# Patient Record
Sex: MALE | Race: WHITE | NOT HISPANIC OR LATINO
[De-identification: names, ages, dates, MRNs, and addresses within clinical notes are randomized per-mention and may not be internally consistent; named-entity substitution may affect disease eponyms.]

---

## 2017-02-16 ENCOUNTER — RX RENEWAL (OUTPATIENT)
Age: 82
End: 2017-02-16

## 2017-03-11 ENCOUNTER — RX RENEWAL (OUTPATIENT)
Age: 82
End: 2017-03-11

## 2017-04-13 ENCOUNTER — RX RENEWAL (OUTPATIENT)
Age: 82
End: 2017-04-13

## 2017-04-14 DIAGNOSIS — J06.9 ACUTE UPPER RESPIRATORY INFECTION, UNSPECIFIED: ICD-10-CM

## 2017-04-25 ENCOUNTER — APPOINTMENT (OUTPATIENT)
Dept: CARDIOLOGY | Facility: CLINIC | Age: 82
End: 2017-04-25

## 2017-04-25 ENCOUNTER — NON-APPOINTMENT (OUTPATIENT)
Age: 82
End: 2017-04-25

## 2017-04-25 VITALS
BODY MASS INDEX: 28.88 KG/M2 | SYSTOLIC BLOOD PRESSURE: 116 MMHG | HEIGHT: 69 IN | DIASTOLIC BLOOD PRESSURE: 61 MMHG | HEART RATE: 54 BPM | RESPIRATION RATE: 16 BRPM | OXYGEN SATURATION: 100 % | WEIGHT: 195 LBS

## 2017-04-25 RX ORDER — AZITHROMYCIN DIHYDRATE 250 MG/1
250 TABLET, FILM COATED ORAL
Qty: 1 | Refills: 0 | Status: DISCONTINUED | COMMUNITY
Start: 2017-04-14 | End: 2017-04-25

## 2017-04-26 ENCOUNTER — RX RENEWAL (OUTPATIENT)
Age: 82
End: 2017-04-26

## 2017-05-17 ENCOUNTER — RX RENEWAL (OUTPATIENT)
Age: 82
End: 2017-05-17

## 2017-06-05 ENCOUNTER — RX RENEWAL (OUTPATIENT)
Age: 82
End: 2017-06-05

## 2017-08-22 ENCOUNTER — APPOINTMENT (OUTPATIENT)
Dept: CARDIOLOGY | Facility: CLINIC | Age: 82
End: 2017-08-22
Payer: MEDICARE

## 2017-08-22 ENCOUNTER — NON-APPOINTMENT (OUTPATIENT)
Age: 82
End: 2017-08-22

## 2017-08-22 VITALS
HEART RATE: 52 BPM | HEIGHT: 69 IN | BODY MASS INDEX: 29.03 KG/M2 | OXYGEN SATURATION: 99 % | DIASTOLIC BLOOD PRESSURE: 70 MMHG | RESPIRATION RATE: 16 BRPM | SYSTOLIC BLOOD PRESSURE: 119 MMHG | WEIGHT: 196 LBS

## 2017-08-22 PROCEDURE — 93000 ELECTROCARDIOGRAM COMPLETE: CPT

## 2017-08-22 PROCEDURE — 93306 TTE W/DOPPLER COMPLETE: CPT

## 2017-08-22 PROCEDURE — 99215 OFFICE O/P EST HI 40 MIN: CPT

## 2017-08-27 ENCOUNTER — EMERGENCY (EMERGENCY)
Facility: HOSPITAL | Age: 82
LOS: 1 days | Discharge: HOME | End: 2017-08-27
Admitting: INTERNAL MEDICINE
Payer: MEDICARE

## 2017-08-27 PROCEDURE — 70450 CT HEAD/BRAIN W/O DYE: CPT | Mod: 26

## 2017-08-27 PROCEDURE — 71010: CPT | Mod: 26

## 2017-08-27 PROCEDURE — 72125 CT NECK SPINE W/O DYE: CPT

## 2017-08-27 PROCEDURE — 71045 X-RAY EXAM CHEST 1 VIEW: CPT

## 2017-08-27 PROCEDURE — 99284 EMERGENCY DEPT VISIT MOD MDM: CPT

## 2017-08-27 PROCEDURE — 73030 X-RAY EXAM OF SHOULDER: CPT | Mod: 26,RT

## 2017-08-27 PROCEDURE — 70450 CT HEAD/BRAIN W/O DYE: CPT

## 2017-08-27 PROCEDURE — 72125 CT NECK SPINE W/O DYE: CPT | Mod: 26

## 2017-08-27 PROCEDURE — 99284 EMERGENCY DEPT VISIT MOD MDM: CPT | Mod: 25

## 2017-08-27 PROCEDURE — 73030 X-RAY EXAM OF SHOULDER: CPT

## 2017-08-27 PROCEDURE — 72170 X-RAY EXAM OF PELVIS: CPT | Mod: 26

## 2017-08-27 PROCEDURE — 72170 X-RAY EXAM OF PELVIS: CPT

## 2017-12-22 ENCOUNTER — NON-APPOINTMENT (OUTPATIENT)
Age: 82
End: 2017-12-22

## 2017-12-22 ENCOUNTER — APPOINTMENT (OUTPATIENT)
Dept: CARDIOLOGY | Facility: CLINIC | Age: 82
End: 2017-12-22
Payer: MEDICARE

## 2017-12-22 VITALS
HEART RATE: 80 BPM | DIASTOLIC BLOOD PRESSURE: 74 MMHG | WEIGHT: 197 LBS | OXYGEN SATURATION: 99 % | RESPIRATION RATE: 15 BRPM | HEIGHT: 69 IN | BODY MASS INDEX: 29.18 KG/M2 | SYSTOLIC BLOOD PRESSURE: 156 MMHG

## 2017-12-22 PROCEDURE — 99215 OFFICE O/P EST HI 40 MIN: CPT

## 2017-12-22 PROCEDURE — 93000 ELECTROCARDIOGRAM COMPLETE: CPT

## 2017-12-31 ENCOUNTER — EMERGENCY (EMERGENCY)
Facility: HOSPITAL | Age: 82
LOS: 1 days | Discharge: HOME | End: 2017-12-31
Admitting: EMERGENCY MEDICINE
Payer: MEDICARE

## 2017-12-31 PROCEDURE — 99284 EMERGENCY DEPT VISIT MOD MDM: CPT

## 2017-12-31 PROCEDURE — 73562 X-RAY EXAM OF KNEE 3: CPT | Mod: 26,50

## 2017-12-31 PROCEDURE — 72125 CT NECK SPINE W/O DYE: CPT | Mod: 26

## 2017-12-31 PROCEDURE — 99284 EMERGENCY DEPT VISIT MOD MDM: CPT | Mod: 25

## 2017-12-31 PROCEDURE — 71101 X-RAY EXAM UNILAT RIBS/CHEST: CPT

## 2017-12-31 PROCEDURE — 72125 CT NECK SPINE W/O DYE: CPT

## 2017-12-31 PROCEDURE — 73502 X-RAY EXAM HIP UNI 2-3 VIEWS: CPT

## 2017-12-31 PROCEDURE — 73502 X-RAY EXAM HIP UNI 2-3 VIEWS: CPT | Mod: 26,RT

## 2017-12-31 PROCEDURE — 70450 CT HEAD/BRAIN W/O DYE: CPT

## 2017-12-31 PROCEDURE — 71101 X-RAY EXAM UNILAT RIBS/CHEST: CPT | Mod: 26

## 2017-12-31 PROCEDURE — 73562 X-RAY EXAM OF KNEE 3: CPT

## 2017-12-31 PROCEDURE — 70450 CT HEAD/BRAIN W/O DYE: CPT | Mod: 26

## 2018-01-27 ENCOUNTER — INPATIENT (INPATIENT)
Facility: HOSPITAL | Age: 83
LOS: 4 days | Discharge: NURSING, REHAB OR LONG-TERM CARE | DRG: 872 | End: 2018-02-01
Attending: STUDENT IN AN ORGANIZED HEALTH CARE EDUCATION/TRAINING PROGRAM | Admitting: HOSPITALIST
Payer: MEDICARE

## 2018-01-27 DIAGNOSIS — Z88.0 ALLERGY STATUS TO PENICILLIN: ICD-10-CM

## 2018-01-27 DIAGNOSIS — I48.0 PAROXYSMAL ATRIAL FIBRILLATION: ICD-10-CM

## 2018-01-27 DIAGNOSIS — E83.42 HYPOMAGNESEMIA: ICD-10-CM

## 2018-01-27 DIAGNOSIS — N17.9 ACUTE KIDNEY FAILURE, UNSPECIFIED: ICD-10-CM

## 2018-01-27 DIAGNOSIS — A41.9 SEPSIS, UNSPECIFIED ORGANISM: ICD-10-CM

## 2018-01-27 DIAGNOSIS — E87.0 HYPEROSMOLALITY AND HYPERNATREMIA: ICD-10-CM

## 2018-01-27 DIAGNOSIS — M48.00 SPINAL STENOSIS, SITE UNSPECIFIED: ICD-10-CM

## 2018-01-27 DIAGNOSIS — R60.0 LOCALIZED EDEMA: ICD-10-CM

## 2018-01-27 DIAGNOSIS — I10 ESSENTIAL (PRIMARY) HYPERTENSION: ICD-10-CM

## 2018-01-27 DIAGNOSIS — E03.9 HYPOTHYROIDISM, UNSPECIFIED: ICD-10-CM

## 2018-01-27 DIAGNOSIS — N40.0 BENIGN PROSTATIC HYPERPLASIA WITHOUT LOWER URINARY TRACT SYMPTOMS: ICD-10-CM

## 2018-01-27 DIAGNOSIS — K57.32 DIVERTICULITIS OF LARGE INTESTINE WITHOUT PERFORATION OR ABSCESS WITHOUT BLEEDING: ICD-10-CM

## 2018-01-27 DIAGNOSIS — E83.39 OTHER DISORDERS OF PHOSPHORUS METABOLISM: ICD-10-CM

## 2018-01-27 DIAGNOSIS — E87.6 HYPOKALEMIA: ICD-10-CM

## 2018-01-27 DIAGNOSIS — R65.20 SEVERE SEPSIS WITHOUT SEPTIC SHOCK: ICD-10-CM

## 2018-01-27 DIAGNOSIS — M10.9 GOUT, UNSPECIFIED: ICD-10-CM

## 2018-01-27 DIAGNOSIS — K21.9 GASTRO-ESOPHAGEAL REFLUX DISEASE WITHOUT ESOPHAGITIS: ICD-10-CM

## 2018-01-27 PROCEDURE — 74176 CT ABD & PELVIS W/O CONTRAST: CPT | Mod: 26

## 2018-01-27 PROCEDURE — 71046 X-RAY EXAM CHEST 2 VIEWS: CPT | Mod: 26

## 2018-01-27 PROCEDURE — 71045 X-RAY EXAM CHEST 1 VIEW: CPT | Mod: 26,59

## 2018-01-27 PROCEDURE — 99223 1ST HOSP IP/OBS HIGH 75: CPT

## 2018-01-27 PROCEDURE — 93010 ELECTROCARDIOGRAM REPORT: CPT

## 2018-01-28 PROCEDURE — 99232 SBSQ HOSP IP/OBS MODERATE 35: CPT

## 2018-01-29 PROCEDURE — 93970 EXTREMITY STUDY: CPT | Mod: 26

## 2018-01-29 PROCEDURE — 99233 SBSQ HOSP IP/OBS HIGH 50: CPT

## 2018-01-30 PROCEDURE — 71045 X-RAY EXAM CHEST 1 VIEW: CPT | Mod: 26

## 2018-01-30 PROCEDURE — 99233 SBSQ HOSP IP/OBS HIGH 50: CPT

## 2018-01-31 PROCEDURE — 99233 SBSQ HOSP IP/OBS HIGH 50: CPT

## 2018-02-01 PROCEDURE — 80053 COMPREHEN METABOLIC PANEL: CPT

## 2018-02-01 PROCEDURE — 87486 CHLMYD PNEUM DNA AMP PROBE: CPT

## 2018-02-01 PROCEDURE — 81003 URINALYSIS AUTO W/O SCOPE: CPT

## 2018-02-01 PROCEDURE — 74176 CT ABD & PELVIS W/O CONTRAST: CPT

## 2018-02-01 PROCEDURE — 84300 ASSAY OF URINE SODIUM: CPT

## 2018-02-01 PROCEDURE — 85027 COMPLETE CBC AUTOMATED: CPT

## 2018-02-01 PROCEDURE — 85730 THROMBOPLASTIN TIME PARTIAL: CPT

## 2018-02-01 PROCEDURE — 87086 URINE CULTURE/COLONY COUNT: CPT

## 2018-02-01 PROCEDURE — 99239 HOSP IP/OBS DSCHRG MGMT >30: CPT

## 2018-02-01 PROCEDURE — 71045 X-RAY EXAM CHEST 1 VIEW: CPT

## 2018-02-01 PROCEDURE — 87400 INFLUENZA A/B EACH AG IA: CPT

## 2018-02-01 PROCEDURE — 85025 COMPLETE CBC W/AUTO DIFF WBC: CPT

## 2018-02-01 PROCEDURE — 97116 GAIT TRAINING THERAPY: CPT

## 2018-02-01 PROCEDURE — 83605 ASSAY OF LACTIC ACID: CPT

## 2018-02-01 PROCEDURE — 96365 THER/PROPH/DIAG IV INF INIT: CPT

## 2018-02-01 PROCEDURE — 84100 ASSAY OF PHOSPHORUS: CPT

## 2018-02-01 PROCEDURE — 85610 PROTHROMBIN TIME: CPT

## 2018-02-01 PROCEDURE — 93005 ELECTROCARDIOGRAM TRACING: CPT

## 2018-02-01 PROCEDURE — 83036 HEMOGLOBIN GLYCOSYLATED A1C: CPT

## 2018-02-01 PROCEDURE — 80076 HEPATIC FUNCTION PANEL: CPT

## 2018-02-01 PROCEDURE — 87633 RESP VIRUS 12-25 TARGETS: CPT

## 2018-02-01 PROCEDURE — 87040 BLOOD CULTURE FOR BACTERIA: CPT

## 2018-02-01 PROCEDURE — 82570 ASSAY OF URINE CREATININE: CPT

## 2018-02-01 PROCEDURE — 87493 C DIFF AMPLIFIED PROBE: CPT

## 2018-02-01 PROCEDURE — 80048 BASIC METABOLIC PNL TOTAL CA: CPT

## 2018-02-01 PROCEDURE — 93970 EXTREMITY STUDY: CPT

## 2018-02-01 PROCEDURE — 87581 M.PNEUMON DNA AMP PROBE: CPT

## 2018-02-01 PROCEDURE — 83735 ASSAY OF MAGNESIUM: CPT

## 2018-02-01 PROCEDURE — 97163 PT EVAL HIGH COMPLEX 45 MIN: CPT

## 2018-02-01 PROCEDURE — 96367 TX/PROPH/DG ADDL SEQ IV INF: CPT

## 2018-02-01 PROCEDURE — 99285 EMERGENCY DEPT VISIT HI MDM: CPT | Mod: 25

## 2018-02-01 PROCEDURE — 71046 X-RAY EXAM CHEST 2 VIEWS: CPT

## 2018-03-16 ENCOUNTER — EMERGENCY (EMERGENCY)
Facility: HOSPITAL | Age: 83
LOS: 1 days | Discharge: HOME | End: 2018-03-16
Admitting: EMERGENCY MEDICINE
Payer: MEDICARE
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
## 2018-03-16 DIAGNOSIS — S61.511A LACERATION WITHOUT FOREIGN BODY OF RIGHT WRIST, INITIAL ENCOUNTER: ICD-10-CM

## 2018-03-16 DIAGNOSIS — K21.9 GASTRO-ESOPHAGEAL REFLUX DISEASE WITHOUT ESOPHAGITIS: ICD-10-CM
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
## 2018-03-16 DIAGNOSIS — N40.0 BENIGN PROSTATIC HYPERPLASIA WITHOUT LOWER URINARY TRACT SYMPTOMS: ICD-10-CM

## 2018-03-16 DIAGNOSIS — Z79.899 OTHER LONG TERM (CURRENT) DRUG THERAPY: ICD-10-CM

## 2018-03-16 DIAGNOSIS — I10 ESSENTIAL (PRIMARY) HYPERTENSION: ICD-10-CM

## 2018-03-16 DIAGNOSIS — E03.9 HYPOTHYROIDISM, UNSPECIFIED: ICD-10-CM

## 2018-03-16 DIAGNOSIS — Z88.0 ALLERGY STATUS TO PENICILLIN: ICD-10-CM

## 2018-03-16 DIAGNOSIS — I48.91 UNSPECIFIED ATRIAL FIBRILLATION: ICD-10-CM
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
## 2018-03-16 PROCEDURE — 99282 EMERGENCY DEPT VISIT SF MDM: CPT

## 2018-04-24 ENCOUNTER — APPOINTMENT (OUTPATIENT)
Dept: CARDIOLOGY | Facility: CLINIC | Age: 83
End: 2018-04-24
Payer: MEDICARE

## 2018-04-24 ENCOUNTER — NON-APPOINTMENT (OUTPATIENT)
Age: 83
End: 2018-04-24

## 2018-04-24 VITALS
BODY MASS INDEX: 28.58 KG/M2 | HEIGHT: 69 IN | OXYGEN SATURATION: 99 % | RESPIRATION RATE: 17 BRPM | SYSTOLIC BLOOD PRESSURE: 155 MMHG | WEIGHT: 193 LBS | HEART RATE: 62 BPM | DIASTOLIC BLOOD PRESSURE: 68 MMHG

## 2018-04-24 PROCEDURE — 99215 OFFICE O/P EST HI 40 MIN: CPT

## 2018-04-24 PROCEDURE — 93000 ELECTROCARDIOGRAM COMPLETE: CPT

## 2018-08-15 ENCOUNTER — EMERGENCY (EMERGENCY)
Facility: HOSPITAL | Age: 83
LOS: 1 days | Discharge: HOME | End: 2018-08-15
Attending: EMERGENCY MEDICINE | Admitting: EMERGENCY MEDICINE
Payer: MEDICARE

## 2018-08-15 VITALS
DIASTOLIC BLOOD PRESSURE: 68 MMHG | HEART RATE: 92 BPM | TEMPERATURE: 99 F | SYSTOLIC BLOOD PRESSURE: 111 MMHG | OXYGEN SATURATION: 99 % | HEIGHT: 71 IN | RESPIRATION RATE: 18 BRPM | WEIGHT: 190.04 LBS

## 2018-08-15 VITALS
OXYGEN SATURATION: 99 % | DIASTOLIC BLOOD PRESSURE: 70 MMHG | SYSTOLIC BLOOD PRESSURE: 114 MMHG | RESPIRATION RATE: 18 BRPM | TEMPERATURE: 99 F | HEART RATE: 86 BPM

## 2018-08-15 LAB
ALBUMIN SERPL ELPH-MCNC: 3.1 G/DL — LOW (ref 3.3–5)
ALP SERPL-CCNC: 51 U/L — SIGNIFICANT CHANGE UP (ref 40–120)
ALT FLD-CCNC: 24 U/L DA — SIGNIFICANT CHANGE UP (ref 10–45)
ANION GAP SERPL CALC-SCNC: 8 MMOL/L — SIGNIFICANT CHANGE UP (ref 5–17)
APPEARANCE UR: CLEAR — SIGNIFICANT CHANGE UP
AST SERPL-CCNC: 27 U/L — SIGNIFICANT CHANGE UP (ref 10–40)
BACTERIA # UR AUTO: NEGATIVE /HPF — SIGNIFICANT CHANGE UP
BASOPHILS # BLD AUTO: 0.1 K/UL — SIGNIFICANT CHANGE UP (ref 0–0.2)
BASOPHILS NFR BLD AUTO: 1.1 % — SIGNIFICANT CHANGE UP (ref 0–2)
BILIRUB SERPL-MCNC: 0.6 MG/DL — SIGNIFICANT CHANGE UP (ref 0.2–1.2)
BILIRUB UR-MCNC: NEGATIVE — SIGNIFICANT CHANGE UP
BUN SERPL-MCNC: 32 MG/DL — HIGH (ref 7–23)
CALCIUM SERPL-MCNC: 8.7 MG/DL — SIGNIFICANT CHANGE UP (ref 8.4–10.5)
CHLORIDE SERPL-SCNC: 102 MMOL/L — SIGNIFICANT CHANGE UP (ref 96–108)
CO2 SERPL-SCNC: 30 MMOL/L — SIGNIFICANT CHANGE UP (ref 22–31)
COLOR SPEC: YELLOW — SIGNIFICANT CHANGE UP
CREAT SERPL-MCNC: 1.47 MG/DL — HIGH (ref 0.5–1.3)
DIFF PNL FLD: ABNORMAL
EOSINOPHIL # BLD AUTO: 0 K/UL — SIGNIFICANT CHANGE UP (ref 0–0.5)
EOSINOPHIL NFR BLD AUTO: 0.1 % — SIGNIFICANT CHANGE UP (ref 0–6)
EPI CELLS # UR: SIGNIFICANT CHANGE UP
GLUCOSE SERPL-MCNC: 112 MG/DL — HIGH (ref 70–99)
GLUCOSE UR QL: NEGATIVE — SIGNIFICANT CHANGE UP
HCT VFR BLD CALC: 38.4 % — LOW (ref 39–50)
HGB BLD-MCNC: 12.7 G/DL — LOW (ref 13–17)
KETONES UR-MCNC: NEGATIVE — SIGNIFICANT CHANGE UP
LEUKOCYTE ESTERASE UR-ACNC: NEGATIVE — SIGNIFICANT CHANGE UP
LYMPHOCYTES # BLD AUTO: 0.5 K/UL — LOW (ref 1–3.3)
LYMPHOCYTES # BLD AUTO: 9.6 % — LOW (ref 13–44)
MCHC RBC-ENTMCNC: 30.2 PG — SIGNIFICANT CHANGE UP (ref 27–34)
MCHC RBC-ENTMCNC: 33 GM/DL — SIGNIFICANT CHANGE UP (ref 32–36)
MCV RBC AUTO: 91.6 FL — SIGNIFICANT CHANGE UP (ref 80–100)
MONOCYTES # BLD AUTO: 0.6 K/UL — SIGNIFICANT CHANGE UP (ref 0–0.9)
MONOCYTES NFR BLD AUTO: 10 % — HIGH (ref 1–9)
NEUTROPHILS # BLD AUTO: 4.5 K/UL — SIGNIFICANT CHANGE UP (ref 1.8–7.4)
NEUTROPHILS NFR BLD AUTO: 79.2 % — HIGH (ref 43–77)
NITRITE UR-MCNC: NEGATIVE — SIGNIFICANT CHANGE UP
PH UR: 7 — SIGNIFICANT CHANGE UP (ref 5–8)
PLATELET # BLD AUTO: 174 K/UL — SIGNIFICANT CHANGE UP (ref 150–400)
POTASSIUM SERPL-MCNC: 3.7 MMOL/L — SIGNIFICANT CHANGE UP (ref 3.5–5.3)
POTASSIUM SERPL-SCNC: 3.7 MMOL/L — SIGNIFICANT CHANGE UP (ref 3.5–5.3)
PROT SERPL-MCNC: 6.6 G/DL — SIGNIFICANT CHANGE UP (ref 6–8.3)
PROT UR-MCNC: 30 MG/DL
RBC # BLD: 4.19 M/UL — LOW (ref 4.2–5.8)
RBC # FLD: 14.7 % — HIGH (ref 10.3–14.5)
RBC CASTS # UR COMP ASSIST: SIGNIFICANT CHANGE UP /HPF (ref 0–4)
SODIUM SERPL-SCNC: 140 MMOL/L — SIGNIFICANT CHANGE UP (ref 135–145)
SP GR SPEC: 1.01 — SIGNIFICANT CHANGE UP (ref 1.01–1.02)
UROBILINOGEN FLD QL: NEGATIVE — SIGNIFICANT CHANGE UP
WBC # BLD: 5.7 K/UL — SIGNIFICANT CHANGE UP (ref 3.8–10.5)
WBC # FLD AUTO: 5.7 K/UL — SIGNIFICANT CHANGE UP (ref 3.8–10.5)
WBC UR QL: NEGATIVE /HPF — SIGNIFICANT CHANGE UP (ref 0–5)

## 2018-08-15 PROCEDURE — 99284 EMERGENCY DEPT VISIT MOD MDM: CPT

## 2018-08-15 PROCEDURE — 99284 EMERGENCY DEPT VISIT MOD MDM: CPT | Mod: 25

## 2018-08-15 PROCEDURE — 96374 THER/PROPH/DIAG INJ IV PUSH: CPT

## 2018-08-15 PROCEDURE — 85027 COMPLETE CBC AUTOMATED: CPT

## 2018-08-15 PROCEDURE — 96361 HYDRATE IV INFUSION ADD-ON: CPT

## 2018-08-15 PROCEDURE — 81001 URINALYSIS AUTO W/SCOPE: CPT

## 2018-08-15 PROCEDURE — 80053 COMPREHEN METABOLIC PANEL: CPT

## 2018-08-15 RX ORDER — SODIUM CHLORIDE 9 MG/ML
1000 INJECTION INTRAMUSCULAR; INTRAVENOUS; SUBCUTANEOUS ONCE
Qty: 0 | Refills: 0 | Status: COMPLETED | OUTPATIENT
Start: 2018-08-15 | End: 2018-08-15

## 2018-08-15 RX ORDER — KETOROLAC TROMETHAMINE 30 MG/ML
15 SYRINGE (ML) INJECTION ONCE
Qty: 0 | Refills: 0 | Status: DISCONTINUED | OUTPATIENT
Start: 2018-08-15 | End: 2018-08-15

## 2018-08-15 RX ADMIN — SODIUM CHLORIDE 2000 MILLILITER(S): 9 INJECTION INTRAMUSCULAR; INTRAVENOUS; SUBCUTANEOUS at 01:49

## 2018-08-15 RX ADMIN — SODIUM CHLORIDE 1000 MILLILITER(S): 9 INJECTION INTRAMUSCULAR; INTRAVENOUS; SUBCUTANEOUS at 02:35

## 2018-08-15 RX ADMIN — Medication 15 MILLIGRAM(S): at 04:25

## 2018-08-15 RX ADMIN — Medication 15 MILLIGRAM(S): at 03:55

## 2018-08-15 NOTE — ED PROVIDER NOTE - OBJECTIVE STATEMENT
Pertinent PMH/PSH/FHx/SHx and Review of Systems contained within:  89 y/o male BIB ems from assisted living s/o fall from bed. pt slide off bed while trying to get up.

## 2018-08-15 NOTE — ED ADULT TRIAGE NOTE - ARRIVAL FROM
[facility] living [facility]

## 2018-08-15 NOTE — ED ADULT NURSE NOTE - PMH
Dementia BPH (benign prostatic hyperplasia)    CN (constipation)    Dementia    Edema    GERD (gastroesophageal reflux disease)    Gout    Hypothyroidism

## 2018-08-15 NOTE — ED ADULT NURSE NOTE - NSIMPLEMENTINTERV_GEN_ALL_ED
Implemented All Fall with Harm Risk Interventions:  [place] to call system. Call bell, personal items and telephone within reach. Instruct patient to call for assistance. Room bathroom lighting operational. Non-slip footwear when patient is off stretcher. Physically safe environment: no spills, clutter or unnecessary equipment. Stretcher in lowest position, wheels locked, appropriate side rails in place. Provide visual cue, wrist band, yellow gown, etc. Monitor gait and stability. Monitor for mental status changes and reorient to person, place, and time. Review medications for side effects contributing to fall risk. Reinforce activity limits and safety measures with patient and family. Provide visual clues: red socks.

## 2018-08-28 ENCOUNTER — NON-APPOINTMENT (OUTPATIENT)
Age: 83
End: 2018-08-28

## 2018-08-28 ENCOUNTER — APPOINTMENT (OUTPATIENT)
Dept: CARDIOLOGY | Facility: CLINIC | Age: 83
End: 2018-08-28
Payer: MEDICARE

## 2018-08-28 VITALS
DIASTOLIC BLOOD PRESSURE: 64 MMHG | HEIGHT: 69 IN | HEART RATE: 52 BPM | OXYGEN SATURATION: 100 % | WEIGHT: 193 LBS | SYSTOLIC BLOOD PRESSURE: 148 MMHG | RESPIRATION RATE: 17 BRPM | BODY MASS INDEX: 28.58 KG/M2

## 2018-08-28 DIAGNOSIS — R60.0 LOCALIZED EDEMA: ICD-10-CM

## 2018-08-28 PROBLEM — K21.9 GASTRO-ESOPHAGEAL REFLUX DISEASE WITHOUT ESOPHAGITIS: Chronic | Status: ACTIVE | Noted: 2018-08-15

## 2018-08-28 PROBLEM — E03.9 HYPOTHYROIDISM, UNSPECIFIED: Chronic | Status: ACTIVE | Noted: 2018-08-15

## 2018-08-28 PROBLEM — N40.0 BENIGN PROSTATIC HYPERPLASIA WITHOUT LOWER URINARY TRACT SYMPTOMS: Chronic | Status: ACTIVE | Noted: 2018-08-15

## 2018-08-28 PROBLEM — R60.9 EDEMA, UNSPECIFIED: Chronic | Status: ACTIVE | Noted: 2018-08-15

## 2018-08-28 PROBLEM — M10.9 GOUT, UNSPECIFIED: Chronic | Status: ACTIVE | Noted: 2018-08-15

## 2018-08-28 PROBLEM — M19.90 UNSPECIFIED OSTEOARTHRITIS, UNSPECIFIED SITE: Chronic | Status: ACTIVE | Noted: 2018-08-15

## 2018-08-28 PROBLEM — F03.90 UNSPECIFIED DEMENTIA WITHOUT BEHAVIORAL DISTURBANCE: Chronic | Status: ACTIVE | Noted: 2018-08-15

## 2018-08-28 PROBLEM — K59.00 CONSTIPATION, UNSPECIFIED: Chronic | Status: ACTIVE | Noted: 2018-08-15

## 2018-08-28 PROCEDURE — 99214 OFFICE O/P EST MOD 30 MIN: CPT

## 2018-08-28 PROCEDURE — 93306 TTE W/DOPPLER COMPLETE: CPT

## 2018-08-28 PROCEDURE — 93000 ELECTROCARDIOGRAM COMPLETE: CPT

## 2018-11-01 ENCOUNTER — INPATIENT (INPATIENT)
Facility: HOSPITAL | Age: 83
LOS: 4 days | Discharge: NURSING, REHAB OR LONG-TERM CARE | DRG: 392 | End: 2018-11-06
Attending: HOSPITALIST | Admitting: INTERNAL MEDICINE
Payer: MEDICARE

## 2018-11-01 VITALS
TEMPERATURE: 98 F | RESPIRATION RATE: 16 BRPM | WEIGHT: 179.46 LBS | SYSTOLIC BLOOD PRESSURE: 119 MMHG | DIASTOLIC BLOOD PRESSURE: 76 MMHG | HEART RATE: 70 BPM | OXYGEN SATURATION: 98 %

## 2018-11-01 DIAGNOSIS — N40.1 BENIGN PROSTATIC HYPERPLASIA WITH LOWER URINARY TRACT SYMPTOMS: ICD-10-CM

## 2018-11-01 DIAGNOSIS — K57.92 DIVERTICULITIS OF INTESTINE, PART UNSPECIFIED, WITHOUT PERFORATION OR ABSCESS WITHOUT BLEEDING: ICD-10-CM

## 2018-11-01 DIAGNOSIS — M10.9 GOUT, UNSPECIFIED: ICD-10-CM

## 2018-11-01 DIAGNOSIS — E03.9 HYPOTHYROIDISM, UNSPECIFIED: ICD-10-CM

## 2018-11-01 LAB
ALBUMIN SERPL ELPH-MCNC: 3.1 G/DL — LOW (ref 3.3–5)
ALP SERPL-CCNC: 57 U/L — SIGNIFICANT CHANGE UP (ref 40–120)
ALT FLD-CCNC: 23 U/L DA — SIGNIFICANT CHANGE UP (ref 10–45)
ANION GAP SERPL CALC-SCNC: 14 MMOL/L — SIGNIFICANT CHANGE UP (ref 5–17)
AST SERPL-CCNC: 23 U/L — SIGNIFICANT CHANGE UP (ref 10–40)
BASOPHILS # BLD AUTO: 0 K/UL — SIGNIFICANT CHANGE UP (ref 0–0.2)
BASOPHILS NFR BLD AUTO: 0.6 % — SIGNIFICANT CHANGE UP (ref 0–2)
BILIRUB SERPL-MCNC: 0.4 MG/DL — SIGNIFICANT CHANGE UP (ref 0.2–1.2)
BUN SERPL-MCNC: 38 MG/DL — HIGH (ref 7–23)
CALCIUM SERPL-MCNC: 8.9 MG/DL — SIGNIFICANT CHANGE UP (ref 8.4–10.5)
CHLORIDE SERPL-SCNC: 107 MMOL/L — SIGNIFICANT CHANGE UP (ref 96–108)
CO2 SERPL-SCNC: 25 MMOL/L — SIGNIFICANT CHANGE UP (ref 22–31)
CREAT SERPL-MCNC: 1.48 MG/DL — HIGH (ref 0.5–1.3)
EOSINOPHIL # BLD AUTO: 0.1 K/UL — SIGNIFICANT CHANGE UP (ref 0–0.5)
EOSINOPHIL NFR BLD AUTO: 1.2 % — SIGNIFICANT CHANGE UP (ref 0–6)
GLUCOSE SERPL-MCNC: 89 MG/DL — SIGNIFICANT CHANGE UP (ref 70–99)
HCT VFR BLD CALC: 37.2 % — LOW (ref 39–50)
HGB BLD-MCNC: 11.8 G/DL — LOW (ref 13–17)
LYMPHOCYTES # BLD AUTO: 1.1 K/UL — SIGNIFICANT CHANGE UP (ref 1–3.3)
LYMPHOCYTES # BLD AUTO: 17.8 % — SIGNIFICANT CHANGE UP (ref 13–44)
MCHC RBC-ENTMCNC: 29.7 PG — SIGNIFICANT CHANGE UP (ref 27–34)
MCHC RBC-ENTMCNC: 31.7 GM/DL — LOW (ref 32–36)
MCV RBC AUTO: 93.6 FL — SIGNIFICANT CHANGE UP (ref 80–100)
MONOCYTES # BLD AUTO: 0.6 K/UL — SIGNIFICANT CHANGE UP (ref 0–0.9)
MONOCYTES NFR BLD AUTO: 10 % — HIGH (ref 1–9)
NEUTROPHILS # BLD AUTO: 4.5 K/UL — SIGNIFICANT CHANGE UP (ref 1.8–7.4)
NEUTROPHILS NFR BLD AUTO: 70.3 % — SIGNIFICANT CHANGE UP (ref 43–77)
PLATELET # BLD AUTO: 189 K/UL — SIGNIFICANT CHANGE UP (ref 150–400)
POTASSIUM SERPL-MCNC: 3 MMOL/L — LOW (ref 3.5–5.3)
POTASSIUM SERPL-SCNC: 3 MMOL/L — LOW (ref 3.5–5.3)
PROT SERPL-MCNC: 6.7 G/DL — SIGNIFICANT CHANGE UP (ref 6–8.3)
RBC # BLD: 3.98 M/UL — LOW (ref 4.2–5.8)
RBC # FLD: 14.8 % — HIGH (ref 10.3–14.5)
SODIUM SERPL-SCNC: 146 MMOL/L — HIGH (ref 135–145)
WBC # BLD: 6.3 K/UL — SIGNIFICANT CHANGE UP (ref 3.8–10.5)
WBC # FLD AUTO: 6.3 K/UL — SIGNIFICANT CHANGE UP (ref 3.8–10.5)

## 2018-11-01 PROCEDURE — 74176 CT ABD & PELVIS W/O CONTRAST: CPT | Mod: 26

## 2018-11-01 PROCEDURE — 71045 X-RAY EXAM CHEST 1 VIEW: CPT | Mod: 26

## 2018-11-01 PROCEDURE — 99285 EMERGENCY DEPT VISIT HI MDM: CPT

## 2018-11-01 PROCEDURE — 93010 ELECTROCARDIOGRAM REPORT: CPT

## 2018-11-01 PROCEDURE — 99223 1ST HOSP IP/OBS HIGH 75: CPT

## 2018-11-01 RX ORDER — MULTIVIT-MIN/FERROUS GLUCONATE 9 MG/15 ML
1 LIQUID (ML) ORAL DAILY
Qty: 0 | Refills: 0 | Status: DISCONTINUED | OUTPATIENT
Start: 2018-11-01 | End: 2018-11-03

## 2018-11-01 RX ORDER — MAGNESIUM OXIDE 400 MG ORAL TABLET 241.3 MG
400 TABLET ORAL DAILY
Qty: 0 | Refills: 0 | Status: DISCONTINUED | OUTPATIENT
Start: 2018-11-01 | End: 2018-11-06

## 2018-11-01 RX ORDER — METRONIDAZOLE 500 MG
500 TABLET ORAL ONCE
Qty: 0 | Refills: 0 | Status: COMPLETED | OUTPATIENT
Start: 2018-11-01 | End: 2018-11-01

## 2018-11-01 RX ORDER — POLYETHYLENE GLYCOL 3350 17 G/17G
0 POWDER, FOR SOLUTION ORAL
Qty: 0 | Refills: 0 | COMMUNITY

## 2018-11-01 RX ORDER — DOCUSATE SODIUM 100 MG
1 CAPSULE ORAL
Qty: 0 | Refills: 0 | COMMUNITY

## 2018-11-01 RX ORDER — TRAMADOL HYDROCHLORIDE 50 MG/1
50 TABLET ORAL THREE TIMES A DAY
Qty: 0 | Refills: 0 | Status: DISCONTINUED | OUTPATIENT
Start: 2018-11-01 | End: 2018-11-06

## 2018-11-01 RX ORDER — ALLOPURINOL 300 MG
300 TABLET ORAL DAILY
Qty: 0 | Refills: 0 | Status: DISCONTINUED | OUTPATIENT
Start: 2018-11-01 | End: 2018-11-06

## 2018-11-01 RX ORDER — SODIUM CHLORIDE 9 MG/ML
3 INJECTION INTRAMUSCULAR; INTRAVENOUS; SUBCUTANEOUS ONCE
Qty: 0 | Refills: 0 | Status: COMPLETED | OUTPATIENT
Start: 2018-11-01 | End: 2018-11-01

## 2018-11-01 RX ORDER — CHOLECALCIFEROL (VITAMIN D3) 125 MCG
1 CAPSULE ORAL
Qty: 0 | Refills: 0 | COMMUNITY

## 2018-11-01 RX ORDER — ACETAMINOPHEN 500 MG
650 TABLET ORAL EVERY 6 HOURS
Qty: 0 | Refills: 0 | Status: DISCONTINUED | OUTPATIENT
Start: 2018-11-01 | End: 2018-11-06

## 2018-11-01 RX ORDER — PANTOPRAZOLE SODIUM 20 MG/1
40 TABLET, DELAYED RELEASE ORAL
Qty: 0 | Refills: 0 | Status: DISCONTINUED | OUTPATIENT
Start: 2018-11-01 | End: 2018-11-06

## 2018-11-01 RX ORDER — POTASSIUM CHLORIDE 20 MEQ
40 PACKET (EA) ORAL EVERY 4 HOURS
Qty: 0 | Refills: 0 | Status: COMPLETED | OUTPATIENT
Start: 2018-11-01 | End: 2018-11-02

## 2018-11-01 RX ORDER — FINASTERIDE 5 MG/1
5 TABLET, FILM COATED ORAL DAILY
Qty: 0 | Refills: 0 | Status: DISCONTINUED | OUTPATIENT
Start: 2018-11-01 | End: 2018-11-06

## 2018-11-01 RX ORDER — METRONIDAZOLE 500 MG
500 TABLET ORAL EVERY 8 HOURS
Qty: 0 | Refills: 0 | Status: DISCONTINUED | OUTPATIENT
Start: 2018-11-01 | End: 2018-11-05

## 2018-11-01 RX ORDER — LEVOTHYROXINE SODIUM 125 MCG
112 TABLET ORAL DAILY
Qty: 0 | Refills: 0 | Status: DISCONTINUED | OUTPATIENT
Start: 2018-11-01 | End: 2018-11-06

## 2018-11-01 RX ORDER — CIPROFLOXACIN LACTATE 400MG/40ML
400 VIAL (ML) INTRAVENOUS EVERY 12 HOURS
Qty: 0 | Refills: 0 | Status: DISCONTINUED | OUTPATIENT
Start: 2018-11-01 | End: 2018-11-05

## 2018-11-01 RX ORDER — SODIUM CHLORIDE 9 MG/ML
500 INJECTION INTRAMUSCULAR; INTRAVENOUS; SUBCUTANEOUS ONCE
Qty: 0 | Refills: 0 | Status: COMPLETED | OUTPATIENT
Start: 2018-11-01 | End: 2018-11-01

## 2018-11-01 RX ORDER — HEPARIN SODIUM 5000 [USP'U]/ML
5000 INJECTION INTRAVENOUS; SUBCUTANEOUS EVERY 8 HOURS
Qty: 0 | Refills: 0 | Status: DISCONTINUED | OUTPATIENT
Start: 2018-11-01 | End: 2018-11-06

## 2018-11-01 RX ORDER — TAMSULOSIN HYDROCHLORIDE 0.4 MG/1
0.4 CAPSULE ORAL AT BEDTIME
Qty: 0 | Refills: 0 | Status: DISCONTINUED | OUTPATIENT
Start: 2018-11-01 | End: 2018-11-06

## 2018-11-01 RX ADMIN — SODIUM CHLORIDE 500 MILLILITER(S): 9 INJECTION INTRAMUSCULAR; INTRAVENOUS; SUBCUTANEOUS at 18:34

## 2018-11-01 RX ADMIN — SODIUM CHLORIDE 3 MILLILITER(S): 9 INJECTION INTRAMUSCULAR; INTRAVENOUS; SUBCUTANEOUS at 18:30

## 2018-11-01 RX ADMIN — SODIUM CHLORIDE 500 MILLILITER(S): 9 INJECTION INTRAMUSCULAR; INTRAVENOUS; SUBCUTANEOUS at 19:34

## 2018-11-01 RX ADMIN — Medication 650 MILLIGRAM(S): at 23:14

## 2018-11-01 RX ADMIN — Medication 40 MILLIEQUIVALENT(S): at 22:52

## 2018-11-01 RX ADMIN — Medication 500 MILLIGRAM(S): at 22:23

## 2018-11-01 RX ADMIN — Medication 100 MILLIGRAM(S): at 21:23

## 2018-11-01 RX ADMIN — Medication 200 MILLIGRAM(S): at 22:23

## 2018-11-01 NOTE — H&P ADULT - HISTORY OF PRESENT ILLNESS
89 M hx of HTN, PAF not on AC, gout, chronic venous insufficiency, gout, BPH, spinal stenosis pw sx of diarrhea x 4 days. Pt has been averaging about 3-5 loose stools a day associated with occ crampy abd pain and bloating. No fevers, no chills. no nausea or vomiting. no melena no BRBPR. +significant decreased appetite. No cough, CP or SOB.   Ambulates with walker at baseline.  Lives in assisted living and manages his own meds.   states med rec from assisted living is incorrect and no longer on metolazone or prednisone.

## 2018-11-01 NOTE — H&P ADULT - ASSESSMENT
89 M hx of HTN, PAF not on AC, gout, chronic venous insufficiency, gout, BPH, spinal stenosis pw sx of diarrhea with diverticulitis.

## 2018-11-01 NOTE — H&P ADULT - NSHPLABSRESULTS_GEN_ALL_CORE
11.8   6.3   )-----------( 189      ( 01 Nov 2018 18:35 )             37.2       11-01    146<H>  |  107  |  38<H>  ----------------------------<  89  3.0<L>   |  25  |  1.48<H>    Ca    8.9      01 Nov 2018 18:35    TPro  6.7  /  Alb  3.1<L>  /  TBili  0.4  /  DBili  x   /  AST  23  /  ALT  23  /  AlkPhos  57  11-01         LIVER FUNCTIONS - ( 01 Nov 2018 18:35 )  Alb: 3.1 g/dL / Pro: 6.7 g/dL / ALK PHOS: 57 U/L / ALT: 23 U/L DA / AST: 23 U/L / GGT: x           EKG: PENDING      CXR: PENDING    < from: CT Abdomen and Pelvis w/ Oral Cont (11.01.18 @ 20:41) >    IMPRESSION:    Findings suggestive of diverticulitis of the proximal sigmoid colon which   is located in the lower abdomen to the right of midline. There is   inflammatory change extending to the bladder dome. There is no evidence   of perforation or abscess  Small hiatal hernia again seen  Bilateral renal cysts some of which appear complex.    < end of copied text > 11.8   6.3   )-----------( 189      ( 01 Nov 2018 18:35 )             37.2       11-01    146<H>  |  107  |  38<H>  ----------------------------<  89  3.0<L>   |  25  |  1.48<H>    Ca    8.9      01 Nov 2018 18:35    TPro  6.7  /  Alb  3.1<L>  /  TBili  0.4  /  DBili  x   /  AST  23  /  ALT  23  /  AlkPhos  57  11-01         LIVER FUNCTIONS - ( 01 Nov 2018 18:35 )  Alb: 3.1 g/dL / Pro: 6.7 g/dL / ALK PHOS: 57 U/L / ALT: 23 U/L DA / AST: 23 U/L / GGT: x           EKG: PENDING  sinus rhythm at 62bpm RBBB LPFB.       CXR: PENDING  wet read - NAPD    < from: CT Abdomen and Pelvis w/ Oral Cont (11.01.18 @ 20:41) >    IMPRESSION:    Findings suggestive of diverticulitis of the proximal sigmoid colon which   is located in the lower abdomen to the right of midline. There is   inflammatory change extending to the bladder dome. There is no evidence   of perforation or abscess  Small hiatal hernia again seen  Bilateral renal cysts some of which appear complex.    < end of copied text >

## 2018-11-01 NOTE — H&P ADULT - NSHPREVIEWOFSYSTEMS_GEN_ALL_CORE
CONSTITUTIONAL: No fever, weight loss, or fatigue  EYES: No eye pain, visual disturbances, or discharge  ENMT:  No difficulty hearing, tinnitus, vertigo; No sinus or throat pain  NECK: No pain or stiffness  RESPIRATORY: No cough, wheezing, chills or hemoptysis; No shortness of breath  CARDIOVASCULAR: No chest pain, palpitations, dizziness, or leg swelling  GASTROINTESTINAL: + abdominal discomfort and bloating.  No nausea, vomiting, or hematemesis; + diarrhea no  constipation. No melena or hematochezia.  GENITOURINARY: No dysuria, frequency, hematuria, or incontinence  NEUROLOGICAL: No headaches, memory loss, loss of strength, numbness, or tremors  SKIN: No itching, burning, rashes, or lesions   LYMPH NODES: No enlarged glands  ENDOCRINE: No heat or cold intolerance; No hair loss  MUSCULOSKELETAL: No joint pain or swelling; No muscle, back, or extremity pain  PSYCHIATRIC: No depression, anxiety, mood swings, or difficulty sleeping  HEME/LYMPH: No easy bruising, or bleeding gums  ALLERY AND IMMUNOLOGIC: No hives or eczema    IMPROVE VTE Individual Risk Assessment          RISK                                                          Points  [  ] Previous VTE                                                3  [  ] Thrombophilia                                             2  [  2] Lower limb paralysis                                   2        (unable to hold up >15 seconds)    [  ] Current Cancer                                             2         (within 6 months)  [  ] Immobilization > 24 hrs                              1  [  ] ICU/CCU stay > 24 hours                             1  [1  ] Age > 60                                                         1    IMPROVE VTE Score:         [      3   ]    Total Risk Factor Score:    0 - 1:   Consider IPC  >2 - 3:  Thromboprophylaxis required (enoxaparin or SQ heparin)        >4:   High Risk: Thromboprophylaxis required (enoxaparin or SQ heparin), optional add IPC  **If CONTRAINDICATION to enoxaparin or SQ heparin, USE IPCs**

## 2018-11-01 NOTE — H&P ADULT - PROBLEM SELECTOR PLAN 1
cont cipro and Flagyl.   gentle IVFs overnight.   hold lasix for 1-2 days and restart when euvolemic cont cipro and Flagyl.   GI consult  gentle IVFs overnight.   hold lasix for 1-2 days and restart when euvolemic

## 2018-11-01 NOTE — ED ADULT NURSE NOTE - NSIMPLEMENTINTERV_GEN_ALL_ED
Implemented All Fall with Harm Risk Interventions:  [place] to call system. Call bell, personal items and telephone within reach. Instruct patient to call for assistance. Room bathroom lighting operational. Non-slip footwear when patient is off stretcher. Physically safe environment: no spills, clutter or unnecessary equipment. Stretcher in lowest position, wheels locked, appropriate side rails in place. Provide visual cue, wrist band, yellow gown, etc. Monitor gait and stability. Monitor for mental status changes and reorient to person, place, and time. Review medications for side effects contributing to fall risk. Reinforce activity limits and safety measures with patient and family. Provide visual clues: red socks.

## 2018-11-01 NOTE — ED ADULT NURSE REASSESSMENT NOTE - NS ED NURSE REASSESS COMMENT FT1
To ER patient lives in [address], as per patient he had a fall this week and has laceration on right forearm, and also intermittent pain to right side.  Patient here for diarrhea.

## 2018-11-01 NOTE — INPATIENT CERTIFICATION FOR MEDICARE PATIENTS - THE STATUS OF COMORBIDITIES.
2. The status of comorbities. (See ED/admit documents) 70 year old male presents with AMS which is resolved. patient was at an ambulatory surgery center, about to get his left fourth digit removed for osteomyelitis, found to have blood sugar 43.  patient was given amp of d50 with immediate resolution of symptoms.  Pt's last meal was 9pm and was ice cream. Woke up this morning and checked his blood sugar found it to be 221 so he took 5 units of lantus. Pt is also on novolog but states he took lantus because it works better. denies headache, chest pain, nausea/vomiting, fever, cough, dysuria. Finished course of abx yesterday for the osteomyelitis.  States he takes Lantus at night and also 3-5 units in the morning.

## 2018-11-01 NOTE — ED PROVIDER NOTE - PMH
Arthritis    BPH (benign prostatic hyperplasia)    CN (constipation)    Dementia    Edema    GERD (gastroesophageal reflux disease)    Gout    Hypothyroidism

## 2018-11-01 NOTE — ED ADULT NURSE NOTE - NS TRANSFER PATIENT BELONGINGS
Clothing Cell Phone/PDA (specify)/Clothing/1 pair of pants, 1 jacket, 1 pair of shoes, 1 hat, cellphone , 1 shirt

## 2018-11-01 NOTE — ED ADULT NURSE REASSESSMENT NOTE - NS ED NURSE REASSESS COMMENT FT1
Patient received resting on stretcher. Patient denies pain or discomfort at this time. Oral contrast for CT abdomen in progress. Awaiting CT scan. Call bell within reach. Will continue to monitor.

## 2018-11-02 DIAGNOSIS — E03.9 HYPOTHYROIDISM, UNSPECIFIED: ICD-10-CM

## 2018-11-02 DIAGNOSIS — N40.0 BENIGN PROSTATIC HYPERPLASIA WITHOUT LOWER URINARY TRACT SYMPTOMS: ICD-10-CM

## 2018-11-02 DIAGNOSIS — Z29.9 ENCOUNTER FOR PROPHYLACTIC MEASURES, UNSPECIFIED: ICD-10-CM

## 2018-11-02 DIAGNOSIS — R60.9 EDEMA, UNSPECIFIED: ICD-10-CM

## 2018-11-02 DIAGNOSIS — K21.9 GASTRO-ESOPHAGEAL REFLUX DISEASE WITHOUT ESOPHAGITIS: ICD-10-CM

## 2018-11-02 LAB
ALBUMIN SERPL ELPH-MCNC: 1.8 G/DL — LOW (ref 3.3–5)
ALP SERPL-CCNC: 50 U/L — SIGNIFICANT CHANGE UP (ref 40–120)
ALT FLD-CCNC: 19 U/L DA — SIGNIFICANT CHANGE UP (ref 10–45)
ANION GAP SERPL CALC-SCNC: 8 MMOL/L — SIGNIFICANT CHANGE UP (ref 5–17)
AST SERPL-CCNC: 20 U/L — SIGNIFICANT CHANGE UP (ref 10–40)
BASOPHILS # BLD AUTO: 0 K/UL — SIGNIFICANT CHANGE UP (ref 0–0.2)
BASOPHILS NFR BLD AUTO: 0.3 % — SIGNIFICANT CHANGE UP (ref 0–2)
BILIRUB SERPL-MCNC: 0.4 MG/DL — SIGNIFICANT CHANGE UP (ref 0.2–1.2)
BUN SERPL-MCNC: 31 MG/DL — HIGH (ref 7–23)
CALCIUM SERPL-MCNC: 8.1 MG/DL — LOW (ref 8.4–10.5)
CHLORIDE SERPL-SCNC: 110 MMOL/L — HIGH (ref 96–108)
CO2 SERPL-SCNC: 27 MMOL/L — SIGNIFICANT CHANGE UP (ref 22–31)
CREAT SERPL-MCNC: 1.36 MG/DL — HIGH (ref 0.5–1.3)
EOSINOPHIL # BLD AUTO: 0.2 K/UL — SIGNIFICANT CHANGE UP (ref 0–0.5)
EOSINOPHIL NFR BLD AUTO: 3.1 % — SIGNIFICANT CHANGE UP (ref 0–6)
GLUCOSE SERPL-MCNC: 107 MG/DL — HIGH (ref 70–99)
HCT VFR BLD CALC: 34.8 % — LOW (ref 39–50)
HGB BLD-MCNC: 10.7 G/DL — LOW (ref 13–17)
LYMPHOCYTES # BLD AUTO: 0.9 K/UL — LOW (ref 1–3.3)
LYMPHOCYTES # BLD AUTO: 17.2 % — SIGNIFICANT CHANGE UP (ref 13–44)
MAGNESIUM SERPL-MCNC: 1.5 MG/DL — LOW (ref 1.6–2.6)
MCHC RBC-ENTMCNC: 28.8 PG — SIGNIFICANT CHANGE UP (ref 27–34)
MCHC RBC-ENTMCNC: 30.8 GM/DL — LOW (ref 32–36)
MCV RBC AUTO: 93.6 FL — SIGNIFICANT CHANGE UP (ref 80–100)
MONOCYTES # BLD AUTO: 0.5 K/UL — SIGNIFICANT CHANGE UP (ref 0–0.9)
MONOCYTES NFR BLD AUTO: 10.6 % — HIGH (ref 1–9)
NEUTROPHILS # BLD AUTO: 3.5 K/UL — SIGNIFICANT CHANGE UP (ref 1.8–7.4)
NEUTROPHILS NFR BLD AUTO: 68.8 % — SIGNIFICANT CHANGE UP (ref 43–77)
PLATELET # BLD AUTO: 174 K/UL — SIGNIFICANT CHANGE UP (ref 150–400)
POTASSIUM SERPL-MCNC: 3.3 MMOL/L — LOW (ref 3.5–5.3)
POTASSIUM SERPL-SCNC: 3.3 MMOL/L — LOW (ref 3.5–5.3)
PROT SERPL-MCNC: 5.8 G/DL — LOW (ref 6–8.3)
RBC # BLD: 3.71 M/UL — LOW (ref 4.2–5.8)
RBC # FLD: 14.8 % — HIGH (ref 10.3–14.5)
SODIUM SERPL-SCNC: 145 MMOL/L — SIGNIFICANT CHANGE UP (ref 135–145)
TSH SERPL-MCNC: 4.66 UIU/ML — HIGH (ref 0.27–4.2)
WBC # BLD: 5.1 K/UL — SIGNIFICANT CHANGE UP (ref 3.8–10.5)
WBC # FLD AUTO: 5.1 K/UL — SIGNIFICANT CHANGE UP (ref 3.8–10.5)

## 2018-11-02 PROCEDURE — 99233 SBSQ HOSP IP/OBS HIGH 50: CPT

## 2018-11-02 RX ADMIN — Medication 200 MILLIGRAM(S): at 06:52

## 2018-11-02 RX ADMIN — Medication 40 MILLIEQUIVALENT(S): at 01:18

## 2018-11-02 RX ADMIN — Medication 300 MILLIGRAM(S): at 13:25

## 2018-11-02 RX ADMIN — TAMSULOSIN HYDROCHLORIDE 0.4 MILLIGRAM(S): 0.4 CAPSULE ORAL at 22:54

## 2018-11-02 RX ADMIN — TRAMADOL HYDROCHLORIDE 50 MILLIGRAM(S): 50 TABLET ORAL at 06:10

## 2018-11-02 RX ADMIN — Medication 100 MILLIGRAM(S): at 13:26

## 2018-11-02 RX ADMIN — MAGNESIUM OXIDE 400 MG ORAL TABLET 400 MILLIGRAM(S): 241.3 TABLET ORAL at 13:24

## 2018-11-02 RX ADMIN — HEPARIN SODIUM 5000 UNIT(S): 5000 INJECTION INTRAVENOUS; SUBCUTANEOUS at 13:26

## 2018-11-02 RX ADMIN — Medication 650 MILLIGRAM(S): at 00:00

## 2018-11-02 RX ADMIN — FINASTERIDE 5 MILLIGRAM(S): 5 TABLET, FILM COATED ORAL at 13:25

## 2018-11-02 RX ADMIN — TRAMADOL HYDROCHLORIDE 50 MILLIGRAM(S): 50 TABLET ORAL at 23:54

## 2018-11-02 RX ADMIN — TRAMADOL HYDROCHLORIDE 50 MILLIGRAM(S): 50 TABLET ORAL at 05:14

## 2018-11-02 RX ADMIN — Medication 1 TABLET(S): at 13:36

## 2018-11-02 RX ADMIN — Medication 100 MILLIGRAM(S): at 05:05

## 2018-11-02 RX ADMIN — HEPARIN SODIUM 5000 UNIT(S): 5000 INJECTION INTRAVENOUS; SUBCUTANEOUS at 06:52

## 2018-11-02 RX ADMIN — Medication 112 MICROGRAM(S): at 06:52

## 2018-11-02 RX ADMIN — Medication 100 MILLIGRAM(S): at 22:54

## 2018-11-02 RX ADMIN — HEPARIN SODIUM 5000 UNIT(S): 5000 INJECTION INTRAVENOUS; SUBCUTANEOUS at 22:54

## 2018-11-02 RX ADMIN — TRAMADOL HYDROCHLORIDE 50 MILLIGRAM(S): 50 TABLET ORAL at 22:54

## 2018-11-02 RX ADMIN — Medication 200 MILLIGRAM(S): at 17:48

## 2018-11-02 RX ADMIN — PANTOPRAZOLE SODIUM 40 MILLIGRAM(S): 20 TABLET, DELAYED RELEASE ORAL at 06:52

## 2018-11-02 NOTE — PROGRESS NOTE ADULT - PROBLEM SELECTOR PLAN 7
PPI for GI prophylaxis  SQ heparin for DVT prophylaxis  Regular low residue diet as tolerated  OOB as tolerated  Full code status

## 2018-11-02 NOTE — CONSULT NOTE ADULT - SUBJECTIVE AND OBJECTIVE BOX
Patient seen and examined.  Full consult dictated and will be available shortly.    Elderly male with history of diverticulitis now admitted with lower abdominal pain and diarrhea.  CAT Scan Abdomen shows sigmoid diverticulitis.  VSS.  Abdomen soft, nontender at present.    Impression: Diarrhea, sigmoid diverticulitis    Plan:  1. Clear liquid diet  2. Continue Cipro & Flagyl for 10 days

## 2018-11-02 NOTE — PROGRESS NOTE ADULT - SUBJECTIVE AND OBJECTIVE BOX
Patient states he is feeling less bloated today with less abdominal cramping.  Had multiple loose BMs overnight and this morning.  Tolerating PO with good intake, voiding and continent of urine, has remained on bedrest today.    Vital Signs  T(C): 36.3 (02 Nov 2018 06:41), Max: 36.8 (01 Nov 2018 17:48)  T(F): 97.3 (02 Nov 2018 06:41), Max: 98.3 (01 Nov 2018 17:48)  HR: 60 (02 Nov 2018 06:41) (53 - 70)  BP: 121/59 (02 Nov 2018 06:41) (119/76 - 137/55)  BP(mean): --  RR: 15 (02 Nov 2018 06:41) (15 - 17)  SpO2: 99% (02 Nov 2018 06:41) (97% - 100%)    MEDICATIONS  (STANDING):  allopurinol 300 milliGRAM(s) Oral daily  ciprofloxacin   IVPB 400 milliGRAM(s) IV Intermittent every 12 hours  finasteride 5 milliGRAM(s) Oral daily  heparin  Injectable 5000 Unit(s) SubCutaneous every 8 hours  levothyroxine 112 MICROGram(s) Oral daily  magnesium oxide 400 milliGRAM(s) Oral daily  metroNIDAZOLE  IVPB 500 milliGRAM(s) IV Intermittent every 8 hours  multivitamin/minerals 1 Tablet(s) Oral daily  pantoprazole    Tablet 40 milliGRAM(s) Oral before breakfast  tamsulosin 0.4 milliGRAM(s) Oral at bedtime  acetaminophen   Tablet .. 650 milliGRAM(s) Oral every 6 hours PRN Temp greater or equal to 38C (100.4F), Mild Pain (1 - 3)  traMADol 50 milliGRAM(s) Oral three times a day PRN Severe Pain (7 - 10)    Physical Exam  Gen:  Elderly male resting in bed, NAD  ENT:  NC/AT, no JVD noted  Thorax:  Symmetric, no retractions  Lung:  CTA b/l  CV:  S1, S2. RRR  Abd:  Softly distended, tympanic throughout. Non-tender to palpation.  BS normoactive, no masses  Extrem:  Warm, well perfused.  +2 edema in LE's bilaterally  Neuro:  A&O x 3, no gross motor/sensory deficits Patient states he is feeling less bloated today with less abdominal cramping.  Had multiple loose BMs overnight and this morning.  Tolerating PO with good intake, voiding and continent of urine, has remained on bedrest today.    Vital Signs Last 24 Hrs  T(C): 36.3 (02 Nov 2018 06:41), Max: 36.8 (01 Nov 2018 17:48)  T(F): 97.3 (02 Nov 2018 06:41), Max: 98.3 (01 Nov 2018 17:48)  HR: 60 (02 Nov 2018 06:41) (53 - 70)  BP: 121/59 (02 Nov 2018 06:41) (119/76 - 137/55)  RR: 15 (02 Nov 2018 06:41) (15 - 17)  SpO2: 99% RA (02 Nov 2018 06:41) (97% - 100%)    MEDICATIONS  (STANDING):  allopurinol 300 milliGRAM(s) Oral daily  ciprofloxacin   IVPB 400 milliGRAM(s) IV Intermittent every 12 hours  finasteride 5 milliGRAM(s) Oral daily  heparin  Injectable 5000 Unit(s) SubCutaneous every 8 hours  levothyroxine 112 MICROGram(s) Oral daily  magnesium oxide 400 milliGRAM(s) Oral daily  metroNIDAZOLE  IVPB 500 milliGRAM(s) IV Intermittent every 8 hours  multivitamin/minerals 1 Tablet(s) Oral daily  pantoprazole    Tablet 40 milliGRAM(s) Oral before breakfast  tamsulosin 0.4 milliGRAM(s) Oral at bedtime  acetaminophen   Tablet .. 650 milliGRAM(s) Oral every 6 hours PRN Temp greater or equal to 38C (100.4F), Mild Pain (1 - 3)  traMADol 50 milliGRAM(s) Oral three times a day PRN Severe Pain (7 - 10)    Physical Exam  Gen:  Elderly male resting in bed, NAD  ENT:  NC/AT, no JVD noted  Thorax:  Symmetric, no retractions  Lung:  CTA b/l  CV:  S1, S2. RRR  Abd:  Softly distended, tympanic throughout. Non-tender to palpation.  BS normoactive, no masses  Extrem:  Warm, well perfused.  +2 edema in LE's bilaterally  Neuro:  A&O x 3, no gross motor/sensory deficits

## 2018-11-02 NOTE — PROGRESS NOTE ADULT - ATTENDING COMMENTS
I have personally seen and examined patient on the above date.  I discussed the case with DARIEL Davidson and I agree with findings and plan as detailed per note above, which I have amended where appropriate.      Acute diverticulitis, GI following - discussed with Dr. Can.    cont IV antibiotics for now  downgrade diet as patient not tolerating regular diet. I have personally seen and examined patient on the above date.  I discussed the case with DARIEL Davidson and I agree with findings and plan as detailed per note above, which I have amended where appropriate.      Acute diverticulitis, GI following - discussed with Dr. Can.    Hypomagnesemia and Hypokalemia - replete.    cont IV antibiotics for now  downgrade diet as patient not tolerating regular diet.

## 2018-11-03 DIAGNOSIS — E83.42 HYPOMAGNESEMIA: ICD-10-CM

## 2018-11-03 DIAGNOSIS — E87.0 HYPEROSMOLALITY AND HYPERNATREMIA: ICD-10-CM

## 2018-11-03 DIAGNOSIS — E87.6 HYPOKALEMIA: ICD-10-CM

## 2018-11-03 LAB
ANION GAP SERPL CALC-SCNC: 7 MMOL/L — SIGNIFICANT CHANGE UP (ref 5–17)
BUN SERPL-MCNC: 21 MG/DL — SIGNIFICANT CHANGE UP (ref 7–23)
CALCIUM SERPL-MCNC: 8.5 MG/DL — SIGNIFICANT CHANGE UP (ref 8.4–10.5)
CHLORIDE SERPL-SCNC: 113 MMOL/L — HIGH (ref 96–108)
CO2 SERPL-SCNC: 26 MMOL/L — SIGNIFICANT CHANGE UP (ref 22–31)
CREAT SERPL-MCNC: 1.17 MG/DL — SIGNIFICANT CHANGE UP (ref 0.5–1.3)
GLUCOSE SERPL-MCNC: 92 MG/DL — SIGNIFICANT CHANGE UP (ref 70–99)
HCT VFR BLD CALC: 35 % — LOW (ref 39–50)
HGB BLD-MCNC: 11 G/DL — LOW (ref 13–17)
MAGNESIUM SERPL-MCNC: 1.6 MG/DL — SIGNIFICANT CHANGE UP (ref 1.6–2.6)
MCHC RBC-ENTMCNC: 29.4 PG — SIGNIFICANT CHANGE UP (ref 27–34)
MCHC RBC-ENTMCNC: 31.4 GM/DL — LOW (ref 32–36)
MCV RBC AUTO: 93.7 FL — SIGNIFICANT CHANGE UP (ref 80–100)
PHOSPHATE SERPL-MCNC: 2.4 MG/DL — LOW (ref 2.5–4.5)
PLATELET # BLD AUTO: 158 K/UL — SIGNIFICANT CHANGE UP (ref 150–400)
POTASSIUM SERPL-MCNC: 3.4 MMOL/L — LOW (ref 3.5–5.3)
POTASSIUM SERPL-SCNC: 3.4 MMOL/L — LOW (ref 3.5–5.3)
RBC # BLD: 3.74 M/UL — LOW (ref 4.2–5.8)
RBC # FLD: 14.9 % — HIGH (ref 10.3–14.5)
SODIUM SERPL-SCNC: 146 MMOL/L — HIGH (ref 135–145)
WBC # BLD: 4.5 K/UL — SIGNIFICANT CHANGE UP (ref 3.8–10.5)
WBC # FLD AUTO: 4.5 K/UL — SIGNIFICANT CHANGE UP (ref 3.8–10.5)

## 2018-11-03 PROCEDURE — 99233 SBSQ HOSP IP/OBS HIGH 50: CPT

## 2018-11-03 RX ORDER — POTASSIUM CHLORIDE 20 MEQ
40 PACKET (EA) ORAL EVERY 4 HOURS
Qty: 0 | Refills: 0 | Status: COMPLETED | OUTPATIENT
Start: 2018-11-03 | End: 2018-11-03

## 2018-11-03 RX ADMIN — Medication 1 TABLET(S): at 11:56

## 2018-11-03 RX ADMIN — HEPARIN SODIUM 5000 UNIT(S): 5000 INJECTION INTRAVENOUS; SUBCUTANEOUS at 06:37

## 2018-11-03 RX ADMIN — Medication 200 MILLIGRAM(S): at 18:01

## 2018-11-03 RX ADMIN — MAGNESIUM OXIDE 400 MG ORAL TABLET 400 MILLIGRAM(S): 241.3 TABLET ORAL at 11:40

## 2018-11-03 RX ADMIN — Medication 40 MILLIEQUIVALENT(S): at 14:39

## 2018-11-03 RX ADMIN — Medication 200 MILLIGRAM(S): at 06:37

## 2018-11-03 RX ADMIN — HEPARIN SODIUM 5000 UNIT(S): 5000 INJECTION INTRAVENOUS; SUBCUTANEOUS at 14:39

## 2018-11-03 RX ADMIN — Medication 100 MILLIGRAM(S): at 14:39

## 2018-11-03 RX ADMIN — Medication 40 MILLIEQUIVALENT(S): at 18:01

## 2018-11-03 RX ADMIN — TRAMADOL HYDROCHLORIDE 50 MILLIGRAM(S): 50 TABLET ORAL at 12:00

## 2018-11-03 RX ADMIN — Medication 100 MILLIGRAM(S): at 06:37

## 2018-11-03 RX ADMIN — Medication 112 MICROGRAM(S): at 06:37

## 2018-11-03 RX ADMIN — FINASTERIDE 5 MILLIGRAM(S): 5 TABLET, FILM COATED ORAL at 11:40

## 2018-11-03 RX ADMIN — PANTOPRAZOLE SODIUM 40 MILLIGRAM(S): 20 TABLET, DELAYED RELEASE ORAL at 06:37

## 2018-11-03 RX ADMIN — Medication 300 MILLIGRAM(S): at 11:40

## 2018-11-03 RX ADMIN — TRAMADOL HYDROCHLORIDE 50 MILLIGRAM(S): 50 TABLET ORAL at 11:40

## 2018-11-03 NOTE — PROGRESS NOTE ADULT - PROBLEM SELECTOR PLAN 9
PPI for GI prophylaxis  SQ heparin for DVT prophylaxis  Regular low residue diet as tolerated  OOB as tolerated  Full code status  PT consult today Chronic, apply compression stockings while in bed

## 2018-11-03 NOTE — PHYSICAL THERAPY INITIAL EVALUATION ADULT - MANUAL MUSCLE TESTING RESULTS, REHAB EVAL
no strength deficits were identified/right shoulder not tested due to pain and limited mobility as per pt

## 2018-11-03 NOTE — PHYSICAL THERAPY INITIAL EVALUATION ADULT - PATIENT/FAMILY/SIGNIFICANT OTHER GOALS STATEMENT, PT EVAL
Pt wants to return to the [facility]

## 2018-11-03 NOTE — PHYSICAL THERAPY INITIAL EVALUATION ADULT - ADDITIONAL COMMENTS
Pt lives in senior living.  Reports he is Independent in his ADL's for dressing and bathing, amb with a rollator and takes care of his own medicines.  Pt. wears glasses.  Appears [place] at times but does not use hearing aides.

## 2018-11-03 NOTE — PROGRESS NOTE ADULT - PROBLEM SELECTOR PLAN 5
Continue allopurinol with analgesia PRN Continue medical management, monitor for regular urine output Continue medical management with synthroid

## 2018-11-03 NOTE — PROGRESS NOTE ADULT - PROBLEM SELECTOR PLAN 6
Chronic, apply compression stockings while in bed Continue medical management with PPI Continue medical management, monitor for regular urine output

## 2018-11-03 NOTE — PHYSICAL THERAPY INITIAL EVALUATION ADULT - RANGE OF MOTION EXAMINATION, REHAB EVAL
right shoulder ROM all movements due to "old rotator cuff injury" as per pt/deficits as listed below

## 2018-11-03 NOTE — PROGRESS NOTE ADULT - PROBLEM SELECTOR PLAN 7
PPI for GI prophylaxis  SQ heparin for DVT prophylaxis  Regular low residue diet as tolerated  OOB as tolerated  Full code status  PT consult today Continue allopurinol with analgesia PRN Continue medical management with PPI

## 2018-11-03 NOTE — PROGRESS NOTE ADULT - SUBJECTIVE AND OBJECTIVE BOX
Patient is a 89y old  Male who presents with a chief complaint of diarrhea (02 Nov 2018 13:50)      Patient seen and examined at bedside. feels well, no diarrhea last night     ALLERGIES:  penicillin (Unknown)    MEDICATIONS:  acetaminophen   Tablet .. 650 milliGRAM(s) Oral every 6 hours PRN  allopurinol 300 milliGRAM(s) Oral daily  ciprofloxacin   IVPB 400 milliGRAM(s) IV Intermittent every 12 hours  finasteride 5 milliGRAM(s) Oral daily  heparin  Injectable 5000 Unit(s) SubCutaneous every 8 hours  levothyroxine 112 MICROGram(s) Oral daily  magnesium oxide 400 milliGRAM(s) Oral daily  metroNIDAZOLE  IVPB 500 milliGRAM(s) IV Intermittent every 8 hours  multivitamin/minerals 1 Tablet(s) Oral daily  pantoprazole    Tablet 40 milliGRAM(s) Oral before breakfast  tamsulosin 0.4 milliGRAM(s) Oral at bedtime  traMADol 50 milliGRAM(s) Oral three times a day PRN    Vital Signs Last 24 Hrs  T(F): 98.3 (03 Nov 2018 05:46), Max: 98.3 (03 Nov 2018 05:46)  HR: 72 (03 Nov 2018 05:46) (60 - 72)  BP: 124/57 (03 Nov 2018 05:46) (124/57 - 133/45)  RR: 14 (02 Nov 2018 23:02) (14 - 15)  SpO2: 100% (03 Nov 2018 05:46) (99% - 100%)  I&O's Summary    02 Nov 2018 07:01  -  03 Nov 2018 07:00  --------------------------------------------------------  IN: 580 mL / OUT: 0 mL / NET: 580 mL    03 Nov 2018 08:01  -  03 Nov 2018 11:14  --------------------------------------------------------  IN: 300 mL / OUT: 0 mL / NET: 300 mL        PHYSICAL EXAM:  General: NAD, alert oriented x 3  Neck: Supple, No JVD  Lungs: Clear to auscultation bilaterally  Cardio: RRR, S1/S2, No murmurs  Abdomen: Soft, Nontender, Nondistended; Bowel sounds present  Extremities: No clubbing, cyanosis, or edema    LABS:                        11.0   4.5   )-----------( 158      ( 03 Nov 2018 06:51 )             35.0     11-03    146  |  113  |  21  ----------------------------<  92  3.4   |  26  |  1.17    Ca    8.5      03 Nov 2018 06:51  Phos  2.4     11-03  Mg     1.6     11-03    TPro  5.8  /  Alb  1.8  /  TBili  0.4  /  DBili  x   /  AST  20  /  ALT  19  /  AlkPhos  50  11-02    eGFR if Non African American: 52 mL/min/1.73M2 (11-03-18 @ 06:51)  eGFR if African American: 60 mL/min/1.73M2 (11-03-18 @ 06:51)    TSH 4.66   TSH with FT4 reflex --  Total T3 --    CAPILLARY BLOOD GLUCOSE    RADIOLOGY & ADDITIONAL TESTS:    Care Discussed with Consultants/Other Providers:

## 2018-11-04 LAB
ANION GAP SERPL CALC-SCNC: 9 MMOL/L — SIGNIFICANT CHANGE UP (ref 5–17)
BUN SERPL-MCNC: 17 MG/DL — SIGNIFICANT CHANGE UP (ref 7–23)
CALCIUM SERPL-MCNC: 8.3 MG/DL — LOW (ref 8.4–10.5)
CHLORIDE SERPL-SCNC: 111 MMOL/L — HIGH (ref 96–108)
CO2 SERPL-SCNC: 24 MMOL/L — SIGNIFICANT CHANGE UP (ref 22–31)
CREAT SERPL-MCNC: 1.22 MG/DL — SIGNIFICANT CHANGE UP (ref 0.5–1.3)
GLUCOSE SERPL-MCNC: 95 MG/DL — SIGNIFICANT CHANGE UP (ref 70–99)
POTASSIUM SERPL-MCNC: 3.8 MMOL/L — SIGNIFICANT CHANGE UP (ref 3.5–5.3)
POTASSIUM SERPL-SCNC: 3.8 MMOL/L — SIGNIFICANT CHANGE UP (ref 3.5–5.3)
SODIUM SERPL-SCNC: 144 MMOL/L — SIGNIFICANT CHANGE UP (ref 135–145)

## 2018-11-04 PROCEDURE — 99233 SBSQ HOSP IP/OBS HIGH 50: CPT

## 2018-11-04 RX ADMIN — Medication 100 MILLIGRAM(S): at 08:20

## 2018-11-04 RX ADMIN — HEPARIN SODIUM 5000 UNIT(S): 5000 INJECTION INTRAVENOUS; SUBCUTANEOUS at 13:11

## 2018-11-04 RX ADMIN — TRAMADOL HYDROCHLORIDE 50 MILLIGRAM(S): 50 TABLET ORAL at 23:00

## 2018-11-04 RX ADMIN — MAGNESIUM OXIDE 400 MG ORAL TABLET 400 MILLIGRAM(S): 241.3 TABLET ORAL at 12:34

## 2018-11-04 RX ADMIN — HEPARIN SODIUM 5000 UNIT(S): 5000 INJECTION INTRAVENOUS; SUBCUTANEOUS at 00:29

## 2018-11-04 RX ADMIN — PANTOPRAZOLE SODIUM 40 MILLIGRAM(S): 20 TABLET, DELAYED RELEASE ORAL at 08:20

## 2018-11-04 RX ADMIN — TAMSULOSIN HYDROCHLORIDE 0.4 MILLIGRAM(S): 0.4 CAPSULE ORAL at 00:27

## 2018-11-04 RX ADMIN — TRAMADOL HYDROCHLORIDE 50 MILLIGRAM(S): 50 TABLET ORAL at 00:27

## 2018-11-04 RX ADMIN — Medication 100 MILLIGRAM(S): at 00:29

## 2018-11-04 RX ADMIN — Medication 650 MILLIGRAM(S): at 09:35

## 2018-11-04 RX ADMIN — Medication 1 TABLET(S): at 12:34

## 2018-11-04 RX ADMIN — Medication 100 MILLIGRAM(S): at 21:59

## 2018-11-04 RX ADMIN — FINASTERIDE 5 MILLIGRAM(S): 5 TABLET, FILM COATED ORAL at 12:35

## 2018-11-04 RX ADMIN — TRAMADOL HYDROCHLORIDE 50 MILLIGRAM(S): 50 TABLET ORAL at 22:14

## 2018-11-04 RX ADMIN — HEPARIN SODIUM 5000 UNIT(S): 5000 INJECTION INTRAVENOUS; SUBCUTANEOUS at 21:59

## 2018-11-04 RX ADMIN — Medication 200 MILLIGRAM(S): at 08:19

## 2018-11-04 RX ADMIN — Medication 100 MILLIGRAM(S): at 13:11

## 2018-11-04 RX ADMIN — Medication 200 MILLIGRAM(S): at 18:55

## 2018-11-04 RX ADMIN — Medication 650 MILLIGRAM(S): at 10:05

## 2018-11-04 RX ADMIN — Medication 300 MILLIGRAM(S): at 12:34

## 2018-11-04 RX ADMIN — TRAMADOL HYDROCHLORIDE 50 MILLIGRAM(S): 50 TABLET ORAL at 01:30

## 2018-11-04 RX ADMIN — HEPARIN SODIUM 5000 UNIT(S): 5000 INJECTION INTRAVENOUS; SUBCUTANEOUS at 08:20

## 2018-11-04 RX ADMIN — TAMSULOSIN HYDROCHLORIDE 0.4 MILLIGRAM(S): 0.4 CAPSULE ORAL at 22:00

## 2018-11-04 RX ADMIN — Medication 112 MICROGRAM(S): at 08:19

## 2018-11-04 NOTE — PROGRESS NOTE ADULT - PROBLEM SELECTOR PLAN 10
HSQ
PPI for GI prophylaxis  SQ heparin for DVT prophylaxis  Regular low residue diet as tolerated  OOB as tolerated  Full code status  PT consult today

## 2018-11-04 NOTE — PROGRESS NOTE ADULT - SUBJECTIVE AND OBJECTIVE BOX
Patient is a 89y old  Male who presents with a chief complaint of diarrhea (03 Nov 2018 20:22)    Patient feels better. Stool formed better today.      Patient seen and examined at bedside.    ALLERGIES:  penicillin (Unknown)    MEDICATIONS  (STANDING):  allopurinol 300 milliGRAM(s) Oral daily  ciprofloxacin   IVPB 400 milliGRAM(s) IV Intermittent every 12 hours  finasteride 5 milliGRAM(s) Oral daily  heparin  Injectable 5000 Unit(s) SubCutaneous every 8 hours  levothyroxine 112 MICROGram(s) Oral daily  magnesium oxide 400 milliGRAM(s) Oral daily  metroNIDAZOLE  IVPB 500 milliGRAM(s) IV Intermittent every 8 hours  multivitamin 1 Tablet(s) Oral daily  pantoprazole    Tablet 40 milliGRAM(s) Oral before breakfast  tamsulosin 0.4 milliGRAM(s) Oral at bedtime    MEDICATIONS  (PRN):  acetaminophen   Tablet .. 650 milliGRAM(s) Oral every 6 hours PRN Temp greater or equal to 38C (100.4F), Mild Pain (1 - 3)  traMADol 50 milliGRAM(s) Oral three times a day PRN Severe Pain (7 - 10)    Vital Signs Last 24 Hrs  T(F): 98 (04 Nov 2018 05:35), Max: 98.2 (03 Nov 2018 17:10)  HR: 59 (04 Nov 2018 05:35) (59 - 82)  BP: 138/34 (04 Nov 2018 05:35) (123/42 - 138/34)  RR: 14 (04 Nov 2018 05:35) (14 - 14)  SpO2: 100% (04 Nov 2018 05:35) (100% - 100%)  I&O's Summary    03 Nov 2018 08:01  -  04 Nov 2018 07:00  --------------------------------------------------------  IN: 600 mL / OUT: 350 mL / NET: 250 mL    BMI (kg/m2): 28.1 (11-02-18 @ 06:41)  PHYSICAL EXAM:  General: NAD, A/O x 3  ENT: MMM  Neck: Supple, No JVD  Lungs: Clear to auscultation bilaterally  Cardio: RRR, S1/S2, No murmurs  Abdomen: Soft, Nontender, Nondistended; Bowel sounds present  Extremities: No calf tenderness, No pitting edema    LABS:                        11.0   4.5   )-----------( 158      ( 03 Nov 2018 06:51 )             35.0       11-04    144  |  111  |  17  ----------------------------<  95  3.8   |  24  |  1.22    Ca    8.3      04 Nov 2018 06:25  Phos  2.4     11-03  Mg     1.6     11-03    TPro  5.8  /  Alb  1.8  /  TBili  0.4  /  DBili  x   /  AST  20  /  ALT  19  /  AlkPhos  50  11-02     eGFR if Non African American: 50 mL/min/1.73M2 (11-04-18 @ 06:25)  eGFR if African American: 57 mL/min/1.73M2 (11-04-18 @ 06:25)    TSH 4.66   TSH with FT4 reflex --  Total T3 --    CAPILLARY BLOOD GLUCOSE    RADIOLOGY & ADDITIONAL TESTS:    Care Discussed with Consultants/Other Providers:

## 2018-11-05 PROCEDURE — 99233 SBSQ HOSP IP/OBS HIGH 50: CPT

## 2018-11-05 RX ORDER — CIPROFLOXACIN LACTATE 400MG/40ML
500 VIAL (ML) INTRAVENOUS EVERY 12 HOURS
Qty: 0 | Refills: 0 | Status: DISCONTINUED | OUTPATIENT
Start: 2018-11-05 | End: 2018-11-06

## 2018-11-05 RX ORDER — METRONIDAZOLE 500 MG
500 TABLET ORAL EVERY 8 HOURS
Qty: 0 | Refills: 0 | Status: DISCONTINUED | OUTPATIENT
Start: 2018-11-05 | End: 2018-11-06

## 2018-11-05 RX ADMIN — HEPARIN SODIUM 5000 UNIT(S): 5000 INJECTION INTRAVENOUS; SUBCUTANEOUS at 06:07

## 2018-11-05 RX ADMIN — Medication 500 MILLIGRAM(S): at 13:16

## 2018-11-05 RX ADMIN — TRAMADOL HYDROCHLORIDE 50 MILLIGRAM(S): 50 TABLET ORAL at 14:00

## 2018-11-05 RX ADMIN — TAMSULOSIN HYDROCHLORIDE 0.4 MILLIGRAM(S): 0.4 CAPSULE ORAL at 21:48

## 2018-11-05 RX ADMIN — PANTOPRAZOLE SODIUM 40 MILLIGRAM(S): 20 TABLET, DELAYED RELEASE ORAL at 06:08

## 2018-11-05 RX ADMIN — Medication 200 MILLIGRAM(S): at 06:07

## 2018-11-05 RX ADMIN — Medication 500 MILLIGRAM(S): at 17:32

## 2018-11-05 RX ADMIN — MAGNESIUM OXIDE 400 MG ORAL TABLET 400 MILLIGRAM(S): 241.3 TABLET ORAL at 13:16

## 2018-11-05 RX ADMIN — Medication 112 MICROGRAM(S): at 06:08

## 2018-11-05 RX ADMIN — TRAMADOL HYDROCHLORIDE 50 MILLIGRAM(S): 50 TABLET ORAL at 23:06

## 2018-11-05 RX ADMIN — Medication 300 MILLIGRAM(S): at 13:16

## 2018-11-05 RX ADMIN — Medication 1 TABLET(S): at 13:16

## 2018-11-05 RX ADMIN — HEPARIN SODIUM 5000 UNIT(S): 5000 INJECTION INTRAVENOUS; SUBCUTANEOUS at 21:48

## 2018-11-05 RX ADMIN — FINASTERIDE 5 MILLIGRAM(S): 5 TABLET, FILM COATED ORAL at 13:16

## 2018-11-05 RX ADMIN — TRAMADOL HYDROCHLORIDE 50 MILLIGRAM(S): 50 TABLET ORAL at 13:22

## 2018-11-05 RX ADMIN — Medication 100 MILLIGRAM(S): at 06:08

## 2018-11-05 RX ADMIN — HEPARIN SODIUM 5000 UNIT(S): 5000 INJECTION INTRAVENOUS; SUBCUTANEOUS at 13:16

## 2018-11-05 RX ADMIN — Medication 500 MILLIGRAM(S): at 21:48

## 2018-11-05 NOTE — PROGRESS NOTE ADULT - ATTENDING COMMENTS
I have personally seen and examined patient on the above date.  I discussed the case with Alyce Christian NP and I agree with findings and plan as detailed per note above, which I have amended where appropriate.      Acute Diverticulitis, resolving, likely d/c tomorrow with oral antibiotics.    PT consult - may need [facility]

## 2018-11-05 NOTE — PROGRESS NOTE ADULT - SUBJECTIVE AND OBJECTIVE BOX
Patient is a 89y old  Male who presents with a chief complaint of diarrhea (04 Nov 2018 08:11)      Patient seen and examined at bedside, no events overnight, in no acute distress.     ALLERGIES:  penicillin (Unknown)    MEDICATIONS:  acetaminophen   Tablet .. 650 milliGRAM(s) Oral every 6 hours PRN  allopurinol 300 milliGRAM(s) Oral daily  ciprofloxacin     Tablet 500 milliGRAM(s) Oral every 12 hours  finasteride 5 milliGRAM(s) Oral daily  heparin  Injectable 5000 Unit(s) SubCutaneous every 8 hours  levothyroxine 112 MICROGram(s) Oral daily  magnesium oxide 400 milliGRAM(s) Oral daily  metroNIDAZOLE    Tablet 500 milliGRAM(s) Oral every 8 hours  multivitamin 1 Tablet(s) Oral daily  pantoprazole    Tablet 40 milliGRAM(s) Oral before breakfast  tamsulosin 0.4 milliGRAM(s) Oral at bedtime  traMADol 50 milliGRAM(s) Oral three times a day PRN    Vital Signs Last 24 Hrs  T(C): 36.9 (05 Nov 2018 05:06), Max: 36.9 (05 Nov 2018 05:06)  T(F): 98.4 (05 Nov 2018 05:06), Max: 98.4 (05 Nov 2018 05:06)  HR: 66 (05 Nov 2018 05:06) (56 - 66)  BP: 139/54 (05 Nov 2018 05:06) (128/55 - 139/54)  BP(mean): --  RR: 15 (05 Nov 2018 05:06) (14 - 15)  SpO2: 99% (05 Nov 2018 05:06) (99% - 100%)  I&O's Summary    04 Nov 2018 07:01  -  05 Nov 2018 07:00  --------------------------------------------------------  IN: 1260 mL / OUT: 300 mL / NET: 960 mL          PAST MEDICAL & SURGICAL HISTORY:  Arthritis  CN (constipation)  BPH (benign prostatic hyperplasia)  GERD (gastroesophageal reflux disease)  Hypothyroidism  Gout  Edema  Dementia  No significant past surgical history      Home Medications:  allopurinol 300 mg oral tablet: 1 tab(s) orally once a day (15 Aug 2018 01:38)  Centrum Silver oral tablet: 1 tab(s) orally once a day (15 Aug 2018 01:39)  finasteride 5 mg oral tablet: 1 tab(s) orally once a day (15 Aug 2018 01:36)  furosemide 80 mg oral tablet: 1 tab(s) orally once a day (15 Aug 2018 01:36)  levothyroxine 112 mcg (0.112 mg) oral tablet: 1 tab(s) orally once a day (15 Aug 2018 01:40)  omeprazole 40 mg oral delayed release capsule: 1 cap(s) orally once a day (15 Aug 2018 01:41)  potassium chloride: 2 tablets daily  (15 Aug 2018 01:39)  Slow-Mag:  (01 Nov 2018 22:45)  tamsulosin 0.4 mg oral capsule: 1 cap(s) orally once a day (15 Aug 2018 01:35)  traMADol 50 mg oral tablet, disintegrating:  (15 Aug 2018 01:37)  Tylenol 325 mg oral tablet: 650 milligram(s) orally 2 times a day (01 Nov 2018 22:44)      PHYSICAL EXAM:  General: NAD, A/O x3  ENT: MMM  Neck: Supple, No JVD  Lungs: Clear to percussion bilaterally  Cardio: RRR, S1/S2, No murmurs  Abdomen: Soft, Nontender, Nondistended; Bowel sounds present  Extremities: No clubbing, cyanosis, or edema        LABS:                        11.0   4.5   )-----------( 158      ( 03 Nov 2018 06:51 )             35.0     11-04    144  |  111  |  17  ----------------------------<  95  3.8   |  24  |  1.22    Ca    8.3      04 Nov 2018 06:25  Phos  2.4     11-03  Mg     1.6     11-03      RADIOLOGY & ADDITIONAL TESTS: < from: Xray Chest 1 View- PORTABLE-Urgent (11.01.18 @ 22:16) >  IMPRESSION: No acute findings.    < end of copied text >      Care Discussed with Consultants/Other Providers: Hospitalist

## 2018-11-06 ENCOUNTER — TRANSCRIPTION ENCOUNTER (OUTPATIENT)
Age: 83
End: 2018-11-06

## 2018-11-06 VITALS
OXYGEN SATURATION: 99 % | DIASTOLIC BLOOD PRESSURE: 54 MMHG | WEIGHT: 216.27 LBS | TEMPERATURE: 98 F | SYSTOLIC BLOOD PRESSURE: 134 MMHG | HEART RATE: 59 BPM | RESPIRATION RATE: 14 BRPM

## 2018-11-06 LAB
ANION GAP SERPL CALC-SCNC: 7 MMOL/L — SIGNIFICANT CHANGE UP (ref 5–17)
BUN SERPL-MCNC: 20 MG/DL — SIGNIFICANT CHANGE UP (ref 7–23)
CALCIUM SERPL-MCNC: 8.7 MG/DL — SIGNIFICANT CHANGE UP (ref 8.4–10.5)
CHLORIDE SERPL-SCNC: 111 MMOL/L — HIGH (ref 96–108)
CO2 SERPL-SCNC: 26 MMOL/L — SIGNIFICANT CHANGE UP (ref 22–31)
CREAT SERPL-MCNC: 1.04 MG/DL — SIGNIFICANT CHANGE UP (ref 0.5–1.3)
GLUCOSE SERPL-MCNC: 98 MG/DL — SIGNIFICANT CHANGE UP (ref 70–99)
HCT VFR BLD CALC: 36.5 % — LOW (ref 39–50)
HGB BLD-MCNC: 11.4 G/DL — LOW (ref 13–17)
MAGNESIUM SERPL-MCNC: 1.5 MG/DL — LOW (ref 1.6–2.6)
MCHC RBC-ENTMCNC: 29 PG — SIGNIFICANT CHANGE UP (ref 27–34)
MCHC RBC-ENTMCNC: 31.2 GM/DL — LOW (ref 32–36)
MCV RBC AUTO: 93.1 FL — SIGNIFICANT CHANGE UP (ref 80–100)
PLATELET # BLD AUTO: 177 K/UL — SIGNIFICANT CHANGE UP (ref 150–400)
POTASSIUM SERPL-MCNC: 3.6 MMOL/L — SIGNIFICANT CHANGE UP (ref 3.5–5.3)
POTASSIUM SERPL-SCNC: 3.6 MMOL/L — SIGNIFICANT CHANGE UP (ref 3.5–5.3)
RBC # BLD: 3.92 M/UL — LOW (ref 4.2–5.8)
RBC # FLD: 14.7 % — HIGH (ref 10.3–14.5)
SODIUM SERPL-SCNC: 144 MMOL/L — SIGNIFICANT CHANGE UP (ref 135–145)
WBC # BLD: 5.2 K/UL — SIGNIFICANT CHANGE UP (ref 3.8–10.5)
WBC # FLD AUTO: 5.2 K/UL — SIGNIFICANT CHANGE UP (ref 3.8–10.5)

## 2018-11-06 PROCEDURE — 99239 HOSP IP/OBS DSCHRG MGMT >30: CPT

## 2018-11-06 RX ORDER — METRONIDAZOLE 500 MG
1 TABLET ORAL
Qty: 9 | Refills: 0 | OUTPATIENT
Start: 2018-11-06 | End: 2018-11-08

## 2018-11-06 RX ORDER — CIPROFLOXACIN LACTATE 400MG/40ML
1 VIAL (ML) INTRAVENOUS
Qty: 6 | Refills: 0 | OUTPATIENT
Start: 2018-11-06 | End: 2018-11-08

## 2018-11-06 RX ORDER — MAGNESIUM SULFATE 500 MG/ML
1 VIAL (ML) INJECTION ONCE
Qty: 0 | Refills: 0 | Status: DISCONTINUED | OUTPATIENT
Start: 2018-11-06 | End: 2018-11-06

## 2018-11-06 RX ORDER — MAGNESIUM OXIDE 400 MG ORAL TABLET 241.3 MG
400 TABLET ORAL ONCE
Qty: 0 | Refills: 0 | Status: COMPLETED | OUTPATIENT
Start: 2018-11-06 | End: 2018-11-06

## 2018-11-06 RX ADMIN — TRAMADOL HYDROCHLORIDE 50 MILLIGRAM(S): 50 TABLET ORAL at 11:25

## 2018-11-06 RX ADMIN — Medication 500 MILLIGRAM(S): at 05:14

## 2018-11-06 RX ADMIN — Medication 112 MICROGRAM(S): at 05:14

## 2018-11-06 RX ADMIN — Medication 300 MILLIGRAM(S): at 11:26

## 2018-11-06 RX ADMIN — MAGNESIUM OXIDE 400 MG ORAL TABLET 400 MILLIGRAM(S): 241.3 TABLET ORAL at 11:25

## 2018-11-06 RX ADMIN — TRAMADOL HYDROCHLORIDE 50 MILLIGRAM(S): 50 TABLET ORAL at 12:20

## 2018-11-06 RX ADMIN — TRAMADOL HYDROCHLORIDE 50 MILLIGRAM(S): 50 TABLET ORAL at 16:24

## 2018-11-06 RX ADMIN — Medication 1 TABLET(S): at 11:25

## 2018-11-06 RX ADMIN — FINASTERIDE 5 MILLIGRAM(S): 5 TABLET, FILM COATED ORAL at 11:26

## 2018-11-06 RX ADMIN — HEPARIN SODIUM 5000 UNIT(S): 5000 INJECTION INTRAVENOUS; SUBCUTANEOUS at 13:16

## 2018-11-06 RX ADMIN — PANTOPRAZOLE SODIUM 40 MILLIGRAM(S): 20 TABLET, DELAYED RELEASE ORAL at 05:14

## 2018-11-06 RX ADMIN — TRAMADOL HYDROCHLORIDE 50 MILLIGRAM(S): 50 TABLET ORAL at 17:35

## 2018-11-06 RX ADMIN — HEPARIN SODIUM 5000 UNIT(S): 5000 INJECTION INTRAVENOUS; SUBCUTANEOUS at 05:14

## 2018-11-06 RX ADMIN — TRAMADOL HYDROCHLORIDE 50 MILLIGRAM(S): 50 TABLET ORAL at 00:01

## 2018-11-06 RX ADMIN — Medication 500 MILLIGRAM(S): at 13:17

## 2018-11-06 NOTE — PROGRESS NOTE ADULT - PROBLEM SELECTOR PLAN 1
1. Complete 10 days of Cipro & Flagyl  2. Diet as tolerated  3. Agree with discharge planning
1. Continue Cipro & Flagyl for full 10 days  2. Advance diet to low residue diet from tomorrow  3. Physical therapy eval Monday
Cont. cipro, flagyl, tolerating diet  C/o cramps  D/C planning for tomorrow 11/6
Seen by GI, continue Cipro and Flagyl.  Diet as tolerated, advance to soft diet for lunch  monitor WBC count and fever curve
cont cipro flagyl  improving
Seen by GI, continue Cipro and Flagyl.  Diet as tolerated, monitor WBC count and fever curve

## 2018-11-06 NOTE — DISCHARGE NOTE ADULT - CARE PLAN
Principal Discharge DX:	Diverticulitis  Goal:	prevent flare up  Assessment and plan of treatment:	Cont. flagyl and cipro for 3 more days  Secondary Diagnosis:	BPH (benign prostatic hyperplasia)  Goal:	prevent urinary retention  Assessment and plan of treatment:	Stable, continue current regimen  Secondary Diagnosis:	Edema  Goal:	manage  Assessment and plan of treatment:	Cont. lasix   Replete electrolytes  Secondary Diagnosis:	GERD (gastroesophageal reflux disease)  Goal:	avoid exacerbation  Assessment and plan of treatment:	stable on current regimen

## 2018-11-06 NOTE — PROGRESS NOTE ADULT - ASSESSMENT
89 year old male with PMH GERD, hypothyroidism, gout, BPH admitted for acute diverticulitis.
89 year old male with PMH GERD, hypothyroidism, gout, BPH admitted for acute diverticulitis.    likely [facility] versus home with PT at [facility] tomorrow.
89 y/o old male with PMH of GERD, hypothyroidism, gout, BPH p/w acute diverticulitis.
Elderly male with multiple medical problems admitted with diverticulitis, now improved.
Elderly male with multiple medical problems admitted with sigmoid diverticulitis, now improving.
89 year old male with PMH GERD, hypothyroidism, gout, BPH admitted for acute diverticulitis.

## 2018-11-06 NOTE — DISCHARGE NOTE ADULT - MEDICATION SUMMARY - MEDICATIONS TO TAKE
I will START or STAY ON the medications listed below when I get home from the hospital:    finasteride 5 mg oral tablet  -- 1 tab(s) by mouth once a day  -- Indication: For BPH (benign prostatic hyperplasia)    metroNIDAZOLE 500 mg oral tablet  -- 1 tab(s) by mouth every 8 hours  -- Indication: For Diverticulosis     Tylenol 325 mg oral tablet  -- 650 milligram(s) by mouth 2 times a day  -- Indication: For Pain/Fever     traMADol 50 mg oral tablet, disintegrating  -- Indication: For Pain     tamsulosin 0.4 mg oral capsule  -- 1 cap(s) by mouth once a day  -- Indication: For BPH (benign prostatic hyperplasia)    allopurinol 300 mg oral tablet  -- 1 tab(s) by mouth once a day  -- Indication: For Gout     furosemide 80 mg oral tablet  -- 1 tab(s) by mouth once a day  -- Indication: For Edema    potassium chloride  -- 2 tablets daily   -- Indication: For Supplement     Slow-Mag  -- Indication: For Supplement     omeprazole 40 mg oral delayed release capsule  -- 1 cap(s) by mouth once a day  -- Indication: For GERD (gastroesophageal reflux disease)    ciprofloxacin 500 mg oral tablet  -- 1 tab(s) by mouth every 12 hours  -- Indication: For Diverticulitis of intestine without perforation or abscess without bleeding    levothyroxine 112 mcg (0.112 mg) oral tablet  -- 1 tab(s) by mouth once a day  -- Indication: For Diverticulitis    Centrum Silver oral tablet  -- 1 tab(s) by mouth once a day  -- Indication: For Supplement

## 2018-11-06 NOTE — DISCHARGE NOTE ADULT - NS AS ACTIVITY OBS
Bathing allowed/Do not make important decisions/Do not drive or operate machinery/Showering allowed/No Heavy lifting/straining/Walking-Outdoors allowed/Walking-Indoors allowed

## 2018-11-06 NOTE — DISCHARGE NOTE ADULT - CARE PROVIDER_API CALL
Olvin Hernandez (), Internal Medicine  [address] [place] [address]  Phone: [phone]  Fax: [phone]

## 2018-11-06 NOTE — DISCHARGE NOTE ADULT - PATIENT PORTAL LINK FT
You can access the [facility], offered by [facility], by registering with the following website: [email or web address]

## 2018-11-06 NOTE — PROGRESS NOTE ADULT - SUBJECTIVE AND OBJECTIVE BOX
Patient seen sitting in chair.  Diarrhea and abdominal pain resolved.  Tolerating PO intake without difficulty.  He is looking forward to returning to his home today.    MEDICATIONS  (STANDING):  allopurinol 300 milliGRAM(s) Oral daily  ciprofloxacin     Tablet 500 milliGRAM(s) Oral every 12 hours  finasteride 5 milliGRAM(s) Oral daily  heparin  Injectable 5000 Unit(s) SubCutaneous every 8 hours  levothyroxine 112 MICROGram(s) Oral daily  magnesium oxide 400 milliGRAM(s) Oral daily  metroNIDAZOLE    Tablet 500 milliGRAM(s) Oral every 8 hours  multivitamin 1 Tablet(s) Oral daily  pantoprazole    Tablet 40 milliGRAM(s) Oral before breakfast  tamsulosin 0.4 milliGRAM(s) Oral at bedtime    MEDICATIONS  (PRN):  acetaminophen   Tablet .. 650 milliGRAM(s) Oral every 6 hours PRN Temp greater or equal to 38C (100.4F), Mild Pain (1 - 3)  traMADol 50 milliGRAM(s) Oral three times a day PRN Severe Pain (7 - 10)    Allergies  penicillin (Unknown)    Vital Signs Last 24 Hrs  T(C): 36.7 (06 Nov 2018 05:05), Max: 37.2 (05 Nov 2018 21:46)  T(F): 98 (06 Nov 2018 05:05), Max: 98.9 (05 Nov 2018 21:46)  HR: 59 (06 Nov 2018 05:05) (59 - 80)  BP: 134/54 (06 Nov 2018 05:05) (126/72 - 149/69)  BP(mean): --  RR: 14 (06 Nov 2018 05:05) (14 - 16)  SpO2: 99% (06 Nov 2018 05:05) (99% - 100%)    PHYSICAL EXAM:    Constitutional: NAD  Neck: No LAD, supple  Respiratory: clear to auscultation b/l no rales, rhonchi, wheezing  Cardiovascular: S1 and S2, RRR, no murmur  Gastrointestinal: +BS x4, soft, NT/ND, neg HSM,  Extremities: No peripheral edema, neg clubbing, cyanosis  Vascular: 2+ peripheral pulses  Neurological: A/O x 3, no focal deficits  Psychiatric: Normal mood, normal affect  Skin: No rashes      LABS:                        11.4   5.2   )-----------( 177      ( 06 Nov 2018 06:59 )             36.5     11-06    144  |  111<H>  |  20  ----------------------------<  98  3.6   |  26  |  1.04    Ca    8.7      06 Nov 2018 06:59  Mg     1.5     11-06      LIVER FUNCTIONS - ( 02 Nov 2018 07:22 )  Alb: 1.8 g/dL / Pro: 5.8 g/dL / ALK PHOS: 50 U/L / ALT: 19 U/L DA / AST: 20 U/L / GGT: x

## 2018-11-06 NOTE — DISCHARGE NOTE ADULT - PLAN OF CARE
prevent flare up Cont. flagyl and cipro for 3 more days prevent urinary retention Stable, continue current regimen manage Cont. lasix   Replete electrolytes avoid exacerbation stable on current regimen

## 2018-11-06 NOTE — DISCHARGE NOTE ADULT - HOSPITAL COURSE
89 y/o old male with PMH of GERD, hypothyroidism, gout, BPH p/w acute diverticulitis, treated with IV cipro and flagyl, symptoms improved, now switched to po cipro and flagyl, stable for discharge with 3 more days of po abx.

## 2018-11-27 PROCEDURE — 96365 THER/PROPH/DIAG IV INF INIT: CPT | Mod: XU

## 2018-11-27 PROCEDURE — 80048 BASIC METABOLIC PNL TOTAL CA: CPT

## 2018-11-27 PROCEDURE — 80053 COMPREHEN METABOLIC PANEL: CPT

## 2018-11-27 PROCEDURE — 96360 HYDRATION IV INFUSION INIT: CPT

## 2018-11-27 PROCEDURE — 96375 TX/PRO/DX INJ NEW DRUG ADDON: CPT

## 2018-11-27 PROCEDURE — 93005 ELECTROCARDIOGRAM TRACING: CPT

## 2018-11-27 PROCEDURE — 84100 ASSAY OF PHOSPHORUS: CPT

## 2018-11-27 PROCEDURE — 71045 X-RAY EXAM CHEST 1 VIEW: CPT

## 2018-11-27 PROCEDURE — 74176 CT ABD & PELVIS W/O CONTRAST: CPT

## 2018-11-27 PROCEDURE — 97116 GAIT TRAINING THERAPY: CPT

## 2018-11-27 PROCEDURE — 83735 ASSAY OF MAGNESIUM: CPT

## 2018-11-27 PROCEDURE — 99285 EMERGENCY DEPT VISIT HI MDM: CPT | Mod: 25

## 2018-11-27 PROCEDURE — 84443 ASSAY THYROID STIM HORMONE: CPT

## 2018-11-27 PROCEDURE — 97161 PT EVAL LOW COMPLEX 20 MIN: CPT

## 2018-11-27 PROCEDURE — 96361 HYDRATE IV INFUSION ADD-ON: CPT

## 2018-11-27 PROCEDURE — 85027 COMPLETE CBC AUTOMATED: CPT

## 2018-12-17 ENCOUNTER — EMERGENCY (EMERGENCY)
Facility: HOSPITAL | Age: 83
LOS: 1 days | Discharge: HOME | End: 2018-12-17
Attending: EMERGENCY MEDICINE | Admitting: EMERGENCY MEDICINE
Payer: MEDICARE

## 2018-12-17 VITALS
SYSTOLIC BLOOD PRESSURE: 162 MMHG | TEMPERATURE: 98 F | HEIGHT: 70 IN | HEART RATE: 62 BPM | RESPIRATION RATE: 18 BRPM | WEIGHT: 190.04 LBS | DIASTOLIC BLOOD PRESSURE: 69 MMHG | OXYGEN SATURATION: 99 %

## 2018-12-17 VITALS
OXYGEN SATURATION: 97 % | DIASTOLIC BLOOD PRESSURE: 71 MMHG | TEMPERATURE: 98 F | HEART RATE: 69 BPM | SYSTOLIC BLOOD PRESSURE: 159 MMHG | RESPIRATION RATE: 18 BRPM

## 2018-12-17 LAB
ALBUMIN SERPL ELPH-MCNC: 3.3 G/DL — SIGNIFICANT CHANGE UP (ref 3.3–5)
ALP SERPL-CCNC: 50 U/L — SIGNIFICANT CHANGE UP (ref 40–120)
ALT FLD-CCNC: 18 U/L DA — SIGNIFICANT CHANGE UP (ref 10–45)
ANION GAP SERPL CALC-SCNC: 9 MMOL/L — SIGNIFICANT CHANGE UP (ref 5–17)
AST SERPL-CCNC: 16 U/L — SIGNIFICANT CHANGE UP (ref 10–40)
BASOPHILS # BLD AUTO: 0 K/UL — SIGNIFICANT CHANGE UP (ref 0–0.2)
BASOPHILS NFR BLD AUTO: 0.5 % — SIGNIFICANT CHANGE UP (ref 0–2)
BILIRUB SERPL-MCNC: 0.5 MG/DL — SIGNIFICANT CHANGE UP (ref 0.2–1.2)
BUN SERPL-MCNC: 29 MG/DL — HIGH (ref 7–23)
CALCIUM SERPL-MCNC: 8.9 MG/DL — SIGNIFICANT CHANGE UP (ref 8.4–10.5)
CHLORIDE SERPL-SCNC: 106 MMOL/L — SIGNIFICANT CHANGE UP (ref 96–108)
CO2 SERPL-SCNC: 29 MMOL/L — SIGNIFICANT CHANGE UP (ref 22–31)
CREAT SERPL-MCNC: 1.24 MG/DL — SIGNIFICANT CHANGE UP (ref 0.5–1.3)
EOSINOPHIL # BLD AUTO: 0 K/UL — SIGNIFICANT CHANGE UP (ref 0–0.5)
EOSINOPHIL NFR BLD AUTO: 0.4 % — SIGNIFICANT CHANGE UP (ref 0–6)
GLUCOSE SERPL-MCNC: 117 MG/DL — HIGH (ref 70–99)
HCT VFR BLD CALC: 37.4 % — LOW (ref 39–50)
HGB BLD-MCNC: 11.9 G/DL — LOW (ref 13–17)
LYMPHOCYTES # BLD AUTO: 1.3 K/UL — SIGNIFICANT CHANGE UP (ref 1–3.3)
LYMPHOCYTES # BLD AUTO: 15 % — SIGNIFICANT CHANGE UP (ref 13–44)
MCHC RBC-ENTMCNC: 29.7 PG — SIGNIFICANT CHANGE UP (ref 27–34)
MCHC RBC-ENTMCNC: 31.8 GM/DL — LOW (ref 32–36)
MCV RBC AUTO: 93.3 FL — SIGNIFICANT CHANGE UP (ref 80–100)
MONOCYTES # BLD AUTO: 0.5 K/UL — SIGNIFICANT CHANGE UP (ref 0–0.9)
MONOCYTES NFR BLD AUTO: 5.9 % — SIGNIFICANT CHANGE UP (ref 2–14)
NEUTROPHILS # BLD AUTO: 7 K/UL — SIGNIFICANT CHANGE UP (ref 1.8–7.4)
NEUTROPHILS NFR BLD AUTO: 78.1 % — HIGH (ref 43–77)
PLATELET # BLD AUTO: 234 K/UL — SIGNIFICANT CHANGE UP (ref 150–400)
POTASSIUM SERPL-MCNC: 3.8 MMOL/L — SIGNIFICANT CHANGE UP (ref 3.5–5.3)
POTASSIUM SERPL-SCNC: 3.8 MMOL/L — SIGNIFICANT CHANGE UP (ref 3.5–5.3)
PROT SERPL-MCNC: 6.8 G/DL — SIGNIFICANT CHANGE UP (ref 6–8.3)
RBC # BLD: 4 M/UL — LOW (ref 4.2–5.8)
RBC # FLD: 15.5 % — HIGH (ref 10.3–14.5)
SODIUM SERPL-SCNC: 144 MMOL/L — SIGNIFICANT CHANGE UP (ref 135–145)
WBC # BLD: 8.9 K/UL — SIGNIFICANT CHANGE UP (ref 3.8–10.5)
WBC # FLD AUTO: 8.9 K/UL — SIGNIFICANT CHANGE UP (ref 3.8–10.5)

## 2018-12-17 PROCEDURE — 71101 X-RAY EXAM UNILAT RIBS/CHEST: CPT | Mod: 26

## 2018-12-17 PROCEDURE — 93005 ELECTROCARDIOGRAM TRACING: CPT

## 2018-12-17 PROCEDURE — 90715 TDAP VACCINE 7 YRS/> IM: CPT

## 2018-12-17 PROCEDURE — 99284 EMERGENCY DEPT VISIT MOD MDM: CPT

## 2018-12-17 PROCEDURE — 99284 EMERGENCY DEPT VISIT MOD MDM: CPT | Mod: 25

## 2018-12-17 PROCEDURE — 71101 X-RAY EXAM UNILAT RIBS/CHEST: CPT

## 2018-12-17 PROCEDURE — 72170 X-RAY EXAM OF PELVIS: CPT

## 2018-12-17 PROCEDURE — 93010 ELECTROCARDIOGRAM REPORT: CPT

## 2018-12-17 PROCEDURE — 70450 CT HEAD/BRAIN W/O DYE: CPT

## 2018-12-17 PROCEDURE — 73090 X-RAY EXAM OF FOREARM: CPT

## 2018-12-17 PROCEDURE — 80053 COMPREHEN METABOLIC PANEL: CPT

## 2018-12-17 PROCEDURE — 85027 COMPLETE CBC AUTOMATED: CPT

## 2018-12-17 PROCEDURE — 72170 X-RAY EXAM OF PELVIS: CPT | Mod: 26

## 2018-12-17 PROCEDURE — 73090 X-RAY EXAM OF FOREARM: CPT | Mod: 26,50

## 2018-12-17 PROCEDURE — 70450 CT HEAD/BRAIN W/O DYE: CPT | Mod: 26

## 2018-12-17 PROCEDURE — 90471 IMMUNIZATION ADMIN: CPT

## 2018-12-17 RX ORDER — ACETAMINOPHEN 500 MG
650 TABLET ORAL ONCE
Qty: 0 | Refills: 0 | Status: COMPLETED | OUTPATIENT
Start: 2018-12-17 | End: 2018-12-17

## 2018-12-17 RX ORDER — LEVOTHYROXINE SODIUM 125 MCG
1 TABLET ORAL
Qty: 0 | Refills: 0 | COMMUNITY

## 2018-12-17 RX ORDER — TETANUS TOXOID, REDUCED DIPHTHERIA TOXOID AND ACELLULAR PERTUSSIS VACCINE, ADSORBED 5; 2.5; 8; 8; 2.5 [IU]/.5ML; [IU]/.5ML; UG/.5ML; UG/.5ML; UG/.5ML
0.5 SUSPENSION INTRAMUSCULAR ONCE
Qty: 0 | Refills: 0 | Status: COMPLETED | OUTPATIENT
Start: 2018-12-17 | End: 2018-12-17

## 2018-12-17 RX ADMIN — TETANUS TOXOID, REDUCED DIPHTHERIA TOXOID AND ACELLULAR PERTUSSIS VACCINE, ADSORBED 0.5 MILLILITER(S): 5; 2.5; 8; 8; 2.5 SUSPENSION INTRAMUSCULAR at 11:51

## 2018-12-17 RX ADMIN — Medication 650 MILLIGRAM(S): at 11:51

## 2018-12-17 NOTE — ED PROVIDER NOTE - CARE PLAN
Principal Discharge DX:	Fall, initial encounter  Secondary Diagnosis:	Traumatic injury of head, initial encounter  Secondary Diagnosis:	Abrasions of multiple sites

## 2018-12-17 NOTE — ED ADULT TRIAGE NOTE - CHIEF COMPLAINT QUOTE
as per EMS he fell twice at the [facility], suffered some abrasions and hit his head

## 2018-12-17 NOTE — ED PROVIDER NOTE - NSFOLLOWUPINSTRUCTIONS_ED_ALL_ED_FT
1. Tylenol 650mg every 6 hours as needed for pain  2. Expect to be more sore tomorrow than today  3. Heat pack to affected area as needed for pain  4. Follow up with your doctor in 1-2 days  5. Return to the ED for pain increasing drastically, weakness or numbness in one part of the body, chest pain, shortness of breath or any concerns

## 2018-12-17 NOTE — GOALS OF CARE CONVERSATION - PERSONAL ADVANCE DIRECTIVE - CONVERSATION DETAILS
I had a detailed discussion with pt about advanced directives and GOC. Pt has a DNR but would want imaging, blood work and hospitalization if indicated.

## 2018-12-17 NOTE — ED ADULT NURSE NOTE - CHIEF COMPLAINT QUOTE
as per EMS he fell twice at the [facility], suffered some abrasions and hit his head

## 2018-12-17 NOTE — ED PROVIDER NOTE - OBJECTIVE STATEMENT
89M h/o GERD, hypothyroidism, BPH, recently admitted for diverticulitis, spinal stenosis - ambulates with walker, presents from [facility] s/p fall. Pt got up to ambulate using his walker and fell backwards, hit his head, no LOC. No prolonged downtime. Able to ambulate since. +abrasions on bilateral forearms. Denies dizziness, fevers, chills, chest pain, sob, nausea, vomiting, lightheadedness, LOC.     PMD: Dr. Hernandez

## 2018-12-17 NOTE — ED PROVIDER NOTE - MUSCULOSKELETAL, MLM
No bony ttp over bilateral elbows, pelvis stable, normal ROM bilateral hips. No ttp over C spine, T-L-S spine. No rib ttp or ecchymosis.

## 2018-12-17 NOTE — ED ADULT NURSE NOTE - CHPI ED NUR SYMPTOMS NEG
no loss of consciousness/no numbness/no confusion/no fever/no tingling/no deformity/no weakness/no bleeding/no vomiting

## 2018-12-17 NOTE — ED ADULT NURSE NOTE - OBJECTIVE STATEMENT
as per the patient he fell today once in the bathroom and again in his room  and bruised his elbows. DENIES LOSS OF CONSCIOUSNESS OR DIZZINESS

## 2018-12-17 NOTE — ED PROVIDER NOTE - PROGRESS NOTE DETAILS
Pt reassessed, doing well. Son and daughter in law at bedside, state he always has right hip pain. Pt to be dc'ed back to [facility]. Usually ambulates with walker. Pt reassessed, doing well. Son and daughter in law at bedside, state he always has right hip pain. Pt to be dc'ed back to [facility]. Usually ambulates with walker. Pt's skin tears dressed with non adherent dressing by RN.

## 2018-12-17 NOTE — ED ADULT NURSE NOTE - INTERVENTIONS DEFINITIONS
Call bell, personal items and telephone within reach/Room bathroom lighting operational/Monitor for mental status changes and reorient to person, place, and time/Reinforce activity limits and safety measures with patient and family/Non-slip footwear when patient is off stretcher/Provide visual clues: red socks/Stretcher in lowest position, wheels locked, appropriate side rails in place/Provide visual cue, wrist band, yellow gown, etc./Monitor gait and stability/Review medications for side effects contributing to fall risk/[place] to call system/Instruct patient to call for assistance/Physically safe environment: no spills, clutter or unnecessary equipment

## 2019-01-15 ENCOUNTER — NON-APPOINTMENT (OUTPATIENT)
Age: 84
End: 2019-01-15

## 2019-01-15 ENCOUNTER — APPOINTMENT (OUTPATIENT)
Dept: CARDIOLOGY | Facility: CLINIC | Age: 84
End: 2019-01-15
Payer: MEDICARE

## 2019-01-15 VITALS
BODY MASS INDEX: 28.58 KG/M2 | RESPIRATION RATE: 17 BRPM | HEART RATE: 74 BPM | DIASTOLIC BLOOD PRESSURE: 68 MMHG | HEIGHT: 69 IN | OXYGEN SATURATION: 98 % | SYSTOLIC BLOOD PRESSURE: 132 MMHG | WEIGHT: 193 LBS

## 2019-01-15 PROCEDURE — 99214 OFFICE O/P EST MOD 30 MIN: CPT

## 2019-01-15 PROCEDURE — 93000 ELECTROCARDIOGRAM COMPLETE: CPT

## 2019-02-14 ENCOUNTER — INPATIENT (INPATIENT)
Facility: HOSPITAL | Age: 84
LOS: 1 days | Discharge: HOME | DRG: 179 | End: 2019-02-16
Attending: INTERNAL MEDICINE | Admitting: FAMILY MEDICINE
Payer: MEDICARE

## 2019-02-14 VITALS
SYSTOLIC BLOOD PRESSURE: 130 MMHG | HEIGHT: 70 IN | RESPIRATION RATE: 20 BRPM | HEART RATE: 87 BPM | WEIGHT: 186.95 LBS | DIASTOLIC BLOOD PRESSURE: 60 MMHG | TEMPERATURE: 99 F

## 2019-02-14 DIAGNOSIS — N40.0 BENIGN PROSTATIC HYPERPLASIA WITHOUT LOWER URINARY TRACT SYMPTOMS: ICD-10-CM

## 2019-02-14 DIAGNOSIS — J18.1 LOBAR PNEUMONIA, UNSPECIFIED ORGANISM: ICD-10-CM

## 2019-02-14 DIAGNOSIS — E03.9 HYPOTHYROIDISM, UNSPECIFIED: ICD-10-CM

## 2019-02-14 DIAGNOSIS — I48.91 UNSPECIFIED ATRIAL FIBRILLATION: ICD-10-CM

## 2019-02-14 DIAGNOSIS — K21.9 GASTRO-ESOPHAGEAL REFLUX DISEASE WITHOUT ESOPHAGITIS: ICD-10-CM

## 2019-02-14 DIAGNOSIS — M10.9 GOUT, UNSPECIFIED: ICD-10-CM

## 2019-02-14 DIAGNOSIS — J18.9 PNEUMONIA, UNSPECIFIED ORGANISM: ICD-10-CM

## 2019-02-14 DIAGNOSIS — R60.0 LOCALIZED EDEMA: ICD-10-CM

## 2019-02-14 LAB
ALBUMIN SERPL ELPH-MCNC: 3.2 G/DL — LOW (ref 3.3–5)
ALP SERPL-CCNC: 53 U/L — SIGNIFICANT CHANGE UP (ref 40–120)
ALT FLD-CCNC: 23 U/L DA — SIGNIFICANT CHANGE UP (ref 10–45)
ANION GAP SERPL CALC-SCNC: 9 MMOL/L — SIGNIFICANT CHANGE UP (ref 5–17)
AST SERPL-CCNC: 19 U/L — SIGNIFICANT CHANGE UP (ref 10–40)
BASOPHILS # BLD AUTO: 0 K/UL — SIGNIFICANT CHANGE UP (ref 0–0.2)
BASOPHILS NFR BLD AUTO: 0.6 % — SIGNIFICANT CHANGE UP (ref 0–2)
BILIRUB SERPL-MCNC: 0.8 MG/DL — SIGNIFICANT CHANGE UP (ref 0.2–1.2)
BUN SERPL-MCNC: 36 MG/DL — HIGH (ref 7–23)
CALCIUM SERPL-MCNC: 9.2 MG/DL — SIGNIFICANT CHANGE UP (ref 8.4–10.5)
CHLORIDE SERPL-SCNC: 105 MMOL/L — SIGNIFICANT CHANGE UP (ref 96–108)
CO2 SERPL-SCNC: 29 MMOL/L — SIGNIFICANT CHANGE UP (ref 22–31)
CREAT SERPL-MCNC: 1.36 MG/DL — HIGH (ref 0.5–1.3)
EOSINOPHIL # BLD AUTO: 0 K/UL — SIGNIFICANT CHANGE UP (ref 0–0.5)
EOSINOPHIL NFR BLD AUTO: 0.1 % — SIGNIFICANT CHANGE UP (ref 0–6)
FLU A RESULT: SIGNIFICANT CHANGE UP
FLU A RESULT: SIGNIFICANT CHANGE UP
FLUAV AG NPH QL: SIGNIFICANT CHANGE UP
FLUBV AG NPH QL: SIGNIFICANT CHANGE UP
GLUCOSE SERPL-MCNC: 122 MG/DL — HIGH (ref 70–99)
HCT VFR BLD CALC: 43.1 % — SIGNIFICANT CHANGE UP (ref 39–50)
HGB BLD-MCNC: 13.6 G/DL — SIGNIFICANT CHANGE UP (ref 13–17)
LYMPHOCYTES # BLD AUTO: 0.2 K/UL — LOW (ref 1–3.3)
LYMPHOCYTES # BLD AUTO: 2.9 % — LOW (ref 13–44)
MCHC RBC-ENTMCNC: 30.1 PG — SIGNIFICANT CHANGE UP (ref 27–34)
MCHC RBC-ENTMCNC: 31.6 GM/DL — LOW (ref 32–36)
MCV RBC AUTO: 95.3 FL — SIGNIFICANT CHANGE UP (ref 80–100)
MONOCYTES # BLD AUTO: 0.4 K/UL — SIGNIFICANT CHANGE UP (ref 0–0.9)
MONOCYTES NFR BLD AUTO: 4.9 % — SIGNIFICANT CHANGE UP (ref 1–9)
NEUTROPHILS # BLD AUTO: 6.6 K/UL — SIGNIFICANT CHANGE UP (ref 1.8–7.4)
NEUTROPHILS NFR BLD AUTO: 91.4 % — HIGH (ref 43–77)
PLATELET # BLD AUTO: 180 K/UL — SIGNIFICANT CHANGE UP (ref 150–400)
POTASSIUM SERPL-MCNC: 3.7 MMOL/L — SIGNIFICANT CHANGE UP (ref 3.5–5.3)
POTASSIUM SERPL-SCNC: 3.7 MMOL/L — SIGNIFICANT CHANGE UP (ref 3.5–5.3)
PROT SERPL-MCNC: 6.6 G/DL — SIGNIFICANT CHANGE UP (ref 6–8.3)
RBC # BLD: 4.53 M/UL — SIGNIFICANT CHANGE UP (ref 4.2–5.8)
RBC # FLD: 14.6 % — HIGH (ref 10.3–14.5)
RSV RESULT: SIGNIFICANT CHANGE UP
RSV RNA RESP QL NAA+PROBE: SIGNIFICANT CHANGE UP
SODIUM SERPL-SCNC: 143 MMOL/L — SIGNIFICANT CHANGE UP (ref 135–145)
WBC # BLD: 7.2 K/UL — SIGNIFICANT CHANGE UP (ref 3.8–10.5)
WBC # FLD AUTO: 7.2 K/UL — SIGNIFICANT CHANGE UP (ref 3.8–10.5)

## 2019-02-14 PROCEDURE — 93010 ELECTROCARDIOGRAM REPORT: CPT

## 2019-02-14 PROCEDURE — 99223 1ST HOSP IP/OBS HIGH 75: CPT

## 2019-02-14 PROCEDURE — 99285 EMERGENCY DEPT VISIT HI MDM: CPT

## 2019-02-14 PROCEDURE — 74176 CT ABD & PELVIS W/O CONTRAST: CPT | Mod: 26

## 2019-02-14 PROCEDURE — 71045 X-RAY EXAM CHEST 1 VIEW: CPT | Mod: 26

## 2019-02-14 RX ORDER — ALLOPURINOL 300 MG
300 TABLET ORAL ONCE
Qty: 0 | Refills: 0 | Status: COMPLETED | OUTPATIENT
Start: 2019-02-14 | End: 2019-02-14

## 2019-02-14 RX ORDER — METRONIDAZOLE 500 MG
500 TABLET ORAL ONCE
Qty: 0 | Refills: 0 | Status: COMPLETED | OUTPATIENT
Start: 2019-02-14 | End: 2019-02-14

## 2019-02-14 RX ORDER — LEVOTHYROXINE SODIUM 125 MCG
125 TABLET ORAL DAILY
Qty: 0 | Refills: 0 | Status: DISCONTINUED | OUTPATIENT
Start: 2019-02-14 | End: 2019-02-16

## 2019-02-14 RX ORDER — ACETAMINOPHEN 500 MG
650 TABLET ORAL ONCE
Qty: 0 | Refills: 0 | Status: COMPLETED | OUTPATIENT
Start: 2019-02-14 | End: 2019-02-14

## 2019-02-14 RX ORDER — SODIUM CHLORIDE 9 MG/ML
1600 INJECTION INTRAMUSCULAR; INTRAVENOUS; SUBCUTANEOUS ONCE
Qty: 0 | Refills: 0 | Status: COMPLETED | OUTPATIENT
Start: 2019-02-14 | End: 2019-02-14

## 2019-02-14 RX ORDER — SODIUM CHLORIDE 9 MG/ML
1000 INJECTION, SOLUTION INTRAVENOUS
Qty: 0 | Refills: 0 | Status: DISCONTINUED | OUTPATIENT
Start: 2019-02-14 | End: 2019-02-15

## 2019-02-14 RX ORDER — CIPROFLOXACIN LACTATE 400MG/40ML
400 VIAL (ML) INTRAVENOUS ONCE
Qty: 0 | Refills: 0 | Status: COMPLETED | OUTPATIENT
Start: 2019-02-14 | End: 2019-02-14

## 2019-02-14 RX ORDER — TAMSULOSIN HYDROCHLORIDE 0.4 MG/1
0.4 CAPSULE ORAL AT BEDTIME
Qty: 0 | Refills: 0 | Status: DISCONTINUED | OUTPATIENT
Start: 2019-02-14 | End: 2019-02-16

## 2019-02-14 RX ORDER — ONDANSETRON 8 MG/1
4 TABLET, FILM COATED ORAL ONCE
Qty: 0 | Refills: 0 | Status: COMPLETED | OUTPATIENT
Start: 2019-02-14 | End: 2019-02-14

## 2019-02-14 RX ORDER — FUROSEMIDE 40 MG
80 TABLET ORAL DAILY
Qty: 0 | Refills: 0 | Status: DISCONTINUED | OUTPATIENT
Start: 2019-02-14 | End: 2019-02-16

## 2019-02-14 RX ORDER — SODIUM CHLORIDE 9 MG/ML
1000 INJECTION INTRAMUSCULAR; INTRAVENOUS; SUBCUTANEOUS ONCE
Qty: 0 | Refills: 0 | Status: COMPLETED | OUTPATIENT
Start: 2019-02-14 | End: 2019-02-14

## 2019-02-14 RX ORDER — ENOXAPARIN SODIUM 100 MG/ML
40 INJECTION SUBCUTANEOUS EVERY 24 HOURS
Qty: 0 | Refills: 0 | Status: DISCONTINUED | OUTPATIENT
Start: 2019-02-14 | End: 2019-02-16

## 2019-02-14 RX ORDER — ONDANSETRON 8 MG/1
4 TABLET, FILM COATED ORAL EVERY 6 HOURS
Qty: 0 | Refills: 0 | Status: DISCONTINUED | OUTPATIENT
Start: 2019-02-14 | End: 2019-02-16

## 2019-02-14 RX ORDER — SENNA PLUS 8.6 MG/1
1 TABLET ORAL AT BEDTIME
Qty: 0 | Refills: 0 | Status: DISCONTINUED | OUTPATIENT
Start: 2019-02-14 | End: 2019-02-16

## 2019-02-14 RX ORDER — FINASTERIDE 5 MG/1
5 TABLET, FILM COATED ORAL DAILY
Qty: 0 | Refills: 0 | Status: DISCONTINUED | OUTPATIENT
Start: 2019-02-14 | End: 2019-02-16

## 2019-02-14 RX ORDER — POTASSIUM CHLORIDE 20 MEQ
0 PACKET (EA) ORAL
Qty: 0 | Refills: 0 | COMMUNITY

## 2019-02-14 RX ORDER — ACETAMINOPHEN 500 MG
650 TABLET ORAL ONCE
Qty: 0 | Refills: 0 | Status: COMPLETED | OUTPATIENT
Start: 2019-02-14 | End: 2019-02-15

## 2019-02-14 RX ORDER — PANTOPRAZOLE SODIUM 20 MG/1
40 TABLET, DELAYED RELEASE ORAL
Qty: 0 | Refills: 0 | Status: DISCONTINUED | OUTPATIENT
Start: 2019-02-14 | End: 2019-02-16

## 2019-02-14 RX ADMIN — ONDANSETRON 4 MILLIGRAM(S): 8 TABLET, FILM COATED ORAL at 14:35

## 2019-02-14 RX ADMIN — FINASTERIDE 5 MILLIGRAM(S): 5 TABLET, FILM COATED ORAL at 22:15

## 2019-02-14 RX ADMIN — Medication 400 MILLIGRAM(S): at 16:34

## 2019-02-14 RX ADMIN — ENOXAPARIN SODIUM 40 MILLIGRAM(S): 100 INJECTION SUBCUTANEOUS at 22:12

## 2019-02-14 RX ADMIN — Medication 650 MILLIGRAM(S): at 20:35

## 2019-02-14 RX ADMIN — SODIUM CHLORIDE 1600 MILLILITER(S): 9 INJECTION INTRAMUSCULAR; INTRAVENOUS; SUBCUTANEOUS at 15:30

## 2019-02-14 RX ADMIN — SODIUM CHLORIDE 60 MILLILITER(S): 9 INJECTION, SOLUTION INTRAVENOUS at 22:15

## 2019-02-14 RX ADMIN — TAMSULOSIN HYDROCHLORIDE 0.4 MILLIGRAM(S): 0.4 CAPSULE ORAL at 22:14

## 2019-02-14 RX ADMIN — SODIUM CHLORIDE 2000 MILLILITER(S): 9 INJECTION INTRAMUSCULAR; INTRAVENOUS; SUBCUTANEOUS at 14:35

## 2019-02-14 RX ADMIN — SODIUM CHLORIDE 1000 MILLILITER(S): 9 INJECTION INTRAMUSCULAR; INTRAVENOUS; SUBCUTANEOUS at 15:05

## 2019-02-14 RX ADMIN — SENNA PLUS 1 TABLET(S): 8.6 TABLET ORAL at 22:13

## 2019-02-14 RX ADMIN — Medication 300 MILLIGRAM(S): at 22:13

## 2019-02-14 RX ADMIN — Medication 200 MILLIGRAM(S): at 15:34

## 2019-02-14 RX ADMIN — Medication 650 MILLIGRAM(S): at 18:34

## 2019-02-14 RX ADMIN — Medication 100 MILLIGRAM(S): at 16:37

## 2019-02-14 NOTE — H&P ADULT - NSHPLABSRESULTS_GEN_ALL_CORE
13.6                 143  | 29   | 36           7.2   >-----------< 180     ------------------------< 122                   43.1                 3.7  | 105  | 1.36                                         Ca 9.2   Mg x     Ph x            < from: CT Abdomen and Pelvis w/ Oral Cont (02.14.19 @ 17:14) >    Impression:    Sigmoid diverticulosis; no bowel obstruction noted.    Labs reviewed:     CXR personally reviewed: < from: Xray Chest 1 View- PORTABLE-Urgent (02.14.19 @ 15:46) >      EXAM:  XR CHEST PORTABLE URGENT 1V      PROCEDURE DATE:  02/14/2019        INTERPRETATION:  INDICATION: Abdominal pain    PRIORS: 12/17/2018.    VIEWS: Portable AP radiography of the chest performed.    FINDINGS: Heart size appears at the upper limits of normal. No hilar or   superior mediastinal abnormalities are identified. Bilateral interstitial   prominence likely related to shallow inspiration. There is opacity   identified within the left costophrenic angle sulcus region, with partial   obscuration of left hemidiaphragm suggesting left lower lobe infiltrate.   Small left pleural effusion cannot be excluded. There is no evidence for   acute right-sided infiltrate. No right pleural effusion is demonstrated.   There is no evidence for pneumothorax. No mediastinal shift is noted. No   osseous fractures are demonstrated.    IMPRESSION: There is opacity identified within the left costophrenic   angle sulcus region, with partial obscuration of left hemidiaphragm   suggesting left lower lobe infiltrate. Small left pleural effusion cannot   be excluded.             ECG reviewed and interpreted: afib at 79

## 2019-02-14 NOTE — ED ADULT NURSE REASSESSMENT NOTE - NS ED NURSE REASSESS COMMENT FT1
Pt straight cath'd for urine due to pt inability to void on his own. Straight cath revealed no urine output, no resistance met. MD Martínez aware

## 2019-02-14 NOTE — ED PROVIDER NOTE - PHYSICAL EXAMINATION
General:     NAD  Eyes: PERRL  Head:     dry oral mucosa  Neck:     trachea midline  Lungs:     CTA b/l  CVS:     regular rate, irregular rhythm   Abd:     RLQ TTP. no rebound or guarding  Ext:   b/l lower ext edema  Neuro: alert and non focal

## 2019-02-14 NOTE — ED ADULT NURSE REASSESSMENT NOTE - NS ED NURSE REASSESS COMMENT FT1
Pt care endorsed to ALANA Shen, pt stable, no s/s of distress observed. MD Martínez and DARIEL Hernandez made aware that pt has yet to void. Patient safety maintained family at bedside

## 2019-02-14 NOTE — ED PROVIDER NOTE - ATTENDING CONTRIBUTION TO CARE
· HPI Objective Statement: 89M h/o GERD, hypothyroidism, BPH, recently admitted for diverticulitis, spinal stenosis - ambulates with walker, presents from [facility] for abd pain that started last night. NBNB vomiting today and diarrhea that started PTA. P  Denies dizziness, fevers, chills, chest pain, sob, nausea, vomiting, lightheadedness, LOC    abd soft LLq tenderness mild distention + femoral pulses bilateral , no expanding lower abd mass   no rebound , no guarding  Dr. Martínez:  I have reviewed and discussed with the PA/ resident the case specifics, including the history, physical assessment, evaluation, conclusion, laboratory results, and medical plan. I agree with the contents, and conclusions. I have personally examined, and interviewed the patient. · HPI Objective Statement: 89M h/o GERD, hypothyroidism, BPH, recently admitted for diverticulitis, spinal stenosis - ambulates with walker, presents from [facility] for abd pain that started last night. NBNB vomiting today and diarrhea that started PTA. P  Denies dizziness, fevers, chills, chest pain, sob, nausea, vomiting, lightheadedness, LOC    abd soft LLq tenderness mild distention + femoral pulses bilateral , no expanding lower abd mass   no rebound , no guarding  cxr shows pneumonia pt admitted for pneumonia  Dr. Martínez:  I have reviewed and discussed with the PA/ resident the case specifics, including the history, physical assessment, evaluation, conclusion, laboratory results, and medical plan. I agree with the contents, and conclusions. I have personally examined, and interviewed the patient.

## 2019-02-14 NOTE — ED PROVIDER NOTE - OBJECTIVE STATEMENT
89M h/o GERD, hypothyroidism, BPH, recently admitted for diverticulitis, spinal stenosis - ambulates with walker, presents from [facility] for abd pain that started last night. NBNB vomiting today and diarrhea that started PTA. P  Denies dizziness, fevers, chills, chest pain, sob, nausea, vomiting, lightheadedness, LOC

## 2019-02-14 NOTE — H&P ADULT - PMH
Arthritis    Atrial fibrillation, unspecified type    BPH (benign prostatic hyperplasia)    CN (constipation)    Dementia    Edema    GERD (gastroesophageal reflux disease)    Gout    Hypothyroidism

## 2019-02-14 NOTE — H&P ADULT - NSHPPHYSICALEXAM_GEN_ALL_CORE
Vital Signs Last 24 Hrs  T(C): 37.1 (14 Feb 2019 19:35), Max: 38.7 (14 Feb 2019 14:35)  T(F): 98.7 (14 Feb 2019 19:35), Max: 101.7 (14 Feb 2019 14:35)  HR: 77 (14 Feb 2019 19:51) (76 - 87)  BP: 105/48 (14 Feb 2019 19:51) (105/48 - 130/60)  BP(mean): --  RR: 18 (14 Feb 2019 19:51) (18 - 20)  SpO2: 98% (14 Feb 2019 19:51) (97% - 98%)    nad, juan alberto,mmm,ncat  supple  bibasilar rales  s1s2  soft nt bs present  B/L LE edema  aaox 3  no gross neuro deficits

## 2019-02-14 NOTE — H&P ADULT - HISTORY OF PRESENT ILLNESS
89 y o m from [facility] h/o HTN, AFIB, c/o nauea/vomiting/cough for 1 day, since last night, associated with generalized abdominal discomfort, 4/10, which resolved in ed, patient noted to have temp of 101 in ed, cxr positive for pna.  no chest pain, no loc, no dysuria

## 2019-02-14 NOTE — H&P ADULT - ASSESSMENT
89 y o m 89 y o m from [facility] h/o HTN, AFIB, c/o nauea/vomiting/cough/ fever, secondary to Left PNA 89 y o m 89 y o m from [facility] h/o HTN, AFIB, c/o nauea/vomiting/cough/ fever, secondary to Left PNA    CAPRINI SCORE [CLOT]    AGE RELATED RISK FACTORS                                                       MOBILITY RELATED FACTORS  [ ] Age 41-60 years                                            (1 Point)                  [x ] Bed rest                                                        (1 Point)  [ ] Age: 61-74 years                                           (2 Points)                 [ ] Plaster cast                                                   (2 Points)  [x ] Age= 75 years                                              (3 Points)                 [ ] Bed bound for more than 72 hours                 (2 Points)    DISEASE RELATED RISK FACTORS                                               GENDER SPECIFIC FACTORS  [ ] Edema in the lower extremities                       (1 Point)                  [ ] Pregnancy                                                     (1 Point)  [ ] Varicose veins                                               (1 Point)                  [ ] Post-partum < 6 weeks                                   (1 Point)             [ ] BMI > 25 Kg/m2                                            (1 Point)                  [ ] Hormonal therapy  or oral contraception          (1 Point)                 [ ] Sepsis (in the previous month)                        (1 Point)                  [ ] History of pregnancy complications                 (1 point)  [ ] Pneumonia or serious lung disease                                               [ ] Unexplained or recurrent                     (1 Point)           (in the previous month)                               (1 Point)  [ ] Abnormal pulmonary function test                     (1 Point)                 SURGERY RELATED RISK FACTORS  [ ] Acute myocardial infarction                              (1 Point)                 [ ]  Section                                             (1 Point)  [ ] Congestive heart failure (in the previous month)  (1 Point)               [ ] Minor surgery                                                  (1 Point)   [ ] Inflammatory bowel disease                             (1 Point)                 [ ] Arthroscopic surgery                                        (2 Points)  [ ] Central venous access                                      (2 Points)                [ ] General surgery lasting more than 45 minutes   (2 Points)       [ ] Stroke (in the previous month)                          (5 Points)               [ ] Elective arthroplasty                                         (5 Points)                                                                                                                                               HEMATOLOGY RELATED FACTORS                                                 TRAUMA RELATED RISK FACTORS  [ ] Prior episodes of VTE                                     (3 Points)                 [ ] Fracture of the hip, pelvis, or leg                       (5 Points)  [ ] Positive family history for VTE                         (3 Points)                 [ ] Acute spinal cord injury (in the previous month)  (5 Points)  [ ] Prothrombin 71488 A                                     (3 Points)                 [ ] Paralysis  (less than 1 month)                             (5 Points)  [ ] Factor V Leiden                                             (3 Points)                  [ ] Multiple Trauma within 1 month                        (5 Points)  [ ] Lupus anticoagulants                                     (3 Points)                                                           [ ] Anticardiolipin antibodies                               (3 Points)                                                       [ ] High homocysteine in the blood                      (3 Points)                                             [ ] Other congenital or acquired thrombophilia      (3 Points)                                                [ ] Heparin induced thrombocytopenia                  (3 Points)                                          Total Score [   4       ]

## 2019-02-14 NOTE — H&P ADULT - PROBLEM SELECTOR PLAN 1
admit to [facility]  blood culturs, ua c/s  gentle hydration  iv antibiotics  npo, reassess in am admit to [facility]  blood cultures, ua c/s  gentle hydration  iv antibiotics- levaquin  npo, reassess in am  r/o viral etilogy

## 2019-02-14 NOTE — ED PROVIDER NOTE - CLINICAL SUMMARY MEDICAL DECISION MAKING FREE TEXT BOX
abd pain with hx diverticulitis. will obtain basic labs since vital are normal and afebrile. viral gastroenteritis vs diverticulitis. if CT scan abnormal will treat. if vitals remain stable and pt able to tolerate PO [facility]

## 2019-02-14 NOTE — ED ADULT NURSE NOTE - NSIMPLEMENTINTERV_GEN_ALL_ED
Implemented All Universal Safety Interventions:  [place] to call system. Call bell, personal items and telephone within reach. Instruct patient to call for assistance. Room bathroom lighting operational. Non-slip footwear when patient is off stretcher. Physically safe environment: no spills, clutter or unnecessary equipment. Stretcher in lowest position, wheels locked, appropriate side rails in place.

## 2019-02-14 NOTE — ED PROVIDER NOTE - PRIOR HOSPITAL/ED VISITS SUMMARY FREE TEXT FOR MDM OBTAINED AND REVIEWED OLD RECORDS QUESTION
pt has multiple admissions for diverticulitis.   also has hx of afib with spontaneous conversion to NSR. not on AC

## 2019-02-15 LAB
ALBUMIN SERPL ELPH-MCNC: 2.3 G/DL — LOW (ref 3.3–5)
ALP SERPL-CCNC: 43 U/L — SIGNIFICANT CHANGE UP (ref 40–120)
ALT FLD-CCNC: 17 U/L DA — SIGNIFICANT CHANGE UP (ref 10–45)
ANION GAP SERPL CALC-SCNC: 7 MMOL/L — SIGNIFICANT CHANGE UP (ref 5–17)
APPEARANCE UR: CLEAR — SIGNIFICANT CHANGE UP
AST SERPL-CCNC: 16 U/L — SIGNIFICANT CHANGE UP (ref 10–40)
BACTERIA # UR AUTO: NEGATIVE /HPF — SIGNIFICANT CHANGE UP
BASOPHILS # BLD AUTO: 0 K/UL — SIGNIFICANT CHANGE UP (ref 0–0.2)
BASOPHILS NFR BLD AUTO: 0.4 % — SIGNIFICANT CHANGE UP (ref 0–2)
BILIRUB SERPL-MCNC: 0.5 MG/DL — SIGNIFICANT CHANGE UP (ref 0.2–1.2)
BILIRUB UR-MCNC: NEGATIVE — SIGNIFICANT CHANGE UP
BUN SERPL-MCNC: 26 MG/DL — HIGH (ref 7–23)
CALCIUM SERPL-MCNC: 8.3 MG/DL — LOW (ref 8.4–10.5)
CHLORIDE SERPL-SCNC: 109 MMOL/L — HIGH (ref 96–108)
CO2 SERPL-SCNC: 29 MMOL/L — SIGNIFICANT CHANGE UP (ref 22–31)
COLOR SPEC: YELLOW — SIGNIFICANT CHANGE UP
COMMENT - URINE: SIGNIFICANT CHANGE UP
CREAT SERPL-MCNC: 1.08 MG/DL — SIGNIFICANT CHANGE UP (ref 0.5–1.3)
DIFF PNL FLD: NEGATIVE — SIGNIFICANT CHANGE UP
EOSINOPHIL # BLD AUTO: 0 K/UL — SIGNIFICANT CHANGE UP (ref 0–0.5)
EOSINOPHIL NFR BLD AUTO: 0.3 % — SIGNIFICANT CHANGE UP (ref 0–6)
EPI CELLS # UR: SIGNIFICANT CHANGE UP
GLUCOSE SERPL-MCNC: 97 MG/DL — SIGNIFICANT CHANGE UP (ref 70–99)
GLUCOSE UR QL: NEGATIVE — SIGNIFICANT CHANGE UP
HCT VFR BLD CALC: 36 % — LOW (ref 39–50)
HGB BLD-MCNC: 11.5 G/DL — LOW (ref 13–17)
KETONES UR-MCNC: NEGATIVE — SIGNIFICANT CHANGE UP
LEUKOCYTE ESTERASE UR-ACNC: ABNORMAL
LYMPHOCYTES # BLD AUTO: 0.5 K/UL — LOW (ref 1–3.3)
LYMPHOCYTES # BLD AUTO: 11.4 % — LOW (ref 13–44)
MCHC RBC-ENTMCNC: 30.9 PG — SIGNIFICANT CHANGE UP (ref 27–34)
MCHC RBC-ENTMCNC: 31.9 GM/DL — LOW (ref 32–36)
MCV RBC AUTO: 96.9 FL — SIGNIFICANT CHANGE UP (ref 80–100)
MONOCYTES # BLD AUTO: 0.4 K/UL — SIGNIFICANT CHANGE UP (ref 0–0.9)
MONOCYTES NFR BLD AUTO: 8.4 % — SIGNIFICANT CHANGE UP (ref 2–14)
NEUTROPHILS # BLD AUTO: 3.8 K/UL — SIGNIFICANT CHANGE UP (ref 1.8–7.4)
NEUTROPHILS NFR BLD AUTO: 79.6 % — HIGH (ref 43–77)
NITRITE UR-MCNC: NEGATIVE — SIGNIFICANT CHANGE UP
PH UR: 6.5 — SIGNIFICANT CHANGE UP (ref 5–8)
PLATELET # BLD AUTO: 136 K/UL — LOW (ref 150–400)
POTASSIUM SERPL-MCNC: 3.5 MMOL/L — SIGNIFICANT CHANGE UP (ref 3.5–5.3)
POTASSIUM SERPL-SCNC: 3.5 MMOL/L — SIGNIFICANT CHANGE UP (ref 3.5–5.3)
PROT SERPL-MCNC: 5.3 G/DL — LOW (ref 6–8.3)
PROT UR-MCNC: NEGATIVE — SIGNIFICANT CHANGE UP
RAPID RVP RESULT: SIGNIFICANT CHANGE UP
RBC # BLD: 3.72 M/UL — LOW (ref 4.2–5.8)
RBC # FLD: 15.4 % — HIGH (ref 10.3–14.5)
RBC CASTS # UR COMP ASSIST: SIGNIFICANT CHANGE UP /HPF (ref 0–4)
SODIUM SERPL-SCNC: 145 MMOL/L — SIGNIFICANT CHANGE UP (ref 135–145)
SP GR SPEC: 1.01 — SIGNIFICANT CHANGE UP (ref 1.01–1.02)
UROBILINOGEN FLD QL: NEGATIVE — SIGNIFICANT CHANGE UP
WBC # BLD: 4.7 K/UL — SIGNIFICANT CHANGE UP (ref 3.8–10.5)
WBC # FLD AUTO: 4.7 K/UL — SIGNIFICANT CHANGE UP (ref 3.8–10.5)
WBC UR QL: SIGNIFICANT CHANGE UP /HPF (ref 0–5)

## 2019-02-15 PROCEDURE — 99233 SBSQ HOSP IP/OBS HIGH 50: CPT

## 2019-02-15 PROCEDURE — 93970 EXTREMITY STUDY: CPT | Mod: 26

## 2019-02-15 RX ADMIN — ENOXAPARIN SODIUM 40 MILLIGRAM(S): 100 INJECTION SUBCUTANEOUS at 21:44

## 2019-02-15 RX ADMIN — FINASTERIDE 5 MILLIGRAM(S): 5 TABLET, FILM COATED ORAL at 10:39

## 2019-02-15 RX ADMIN — Medication 80 MILLIGRAM(S): at 05:50

## 2019-02-15 RX ADMIN — Medication 650 MILLIGRAM(S): at 22:05

## 2019-02-15 RX ADMIN — Medication 650 MILLIGRAM(S): at 21:48

## 2019-02-15 RX ADMIN — Medication 125 MICROGRAM(S): at 05:50

## 2019-02-15 RX ADMIN — PANTOPRAZOLE SODIUM 40 MILLIGRAM(S): 20 TABLET, DELAYED RELEASE ORAL at 05:51

## 2019-02-15 RX ADMIN — TAMSULOSIN HYDROCHLORIDE 0.4 MILLIGRAM(S): 0.4 CAPSULE ORAL at 21:44

## 2019-02-15 NOTE — PROGRESS NOTE ADULT - ASSESSMENT
89 y o m 89 y o m from [facility] h/o HTN, AFIB, c/o nauea/vomiting/cough/ fever, secondary to Left PNA

## 2019-02-15 NOTE — PROGRESS NOTE ADULT - SUBJECTIVE AND OBJECTIVE BOX
Patient is a 89y old  Male who presents with a chief complaint of nausea/vomiting/pna (2019 19:56)      Patient seen and examined at bedside. feels better today     ALLERGIES:  penicillin (Unknown)    MEDICATIONS:  acetaminophen   Tablet .. 650 milliGRAM(s) Oral Once PRN  enoxaparin Injectable 40 milliGRAM(s) SubCutaneous every 24 hours  finasteride 5 milliGRAM(s) Oral daily  furosemide    Tablet 80 milliGRAM(s) Oral daily  levoFLOXacin IVPB 500 milliGRAM(s) IV Intermittent every 24 hours  levothyroxine 125 MICROGram(s) Oral daily  ondansetron Injectable 4 milliGRAM(s) IV Push every 6 hours PRN  pantoprazole    Tablet 40 milliGRAM(s) Oral before breakfast  senna 1 Tablet(s) Oral at bedtime PRN  tamsulosin 0.4 milliGRAM(s) Oral at bedtime    Vital Signs Last 24 Hrs  T(F): 97.6 (15 Feb 2019 10:42), Max: 101.7 (2019 14:35)  HR: 55 (15 Feb 2019 10:42) (55 - 87)  BP: 146/50 (15 Feb 2019 10:42) (105/48 - 146/50)  RR: 15 (15 Feb 2019 10:42) (15 - 20)  SpO2: 99% (15 Feb 2019 10:42) (97% - 99%)  I&O's Summary    2019 07:01  -  15 Feb 2019 07:00  --------------------------------------------------------  IN: 360 mL / OUT: 1 mL / NET: 359 mL        PHYSICAL EXAM:  General: NAD, alert oriented x 3  Neck: Supple, No JVD  Lungs: Clear to auscultation bilaterally  Cardio: RRR, S1/S2, No murmurs  Abdomen: Soft, Nontender, Nondistended; Bowel sounds present  Extremities: No clubbing, cyanosis, or edema      LABS:                        11.5   4.7   )-----------( 136      ( 15 Feb 2019 06:04 )             36.0     02-15    145  |  109  |  26  ----------------------------<  97  3.5   |  29  |  1.08    Ca    8.3      15 Feb 2019 06:04    TPro  5.3  /  Alb  2.3  /  TBili  0.5  /  DBili  x   /  AST  16  /  ALT  17  /  AlkPhos  43  -15    eGFR if Non African American: 57 mL/min/1.73M2 (02-15-19 @ 06:04)  eGFR if African American: 66 mL/min/1.73M2 (02-15-19 @ 06:04)    CAPILLARY BLOOD GLUCOSE    Urinalysis Basic - ( 15 Feb 2019 10:56 )    Color: Yellow / Appearance: Clear / S.010 / pH: x  Gluc: x / Ketone: Negative  / Bili: Negative / Urobili: Negative   Blood: x / Protein: Negative / Nitrite: Negative   Leuk Esterase: Trace / RBC: 0-4 /HPF / WBC 0-2 /HPF   Sq Epi: x / Non Sq Epi: Neg.-Few / Bacteria: Negative /HPF    RADIOLOGY & ADDITIONAL TESTS:    Care Discussed with Consultants/Other Providers:

## 2019-02-15 NOTE — PROGRESS NOTE ADULT - PROBLEM SELECTOR PLAN 1
gentle hydration  iv antibiotics- levaquin, change to PO today   RVP negative, await blood cultures, UA negative.  came with nausea, vomiting, resolved. had a loose BM in AM.  PT consult pending  likely d/c in AM

## 2019-02-16 ENCOUNTER — TRANSCRIPTION ENCOUNTER (OUTPATIENT)
Age: 84
End: 2019-02-16

## 2019-02-16 VITALS
DIASTOLIC BLOOD PRESSURE: 55 MMHG | OXYGEN SATURATION: 100 % | RESPIRATION RATE: 14 BRPM | HEART RATE: 77 BPM | SYSTOLIC BLOOD PRESSURE: 146 MMHG | TEMPERATURE: 98 F

## 2019-02-16 PROCEDURE — 87581 M.PNEUMON DNA AMP PROBE: CPT

## 2019-02-16 PROCEDURE — 96367 TX/PROPH/DG ADDL SEQ IV INF: CPT

## 2019-02-16 PROCEDURE — 99285 EMERGENCY DEPT VISIT HI MDM: CPT | Mod: 25

## 2019-02-16 PROCEDURE — 74176 CT ABD & PELVIS W/O CONTRAST: CPT

## 2019-02-16 PROCEDURE — 96365 THER/PROPH/DIAG IV INF INIT: CPT

## 2019-02-16 PROCEDURE — 87631 RESP VIRUS 3-5 TARGETS: CPT

## 2019-02-16 PROCEDURE — 87486 CHLMYD PNEUM DNA AMP PROBE: CPT

## 2019-02-16 PROCEDURE — 97162 PT EVAL MOD COMPLEX 30 MIN: CPT

## 2019-02-16 PROCEDURE — 96375 TX/PRO/DX INJ NEW DRUG ADDON: CPT

## 2019-02-16 PROCEDURE — 87040 BLOOD CULTURE FOR BACTERIA: CPT

## 2019-02-16 PROCEDURE — 87186 SC STD MICRODIL/AGAR DIL: CPT

## 2019-02-16 PROCEDURE — 93970 EXTREMITY STUDY: CPT

## 2019-02-16 PROCEDURE — 80053 COMPREHEN METABOLIC PANEL: CPT

## 2019-02-16 PROCEDURE — 85027 COMPLETE CBC AUTOMATED: CPT

## 2019-02-16 PROCEDURE — 71045 X-RAY EXAM CHEST 1 VIEW: CPT

## 2019-02-16 PROCEDURE — 87633 RESP VIRUS 12-25 TARGETS: CPT

## 2019-02-16 PROCEDURE — 81001 URINALYSIS AUTO W/SCOPE: CPT

## 2019-02-16 PROCEDURE — 87086 URINE CULTURE/COLONY COUNT: CPT

## 2019-02-16 PROCEDURE — 93005 ELECTROCARDIOGRAM TRACING: CPT

## 2019-02-16 PROCEDURE — 36415 COLL VENOUS BLD VENIPUNCTURE: CPT

## 2019-02-16 PROCEDURE — 99239 HOSP IP/OBS DSCHRG MGMT >30: CPT

## 2019-02-16 RX ORDER — CIPROFLOXACIN LACTATE 400MG/40ML
1 VIAL (ML) INTRAVENOUS
Qty: 4 | Refills: 0 | OUTPATIENT
Start: 2019-02-16 | End: 2019-02-19

## 2019-02-16 RX ADMIN — Medication 125 MICROGRAM(S): at 05:14

## 2019-02-16 RX ADMIN — PANTOPRAZOLE SODIUM 40 MILLIGRAM(S): 20 TABLET, DELAYED RELEASE ORAL at 05:14

## 2019-02-16 RX ADMIN — FINASTERIDE 5 MILLIGRAM(S): 5 TABLET, FILM COATED ORAL at 12:11

## 2019-02-16 RX ADMIN — Medication 80 MILLIGRAM(S): at 05:14

## 2019-02-16 NOTE — PROGRESS NOTE ADULT - SUBJECTIVE AND OBJECTIVE BOX
Patient is a 89y old  Male who presents with a chief complaint of nausea/vomiting/pna (15 Feb 2019 13:37)      Patient seen and examined at bedside.    ALLERGIES:  penicillin (Unknown)    MEDICATIONS:  enoxaparin Injectable 40 milliGRAM(s) SubCutaneous every 24 hours  finasteride 5 milliGRAM(s) Oral daily  furosemide    Tablet 80 milliGRAM(s) Oral daily  levoFLOXacin IVPB 500 milliGRAM(s) IV Intermittent every 24 hours  levothyroxine 125 MICROGram(s) Oral daily  ondansetron Injectable 4 milliGRAM(s) IV Push every 6 hours PRN  pantoprazole    Tablet 40 milliGRAM(s) Oral before breakfast  senna 1 Tablet(s) Oral at bedtime PRN  tamsulosin 0.4 milliGRAM(s) Oral at bedtime    Vital Signs Last 24 Hrs  T(F): 97.9 (2019 05:17), Max: 98.5 (15 Feb 2019 16:55)  HR: 75 (2019 05:17) (55 - 75)  BP: 145/61 (2019 05:17) (145/59 - 146/50)  RR: 14 (2019 05:17) (14 - 15)  SpO2: 100% (2019 05:17) (99% - 100%)  I&O's Summary    15 Feb 2019 07:01  -  2019 07:00  --------------------------------------------------------  IN: 200 mL / OUT: 0 mL / NET: 200 mL        PHYSICAL EXAM:  General: NAD,    ENT: MMM  Neck: Supple, No JVD  Lungs: Clear to auscultation bilaterally  Cardio: RRR, S1/S2, No murmurs  Abdomen: Soft, Nontender, Nondistended; Bowel sounds present  Extremities: No cyanosis, No edema    LABS:                        11.5   4.7   )-----------( 136      ( 15 Feb 2019 06:04 )             36.0     02-15    145  |  109  |  26  ----------------------------<  97  3.5   |  29  |  1.08    Ca    8.3      15 Feb 2019 06:04    TPro  5.3  /  Alb  2.3  /  TBili  0.5  /  DBili  x   /  AST  16  /  ALT  17  /  AlkPhos  43  02-15    eGFR if Non African American: 57 mL/min/1.73M2 (02-15-19 @ 06:04)  eGFR if African American: 66 mL/min/1.73M2 (02-15-19 @ 06:04)                    CAPILLARY BLOOD GLUCOSE          Urinalysis Basic - ( 15 Feb 2019 10:56 )    Color: Yellow / Appearance: Clear / S.010 / pH: x  Gluc: x / Ketone: Negative  / Bili: Negative / Urobili: Negative   Blood: x / Protein: Negative / Nitrite: Negative   Leuk Esterase: Trace / RBC: 0-4 /HPF / WBC 0-2 /HPF   Sq Epi: x / Non Sq Epi: Neg.-Few / Bacteria: Negative /HPF        Culture - Blood (collected 2019 15:30)  Source: .Blood Blood-Peripheral  Preliminary Report (15 Feb 2019 21:01):    No growth to date.    Culture - Blood (collected 2019 15:30)  Source: .Blood Blood-Peripheral  Preliminary Report (15 Feb 2019 21:01):    No growth to date.        RADIOLOGY & ADDITIONAL TESTS:  < from: US Duplex Venous Lower Ext Complete, Bilateral (02.15.19 @ 16:30) >    IMPRESSION:     No evidence of bilateral lower extremity deep venous thrombosis.   Suboptimal visualization of the right calf veins.    There is a 2.7 cm right popliteal fossa cyst, likely a Baker's cyst,   decreased in size compared to 2018.      < end of copied text >  < from: CT Abdomen and Pelvis w/ Oral Cont (19 @ 17:14) >  Impression:    Sigmoid diverticulosis; no bowel obstruction noted.    Other findings as discussed above.        < end of copied text >  < from: Xray Chest 1 View- PORTABLE-Urgent (19 @ 15:46) >    IMPRESSION: There is opacity identified within the left costophrenic   angle sulcus region, with partial obscuration of left hemidiaphragm   suggesting left lower lobe infiltrate. Small left pleural effusion cannot   be excluded.       < end of copied text >    Care Discussed with Consultants/Other Providers:

## 2019-02-16 NOTE — PHYSICAL THERAPY INITIAL EVALUATION ADULT - ADDITIONAL COMMENTS
pt lives in [facility] x6 yrs, uses rolling walker to ambulate, requires assistance w/ADL's

## 2019-02-16 NOTE — DISCHARGE NOTE ADULT - HOSPITAL COURSE
89 yo m admitted 2-16-19 with pmh essential htn, chronic afib from [facility] presents with PNA    1. PNA: probable gram neg pna prelim bcx neg on levaquin. rvp neg  2. chronic afib: rate controlled no a/c fall risk  3. hypothyroidism: stable c/w synthroid  4. gout: stable c/w allopurinol  5. bph: stable c/w home meds  6. gerd: stable c/w ppi  7. leg edema: stable c/w lasix doppler neg  8. dispo: awaiting pt consult d/c  to [facility] today  dr melgar informed via text msg

## 2019-02-16 NOTE — DISCHARGE NOTE ADULT - CARE PROVIDER_API CALL
Olvin Hernandez (DO)  Internal Medicine  [address] [id number]  Phone: [phone]  Fax: [phone]  Follow Up Time:

## 2019-02-16 NOTE — PROGRESS NOTE ADULT - ASSESSMENT
89 yo m admitted 2-16-19 with pmh essential htn, chronic afib from [facility] presents with PNA    1. PNA: probable gram neg pna prelim bcx neg on levaquin. rvp neg  2. chronic afib: rate controlled no a/c fall risk  3. hypothyroidism: stable c/w synthroid  4. gout: stable c/w allopurinol  5. bph: stable c/w home meds  6. gerd: stable c/w ppi  7. leg edema: stable c/w lasix doppler neg  8. dispo: awaiting pt consult d/c planning in process

## 2019-02-16 NOTE — DISCHARGE NOTE ADULT - PATIENT PORTAL LINK FT
You can access the [facility] Patient Portal, offered by [facility], by registering with the following website: [email or web address]

## 2019-02-16 NOTE — DISCHARGE NOTE ADULT - MEDICATION SUMMARY - MEDICATIONS TO TAKE
I will START or STAY ON the medications listed below when I get home from the hospital:    finasteride 5 mg oral tablet  -- 1 tab(s) by mouth once a day  -- Indication: For Bph    traMADol 50 mg oral tablet, disintegrating  -- Indication: For Pain management    Tylenol 325 mg oral tablet  -- 650 milligram(s) by mouth 2 times a day  -- Indication: For Pain management    tamsulosin 0.4 mg oral capsule  -- 1 cap(s) by mouth once a day  -- Indication: For Bph    allopurinol 300 mg oral tablet  -- 1 tab(s) by mouth once a day  -- Indication: For Gout, unspecified cause, unspecified chronicity, unspecified site    furosemide 80 mg oral tablet  -- 1 tab(s) by mouth once a day  -- Indication: For Leg edema    ferrous sulfate 325 mg (65 mg elemental iron) oral tablet  -- orally once a day  -- Indication: For Anemia    lactulose  -- Indication: For constipation    Colace 100 mg oral capsule  -- 1 cap(s) by mouth 2 times a day  -- Indication: For constipation    Senna 8.6 mg oral tablet  -- 1 tab(s) by mouth once a day (at bedtime)  -- Indication: For constipation    potassium chloride 10 mEq oral tablet, extended release  -- 1 tab(s) by mouth 2 times a day  -- Indication: For Potassium    Slow-Mag  -- Indication: For magnesium    omeprazole 40 mg oral delayed release capsule  -- 1 cap(s) by mouth once a day  -- Indication: For Gerd    Levaquin 500 mg oral tablet  -- 1 tab(s) by mouth once a day   -- Avoid prolonged or excessive exposure to direct and/or artificial sunlight while taking this medication.  Do not take dairy products, antacids, or iron preparations within one hour of this medication.  Finish all this medication unless otherwise directed by prescriber.  May cause drowsiness or dizziness.  Medication should be taken with plenty of water.    -- Indication: For Pneumonia    levothyroxine 125 mcg (0.125 mg) oral tablet  -- 1 tab(s) by mouth once a day  -- Indication: For Hypothyroidism, unspecified type    Centrum Silver oral tablet  -- 1 tab(s) by mouth once a day  -- Indication: For vitamins    Vitamin D3 1000 intl units oral capsule  -- 1 cap(s) by mouth once a day  -- Indication: For vitamins

## 2019-02-16 NOTE — DISCHARGE NOTE ADULT - CARE PLAN
Principal Discharge DX:	Pneumonia  Goal:	improved  Assessment and plan of treatment:	complete antibiotics as directed

## 2019-02-19 LAB
CULTURE RESULTS: SIGNIFICANT CHANGE UP
CULTURE RESULTS: SIGNIFICANT CHANGE UP
SPECIMEN SOURCE: SIGNIFICANT CHANGE UP
SPECIMEN SOURCE: SIGNIFICANT CHANGE UP

## 2019-04-23 ENCOUNTER — INPATIENT (INPATIENT)
Facility: HOSPITAL | Age: 84
LOS: 1 days | Discharge: NURSING, REHAB OR LONG-TERM CARE | DRG: 93 | End: 2019-04-25
Attending: STUDENT IN AN ORGANIZED HEALTH CARE EDUCATION/TRAINING PROGRAM | Admitting: INTERNAL MEDICINE
Payer: MEDICARE

## 2019-04-23 VITALS
HEIGHT: 70 IN | SYSTOLIC BLOOD PRESSURE: 121 MMHG | RESPIRATION RATE: 15 BRPM | HEART RATE: 88 BPM | DIASTOLIC BLOOD PRESSURE: 68 MMHG | TEMPERATURE: 99 F | WEIGHT: 195.99 LBS | OXYGEN SATURATION: 97 %
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
## 2019-04-23 PROBLEM — I48.91 UNSPECIFIED ATRIAL FIBRILLATION: Chronic | Status: ACTIVE | Noted: 2019-02-14

## 2019-04-23 LAB
ALBUMIN SERPL ELPH-MCNC: 3.3 G/DL — SIGNIFICANT CHANGE UP (ref 3.3–5)
ALP SERPL-CCNC: 64 U/L — SIGNIFICANT CHANGE UP (ref 40–120)
ALT FLD-CCNC: 16 U/L DA — SIGNIFICANT CHANGE UP (ref 10–45)
ANION GAP SERPL CALC-SCNC: 11 MMOL/L — SIGNIFICANT CHANGE UP (ref 5–17)
APPEARANCE UR: CLEAR — SIGNIFICANT CHANGE UP
APTT BLD: 31.2 SEC — SIGNIFICANT CHANGE UP (ref 27.5–36.3)
AST SERPL-CCNC: 26 U/L — SIGNIFICANT CHANGE UP (ref 10–40)
BASOPHILS # BLD AUTO: 0.1 K/UL — SIGNIFICANT CHANGE UP (ref 0–0.2)
BASOPHILS NFR BLD AUTO: 0.7 % — SIGNIFICANT CHANGE UP (ref 0–2)
BILIRUB SERPL-MCNC: 0.4 MG/DL — SIGNIFICANT CHANGE UP (ref 0.2–1.2)
BILIRUB UR-MCNC: NEGATIVE — SIGNIFICANT CHANGE UP
BUN SERPL-MCNC: 34 MG/DL — HIGH (ref 7–23)
CALCIUM SERPL-MCNC: 9.7 MG/DL — SIGNIFICANT CHANGE UP (ref 8.4–10.5)
CHLORIDE SERPL-SCNC: 104 MMOL/L — SIGNIFICANT CHANGE UP (ref 96–108)
CO2 SERPL-SCNC: 27 MMOL/L — SIGNIFICANT CHANGE UP (ref 22–31)
COLOR SPEC: YELLOW — SIGNIFICANT CHANGE UP
CREAT SERPL-MCNC: 1.33 MG/DL — HIGH (ref 0.5–1.3)
DIFF PNL FLD: NEGATIVE — SIGNIFICANT CHANGE UP
EOSINOPHIL # BLD AUTO: 0 K/UL — SIGNIFICANT CHANGE UP (ref 0–0.5)
EOSINOPHIL NFR BLD AUTO: 0.2 % — SIGNIFICANT CHANGE UP (ref 0–6)
GLUCOSE SERPL-MCNC: 112 MG/DL — HIGH (ref 70–99)
GLUCOSE UR QL: NEGATIVE — SIGNIFICANT CHANGE UP
HCT VFR BLD CALC: 39.6 % — SIGNIFICANT CHANGE UP (ref 39–50)
HGB BLD-MCNC: 12.7 G/DL — LOW (ref 13–17)
INR BLD: 1.15 RATIO — SIGNIFICANT CHANGE UP (ref 0.88–1.16)
KETONES UR-MCNC: NEGATIVE — SIGNIFICANT CHANGE UP
LEUKOCYTE ESTERASE UR-ACNC: NEGATIVE — SIGNIFICANT CHANGE UP
LIDOCAIN IGE QN: 56 U/L — LOW (ref 73–393)
LYMPHOCYTES # BLD AUTO: 1.2 K/UL — SIGNIFICANT CHANGE UP (ref 1–3.3)
LYMPHOCYTES # BLD AUTO: 8.9 % — LOW (ref 13–44)
MCHC RBC-ENTMCNC: 30.9 PG — SIGNIFICANT CHANGE UP (ref 27–34)
MCHC RBC-ENTMCNC: 32.1 GM/DL — SIGNIFICANT CHANGE UP (ref 32–36)
MCV RBC AUTO: 96.3 FL — SIGNIFICANT CHANGE UP (ref 80–100)
MONOCYTES # BLD AUTO: 0.8 K/UL — SIGNIFICANT CHANGE UP (ref 0–0.9)
MONOCYTES NFR BLD AUTO: 5.8 % — SIGNIFICANT CHANGE UP (ref 2–14)
NEUTROPHILS # BLD AUTO: 11.1 K/UL — HIGH (ref 1.8–7.4)
NEUTROPHILS NFR BLD AUTO: 84.5 % — HIGH (ref 43–77)
NITRITE UR-MCNC: NEGATIVE — SIGNIFICANT CHANGE UP
PH UR: 6 — SIGNIFICANT CHANGE UP (ref 5–8)
PLATELET # BLD AUTO: 170 K/UL — SIGNIFICANT CHANGE UP (ref 150–400)
POTASSIUM SERPL-MCNC: 3.9 MMOL/L — SIGNIFICANT CHANGE UP (ref 3.5–5.3)
POTASSIUM SERPL-SCNC: 3.9 MMOL/L — SIGNIFICANT CHANGE UP (ref 3.5–5.3)
PROT SERPL-MCNC: 7 G/DL — SIGNIFICANT CHANGE UP (ref 6–8.3)
PROT UR-MCNC: NEGATIVE — SIGNIFICANT CHANGE UP
PROTHROM AB SERPL-ACNC: 12.9 SEC — SIGNIFICANT CHANGE UP (ref 10–12.9)
RBC # BLD: 4.12 M/UL — LOW (ref 4.2–5.8)
RBC # FLD: 15.1 % — HIGH (ref 10.3–14.5)
SODIUM SERPL-SCNC: 142 MMOL/L — SIGNIFICANT CHANGE UP (ref 135–145)
SP GR SPEC: 1.01 — SIGNIFICANT CHANGE UP (ref 1.01–1.02)
UROBILINOGEN FLD QL: NEGATIVE — SIGNIFICANT CHANGE UP
WBC # BLD: 13.1 K/UL — HIGH (ref 3.8–10.5)
WBC # FLD AUTO: 13.1 K/UL — HIGH (ref 3.8–10.5)

## 2019-04-23 PROCEDURE — 99223 1ST HOSP IP/OBS HIGH 75: CPT

## 2019-04-23 PROCEDURE — 70450 CT HEAD/BRAIN W/O DYE: CPT | Mod: 26

## 2019-04-23 PROCEDURE — 99285 EMERGENCY DEPT VISIT HI MDM: CPT

## 2019-04-23 PROCEDURE — 71045 X-RAY EXAM CHEST 1 VIEW: CPT | Mod: 26

## 2019-04-23 PROCEDURE — 93971 EXTREMITY STUDY: CPT | Mod: 26,RT

## 2019-04-23 PROCEDURE — 74176 CT ABD & PELVIS W/O CONTRAST: CPT | Mod: 26

## 2019-04-23 PROCEDURE — 93010 ELECTROCARDIOGRAM REPORT: CPT

## 2019-04-23 RX ORDER — TRAMADOL HYDROCHLORIDE 50 MG/1
25 TABLET ORAL
Qty: 0 | Refills: 0 | Status: DISCONTINUED | OUTPATIENT
Start: 2019-04-23 | End: 2019-04-25

## 2019-04-23 RX ORDER — SODIUM CHLORIDE 9 MG/ML
1000 INJECTION INTRAMUSCULAR; INTRAVENOUS; SUBCUTANEOUS
Qty: 0 | Refills: 0 | Status: DISCONTINUED | OUTPATIENT
Start: 2019-04-23 | End: 2019-04-23

## 2019-04-23 RX ORDER — TAMSULOSIN HYDROCHLORIDE 0.4 MG/1
0.4 CAPSULE ORAL AT BEDTIME
Qty: 0 | Refills: 0 | Status: DISCONTINUED | OUTPATIENT
Start: 2019-04-23 | End: 2019-04-25

## 2019-04-23 RX ORDER — ACETAMINOPHEN 500 MG
650 TABLET ORAL ONCE
Qty: 0 | Refills: 0 | Status: COMPLETED | OUTPATIENT
Start: 2019-04-23 | End: 2019-04-23

## 2019-04-23 RX ORDER — SENNA PLUS 8.6 MG/1
2 TABLET ORAL AT BEDTIME
Qty: 0 | Refills: 0 | Status: DISCONTINUED | OUTPATIENT
Start: 2019-04-23 | End: 2019-04-25

## 2019-04-23 RX ORDER — POTASSIUM CHLORIDE 20 MEQ
10 PACKET (EA) ORAL DAILY
Qty: 0 | Refills: 0 | Status: DISCONTINUED | OUTPATIENT
Start: 2019-04-23 | End: 2019-04-25

## 2019-04-23 RX ORDER — SODIUM CHLORIDE 9 MG/ML
3 INJECTION INTRAMUSCULAR; INTRAVENOUS; SUBCUTANEOUS ONCE
Qty: 0 | Refills: 0 | Status: COMPLETED | OUTPATIENT
Start: 2019-04-23 | End: 2019-04-23

## 2019-04-23 RX ORDER — PANTOPRAZOLE SODIUM 20 MG/1
40 TABLET, DELAYED RELEASE ORAL
Qty: 0 | Refills: 0 | Status: DISCONTINUED | OUTPATIENT
Start: 2019-04-23 | End: 2019-04-25

## 2019-04-23 RX ORDER — ACETAMINOPHEN 500 MG
650 TABLET ORAL EVERY 6 HOURS
Qty: 0 | Refills: 0 | Status: DISCONTINUED | OUTPATIENT
Start: 2019-04-23 | End: 2019-04-25

## 2019-04-23 RX ORDER — ALLOPURINOL 300 MG
300 TABLET ORAL DAILY
Qty: 0 | Refills: 0 | Status: DISCONTINUED | OUTPATIENT
Start: 2019-04-23 | End: 2019-04-25

## 2019-04-23 RX ORDER — LUBIPROSTONE 24 UG/1
8 CAPSULE, GELATIN COATED ORAL
Qty: 0 | Refills: 0 | Status: DISCONTINUED | OUTPATIENT
Start: 2019-04-23 | End: 2019-04-25

## 2019-04-23 RX ORDER — TETANUS TOXOID, REDUCED DIPHTHERIA TOXOID AND ACELLULAR PERTUSSIS VACCINE, ADSORBED 5; 2.5; 8; 8; 2.5 [IU]/.5ML; [IU]/.5ML; UG/.5ML; UG/.5ML; UG/.5ML
0.5 SUSPENSION INTRAMUSCULAR ONCE
Qty: 0 | Refills: 0 | Status: COMPLETED | OUTPATIENT
Start: 2019-04-23 | End: 2019-04-23

## 2019-04-23 RX ORDER — FINASTERIDE 5 MG/1
5 TABLET, FILM COATED ORAL DAILY
Qty: 0 | Refills: 0 | Status: DISCONTINUED | OUTPATIENT
Start: 2019-04-23 | End: 2019-04-25

## 2019-04-23 RX ORDER — LACTULOSE 10 G/15ML
20 SOLUTION ORAL DAILY
Qty: 0 | Refills: 0 | Status: DISCONTINUED | OUTPATIENT
Start: 2019-04-23 | End: 2019-04-25

## 2019-04-23 RX ORDER — FUROSEMIDE 40 MG
80 TABLET ORAL DAILY
Qty: 0 | Refills: 0 | Status: DISCONTINUED | OUTPATIENT
Start: 2019-04-23 | End: 2019-04-25

## 2019-04-23 RX ORDER — LEVOTHYROXINE SODIUM 125 MCG
125 TABLET ORAL DAILY
Qty: 0 | Refills: 0 | Status: DISCONTINUED | OUTPATIENT
Start: 2019-04-23 | End: 2019-04-25

## 2019-04-23 RX ORDER — FERROUS SULFATE 325(65) MG
325 TABLET ORAL DAILY
Qty: 0 | Refills: 0 | Status: DISCONTINUED | OUTPATIENT
Start: 2019-04-23 | End: 2019-04-25

## 2019-04-23 RX ORDER — ENOXAPARIN SODIUM 100 MG/ML
40 INJECTION SUBCUTANEOUS EVERY 24 HOURS
Qty: 0 | Refills: 0 | Status: DISCONTINUED | OUTPATIENT
Start: 2019-04-23 | End: 2019-04-25

## 2019-04-23 RX ORDER — DOCUSATE SODIUM 100 MG
100 CAPSULE ORAL THREE TIMES A DAY
Qty: 0 | Refills: 0 | Status: DISCONTINUED | OUTPATIENT
Start: 2019-04-23 | End: 2019-04-25

## 2019-04-23 RX ADMIN — SODIUM CHLORIDE 3 MILLILITER(S): 9 INJECTION INTRAMUSCULAR; INTRAVENOUS; SUBCUTANEOUS at 15:35

## 2019-04-23 RX ADMIN — TAMSULOSIN HYDROCHLORIDE 0.4 MILLIGRAM(S): 0.4 CAPSULE ORAL at 23:31

## 2019-04-23 RX ADMIN — TRAMADOL HYDROCHLORIDE 25 MILLIGRAM(S): 50 TABLET ORAL at 23:31

## 2019-04-23 RX ADMIN — Medication 650 MILLIGRAM(S): at 18:10

## 2019-04-23 RX ADMIN — TETANUS TOXOID, REDUCED DIPHTHERIA TOXOID AND ACELLULAR PERTUSSIS VACCINE, ADSORBED 0.5 MILLILITER(S): 5; 2.5; 8; 8; 2.5 SUSPENSION INTRAMUSCULAR at 15:48

## 2019-04-23 RX ADMIN — Medication 100 MILLIGRAM(S): at 23:30

## 2019-04-23 RX ADMIN — SODIUM CHLORIDE 125 MILLILITER(S): 9 INJECTION INTRAMUSCULAR; INTRAVENOUS; SUBCUTANEOUS at 15:49

## 2019-04-23 RX ADMIN — Medication 650 MILLIGRAM(S): at 19:10

## 2019-04-23 RX ADMIN — SENNA PLUS 2 TABLET(S): 8.6 TABLET ORAL at 23:31

## 2019-04-23 NOTE — H&P ADULT - NSICDXPASTMEDICALHX_GEN_ALL_CORE_FT
PAST MEDICAL HISTORY:  Arthritis     Atrial fibrillation, unspecified type     BPH (benign prostatic hyperplasia)     CN (constipation)     Dementia     Edema     GERD (gastroesophageal reflux disease)     Gout     Hypothyroidism     Spinal stenosis

## 2019-04-23 NOTE — ED ADULT TRIAGE NOTE - CHIEF COMPLAINT QUOTE
Pt BIB EMS from [facility] with c/o 2 unwitnessed falls today in the bathroom. Pt states, "I was getting up off of the toilet and my hand slipped off the bar and I landed on my butt and hit my head on the toilet." Pt denies LOC, chest pain, dizziness, or SOB. Pt presents with bilateral elbow skin tears and abrasion to right ear. EMS denies blood thinner use.

## 2019-04-23 NOTE — ED PROVIDER NOTE - CLINICAL SUMMARY MEDICAL DECISION MAKING FREE TEXT BOX
pt with head injury s/p fall and abd pain. will ct head to r/o ich, ct abd/pelvis to r/o pathology, labs, ivf, re-evaluate pt with head injury s/p fall and abd pain. will ct head to r/o ich, ct abd/pelvis to r/o pathology, us r ue to r/o dvt, labs, ivf, re-evaluate

## 2019-04-23 NOTE — ED PROVIDER NOTE - ATTENDING CONTRIBUTION TO CARE
89 y m s/p fall head injury , old wound R UL  + swelling, abrasion L elbow from todays fall   abdominal pain   pe nad , abrasion L elbow 1 cm , open wound R UL R UL swelling   abdomen soft RLQ tenderness   ct abd, ct brain, doppler R UL basic labs   pt unable to stand and ambulate after fall   Dr. Martínez:  I have reviewed and discussed with the PA/ resident the case specifics, including the history, physical assessment, evaluation, conclusion, laboratory results, and medical plan. I agree with the contents, and conclusions. I have personally examined, and interviewed the patient.

## 2019-04-23 NOTE — H&P ADULT - NSHPPHYSICALEXAM_GEN_ALL_CORE
Vital Signs (24 Hrs):  T(C): 36.7 (19 @ 22:18), Max: 37.1 (19 @ 14:49)  HR: 71 (19 @ 22:18) (61 - 88)  BP: 113/72 (19 @ 22:18) (104/60 - 132/81)  RR: 18 (19 @ 22:18) (15 - 18)  SpO2: 98% (19 @ 22:18) (97% - 100%)  Daily Height in cm: 177.8 (2019 22:18)    Daily Weight in k.2 (2019 22:18)

## 2019-04-23 NOTE — ED ADULT NURSE NOTE - CHIEF COMPLAINT QUOTE
Pt BIB EMS from [facility] with c/o 2 unwitnessed falls today in the bathroom. Pt states, "I was getting up off of the toilet and my hand slipped off the bar and I landed on my butt and hit my head on the toilet." Pt denies LOC, chest pain, dizziness, or SOB. Pt presents with bilateral elbow skin tears and abrasion to right ear. EMS denies blood thinner use.

## 2019-04-23 NOTE — ED ADULT NURSE NOTE - OBJECTIVE STATEMENT
Pt BIB EMS from [facility] due to having 2 unwitnessed falls today while in the bathroom. Pt states, "I was getting up off of the toilet and my hand slipped off the bar and I landed on my butt and hit my head on the toilet." Pt denies LOC, chest pain, dizziness, or SOB. Pt presents with bilateral elbow skin tears which he states are from prior falls and abrasion to right ear from todays fall. EMS denies pt blood thinner use. pt also complaining of abdominal pain and states he was "having trouble having a BM this morning." Pt denies any n/v/d, chest pain, blurred vision, headache, dizziness, fever, chills or any other complaint at this time. Pt BIB EMS from [facility] due to having 2 unwitnessed falls today while in the bathroom. Pt states, "I was getting up off of the toilet and my hand slipped off the bar and I landed on my butt and hit my head on the toilet." Pt denies LOC, chest pain, dizziness, or SOB. Pt presents with bilateral elbow skin tears which he states are from prior falls and abrasion to right ear from todays fall. EMS denies pt blood thinner use. pt also complaining of abdominal pain and states he was "having trouble having a BM this morning." pt also with B/L LE swelling which he states he has "had for a weeks now." Pt denies any n/v/d, chest pain, blurred vision, headache, dizziness, fever, chills or any other complaint at this time.

## 2019-04-23 NOTE — ED ADULT NURSE NOTE - PMH
Arthritis    Atrial fibrillation, unspecified type    BPH (benign prostatic hyperplasia)    CN (constipation)    Dementia    Edema    GERD (gastroesophageal reflux disease)    Gout    Hypothyroidism Arthritis    Atrial fibrillation, unspecified type    BPH (benign prostatic hyperplasia)    CN (constipation)    Dementia    Edema    GERD (gastroesophageal reflux disease)    Gout    Hypothyroidism    Spinal stenosis

## 2019-04-23 NOTE — H&P ADULT - HISTORY OF PRESENT ILLNESS
pt is a 89y/o male with pmhx of Afib, BPH, Dementia, Arthritis, spinal stenosis, HTN, Hypothyroid, resides at the [facility] assisted living, was in his USOH until afternoon, was trying to have a BM but was having a difficult time.  But then after finally having a BM, as he stood up,  c/o fall x today. pt reports he was constipated and on the toilet, he lost his balance and fell. pt reports he hit his head on the toilet without loc. pt c/o generalized abd pain without n/v. pt is a 89y/o male with pmhx of Afib, BPH, Dementia, PVD, Arthritis, spinal stenosis, HTN, Hypothyroid, resides at the [facility] assisted living, was in his USOH until afternoon, was trying to have a BM but was having a difficult time.  But then after finally having a BM, as he stood up from the toilet, lost balance and fell, hitting his buttocks in the floor.  He denies any LOC, denies chest pain, dyspnea, nausea, vomiting, but feels fatigued, and abdomen still feels bloated.  Pt states he does have a regular bowel regimen for his constipation.  He also walks with a walker. pt is a 89y/o male with pmhx of Afib, BPH, Dementia, PVD, Arthritis, spinal stenosis, HTN, Hypothyroid, resides at the [facility] assisted living, was in his USOH until afternoon, was trying to have a BM but was having a difficult time.  But then after finally having a BM, as he stood up from the toilet, lost balance and fell, hitting his buttocks in the floor.  He denies any LOC, denies chest pain, dyspnea, nausea, vomiting, but feels fatigued, and abdomen still feels bloated.  Pt states he does have a regular bowel regimen for his constipation.  He also walks with a walker.  Xrays neg  for fracture but pt very sore and too weak to ambulate at this time. pt is a 89y/o male with pmhx of Afib, BPH, Dementia, PVD, Arthritis, spinal stenosis, HTN, Hypothyroid, resides at the [facility] assisted living, was in his USOH until afternoon, was trying to have a BM but was having a difficult time.  But then after finally having a BM, as he stood up from the toilet, lost balance and fell, hitting his buttocks in the floor.  He denies any LOC, denies chest pain, dyspnea, nausea, vomiting, but feels fatigued, and abdomen still feels bloated.  Pt states he does have a regular bowel regimen for his constipation.  He also walks with a walker.  Xrays neg  for fracture but pt very sore and too weak to ambulate at this time.   Last leg doppler 02/2018, neg for DVT.

## 2019-04-23 NOTE — H&P ADULT - ASSESSMENT
pt is a 89y/o male with pmhx of Afib, BPH, Dementia, PVD, Arthritis, spinal stenosis, HTN, Hypothyroid, +constipation presents to the ED s/p fall.  +Weakness, Fatigue, poor balance, hx of frequent falls    Admit  Cont Current meds Lasix, levothyroxine  Cont bowel regimen- laxatives, stool softener, amitiza, lactulose prn  Cont pantoprazole  Analgesics  leg dopplers  PT eval  DVT prophylaxis    IMPROVE VTE Individual Risk Assessment  RISK                                                                Points  [  ] Previous VTE                                                  3  [  ] Thrombophilia                                               2  [  ] Lower limb paralysis                                      2        (unable to hold up >15 seconds)    [  ] Current Cancer                                              2        (within 6 months)  [  ] Immobilization > 24 hrs                                1  [  ] ICU/CCU stay > 24 hours                              1  [ x ] Age > 60                                                      1  IMPROVE VTE Score _1__  IMPROVE Score 0-1: Low Risk, No VTE prophylaxis required for most patients, encourage ambulation.   IMPROVE Score 2-3: At risk, pharmacologic VTE prophylaxis is indicated for most patients (in the absence of a contraindication)  IMPROVE Score > or = 4: High Risk, pharmacologic VTE prophylaxis is indicated for most patients (in the absence of a contraindication)

## 2019-04-23 NOTE — ED ADULT NURSE NOTE - NSIMPLEMENTINTERV_GEN_ALL_ED
Implemented All Fall with Harm Risk Interventions:  [place] to call system. Call bell, personal items and telephone within reach. Instruct patient to call for assistance. Room bathroom lighting operational. Non-slip footwear when patient is off stretcher. Physically safe environment: no spills, clutter or unnecessary equipment. Stretcher in lowest position, wheels locked, appropriate side rails in place. Provide visual cue, wrist band, yellow gown, etc. Monitor gait and stability. Monitor for mental status changes and reorient to person, place, and time. Review medications for side effects contributing to fall risk. Reinforce activity limits and safety measures with patient and family. Provide visual clues: red socks.

## 2019-04-23 NOTE — ED PROVIDER NOTE - PHYSICAL EXAMINATION
ms lue: nontender.   ms rue:nontender. ue with swelling. + 2 radial bl  Spine Exam:     Cervical: No erythema, ecchymosis, or visible deformity. No midline tenderness or step-off appreciated on palpation. No paravertebral tenderness. No muscle spasm. FROM.

## 2019-04-23 NOTE — ED PROVIDER NOTE - OBJECTIVE STATEMENT
pt is a 89yo male with pmhx of a-fib, bph, dementia, arthritis, htn. hypothyroid c/o fall x today. pt reports he was constipated and on the toilet, he lost his balance and fell. pt reports he hit his head on the toilet without loc. pt c/o generalized abd pain without n/v.   pmd: talia

## 2019-04-23 NOTE — ED PROVIDER NOTE - ABDOMINAL TENDER
right upper quadrant/umbilical/periumbilical/epigastric/left upper quadrant/left lower quadrant/right lower quadrant

## 2019-04-23 NOTE — ED PROVIDER NOTE - PROGRESS NOTE DETAILS
labs and imaging reviewed. pt unable to ambulate consulted [facility] assisted living who advised pt normally ambulates at baseline with walker but well. will admit. son elena consulted who agrees with plan

## 2019-04-23 NOTE — ED ADULT TRIAGE NOTE - ACCOMPANIED BY
[email or web address] ,[email or web address],[email or web address],DirectAddress_Unknown

## 2019-04-24 DIAGNOSIS — F03.90 UNSPECIFIED DEMENTIA WITHOUT BEHAVIORAL DISTURBANCE: ICD-10-CM

## 2019-04-24 DIAGNOSIS — I10 ESSENTIAL (PRIMARY) HYPERTENSION: ICD-10-CM

## 2019-04-24 DIAGNOSIS — M48.00 SPINAL STENOSIS, SITE UNSPECIFIED: ICD-10-CM

## 2019-04-24 DIAGNOSIS — N17.9 ACUTE KIDNEY FAILURE, UNSPECIFIED: ICD-10-CM
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
## 2019-04-24 DIAGNOSIS — E03.9 HYPOTHYROIDISM, UNSPECIFIED: ICD-10-CM

## 2019-04-24 DIAGNOSIS — R60.9 EDEMA, UNSPECIFIED: ICD-10-CM

## 2019-04-24 PROCEDURE — 93970 EXTREMITY STUDY: CPT | Mod: 26

## 2019-04-24 PROCEDURE — 99233 SBSQ HOSP IP/OBS HIGH 50: CPT

## 2019-04-24 PROCEDURE — 93306 TTE W/DOPPLER COMPLETE: CPT | Mod: 26

## 2019-04-24 RX ADMIN — Medication 80 MILLIGRAM(S): at 07:11

## 2019-04-24 RX ADMIN — Medication 100 MILLIGRAM(S): at 12:58

## 2019-04-24 RX ADMIN — Medication 650 MILLIGRAM(S): at 07:07

## 2019-04-24 RX ADMIN — LUBIPROSTONE 8 MICROGRAM(S): 24 CAPSULE, GELATIN COATED ORAL at 17:10

## 2019-04-24 RX ADMIN — SENNA PLUS 2 TABLET(S): 8.6 TABLET ORAL at 21:50

## 2019-04-24 RX ADMIN — FINASTERIDE 5 MILLIGRAM(S): 5 TABLET, FILM COATED ORAL at 12:56

## 2019-04-24 RX ADMIN — PANTOPRAZOLE SODIUM 40 MILLIGRAM(S): 20 TABLET, DELAYED RELEASE ORAL at 07:07

## 2019-04-24 RX ADMIN — TAMSULOSIN HYDROCHLORIDE 0.4 MILLIGRAM(S): 0.4 CAPSULE ORAL at 21:50

## 2019-04-24 RX ADMIN — Medication 325 MILLIGRAM(S): at 12:56

## 2019-04-24 RX ADMIN — Medication 650 MILLIGRAM(S): at 08:00

## 2019-04-24 RX ADMIN — Medication 100 MILLIGRAM(S): at 07:06

## 2019-04-24 RX ADMIN — TRAMADOL HYDROCHLORIDE 25 MILLIGRAM(S): 50 TABLET ORAL at 00:05

## 2019-04-24 RX ADMIN — Medication 10 MILLIEQUIVALENT(S): at 12:58

## 2019-04-24 RX ADMIN — LUBIPROSTONE 8 MICROGRAM(S): 24 CAPSULE, GELATIN COATED ORAL at 07:12

## 2019-04-24 RX ADMIN — ENOXAPARIN SODIUM 40 MILLIGRAM(S): 100 INJECTION SUBCUTANEOUS at 07:12

## 2019-04-24 RX ADMIN — Medication 300 MILLIGRAM(S): at 12:58

## 2019-04-24 RX ADMIN — Medication 125 MICROGRAM(S): at 07:12

## 2019-04-24 RX ADMIN — Medication 100 MILLIGRAM(S): at 21:50

## 2019-04-24 NOTE — GOALS OF CARE CONVERSATION - PERSONAL ADVANCE DIRECTIVE - CONVERSATION DETAILS
Met with patient at bedside, he is A&Ox3. States he has lived at [facility] x5 years. States two of his sons live locally, and they are his HCPs. HCP form in chart. Discussed goals of care. He states that he does not want attempted resuscitation in the event of cardiac arrest or cardiopulmonary decompensation, he wants to allow "natural death". Patient initiated MOLST indicating wish for DNR/I.

## 2019-04-24 NOTE — PROGRESS NOTE ADULT - PROBLEM SELECTOR PLAN 1
Fall secondary to being off balance after Fall secondary to being off balance after using the toilet  PT ordered  No factures found on xray

## 2019-04-24 NOTE — PHYSICAL THERAPY INITIAL EVALUATION ADULT - ADDITIONAL COMMENTS
Pt lives at the [facility].  Pt uses rollator for ambulation, independent with ADLs.

## 2019-04-24 NOTE — PROGRESS NOTE ADULT - SUBJECTIVE AND OBJECTIVE BOX
Patient is a 89y old  Male who presents with a chief complaint of fall (2019 21:46)      Patient seen and examined at bedside.    ALLERGIES:  penicillin (Unknown)    MEDICATIONS:  acetaminophen   Tablet .. 650 milliGRAM(s) Oral every 6 hours PRN  allopurinol 300 milliGRAM(s) Oral daily  docusate sodium 100 milliGRAM(s) Oral three times a day  enoxaparin Injectable 40 milliGRAM(s) SubCutaneous every 24 hours  ferrous    sulfate 325 milliGRAM(s) Oral daily  finasteride 5 milliGRAM(s) Oral daily  furosemide    Tablet 80 milliGRAM(s) Oral daily  lactulose Syrup 20 Gram(s) Oral daily PRN  levothyroxine 125 MICROGram(s) Oral daily  lubiprostone 8 MICROGram(s) Oral two times a day  pantoprazole    Tablet 40 milliGRAM(s) Oral before breakfast  potassium chloride    Tablet ER 10 milliEquivalent(s) Oral daily  senna 2 Tablet(s) Oral at bedtime  tamsulosin 0.4 milliGRAM(s) Oral at bedtime  traMADol 25 milliGRAM(s) Oral four times a day PRN    Vital Signs Last 24 Hrs  T(F): 97.9 (2019 05:50), Max: 98.4 (2019 19:27)  HR: 63 (2019 05:50) (61 - 82)  BP: 102/64 (2019 05:50) (102/64 - 132/81)  RR: 16 (2019 05:50) (16 - 18)  SpO2: 99% (2019 05:50) (98% - 100%)  I&O's Summary    2019 07:  -  2019 07:00  --------------------------------------------------------  IN: 300 mL / OUT: 400 mL / NET: -100 mL    2019 07:  -  2019 14:49  --------------------------------------------------------  IN: 700 mL / OUT: 860 mL / NET: -160 mL        PHYSICAL EXAM:  General: NAD, A/O x 3  ENT: MMM  Neck: Supple, No JVD  Lungs: Clear to auscultation bilaterally  Cardio: RRR, S1/S2, No murmurs  Abdomen: Soft, Nontender, Nondistended; Bowel sounds present  Extremities: No cyanosis, No edema    LABS:                        12.7   13.1  )-----------( 170      ( 2019 15:30 )             39.6         142  |  104  |  34  ----------------------------<  112  3.9   |  27  |  1.33    Ca    9.7      2019 15:30    TPro  7.0  /  Alb  3.3  /  TBili  0.4  /  DBili  x   /  AST  26  /  ALT  16  /  AlkPhos  64      eGFR if Non African American: 44 mL/min/1.73M2 (19 @ 15:30)  eGFR if : 51 mL/min/1.73M2 (19 @ 15:30)    PT/INR - ( 2019 16:16 )   PT: 12.9 sec;   INR: 1.15 ratio         PTT - ( 2019 16:16 )  PTT:31.2 sec                CAPILLARY BLOOD GLUCOSE          Urinalysis Basic - ( 2019 18:28 )    Color: Yellow / Appearance: Clear / S.010 / pH: x  Gluc: x / Ketone: Negative  / Bili: Negative / Urobili: Negative   Blood: x / Protein: Negative / Nitrite: Negative   Leuk Esterase: Negative / RBC: x / WBC x   Sq Epi: x / Non Sq Epi: x / Bacteria: x          RADIOLOGY & ADDITIONAL TESTS:    Care Discussed with Consultants/Other Providers: Patient is a 89y old  Male who presents with a chief complaint of fall (2019 21:46)      Patient seen and examined at bedside.  Patient denies any chest pain or shortness fo breath.     ALLERGIES:  penicillin (Unknown)    MEDICATIONS:  acetaminophen   Tablet .. 650 milliGRAM(s) Oral every 6 hours PRN  allopurinol 300 milliGRAM(s) Oral daily  docusate sodium 100 milliGRAM(s) Oral three times a day  enoxaparin Injectable 40 milliGRAM(s) SubCutaneous every 24 hours  ferrous    sulfate 325 milliGRAM(s) Oral daily  finasteride 5 milliGRAM(s) Oral daily  furosemide    Tablet 80 milliGRAM(s) Oral daily  lactulose Syrup 20 Gram(s) Oral daily PRN  levothyroxine 125 MICROGram(s) Oral daily  lubiprostone 8 MICROGram(s) Oral two times a day  pantoprazole    Tablet 40 milliGRAM(s) Oral before breakfast  potassium chloride    Tablet ER 10 milliEquivalent(s) Oral daily  senna 2 Tablet(s) Oral at bedtime  tamsulosin 0.4 milliGRAM(s) Oral at bedtime  traMADol 25 milliGRAM(s) Oral four times a day PRN    Vital Signs Last 24 Hrs  T(F): 97.9 (2019 05:50), Max: 98.4 (2019 19:27)  HR: 63 (2019 05:50) (61 - 82)  BP: 102/64 (2019 05:50) (102/64 - 132/81)  RR: 16 (2019 05:50) (16 - 18)  SpO2: 99% (2019 05:50) (98% - 100%)  I&O's Summary    2019 07:  -  2019 07:00  --------------------------------------------------------  IN: 300 mL / OUT: 400 mL / NET: -100 mL    2019 07:  -  2019 14:49  --------------------------------------------------------  IN: 700 mL / OUT: 860 mL / NET: -160 mL        PHYSICAL EXAM:  General: NAD, A/O x 3  ENT: MMM  Neck: Supple, No JVD  Lungs: decreased at bases   Cardio: RRR, S1/S2, 2/6 systolic murmur mechanical in nature   Abdomen: Soft, Nontender, Nondistended; Bowel sounds present  Extremities: No cyanosis, No edema    LABS:                        12.7   13.1  )-----------( 170      ( 2019 15:30 )             39.6         142  |  104  |  34  ----------------------------<  112  3.9   |  27  |  1.33    Ca    9.7      2019 15:30    TPro  7.0  /  Alb  3.3  /  TBili  0.4  /  DBili  x   /  AST  26  /  ALT  16  /  AlkPhos  64      eGFR if Non African American: 44 mL/min/1.73M2 (19 @ 15:30)  eGFR if : 51 mL/min/1.73M2 (19 @ 15:30)    PT/INR - ( 2019 16:16 )   PT: 12.9 sec;   INR: 1.15 ratio         PTT - ( 2019 16:16 )  PTT:31.2 sec                CAPILLARY BLOOD GLUCOSE          Urinalysis Basic - ( 2019 18:28 )    Color: Yellow / Appearance: Clear / S.010 / pH: x  Gluc: x / Ketone: Negative  / Bili: Negative / Urobili: Negative   Blood: x / Protein: Negative / Nitrite: Negative   Leuk Esterase: Negative / RBC: x / WBC x   Sq Epi: x / Non Sq Epi: x / Bacteria: x          RADIOLOGY & ADDITIONAL TESTS:    Care Discussed with Consultants/Other Providers: Patient is a 89y old  Male who presents with a chief complaint of fall (2019 21:46)      Patient seen and examined at bedside.  Patient denies any chest pain or shortness fo breath.     ALLERGIES:  penicillin (Unknown)    MEDICATIONS:  acetaminophen   Tablet .. 650 milliGRAM(s) Oral every 6 hours PRN  allopurinol 300 milliGRAM(s) Oral daily  docusate sodium 100 milliGRAM(s) Oral three times a day  enoxaparin Injectable 40 milliGRAM(s) SubCutaneous every 24 hours  ferrous    sulfate 325 milliGRAM(s) Oral daily  finasteride 5 milliGRAM(s) Oral daily  furosemide    Tablet 80 milliGRAM(s) Oral daily  lactulose Syrup 20 Gram(s) Oral daily PRN  levothyroxine 125 MICROGram(s) Oral daily  lubiprostone 8 MICROGram(s) Oral two times a day  pantoprazole    Tablet 40 milliGRAM(s) Oral before breakfast  potassium chloride    Tablet ER 10 milliEquivalent(s) Oral daily  senna 2 Tablet(s) Oral at bedtime  tamsulosin 0.4 milliGRAM(s) Oral at bedtime  traMADol 25 milliGRAM(s) Oral four times a day PRN    Vital Signs Last 24 Hrs  T(F): 97.9 (2019 05:50), Max: 98.4 (2019 19:27)  HR: 63 (2019 05:50) (61 - 82)  BP: 102/64 (2019 05:50) (102/64 - 132/81)  RR: 16 (2019 05:50) (16 - 18)  SpO2: 99% (2019 05:50) (98% - 100%)  I&O's Summary    2019 07:  -  2019 07:00  --------------------------------------------------------  IN: 300 mL / OUT: 400 mL / NET: -100 mL    2019 07:  -  2019 14:49  --------------------------------------------------------  IN: 700 mL / OUT: 860 mL / NET: -160 mL        PHYSICAL EXAM:  General: NAD, A/O x 3  ENT: MMM  Neck: Supple, No JVD  Lungs: decreased at bases   Cardio: RRR, S1/S2, 2/6 systolic murmur mechanical in nature   Abdomen: Soft, Nontender, Nondistended; Bowel sounds present  Extremities: No cyanosis, bilateral +2 edema with discoloration reddness     LABS:                        12.7   13.1  )-----------( 170      ( 2019 15:30 )             39.6         142  |  104  |  34  ----------------------------<  112  3.9   |  27  |  1.33    Ca    9.7      2019 15:30    TPro  7.0  /  Alb  3.3  /  TBili  0.4  /  DBili  x   /  AST  26  /  ALT  16  /  AlkPhos  64      eGFR if Non African American: 44 mL/min/1.73M2 (19 @ 15:30)  eGFR if : 51 mL/min/1.73M2 (19 @ 15:30)    PT/INR - ( 2019 16:16 )   PT: 12.9 sec;   INR: 1.15 ratio         PTT - ( 2019 16:16 )  PTT:31.2 sec                CAPILLARY BLOOD GLUCOSE          Urinalysis Basic - ( 2019 18:28 )    Color: Yellow / Appearance: Clear / S.010 / pH: x  Gluc: x / Ketone: Negative  / Bili: Negative / Urobili: Negative   Blood: x / Protein: Negative / Nitrite: Negative   Leuk Esterase: Negative / RBC: x / WBC x   Sq Epi: x / Non Sq Epi: x / Bacteria: x          RADIOLOGY & ADDITIONAL TESTS:    Care Discussed with Consultants/Other Providers:

## 2019-04-24 NOTE — PROGRESS NOTE ADULT - ASSESSMENT
pt is a 89y/o male with pmhx of Afib, BPH, Dementia, PVD, Arthritis, spinal stenosis, HTN, Hypothyroid, +constipation presents to the ED s/p fall.  +Weakness, Fatigue, poor balance, hx of frequent falls    Admit  Cont Current meds Lasix, levothyroxine  Cont bowel regimen- laxatives, stool softener, amitiza, lactulose prn  Cont pantoprazole  Analgesics  leg dopplers  PT eval  DVT prophylaxis pt is a 89y/o male with pmhx of Afib, BPH, Dementia, PVD, Arthritis, spinal stenosis, HTN, Hypothyroid, +constipation presents to the ED s/p fall.  +Weakness, Fatigue, poor balance, hx of frequent falls

## 2019-04-24 NOTE — PHYSICAL THERAPY INITIAL EVALUATION ADULT - GAIT PATTERN USED, PT EVAL
Problem:  Infant, Late or Early Term  Goal: Signs and Symptoms of Listed Potential Problems Will be Absent or Manageable ( Infant, Late or Early Term)  Outcome: Ongoing (interventions implemented as appropriate)    Problem: Patient Care Overview (Infant)  Goal: Plan of Care Review  Outcome: Ongoing (interventions implemented as appropriate)    [id number]   Patient Care Overview   Progress no change   Outcome Evaluation   Outcome Summary/Follow up Plan Infant has continued to tolerate Blended NC at 2 LPM, fiO2 has been weaned to 30% at this time. No desats, other than with stimuli, self resolves. Infant shows a decreased work in breathing. Tolerating increase in feedings with no spitting this shfit.       Goal: Infant Individualization and Mutuality  Outcome: Ongoing (interventions implemented as appropriate)  Goal: Discharge Needs Assessment  Outcome: Ongoing (interventions implemented as appropriate)       2-point gait

## 2019-04-25 ENCOUNTER — TRANSCRIPTION ENCOUNTER (OUTPATIENT)
Age: 84
End: 2019-04-25

## 2019-04-25 VITALS
DIASTOLIC BLOOD PRESSURE: 68 MMHG | HEART RATE: 68 BPM | RESPIRATION RATE: 14 BRPM | SYSTOLIC BLOOD PRESSURE: 118 MMHG | OXYGEN SATURATION: 99 % | TEMPERATURE: 98 F

## 2019-04-25 LAB
ANION GAP SERPL CALC-SCNC: 8 MMOL/L — SIGNIFICANT CHANGE UP (ref 5–17)
BUN SERPL-MCNC: 30 MG/DL — HIGH (ref 7–23)
CALCIUM SERPL-MCNC: 9.1 MG/DL — SIGNIFICANT CHANGE UP (ref 8.4–10.5)
CHLORIDE SERPL-SCNC: 109 MMOL/L — HIGH (ref 96–108)
CO2 SERPL-SCNC: 27 MMOL/L — SIGNIFICANT CHANGE UP (ref 22–31)
CREAT SERPL-MCNC: 1.11 MG/DL — SIGNIFICANT CHANGE UP (ref 0.5–1.3)
GLUCOSE SERPL-MCNC: 102 MG/DL — HIGH (ref 70–99)
HCT VFR BLD CALC: 35.6 % — LOW (ref 39–50)
HGB BLD-MCNC: 11.4 G/DL — LOW (ref 13–17)
MCHC RBC-ENTMCNC: 31 PG — SIGNIFICANT CHANGE UP (ref 27–34)
MCHC RBC-ENTMCNC: 32 GM/DL — SIGNIFICANT CHANGE UP (ref 32–36)
MCV RBC AUTO: 96.7 FL — SIGNIFICANT CHANGE UP (ref 80–100)
PLATELET # BLD AUTO: 197 K/UL — SIGNIFICANT CHANGE UP (ref 150–400)
POTASSIUM SERPL-MCNC: 3.3 MMOL/L — LOW (ref 3.5–5.3)
POTASSIUM SERPL-SCNC: 3.3 MMOL/L — LOW (ref 3.5–5.3)
RBC # BLD: 3.68 M/UL — LOW (ref 4.2–5.8)
RBC # FLD: 14.9 % — HIGH (ref 10.3–14.5)
SODIUM SERPL-SCNC: 144 MMOL/L — SIGNIFICANT CHANGE UP (ref 135–145)
WBC # BLD: 6.5 K/UL — SIGNIFICANT CHANGE UP (ref 3.8–10.5)
WBC # FLD AUTO: 6.5 K/UL — SIGNIFICANT CHANGE UP (ref 3.8–10.5)

## 2019-04-25 PROCEDURE — 93971 EXTREMITY STUDY: CPT

## 2019-04-25 PROCEDURE — 93970 EXTREMITY STUDY: CPT

## 2019-04-25 PROCEDURE — 99239 HOSP IP/OBS DSCHRG MGMT >30: CPT

## 2019-04-25 PROCEDURE — 71045 X-RAY EXAM CHEST 1 VIEW: CPT

## 2019-04-25 PROCEDURE — 99285 EMERGENCY DEPT VISIT HI MDM: CPT | Mod: 25

## 2019-04-25 PROCEDURE — 85027 COMPLETE CBC AUTOMATED: CPT

## 2019-04-25 PROCEDURE — 85730 THROMBOPLASTIN TIME PARTIAL: CPT

## 2019-04-25 PROCEDURE — 80048 BASIC METABOLIC PNL TOTAL CA: CPT

## 2019-04-25 PROCEDURE — 93005 ELECTROCARDIOGRAM TRACING: CPT

## 2019-04-25 PROCEDURE — 36000 PLACE NEEDLE IN VEIN: CPT

## 2019-04-25 PROCEDURE — 74176 CT ABD & PELVIS W/O CONTRAST: CPT

## 2019-04-25 PROCEDURE — 70450 CT HEAD/BRAIN W/O DYE: CPT

## 2019-04-25 PROCEDURE — 83690 ASSAY OF LIPASE: CPT

## 2019-04-25 PROCEDURE — 80053 COMPREHEN METABOLIC PANEL: CPT

## 2019-04-25 PROCEDURE — 36415 COLL VENOUS BLD VENIPUNCTURE: CPT

## 2019-04-25 PROCEDURE — 97161 PT EVAL LOW COMPLEX 20 MIN: CPT

## 2019-04-25 PROCEDURE — 90715 TDAP VACCINE 7 YRS/> IM: CPT

## 2019-04-25 PROCEDURE — 85610 PROTHROMBIN TIME: CPT

## 2019-04-25 PROCEDURE — 93306 TTE W/DOPPLER COMPLETE: CPT

## 2019-04-25 PROCEDURE — 90471 IMMUNIZATION ADMIN: CPT

## 2019-04-25 RX ORDER — FINASTERIDE 5 MG/1
1 TABLET, FILM COATED ORAL
Qty: 0 | Refills: 0 | DISCHARGE
Start: 2019-04-25

## 2019-04-25 RX ORDER — FUROSEMIDE 40 MG
1 TABLET ORAL
Qty: 0 | Refills: 0 | COMMUNITY

## 2019-04-25 RX ORDER — ALLOPURINOL 300 MG
1 TABLET ORAL
Qty: 0 | Refills: 0 | COMMUNITY

## 2019-04-25 RX ORDER — ALLOPURINOL 300 MG
1 TABLET ORAL
Qty: 0 | Refills: 0 | DISCHARGE
Start: 2019-04-25

## 2019-04-25 RX ORDER — FUROSEMIDE 40 MG
1 TABLET ORAL
Qty: 0 | Refills: 0 | DISCHARGE
Start: 2019-04-25

## 2019-04-25 RX ORDER — POTASSIUM CHLORIDE 20 MEQ
40 PACKET (EA) ORAL ONCE
Qty: 0 | Refills: 0 | Status: COMPLETED | OUTPATIENT
Start: 2019-04-25 | End: 2019-04-25

## 2019-04-25 RX ORDER — FINASTERIDE 5 MG/1
1 TABLET, FILM COATED ORAL
Qty: 0 | Refills: 0 | COMMUNITY

## 2019-04-25 RX ADMIN — Medication 125 MICROGRAM(S): at 05:14

## 2019-04-25 RX ADMIN — ENOXAPARIN SODIUM 40 MILLIGRAM(S): 100 INJECTION SUBCUTANEOUS at 06:16

## 2019-04-25 RX ADMIN — TRAMADOL HYDROCHLORIDE 25 MILLIGRAM(S): 50 TABLET ORAL at 06:15

## 2019-04-25 RX ADMIN — Medication 650 MILLIGRAM(S): at 06:36

## 2019-04-25 RX ADMIN — Medication 80 MILLIGRAM(S): at 05:14

## 2019-04-25 RX ADMIN — LUBIPROSTONE 8 MICROGRAM(S): 24 CAPSULE, GELATIN COATED ORAL at 17:04

## 2019-04-25 RX ADMIN — Medication 10 MILLIEQUIVALENT(S): at 13:02

## 2019-04-25 RX ADMIN — Medication 100 MILLIGRAM(S): at 05:15

## 2019-04-25 RX ADMIN — Medication 325 MILLIGRAM(S): at 13:03

## 2019-04-25 RX ADMIN — Medication 650 MILLIGRAM(S): at 06:13

## 2019-04-25 RX ADMIN — LUBIPROSTONE 8 MICROGRAM(S): 24 CAPSULE, GELATIN COATED ORAL at 05:14

## 2019-04-25 RX ADMIN — Medication 300 MILLIGRAM(S): at 13:03

## 2019-04-25 RX ADMIN — TRAMADOL HYDROCHLORIDE 25 MILLIGRAM(S): 50 TABLET ORAL at 05:21

## 2019-04-25 RX ADMIN — Medication 100 MILLIGRAM(S): at 13:02

## 2019-04-25 RX ADMIN — FINASTERIDE 5 MILLIGRAM(S): 5 TABLET, FILM COATED ORAL at 13:03

## 2019-04-25 RX ADMIN — Medication 40 MILLIEQUIVALENT(S): at 14:37

## 2019-04-25 RX ADMIN — PANTOPRAZOLE SODIUM 40 MILLIGRAM(S): 20 TABLET, DELAYED RELEASE ORAL at 05:14

## 2019-04-25 NOTE — DISCHARGE NOTE NURSING/CASE MANAGEMENT/SOCIAL WORK - NSDCDPATPORTLINK_GEN_ALL_CORE
You can access the [facility] Portal, offered by [facility], by registering with the following website: [email or web address]

## 2019-04-25 NOTE — PROGRESS NOTE ADULT - SUBJECTIVE AND OBJECTIVE BOX
Patient is a 89y old  Male who presents with a chief complaint of fall (2019 14:49)      Patient seen and examined at bedside.  Patient     ALLERGIES:  penicillin (Unknown)    MEDICATIONS:  acetaminophen   Tablet .. 650 milliGRAM(s) Oral every 6 hours PRN  allopurinol 300 milliGRAM(s) Oral daily  docusate sodium 100 milliGRAM(s) Oral three times a day  enoxaparin Injectable 40 milliGRAM(s) SubCutaneous every 24 hours  ferrous    sulfate 325 milliGRAM(s) Oral daily  finasteride 5 milliGRAM(s) Oral daily  furosemide    Tablet 80 milliGRAM(s) Oral daily  lactulose Syrup 20 Gram(s) Oral daily PRN  levothyroxine 125 MICROGram(s) Oral daily  lubiprostone 8 MICROGram(s) Oral two times a day  pantoprazole    Tablet 40 milliGRAM(s) Oral before breakfast  potassium chloride    Tablet ER 40 milliEquivalent(s) Oral once  potassium chloride    Tablet ER 10 milliEquivalent(s) Oral daily  senna 2 Tablet(s) Oral at bedtime  tamsulosin 0.4 milliGRAM(s) Oral at bedtime  traMADol 25 milliGRAM(s) Oral four times a day PRN    Vital Signs Last 24 Hrs  T(F): 97.6 (2019 05:35), Max: 97.9 (2019 21:44)  HR: 64 (2019 05:35) (61 - 68)  BP: 123/66 (2019 05:35) (121/61 - 123/66)  RR: 16 (2019 05:35) (16 - 16)  SpO2: 99% (2019 05:35) (99% - 100%)  I&O's Summary    2019 07:  -  2019 07:00  --------------------------------------------------------  IN: 1000 mL / OUT: 1163 mL / NET: -163 mL    2019 07:  -  2019 10:53  --------------------------------------------------------  IN: 600 mL / OUT: 300 mL / NET: 300 mL        PHYSICAL EXAM:  General: NAD, A/O x 3  ENT: MMM  Neck: Supple, No JVD  Lungs: Clear to auscultation bilaterally  Cardio: RRR, S1/S2, No murmurs  Abdomen: Soft, Nontender, Nondistended; Bowel sounds present  Extremities: No cyanosis, No edema    LABS:                        11.4   6.5   )-----------( 197      ( 2019 06:35 )             35.6         144  |  109  |  30  ----------------------------<  102  3.3   |  27  |  1.11    Ca    9.1      2019 06:35    TPro  7.0  /  Alb  3.3  /  TBili  0.4  /  DBili  x   /  AST  26  /  ALT  16  /  AlkPhos  64      eGFR if Non African American: 55 mL/min/1.73M2 (19 @ 06:35)  eGFR if African American: 64 mL/min/1.73M2 (19 @ 06:35)    PT/INR - ( 2019 16:16 )   PT: 12.9 sec;   INR: 1.15 ratio         PTT - ( 2019 16:16 )  PTT:31.2 sec                CAPILLARY BLOOD GLUCOSE          Urinalysis Basic - ( 2019 18:28 )    Color: Yellow / Appearance: Clear / S.010 / pH: x  Gluc: x / Ketone: Negative  / Bili: Negative / Urobili: Negative   Blood: x / Protein: Negative / Nitrite: Negative   Leuk Esterase: Negative / RBC: x / WBC x   Sq Epi: x / Non Sq Epi: x / Bacteria: x          RADIOLOGY & ADDITIONAL TESTS:    Care Discussed with Consultants/Other Providers: Patient is a 89y old  Male who presents with a chief complaint of fall (2019 14:49)      Patient seen and examined at bedside.  Patient     ALLERGIES:  penicillin (Unknown)    MEDICATIONS:  acetaminophen   Tablet .. 650 milliGRAM(s) Oral every 6 hours PRN  allopurinol 300 milliGRAM(s) Oral daily  docusate sodium 100 milliGRAM(s) Oral three times a day  enoxaparin Injectable 40 milliGRAM(s) SubCutaneous every 24 hours  ferrous    sulfate 325 milliGRAM(s) Oral daily  finasteride 5 milliGRAM(s) Oral daily  furosemide    Tablet 80 milliGRAM(s) Oral daily  lactulose Syrup 20 Gram(s) Oral daily PRN  levothyroxine 125 MICROGram(s) Oral daily  lubiprostone 8 MICROGram(s) Oral two times a day  pantoprazole    Tablet 40 milliGRAM(s) Oral before breakfast  potassium chloride    Tablet ER 40 milliEquivalent(s) Oral once  potassium chloride    Tablet ER 10 milliEquivalent(s) Oral daily  senna 2 Tablet(s) Oral at bedtime  tamsulosin 0.4 milliGRAM(s) Oral at bedtime  traMADol 25 milliGRAM(s) Oral four times a day PRN    Vital Signs Last 24 Hrs  T(F): 97.6 (2019 05:35), Max: 97.9 (2019 21:44)  HR: 64 (2019 05:35) (61 - 68)  BP: 123/66 (2019 05:35) (121/61 - 123/66)  RR: 16 (2019 05:35) (16 - 16)  SpO2: 99% (2019 05:35) (99% - 100%)  I&O's Summary    2019 07:  -  2019 07:00  --------------------------------------------------------  IN: 1000 mL / OUT: 1163 mL / NET: -163 mL    2019 07:  -  2019 10:53  --------------------------------------------------------  IN: 600 mL / OUT: 300 mL / NET: 300 mL        PHYSICAL EXAM:  General: NAD, A/O x 3  ENT: MMM  Neck: Supple, No JVD  Lungs: Clear to auscultation bilaterally  Cardio: RRR, S1/S2, 2/6 systolic murmur  Abdomen: Soft, Nontender, Nondistended; Bowel sounds present  Extremities: No cyanosis, bilateral edema +2    LABS:                        11.4   6.5   )-----------( 197      ( 2019 06:35 )             35.6         144  |  109  |  30  ----------------------------<  102  3.3   |  27  |  1.11    Ca    9.1      2019 06:35    TPro  7.0  /  Alb  3.3  /  TBili  0.4  /  DBili  x   /  AST  26  /  ALT  16  /  AlkPhos  64      eGFR if Non African American: 55 mL/min/1.73M2 (19 @ 06:35)  eGFR if African American: 64 mL/min/1.73M2 (19 @ 06:35)    PT/INR - ( 2019 16:16 )   PT: 12.9 sec;   INR: 1.15 ratio         PTT - ( 2019 16:16 )  PTT:31.2 sec                CAPILLARY BLOOD GLUCOSE          Urinalysis Basic - ( 2019 18:28 )    Color: Yellow / Appearance: Clear / S.010 / pH: x  Gluc: x / Ketone: Negative  / Bili: Negative / Urobili: Negative   Blood: x / Protein: Negative / Nitrite: Negative   Leuk Esterase: Negative / RBC: x / WBC x   Sq Epi: x / Non Sq Epi: x / Bacteria: x          RADIOLOGY & ADDITIONAL TESTS:    Care Discussed with Consultants/Other Providers:

## 2019-04-25 NOTE — DISCHARGE NOTE PROVIDER - HOSPITAL COURSE
pt is a 89y/o male with pmhx of Afib, BPH, Dementia, PVD, Arthritis, spinal stenosis, HTN, Hypothyroid, +constipation presents to the ED s/p fall.    Found to have no fractures but abrasions to both elbows.  Patient was found to have acute kidney insufficiency which resolved with hydration.  Patietn was evaluated    by physical therapy and found to benefit from home physical therapy            Dr. Hernandez PCP was notified of discharge plan

## 2019-04-25 NOTE — PROGRESS NOTE ADULT - ASSESSMENT
pt is a 89y/o male with pmhx of Afib, BPH, Dementia, PVD, Arthritis, spinal stenosis, HTN, Hypothyroid, +constipation presents to the ED s/p fall.  +Weakness, Fatigue, poor balance, hx of frequent falls        Had echo no significant new findings

## 2019-04-25 NOTE — PROGRESS NOTE ADULT - PROBLEM SELECTOR PLAN 4
plan to continue home compression therapy   On home lasix dose  Discoloration of skin  consistent with venous dermatitis
Wears compression stocks at home facility  Patient to continue with compression therapy here  On home lasix dose  Discoloration of skin  consistent with venous dermatitis

## 2019-04-25 NOTE — PROGRESS NOTE ADULT - PROBLEM SELECTOR PLAN 1
Fall secondary to being off balance after using the toilet  PT recommended home PT  No factures found on xray

## 2019-04-25 NOTE — DISCHARGE NOTE PROVIDER - CARE PROVIDER_API CALL
Olvin Hernandez (DO)  Internal Medicine  [address] [id number]  Phone: [phone]  Fax: [phone]  Follow Up Time:

## 2019-04-25 NOTE — DISCHARGE NOTE PROVIDER - NSDCCPCAREPLAN_GEN_ALL_CORE_FT
PRINCIPAL DISCHARGE DIAGNOSIS  Diagnosis: Fall  Assessment and Plan of Treatment: Needs physical therapy strength training to be done at home      SECONDARY DISCHARGE DIAGNOSES  Diagnosis: Head injury  Assessment and Plan of Treatment:     Diagnosis: Abdominal pain  Assessment and Plan of Treatment:

## 2019-04-30 NOTE — CDI QUERY NOTE - NSCDIOTHERTXTBX_GEN_ALL_CORE_HH
[facility] policy based on KDIGO guidelines defines ANTONINA as any of the following;    	- Increase Creatinine = 0.3mg/dl from baseline within 48 hours; or    	- Increase Creatinine level to = 1.5x baseline, which is known or presumed to have occurred within 7 days.        Creatinine levels – 2/15/19 - 1.08 - 4/23/19 - 1.33 - 4/25/19 - 1.11. NOT 0.3mg/dl      Please clarify;    ANTONINA ruled in – please document clinical data to support ANTONINA.    ANTONINA ruled out.      Thank you.

## 2019-05-06 ENCOUNTER — EMERGENCY (EMERGENCY)
Facility: HOSPITAL | Age: 84
LOS: 1 days | Discharge: HOME | End: 2019-05-06
Attending: EMERGENCY MEDICINE | Admitting: EMERGENCY MEDICINE
Payer: MEDICARE

## 2019-05-06 VITALS
HEIGHT: 70 IN | HEART RATE: 66 BPM | TEMPERATURE: 98 F | RESPIRATION RATE: 15 BRPM | SYSTOLIC BLOOD PRESSURE: 124 MMHG | OXYGEN SATURATION: 99 % | WEIGHT: 198.42 LBS | DIASTOLIC BLOOD PRESSURE: 58 MMHG

## 2019-05-06 VITALS
OXYGEN SATURATION: 98 % | HEART RATE: 67 BPM | SYSTOLIC BLOOD PRESSURE: 128 MMHG | RESPIRATION RATE: 17 BRPM | DIASTOLIC BLOOD PRESSURE: 61 MMHG

## 2019-05-06 DIAGNOSIS — R60.1 GENERALIZED EDEMA: ICD-10-CM

## 2019-05-06 PROBLEM — M48.00 SPINAL STENOSIS, SITE UNSPECIFIED: Chronic | Status: ACTIVE | Noted: 2019-04-23

## 2019-05-06 LAB
HCT VFR BLD CALC: 37 % — LOW (ref 39–50)
HGB BLD-MCNC: 11.3 G/DL — LOW (ref 13–17)
MCHC RBC-ENTMCNC: 29.4 PG — SIGNIFICANT CHANGE UP (ref 27–34)
MCHC RBC-ENTMCNC: 30.6 GM/DL — LOW (ref 32–36)
MCV RBC AUTO: 96.1 FL — SIGNIFICANT CHANGE UP (ref 80–100)
PLATELET # BLD AUTO: 228 K/UL — SIGNIFICANT CHANGE UP (ref 150–400)
RBC # BLD: 3.85 M/UL — LOW (ref 4.2–5.8)
RBC # FLD: 14.3 % — SIGNIFICANT CHANGE UP (ref 10.3–14.5)
WBC # BLD: 6.7 K/UL — SIGNIFICANT CHANGE UP (ref 3.8–10.5)
WBC # FLD AUTO: 6.7 K/UL — SIGNIFICANT CHANGE UP (ref 3.8–10.5)

## 2019-05-06 PROCEDURE — 36415 COLL VENOUS BLD VENIPUNCTURE: CPT

## 2019-05-06 PROCEDURE — 99283 EMERGENCY DEPT VISIT LOW MDM: CPT

## 2019-05-06 PROCEDURE — 85027 COMPLETE CBC AUTOMATED: CPT

## 2019-05-06 PROCEDURE — 99284 EMERGENCY DEPT VISIT MOD MDM: CPT

## 2019-05-06 RX ORDER — SENNA PLUS 8.6 MG/1
1 TABLET ORAL
Qty: 0 | Refills: 0 | COMMUNITY

## 2019-05-06 RX ORDER — DOCUSATE SODIUM 100 MG
1 CAPSULE ORAL
Qty: 0 | Refills: 0 | COMMUNITY

## 2019-05-06 RX ADMIN — Medication 20 MILLIGRAM(S): at 12:27

## 2019-05-06 NOTE — ED PROVIDER NOTE - OBJECTIVE STATEMENT
Patient had been started on antibiotics for cellulitis of arm . Placed on doxycyline and bactroban. Visiting nurse States that since placing bactroban on wound it has gotten redder . No fever / streaking or chills. Had recent fall with bruising to arm

## 2019-05-06 NOTE — ED ADULT NURSE NOTE - OBJECTIVE STATEMENT
The VN thought my right arm looked worse today.  Right arm reddened, edematous.  Pt c/o itching at site.

## 2019-05-06 NOTE — ED ADULT NURSE NOTE - CHIEF COMPLAINT QUOTE
Pt BIB EMS from the [facility] with c/o worsening infection x2-3 weeks to right arm with increased edema. Pt denies pain or fevers.

## 2019-05-06 NOTE — ED PROVIDER NOTE - NSFOLLOWUPINSTRUCTIONS_ED_ALL_ED_FT
Stop bactroban  Start prednisone 20mg daily    - General Information     Dermatitis    WHAT YOU NEED TO KNOW:    What is dermatitis? Dermatitis is skin inflammation. You may have an itchy rash, redness, or swelling. You may also have bumps or blisters that crust over or ooze clear fluid.     What causes dermatitis? Dermatitis may be caused by allergens such as dust mites, pet dander, pollen, and certain foods. Dermatitis can also develop when something touches your skin and irritates it or causes an allergic reaction. Examples include soaps, chemicals, latex, and poison ivy.    How is dermatitis diagnosed? Your healthcare provider will examine your skin. He will ask questions about your rash and any other symptoms you have. Tell him if you noticed anything trigger your rash, such as a certain food or activity. Tell him about any medicines you are taking or any allergies or medical conditions you have.     How is dermatitis treated? Treatment depends on the cause of your rash. You may need medicines to help decrease itching and inflammation or treat a bacterial infection. They may be given as a topical cream, shot, or a pill.     How can I manage my symptoms?     Apply a cool compress to your rash. This will help soothe your skin.       Keep your skin moist. Rub unscented cream or lotion on your skin to prevent dryness and itching. Do this right after a lukewarm bath or shower when your skin is still damp.      Avoid skin irritants. Do not use skin irritants, such as makeup, hair products, soaps, and cleansers. Use products that do not contain fragrances or dye.    Call 911 if you have any of the following symptoms of anaphylaxis:     Sudden trouble breathing      Throat swelling and tightness      Dizziness, lightheadedness, fainting, or confusion     When should I seek immediate care?     You develop a fever or have red streaks going up your arm or leg.      Your rash gets more swollen, red, or hot.    When should I contact my healthcare provider?     Your skin blisters, oozes white or yellow pus, or has a foul-smelling discharge.      Your rash spreads or does not get better, even after treatment.      You have questions or concerns about your condition or care.    CARE AGREEMENT:    You have the right to help plan your care. Learn about your health condition and how it may be treated. Discuss treatment options with your healthcare providers to decide what care you want to receive. You always have the right to refuse treatment.        © Copyright [employer] 2019 All illustrations and images included in CareNotes are the copyrighted property of [email or web address] or [employer].      back to top                      © Copyright [employer] 2019

## 2019-05-06 NOTE — ED PROVIDER NOTE - CLINICAL SUMMARY MEDICAL DECISION MAKING FREE TEXT BOX
patient being treated for cellulitis in right forearm Worse after Bactroban .CBC is normal , no fever . Continue antibiotics and stop Bactroban . trial of steroids   May have allergic component

## 2019-05-06 NOTE — ED ADULT TRIAGE NOTE - CHIEF COMPLAINT QUOTE
Pt BIB EMS from the [facility] with c/o worsening infection x2-3 weeks to right arm with increased edema. Pt denies pain or fevers.

## 2019-05-06 NOTE — ED ADULT NURSE NOTE - NSIMPLEMENTINTERV_GEN_ALL_ED
Implemented All Fall with Harm Risk Interventions:  [place] to call system. Call bell, personal items and telephone within reach. Instruct patient to call for assistance. Room bathroom lighting operational. Non-slip footwear when patient is off stretcher. Physically safe environment: no spills, clutter or unnecessary equipment. Stretcher in lowest position, wheels locked, appropriate side rails in place. Provide visual cue, wrist band, yellow gown, etc. Monitor gait and stability. Monitor for mental status changes and reorient to person, place, and time. Review medications for side effects contributing to fall risk. Reinforce activity limits and safety measures with patient and family. Provide visual clues: red socks.

## 2019-05-06 NOTE — ED ADULT NURSE NOTE - PMH
Arthritis    Atrial fibrillation, unspecified type    BPH (benign prostatic hyperplasia)    CN (constipation)    Dementia    Edema    GERD (gastroesophageal reflux disease)    Gout    Hypothyroidism    Spinal stenosis

## 2019-05-14 ENCOUNTER — APPOINTMENT (OUTPATIENT)
Dept: CARDIOLOGY | Facility: CLINIC | Age: 84
End: 2019-05-14
Payer: MEDICARE

## 2019-05-14 ENCOUNTER — NON-APPOINTMENT (OUTPATIENT)
Age: 84
End: 2019-05-14

## 2019-05-14 VITALS
HEIGHT: 69 IN | OXYGEN SATURATION: 100 % | RESPIRATION RATE: 17 BRPM | HEART RATE: 60 BPM | DIASTOLIC BLOOD PRESSURE: 59 MMHG | BODY MASS INDEX: 28.44 KG/M2 | WEIGHT: 192 LBS | SYSTOLIC BLOOD PRESSURE: 121 MMHG

## 2019-05-14 DIAGNOSIS — I35.1 NONRHEUMATIC AORTIC (VALVE) INSUFFICIENCY: ICD-10-CM

## 2019-05-14 PROCEDURE — 99215 OFFICE O/P EST HI 40 MIN: CPT

## 2019-05-14 PROCEDURE — 93000 ELECTROCARDIOGRAM COMPLETE: CPT

## 2019-05-20 ENCOUNTER — EMERGENCY (EMERGENCY)
Facility: HOSPITAL | Age: 84
LOS: 1 days | Discharge: HOME | End: 2019-05-20
Attending: EMERGENCY MEDICINE | Admitting: EMERGENCY MEDICINE
Payer: MEDICARE

## 2019-05-20 VITALS
RESPIRATION RATE: 16 BRPM | DIASTOLIC BLOOD PRESSURE: 61 MMHG | OXYGEN SATURATION: 99 % | SYSTOLIC BLOOD PRESSURE: 153 MMHG | HEART RATE: 57 BPM

## 2019-05-20 VITALS
RESPIRATION RATE: 17 BRPM | OXYGEN SATURATION: 100 % | HEART RATE: 60 BPM | DIASTOLIC BLOOD PRESSURE: 58 MMHG | TEMPERATURE: 98 F | WEIGHT: 190.04 LBS | SYSTOLIC BLOOD PRESSURE: 115 MMHG | HEIGHT: 70 IN

## 2019-05-20 DIAGNOSIS — S09.90XA UNSPECIFIED INJURY OF HEAD, INITIAL ENCOUNTER: ICD-10-CM

## 2019-05-20 LAB
ALBUMIN SERPL ELPH-MCNC: 3 G/DL — LOW (ref 3.3–5)
ALP SERPL-CCNC: 61 U/L — SIGNIFICANT CHANGE UP (ref 40–120)
ALT FLD-CCNC: 16 U/L DA — SIGNIFICANT CHANGE UP (ref 10–45)
ANION GAP SERPL CALC-SCNC: 5 MMOL/L — SIGNIFICANT CHANGE UP (ref 5–17)
APTT BLD: 34.9 SEC — SIGNIFICANT CHANGE UP (ref 27.5–36.3)
AST SERPL-CCNC: 16 U/L — SIGNIFICANT CHANGE UP (ref 10–40)
BILIRUB SERPL-MCNC: 0.5 MG/DL — SIGNIFICANT CHANGE UP (ref 0.2–1.2)
BUN SERPL-MCNC: 29 MG/DL — HIGH (ref 7–23)
CALCIUM SERPL-MCNC: 9.1 MG/DL — SIGNIFICANT CHANGE UP (ref 8.4–10.5)
CHLORIDE SERPL-SCNC: 106 MMOL/L — SIGNIFICANT CHANGE UP (ref 96–108)
CK SERPL-CCNC: 44 U/L — SIGNIFICANT CHANGE UP (ref 30–200)
CO2 SERPL-SCNC: 31 MMOL/L — SIGNIFICANT CHANGE UP (ref 22–31)
CREAT SERPL-MCNC: 1.27 MG/DL — SIGNIFICANT CHANGE UP (ref 0.5–1.3)
GLUCOSE SERPL-MCNC: 96 MG/DL — SIGNIFICANT CHANGE UP (ref 70–99)
HCT VFR BLD CALC: 35.7 % — LOW (ref 39–50)
HGB BLD-MCNC: 11.2 G/DL — LOW (ref 13–17)
INR BLD: 1.13 RATIO — SIGNIFICANT CHANGE UP (ref 0.88–1.16)
MCHC RBC-ENTMCNC: 29.6 PG — SIGNIFICANT CHANGE UP (ref 27–34)
MCHC RBC-ENTMCNC: 31.4 GM/DL — LOW (ref 32–36)
MCV RBC AUTO: 94.4 FL — SIGNIFICANT CHANGE UP (ref 80–100)
NRBC # BLD: 0 /100 WBCS — SIGNIFICANT CHANGE UP (ref 0–0)
NT-PROBNP SERPL-SCNC: 595 PG/ML — HIGH (ref 0–300)
PLATELET # BLD AUTO: 207 K/UL — SIGNIFICANT CHANGE UP (ref 150–400)
POTASSIUM SERPL-MCNC: 3.8 MMOL/L — SIGNIFICANT CHANGE UP (ref 3.5–5.3)
POTASSIUM SERPL-SCNC: 3.8 MMOL/L — SIGNIFICANT CHANGE UP (ref 3.5–5.3)
PROT SERPL-MCNC: 6.4 G/DL — SIGNIFICANT CHANGE UP (ref 6–8.3)
PROTHROM AB SERPL-ACNC: 12.7 SEC — SIGNIFICANT CHANGE UP (ref 10–12.9)
RBC # BLD: 3.78 M/UL — LOW (ref 4.2–5.8)
RBC # FLD: 15.3 % — HIGH (ref 10.3–14.5)
SODIUM SERPL-SCNC: 142 MMOL/L — SIGNIFICANT CHANGE UP (ref 135–145)
TROPONIN I SERPL-MCNC: 0.02 NG/ML — SIGNIFICANT CHANGE UP (ref 0.02–0.06)
WBC # BLD: 6.38 K/UL — SIGNIFICANT CHANGE UP (ref 3.8–10.5)
WBC # FLD AUTO: 6.38 K/UL — SIGNIFICANT CHANGE UP (ref 3.8–10.5)

## 2019-05-20 PROCEDURE — 36415 COLL VENOUS BLD VENIPUNCTURE: CPT

## 2019-05-20 PROCEDURE — 85027 COMPLETE CBC AUTOMATED: CPT

## 2019-05-20 PROCEDURE — 12002 RPR S/N/AX/GEN/TRNK2.6-7.5CM: CPT

## 2019-05-20 PROCEDURE — 71045 X-RAY EXAM CHEST 1 VIEW: CPT | Mod: 26

## 2019-05-20 PROCEDURE — 85730 THROMBOPLASTIN TIME PARTIAL: CPT

## 2019-05-20 PROCEDURE — 93010 ELECTROCARDIOGRAM REPORT: CPT

## 2019-05-20 PROCEDURE — 99285 EMERGENCY DEPT VISIT HI MDM: CPT | Mod: 25

## 2019-05-20 PROCEDURE — 71045 X-RAY EXAM CHEST 1 VIEW: CPT

## 2019-05-20 PROCEDURE — 84484 ASSAY OF TROPONIN QUANT: CPT

## 2019-05-20 PROCEDURE — 72170 X-RAY EXAM OF PELVIS: CPT

## 2019-05-20 PROCEDURE — 83880 ASSAY OF NATRIURETIC PEPTIDE: CPT

## 2019-05-20 PROCEDURE — 82550 ASSAY OF CK (CPK): CPT

## 2019-05-20 PROCEDURE — 85610 PROTHROMBIN TIME: CPT

## 2019-05-20 PROCEDURE — 72125 CT NECK SPINE W/O DYE: CPT

## 2019-05-20 PROCEDURE — 70450 CT HEAD/BRAIN W/O DYE: CPT

## 2019-05-20 PROCEDURE — 70450 CT HEAD/BRAIN W/O DYE: CPT | Mod: 26

## 2019-05-20 PROCEDURE — 93005 ELECTROCARDIOGRAM TRACING: CPT

## 2019-05-20 PROCEDURE — 72170 X-RAY EXAM OF PELVIS: CPT | Mod: 26

## 2019-05-20 PROCEDURE — 72125 CT NECK SPINE W/O DYE: CPT | Mod: 26

## 2019-05-20 PROCEDURE — 99284 EMERGENCY DEPT VISIT MOD MDM: CPT | Mod: 25

## 2019-05-20 PROCEDURE — 80053 COMPREHEN METABOLIC PANEL: CPT

## 2019-05-20 NOTE — ED ADULT NURSE NOTE - CHPI ED NUR SYMPTOMS NEG
no weakness/no loss of consciousness/no fever/no vomiting/no deformity/no confusion/no numbness/no tingling

## 2019-05-20 NOTE — ED PROVIDER NOTE - OBJECTIVE STATEMENT
Patient presents to the ER from [facility] s/p fall. It was unwitnessed. He reports to me that he was just finished showering. He was standing in front of the mirror as he combed his hair. He wanted to step away but his legs felt weak, as they usually do. There was a grab bar, which he used to assist himself to sit but his legs gave out and he fell into a seated position, hitting his back side and back of head. He denies LOC or vomiting. No change in vision. No back or buttock pain. He says he has swollen legs chronically and their appearance today is its usual appearance. No SOB, chest pain or palpitations. EMS placed in C-Collar and he has dressing around his head as there is an occipital head wound.

## 2019-05-20 NOTE — ED PROVIDER NOTE - CARE PLAN
Principal Discharge DX:	CHI (closed head injury), initial encounter  Secondary Diagnosis:	Scalp laceration, initial encounter  Secondary Diagnosis:	Status post fall  Secondary Diagnosis:	Leg weakness, bilateral

## 2019-05-20 NOTE — ED PROVIDER NOTE - NSFOLLOWUPINSTRUCTIONS_ED_ALL_ED_FT

## 2019-05-20 NOTE — ED ADULT NURSE NOTE - NSIMPLEMENTINTERV_GEN_ALL_ED
Implemented All Fall with Harm Risk Interventions:  [place] to call system. Call bell, personal items and telephone within reach. Instruct patient to call for assistance. Room bathroom lighting operational. Non-slip footwear when patient is off stretcher. Physically safe environment: no spills, clutter or unnecessary equipment. Stretcher in lowest position, wheels locked, appropriate side rails in place. Provide visual cue, wrist band, yellow gown, etc. Monitor gait and stability. Monitor for mental status changes and reorient to person, place, and time. Review medications for side effects contributing to fall risk. Reinforce activity limits and safety measures with patient and family. Provide visual clues: red socks.

## 2019-05-20 NOTE — ED PROVIDER NOTE - CARE PROVIDER_API CALL
Olvin Hernandez (DO)  Internal Medicine  [address] [id number]  Phone: [phone]  Fax: [phone]  Follow Up Time:

## 2019-05-20 NOTE — ED ADULT NURSE REASSESSMENT NOTE - NS ED NURSE REASSESS COMMENT FT1
Pt taken to CT/X-ray, pending labs post radiology.
Pt ambulating steady with a walker. Pt denies weakness or dizziness when ambulating.
Pt pending d/c.  Pt's "daughter in law" arrived to ED, states can not herself take pt back to assisted living facility because her car is too low and small to have pt get in it, states needs to wait for pt's son to leave Dentist's appointment for transport. In meantime, Dr. Castillo is in the process of arranging ambulate service for pt if pt's insurance allows, family aware and agree with current plan for d/c.

## 2019-05-20 NOTE — ED PROVIDER NOTE - CLINICAL SUMMARY MEDICAL DECISION MAKING FREE TEXT BOX
Patient presents to the ER from [facility] s/p fall. It was unwitnessed. He reports to me that he was just finished showering. He was standing in front of the mirror as he combed his hair. He wanted to step away but his legs felt weak, as they usually do. There was a grab bar, which he used to assist himself to sit but his legs gave out and he fell into a seated position, hitting his back side and back of head. He denies LOC or vomiting. No change in vision. No back or buttock pain. He says he has swollen legs chronically and their appearance today is its usual appearance. No SOB, chest pain or palpitations. EMS placed in C-Collar and he has dressing around his head as there is an occipital head wound. Patient presents to the ER from [facility] s/p fall. It was unwitnessed. He reports to me that he was just finished showering. He was standing in front of the mirror as he combed his hair. He wanted to step away but his legs felt weak, as they usually do. There was a grab bar, which he used to assist himself to sit but his legs gave out and he fell into a seated position, hitting his back side and back of head. He denies LOC or vomiting. No change in vision. No back or buttock pain. He says he has swollen legs chronically and their appearance today is its usual appearance. No SOB, chest pain or palpitations. EMS placed in C-Collar and he has dressing around his head as there is an occipital head wound. Repaired with 3 staples. Pt ambulatory with walker in the ED to the bathroom. D/W Daughter. He looks well to her. THis has happened before.

## 2019-07-25 NOTE — ED PROVIDER NOTE - SKIN COLOR
Detail Level: Detailed
forearm with epidermal excoriation. No streaking Blanching erythema, No pus/pink

## 2019-08-01 NOTE — ED ADULT TRIAGE NOTE - LOCATION:
[facility] Call Center    Phone Message    May a detailed message be left on voicemail: yes    Reason for Call: Other: Per call from Clifford is requesting a call back to follow up on the status of the RX. Clifford can be reached at [phone].      Action Taken: Message routed to:  [facility] ([facility] [place]   Left arm;

## 2019-08-12 ENCOUNTER — INPATIENT (INPATIENT)
Facility: HOSPITAL | Age: 84
LOS: 1 days | Discharge: NURSING, REHAB OR LONG-TERM CARE | DRG: 868 | End: 2019-08-14
Attending: STUDENT IN AN ORGANIZED HEALTH CARE EDUCATION/TRAINING PROGRAM | Admitting: HOSPITALIST
Payer: MEDICARE

## 2019-08-12 VITALS
TEMPERATURE: 98 F | SYSTOLIC BLOOD PRESSURE: 152 MMHG | RESPIRATION RATE: 16 BRPM | WEIGHT: 184.97 LBS | OXYGEN SATURATION: 100 % | HEART RATE: 57 BPM | HEIGHT: 70 IN | DIASTOLIC BLOOD PRESSURE: 72 MMHG

## 2019-08-12 DIAGNOSIS — Z98.890 OTHER SPECIFIED POSTPROCEDURAL STATES: Chronic | ICD-10-CM

## 2019-08-12 DIAGNOSIS — L03.116 CELLULITIS OF LEFT LOWER LIMB: ICD-10-CM

## 2019-08-12 LAB
ALBUMIN SERPL ELPH-MCNC: 2.9 G/DL — LOW (ref 3.3–5)
ALP SERPL-CCNC: 67 U/L — SIGNIFICANT CHANGE UP (ref 40–120)
ALT FLD-CCNC: 10 U/L DA — SIGNIFICANT CHANGE UP (ref 10–45)
ANION GAP SERPL CALC-SCNC: 4 MMOL/L — LOW (ref 5–17)
APTT BLD: 36.3 SEC — SIGNIFICANT CHANGE UP (ref 27.5–36.3)
AST SERPL-CCNC: 14 U/L — SIGNIFICANT CHANGE UP (ref 10–40)
BASOPHILS # BLD AUTO: 0.05 K/UL — SIGNIFICANT CHANGE UP (ref 0–0.2)
BASOPHILS NFR BLD AUTO: 0.8 % — SIGNIFICANT CHANGE UP (ref 0–2)
BILIRUB SERPL-MCNC: 0.5 MG/DL — SIGNIFICANT CHANGE UP (ref 0.2–1.2)
BUN SERPL-MCNC: 23 MG/DL — SIGNIFICANT CHANGE UP (ref 7–23)
CALCIUM SERPL-MCNC: 8.8 MG/DL — SIGNIFICANT CHANGE UP (ref 8.4–10.5)
CHLORIDE SERPL-SCNC: 109 MMOL/L — HIGH (ref 96–108)
CO2 SERPL-SCNC: 31 MMOL/L — SIGNIFICANT CHANGE UP (ref 22–31)
CREAT SERPL-MCNC: 1.22 MG/DL — SIGNIFICANT CHANGE UP (ref 0.5–1.3)
EOSINOPHIL # BLD AUTO: 0.22 K/UL — SIGNIFICANT CHANGE UP (ref 0–0.5)
EOSINOPHIL NFR BLD AUTO: 3.5 % — SIGNIFICANT CHANGE UP (ref 0–6)
GLUCOSE SERPL-MCNC: 110 MG/DL — HIGH (ref 70–99)
HCT VFR BLD CALC: 36.9 % — LOW (ref 39–50)
HGB BLD-MCNC: 11.5 G/DL — LOW (ref 13–17)
IMM GRANULOCYTES NFR BLD AUTO: 0.3 % — SIGNIFICANT CHANGE UP (ref 0–1.5)
INR BLD: 1.17 RATIO — HIGH (ref 0.88–1.16)
LYMPHOCYTES # BLD AUTO: 1.21 K/UL — SIGNIFICANT CHANGE UP (ref 1–3.3)
LYMPHOCYTES # BLD AUTO: 19.5 % — SIGNIFICANT CHANGE UP (ref 13–44)
MCHC RBC-ENTMCNC: 28.5 PG — SIGNIFICANT CHANGE UP (ref 27–34)
MCHC RBC-ENTMCNC: 31.2 GM/DL — LOW (ref 32–36)
MCV RBC AUTO: 91.6 FL — SIGNIFICANT CHANGE UP (ref 80–100)
MONOCYTES # BLD AUTO: 0.56 K/UL — SIGNIFICANT CHANGE UP (ref 0–0.9)
MONOCYTES NFR BLD AUTO: 9 % — SIGNIFICANT CHANGE UP (ref 2–14)
NEUTROPHILS # BLD AUTO: 4.14 K/UL — SIGNIFICANT CHANGE UP (ref 1.8–7.4)
NEUTROPHILS NFR BLD AUTO: 66.9 % — SIGNIFICANT CHANGE UP (ref 43–77)
NRBC # BLD: 0 /100 WBCS — SIGNIFICANT CHANGE UP (ref 0–0)
PLATELET # BLD AUTO: 230 K/UL — SIGNIFICANT CHANGE UP (ref 150–400)
POTASSIUM SERPL-MCNC: 3.4 MMOL/L — LOW (ref 3.5–5.3)
POTASSIUM SERPL-SCNC: 3.4 MMOL/L — LOW (ref 3.5–5.3)
PROT SERPL-MCNC: 6.6 G/DL — SIGNIFICANT CHANGE UP (ref 6–8.3)
PROTHROM AB SERPL-ACNC: 13.2 SEC — HIGH (ref 10–12.9)
RBC # BLD: 4.03 M/UL — LOW (ref 4.2–5.8)
RBC # FLD: 16.3 % — HIGH (ref 10.3–14.5)
SODIUM SERPL-SCNC: 144 MMOL/L — SIGNIFICANT CHANGE UP (ref 135–145)
WBC # BLD: 6.2 K/UL — SIGNIFICANT CHANGE UP (ref 3.8–10.5)
WBC # FLD AUTO: 6.2 K/UL — SIGNIFICANT CHANGE UP (ref 3.8–10.5)

## 2019-08-12 PROCEDURE — 99223 1ST HOSP IP/OBS HIGH 75: CPT

## 2019-08-12 PROCEDURE — 71045 X-RAY EXAM CHEST 1 VIEW: CPT | Mod: 26

## 2019-08-12 PROCEDURE — 99285 EMERGENCY DEPT VISIT HI MDM: CPT

## 2019-08-12 PROCEDURE — 93970 EXTREMITY STUDY: CPT | Mod: 26

## 2019-08-12 PROCEDURE — 73630 X-RAY EXAM OF FOOT: CPT | Mod: 26,LT

## 2019-08-12 PROCEDURE — 93010 ELECTROCARDIOGRAM REPORT: CPT

## 2019-08-12 RX ORDER — DOCUSATE SODIUM 100 MG
100 CAPSULE ORAL THREE TIMES A DAY
Refills: 0 | Status: DISCONTINUED | OUTPATIENT
Start: 2019-08-12 | End: 2019-08-14

## 2019-08-12 RX ORDER — VANCOMYCIN HCL 1 G
1000 VIAL (EA) INTRAVENOUS ONCE
Refills: 0 | Status: COMPLETED | OUTPATIENT
Start: 2019-08-12 | End: 2019-08-12

## 2019-08-12 RX ORDER — CEFTRIAXONE 500 MG/1
INJECTION, POWDER, FOR SOLUTION INTRAMUSCULAR; INTRAVENOUS
Refills: 0 | Status: DISCONTINUED | OUTPATIENT
Start: 2019-08-12 | End: 2019-08-13

## 2019-08-12 RX ORDER — CEFTRIAXONE 500 MG/1
2000 INJECTION, POWDER, FOR SOLUTION INTRAMUSCULAR; INTRAVENOUS ONCE
Refills: 0 | Status: COMPLETED | OUTPATIENT
Start: 2019-08-12 | End: 2019-08-12

## 2019-08-12 RX ORDER — ENOXAPARIN SODIUM 100 MG/ML
40 INJECTION SUBCUTANEOUS DAILY
Refills: 0 | Status: DISCONTINUED | OUTPATIENT
Start: 2019-08-12 | End: 2019-08-14

## 2019-08-12 RX ORDER — TAMSULOSIN HYDROCHLORIDE 0.4 MG/1
0.4 CAPSULE ORAL AT BEDTIME
Refills: 0 | Status: DISCONTINUED | OUTPATIENT
Start: 2019-08-12 | End: 2019-08-14

## 2019-08-12 RX ORDER — CEFTRIAXONE 500 MG/1
2000 INJECTION, POWDER, FOR SOLUTION INTRAMUSCULAR; INTRAVENOUS EVERY 24 HOURS
Refills: 0 | Status: DISCONTINUED | OUTPATIENT
Start: 2019-08-13 | End: 2019-08-13

## 2019-08-12 RX ORDER — TAMSULOSIN HYDROCHLORIDE 0.4 MG/1
1 CAPSULE ORAL
Qty: 0 | Refills: 0 | DISCHARGE

## 2019-08-12 RX ORDER — MULTIVIT-MIN/FERROUS GLUCONATE 9 MG/15 ML
1 LIQUID (ML) ORAL DAILY
Refills: 0 | Status: DISCONTINUED | OUTPATIENT
Start: 2019-08-12 | End: 2019-08-13

## 2019-08-12 RX ORDER — FUROSEMIDE 40 MG
80 TABLET ORAL DAILY
Refills: 0 | Status: DISCONTINUED | OUTPATIENT
Start: 2019-08-12 | End: 2019-08-13

## 2019-08-12 RX ORDER — POTASSIUM CHLORIDE 20 MEQ
20 PACKET (EA) ORAL ONCE
Refills: 0 | Status: COMPLETED | OUTPATIENT
Start: 2019-08-12 | End: 2019-08-12

## 2019-08-12 RX ORDER — LUBIPROSTONE 24 UG/1
8 CAPSULE, GELATIN COATED ORAL
Refills: 0 | Status: DISCONTINUED | OUTPATIENT
Start: 2019-08-12 | End: 2019-08-14

## 2019-08-12 RX ORDER — FERROUS SULFATE 325(65) MG
0 TABLET ORAL
Qty: 0 | Refills: 0 | DISCHARGE

## 2019-08-12 RX ORDER — HYDROXYZINE HCL 10 MG
10 TABLET ORAL ONCE
Refills: 0 | Status: DISCONTINUED | OUTPATIENT
Start: 2019-08-12 | End: 2019-08-12

## 2019-08-12 RX ORDER — LACTULOSE 10 G/15ML
0 SOLUTION ORAL
Qty: 0 | Refills: 0 | DISCHARGE

## 2019-08-12 RX ORDER — LANOLIN ALCOHOL/MO/W.PET/CERES
6 CREAM (GRAM) TOPICAL AT BEDTIME
Refills: 0 | Status: DISCONTINUED | OUTPATIENT
Start: 2019-08-12 | End: 2019-08-14

## 2019-08-12 RX ORDER — TRAMADOL HYDROCHLORIDE 50 MG/1
0 TABLET ORAL
Qty: 0 | Refills: 0 | DISCHARGE

## 2019-08-12 RX ORDER — SENNA PLUS 8.6 MG/1
2 TABLET ORAL AT BEDTIME
Refills: 0 | Status: DISCONTINUED | OUTPATIENT
Start: 2019-08-12 | End: 2019-08-14

## 2019-08-12 RX ORDER — ALLOPURINOL 300 MG
300 TABLET ORAL ONCE
Refills: 0 | Status: COMPLETED | OUTPATIENT
Start: 2019-08-12 | End: 2019-08-14

## 2019-08-12 RX ORDER — FINASTERIDE 5 MG/1
5 TABLET, FILM COATED ORAL DAILY
Refills: 0 | Status: DISCONTINUED | OUTPATIENT
Start: 2019-08-12 | End: 2019-08-14

## 2019-08-12 RX ORDER — ACETAMINOPHEN 500 MG
650 TABLET ORAL EVERY 6 HOURS
Refills: 0 | Status: DISCONTINUED | OUTPATIENT
Start: 2019-08-12 | End: 2019-08-14

## 2019-08-12 RX ORDER — MULTIVIT-MIN/FERROUS GLUCONATE 9 MG/15 ML
1 LIQUID (ML) ORAL
Qty: 0 | Refills: 0 | DISCHARGE

## 2019-08-12 RX ORDER — ACETAMINOPHEN 500 MG
650 TABLET ORAL
Qty: 0 | Refills: 0 | DISCHARGE

## 2019-08-12 RX ORDER — PANTOPRAZOLE SODIUM 20 MG/1
40 TABLET, DELAYED RELEASE ORAL
Refills: 0 | Status: DISCONTINUED | OUTPATIENT
Start: 2019-08-12 | End: 2019-08-14

## 2019-08-12 RX ORDER — CHOLECALCIFEROL (VITAMIN D3) 125 MCG
1000 CAPSULE ORAL ONCE
Refills: 0 | Status: COMPLETED | OUTPATIENT
Start: 2019-08-12 | End: 2019-08-12

## 2019-08-12 RX ORDER — LEVOTHYROXINE SODIUM 125 MCG
125 TABLET ORAL DAILY
Refills: 0 | Status: DISCONTINUED | OUTPATIENT
Start: 2019-08-12 | End: 2019-08-14

## 2019-08-12 RX ADMIN — CEFTRIAXONE 100 MILLIGRAM(S): 500 INJECTION, POWDER, FOR SOLUTION INTRAMUSCULAR; INTRAVENOUS at 17:48

## 2019-08-12 RX ADMIN — Medication 250 MILLIGRAM(S): at 12:00

## 2019-08-12 RX ADMIN — Medication 20 MILLIEQUIVALENT(S): at 17:55

## 2019-08-12 RX ADMIN — Medication 1000 UNIT(S): at 17:55

## 2019-08-12 RX ADMIN — Medication 6 MILLIGRAM(S): at 23:03

## 2019-08-12 RX ADMIN — TAMSULOSIN HYDROCHLORIDE 0.4 MILLIGRAM(S): 0.4 CAPSULE ORAL at 23:03

## 2019-08-12 RX ADMIN — LUBIPROSTONE 8 MICROGRAM(S): 24 CAPSULE, GELATIN COATED ORAL at 17:55

## 2019-08-12 RX ADMIN — Medication 100 MILLIGRAM(S): at 23:02

## 2019-08-12 RX ADMIN — Medication 1000 MILLIGRAM(S): at 13:00

## 2019-08-12 NOTE — H&P ADULT - ASSESSMENT
Patent is a 89 year old male w/ pmh including afib, bph, constipation, dementia, edema, gerd, gout, hypothyroidism coming to  ER with complaints of increasing edema b/l le L>R.    #lower extremity edema  - most probable secondary to cellulitis w/ component dependent edema  - vanco in ED changed to ceftriaxone  - id consult  - pod consult   - monitor vitals   - elevate b/l le  - lasix  - tylenol for pain  - doppler negative for dvt    #hypokalemia   - repleted   - monitor   - check mg phos  - pt consult    #gout  - allopurinol    #bph  - stable  - finasteride  - flomax    #CKD stage 3A  - stable  - monitor cr   - avoid nephrotoxic medications     #gerd  - ppi    #hypothyroidism  - levothyroxine    #dvt prophylaxis  - lovenox sc-> elderly male w/ foot swelling  IMPROVE VTE Individual Risk Assessment    RISK                                                                Points    [  ] Previous VTE                                                  3    [  ] Thrombophilia                                               2    [  ] Lower limb paralysis                                      2        (unable to hold up >15 seconds)      [  ] Current Cancer                                              2         (within 6 months)    [  ] Immobilization > 24 hrs                                1    [  ] ICU/CCU stay > 24 hours                              1    [x  ] Age > 60                                                      1    IMPROVE VTE Score ____1_____    IMPROVE Score 0-1: Low Risk, No VTE prophylaxis required for most patients, encourage ambulation.   IMPROVE Score 2-3: At risk, pharmacologic VTE prophylaxis is indicated for most patients (in the absence of a contraindication)  IMPROVE Score > or = 4: High Risk, pharmacologic VTE prophylaxis is indicated for most patients (in the absence of a contraindication) Patent is a 89 year old male w/ pmh including afib, bph, constipation, dementia, edema, gerd, gout, hypothyroidism coming to  ER with complaints of increasing edema b/l le L>R.    #lower extremity edema  - most probable secondary to cellulitis w/ component dependent edema  - vanco in ED changed to ceftriaxone  - id consult  - pod consult   - monitor vitals   - elevate b/l le  - lasix  - tylenol for pain  - doppler negative for dvt    #hypokalemia   - repleted   - monitor   - check mg phos  - pt consult    #gout  - allopurinol    #bph  - stable  - finasteride  - flomax    #CKD stage 3A  - stable  - monitor cr   - avoid nephrotoxic medications     #gerd  - ppi    #hypothyroidism  - levothyroxine    #insomnia  - melatonin    #dvt prophylaxis  - lovenox sc-> elderly male w/ foot swelling  IMPROVE VTE Individual Risk Assessment    RISK                                                                Points    [  ] Previous VTE                                                  3    [  ] Thrombophilia                                               2    [  ] Lower limb paralysis                                      2        (unable to hold up >15 seconds)      [  ] Current Cancer                                              2         (within 6 months)    [  ] Immobilization > 24 hrs                                1    [  ] ICU/CCU stay > 24 hours                              1    [x  ] Age > 60                                                      1    IMPROVE VTE Score ____1_____    IMPROVE Score 0-1: Low Risk, No VTE prophylaxis required for most patients, encourage ambulation.   IMPROVE Score 2-3: At risk, pharmacologic VTE prophylaxis is indicated for most patients (in the absence of a contraindication)  IMPROVE Score > or = 4: High Risk, pharmacologic VTE prophylaxis is indicated for most patients (in the absence of a contraindication)

## 2019-08-12 NOTE — ED PROVIDER NOTE - PHYSICAL EXAMINATION
Lt le swelling to the foot, ankle,  + Yellow discoloration in toe nails, Fluid filled blisters to top foot/ medial aspect,  Minimal erythema , warmth.

## 2019-08-12 NOTE — H&P ADULT - NSHPPHYSICALEXAM_GEN_ALL_CORE
Vital Signs Last 24 Hrs  T(F): 98.1 (12 Aug 2019 12:48), Max: 98.1 (12 Aug 2019 12:48)  HR: 60 (12 Aug 2019 12:48) (57 - 60)  BP: 157/68 (12 Aug 2019 12:48) (152/72 - 157/68)  RR: 17 (12 Aug 2019 12:48) (16 - 17)  SpO2: 100% (12 Aug 2019 12:48) (100% - 100%)    Physical Exam:  Constitutional: NAD, awake and alert, well-developed  Neck: Soft and supple, No LAD, No JVD  Respiratory: cta b/l no wheezing no rhonchi  Cardiovascular: +s1/s2   Gastrointestinal: soft nt nd bs+  Vascular: 2+ peripheral pulses  Neurological: A/O x 3, hard of hearing  Musculoskeletal: 5/5 strength b/l upper and lower extremities  Skin: +2/3 edema b/l le l>r w/ blisters; erythematous warm to touch non draining  : Contraindicated  Breasts: Contraindicated  Rectal: Contraindicated

## 2019-08-12 NOTE — ED PROVIDER NOTE - OBJECTIVE STATEMENT
89 yo male, from [facility] [place], phx gout, edema, atrial fib, htn, dementia, BPH, comes to the ED co lower extremity swelling . Pt has a hx of LE swelling, has visiting nurse for wound care weekly at the [facility] who advised him to come tot he ED today sp visit due to increased swelling. Pt denies any pain or sensation changes to the lower extremities.  Denies any fevers or chills. Admits to more frequent fluid filled blisters over the past few days.

## 2019-08-12 NOTE — H&P ADULT - NSHPREVIEWOFSYSTEMS_GEN_ALL_CORE
Review of Systems:  CONSTITUTIONAL: No weakness, fevers or chills  EYES/ENT: No visual changes;  No vertigo or throat pain ; +hard of hearing   NECK: No pain or stiffness  RESPIRATORY: No cough, wheezing, hemoptysis; No shortness of breath,   CARDIOVASCULAR: No chest pain or palpitations  GASTROINTESTINAL: No abdominal or epigastric pain. No nausea, vomiting, or hematemesis; No diarrhea or constipation.   GENITOURINARY: No dysuria, frequency or hematuria  NEUROLOGICAL: No numbness or weakness  SKIN: +b/l le skin changes noted;  erythematous edematous w/ blister l>r  All other review of systems is negative unless indicated above

## 2019-08-12 NOTE — CONSULT NOTE ADULT - SUBJECTIVE AND OBJECTIVE BOX
HPI:   Patient is a 89y male with Afib, mild dementia, at assisted living, longstanding leg edema, seen by Dr Olivas in past for bullous pemphigoid (foot, leg, and forearm blisters), poorly tolerant of steroids, who was referred to ED earlier by visiting nurse.  Apparently, more in way of R leg erythema.  L foot now with increased edema, scale, slough, and large blister.  Pt can't recall how long R foot has been red, as typically legs are ace wrapped or using compression stockings.  No report of fever or chills.  In d/w pt's daughter, not clear how long latest skin changes present.     REVIEW OF SYSTEMS:  All other review of systems negative (Comprehensive ROS)    PAST MEDICAL & SURGICAL HISTORY:  Spinal stenosis  Atrial fibrillation, unspecified type  Arthritis  CN (constipation)  BPH (benign prostatic hyperplasia)  GERD (gastroesophageal reflux disease)  Hypothyroidism  Gout  Edema  Dementia  S/P hernia repair      Allergies  penicillin (Unknown)    Antimicrobials Day # 1  cefTRIAXone   IVPB        Other Medications:  acetaminophen   Tablet .. 650 milliGRAM(s) Oral every 6 hours PRN  allopurinol 300 milliGRAM(s) Oral Once  docusate sodium 100 milliGRAM(s) Oral three times a day  enoxaparin Injectable 40 milliGRAM(s) SubCutaneous daily  finasteride 5 milliGRAM(s) Oral daily  furosemide    Tablet 80 milliGRAM(s) Oral daily  levothyroxine 125 MICROGram(s) Oral daily  lubiprostone 8 MICROGram(s) Oral two times a day  melatonin 6 milliGRAM(s) Oral at bedtime  multivitamin/minerals 1 Tablet(s) Oral daily  pantoprazole    Tablet 40 milliGRAM(s) Oral before breakfast  senna 2 Tablet(s) Oral at bedtime PRN  tamsulosin 0.4 milliGRAM(s) Oral at bedtime      FAMILY HISTORY:  No pertinent family history in first degree relatives      SOCIAL HISTORY:  Smoking: no    ETOH: no      Single     T(F): 97.8 (08-12-19 @ 15:45), Max: 98.1 (08-12-19 @ 12:48)  HR: 55 (08-12-19 @ 15:45)  BP: 144/56 (08-12-19 @ 15:45)  RR: 14 (08-12-19 @ 15:45)  SpO2: 100% (08-12-19 @ 15:45)  Wt(kg): --    PHYSICAL EXAM:  General: alert, no acute distress  Eyes:  anicteric, no conjunctival injection, no discharge  Oropharynx: no lesions or injection 	  Neck: supple, without adenopathy  Lungs: clear to auscultation  Heart: irregular rhythm; no murmur, rubs or gallops  Abdomen: soft, nondistended, nontender, without mass or organomegaly  Skin: L foot denuded, large dorsal blister, erythema; R midleg faint area of erythema   Extremities: b/l leg pitting edema  Neurologic: alert,moves all extremities    LAB RESULTS:                        11.5   6.20  )-----------( 230      ( 12 Aug 2019 11:50 )             36.9     08-12    144  |  109<H>  |  23  ----------------------------<  110<H>  3.4<L>   |  31  |  1.22    Ca    8.8      12 Aug 2019 11:50    TPro  6.6  /  Alb  2.9<L>  /  TBili  0.5  /  DBili  x   /  AST  14  /  ALT  10  /  AlkPhos  67  08-12    MICROBIOLOGY:  RECENT CULTURES:  pending    RADIOLOGY REVIEWED:  US Duplex Venous Lower Ext Complete, Bilateral (08.12.19 @ 12:40) >  No evidence of deep venous thrombosis in either lower extremity.    Xray Foot AP + Lateral + Oblique, Left (08.12.19 @ 14:23) >  There is extensive edema of the foot particularly on the dorsum.  There is partial ossification of the soft tissues are seen off the   inferior calcaneus.  There is some mild first MP degeneration.  No bone destruction or fracture is evident.    Xray Chest 1 View AP/PA (08.12.19 @ 14:23) >  Slightly increased interstitial pattern from May of this year.

## 2019-08-12 NOTE — ED ADULT NURSE NOTE - OBJECTIVE STATEMENT
Pt arrives to ER brought in by EMS from [facility] for bilateral lower extremity swelling. Pt presents with weeping edema to bilateral legs, red and warm with multiple blisters to top of left foot. Pt reports leg swelling over the last few months but worsened in the last few days. Pt states he is ambulatory with a walker, reports feeling "uncomfortable" at 5/10. Pt denies numbness/tingling, no change in sensation, able to move bilateral lower extremities purposefully.

## 2019-08-12 NOTE — CONSULT NOTE ADULT - ASSESSMENT
89 yo M, h/o longstanding leg edema and pemphigoid  Here with increased leg edema, scale, blister L foot, and concern for cellulitis R leg  R foot without lesions, pt afebrile, WBC/diff normal, and duration of erythema not known  Difficult to distinguish strictly inflammatory changes from impressive edema/stasis directly, possible pemphigoid, or secondary infection  Presentation is not typical for purely cellulitis    Plan:  Will follow on CTX for now  Leg elevation very important  Derm eval would be very helpful  D/w pt and family at length

## 2019-08-12 NOTE — H&P ADULT - HISTORY OF PRESENT ILLNESS
Patient with bouts of dementia unable to recall full history such as psh (past hernia repair) and family history at this time.     Patent is a 89 year old male w/ pmh including afib, bph, constipation, dementia, edema, gerd, gout, hypothyroidism coming to  ER with complaints of increasing edema b/l le L>R. patient stating pain w/ movement or touch. ambulates with walker. states has been occuring for past 4 weeks. has not tried medications for this in past. states previous hx of upper extremity cellulitis but unsure when. no cp sob nausea vomiting diarrhea. pain well controlled when not moving

## 2019-08-12 NOTE — ED ADULT NURSE NOTE - NSIMPLEMENTINTERV_GEN_ALL_ED
Implemented All Fall Risk Interventions:  [place] to call system. Call bell, personal items and telephone within reach. Instruct patient to call for assistance. Room bathroom lighting operational. Non-slip footwear when patient is off stretcher. Physically safe environment: no spills, clutter or unnecessary equipment. Stretcher in lowest position, wheels locked, appropriate side rails in place. Provide visual cue, wrist band, yellow gown, etc. Monitor gait and stability. Monitor for mental status changes and reorient to person, place, and time. Review medications for side effects contributing to fall risk. Reinforce activity limits and safety measures with patient and family.

## 2019-08-12 NOTE — ED PROVIDER NOTE - CLINICAL SUMMARY MEDICAL DECISION MAKING FREE TEXT BOX
Inc. LE swelling/ blister,  Check labs, US LE, xray, IV AB, re-eval Increasing LE swelling/ blistering,  Check labs, US LE, xray, IV AB, re-eval; Exam impressive for advancing cellulitis. Will rquire IV antibiotics.

## 2019-08-13 LAB
ANION GAP SERPL CALC-SCNC: 7 MMOL/L — SIGNIFICANT CHANGE UP (ref 5–17)
BASOPHILS # BLD AUTO: 0.04 K/UL — SIGNIFICANT CHANGE UP (ref 0–0.2)
BASOPHILS NFR BLD AUTO: 0.7 % — SIGNIFICANT CHANGE UP (ref 0–2)
BUN SERPL-MCNC: 20 MG/DL — SIGNIFICANT CHANGE UP (ref 7–23)
CALCIUM SERPL-MCNC: 8.5 MG/DL — SIGNIFICANT CHANGE UP (ref 8.4–10.5)
CHLORIDE SERPL-SCNC: 109 MMOL/L — HIGH (ref 96–108)
CO2 SERPL-SCNC: 28 MMOL/L — SIGNIFICANT CHANGE UP (ref 22–31)
CREAT SERPL-MCNC: 1.06 MG/DL — SIGNIFICANT CHANGE UP (ref 0.5–1.3)
EOSINOPHIL # BLD AUTO: 0.33 K/UL — SIGNIFICANT CHANGE UP (ref 0–0.5)
EOSINOPHIL NFR BLD AUTO: 5.4 % — SIGNIFICANT CHANGE UP (ref 0–6)
GLUCOSE SERPL-MCNC: 93 MG/DL — SIGNIFICANT CHANGE UP (ref 70–99)
HCT VFR BLD CALC: 36.1 % — LOW (ref 39–50)
HGB BLD-MCNC: 11.4 G/DL — LOW (ref 13–17)
IMM GRANULOCYTES NFR BLD AUTO: 0.5 % — SIGNIFICANT CHANGE UP (ref 0–1.5)
LYMPHOCYTES # BLD AUTO: 1.45 K/UL — SIGNIFICANT CHANGE UP (ref 1–3.3)
LYMPHOCYTES # BLD AUTO: 23.8 % — SIGNIFICANT CHANGE UP (ref 13–44)
MAGNESIUM SERPL-MCNC: 1.7 MG/DL — SIGNIFICANT CHANGE UP (ref 1.6–2.6)
MCHC RBC-ENTMCNC: 28.9 PG — SIGNIFICANT CHANGE UP (ref 27–34)
MCHC RBC-ENTMCNC: 31.6 GM/DL — LOW (ref 32–36)
MCV RBC AUTO: 91.4 FL — SIGNIFICANT CHANGE UP (ref 80–100)
MONOCYTES # BLD AUTO: 0.5 K/UL — SIGNIFICANT CHANGE UP (ref 0–0.9)
MONOCYTES NFR BLD AUTO: 8.2 % — SIGNIFICANT CHANGE UP (ref 2–14)
NEUTROPHILS # BLD AUTO: 3.74 K/UL — SIGNIFICANT CHANGE UP (ref 1.8–7.4)
NEUTROPHILS NFR BLD AUTO: 61.4 % — SIGNIFICANT CHANGE UP (ref 43–77)
NRBC # BLD: 0 /100 WBCS — SIGNIFICANT CHANGE UP (ref 0–0)
PHOSPHATE SERPL-MCNC: 3.3 MG/DL — SIGNIFICANT CHANGE UP (ref 2.5–4.5)
PLATELET # BLD AUTO: 217 K/UL — SIGNIFICANT CHANGE UP (ref 150–400)
POTASSIUM SERPL-MCNC: 3.5 MMOL/L — SIGNIFICANT CHANGE UP (ref 3.5–5.3)
POTASSIUM SERPL-SCNC: 3.5 MMOL/L — SIGNIFICANT CHANGE UP (ref 3.5–5.3)
RBC # BLD: 3.95 M/UL — LOW (ref 4.2–5.8)
RBC # FLD: 16.2 % — HIGH (ref 10.3–14.5)
SODIUM SERPL-SCNC: 144 MMOL/L — SIGNIFICANT CHANGE UP (ref 135–145)
WBC # BLD: 6.09 K/UL — SIGNIFICANT CHANGE UP (ref 3.8–10.5)
WBC # FLD AUTO: 6.09 K/UL — SIGNIFICANT CHANGE UP (ref 3.8–10.5)

## 2019-08-13 PROCEDURE — 99233 SBSQ HOSP IP/OBS HIGH 50: CPT

## 2019-08-13 RX ORDER — FUROSEMIDE 40 MG
40 TABLET ORAL DAILY
Refills: 0 | Status: DISCONTINUED | OUTPATIENT
Start: 2019-08-13 | End: 2019-08-14

## 2019-08-13 RX ADMIN — LUBIPROSTONE 8 MICROGRAM(S): 24 CAPSULE, GELATIN COATED ORAL at 17:51

## 2019-08-13 RX ADMIN — LUBIPROSTONE 8 MICROGRAM(S): 24 CAPSULE, GELATIN COATED ORAL at 06:59

## 2019-08-13 RX ADMIN — Medication 125 MICROGRAM(S): at 06:59

## 2019-08-13 RX ADMIN — FINASTERIDE 5 MILLIGRAM(S): 5 TABLET, FILM COATED ORAL at 12:29

## 2019-08-13 RX ADMIN — Medication 1 APPLICATION(S): at 22:29

## 2019-08-13 RX ADMIN — Medication 80 MILLIGRAM(S): at 06:59

## 2019-08-13 RX ADMIN — Medication 6 MILLIGRAM(S): at 22:29

## 2019-08-13 RX ADMIN — ENOXAPARIN SODIUM 40 MILLIGRAM(S): 100 INJECTION SUBCUTANEOUS at 12:28

## 2019-08-13 RX ADMIN — TAMSULOSIN HYDROCHLORIDE 0.4 MILLIGRAM(S): 0.4 CAPSULE ORAL at 22:28

## 2019-08-13 RX ADMIN — PANTOPRAZOLE SODIUM 40 MILLIGRAM(S): 20 TABLET, DELAYED RELEASE ORAL at 06:59

## 2019-08-13 RX ADMIN — Medication 100 MILLIGRAM(S): at 06:59

## 2019-08-13 RX ADMIN — Medication 40 MILLIGRAM(S): at 12:28

## 2019-08-13 NOTE — DIETITIAN INITIAL EVALUATION ADULT. - OTHER INFO
89 y/o M w/ afib, mild dementia, longstanding leg edema, spinal stenosis, HTN, hypothyroid, constipation, weakness, fatigue, poor balance, hx of frequent falls; came from assisted living facility. Found to have cellulitis/edema/venous stasis. Pt tolerating regular diet w/ good PO intakes (consuming 100% of meals) per flowsheets. Per chart review (pt has previous admissions 11/18, 2/19, 4/19), pt has hx of weight fluctuations may be r/t longstanding leg edema. On lasix; fluid shifts expected. Per flowsheets, noted w/ moderate 3+ bilateral leg edema; severe 4+ bipedal edema; stage I PU buttocks. Last BM 8/12. 89 y/o M w/ afib, mild dementia, longstanding leg edema, spinal stenosis, HTN, hypothyroid, constipation, weakness, fatigue, poor balance, hx of frequent falls; came from assisted living facility. Found to have cellulitis/edema/venous stasis. Pt tolerating regular diet w/ good PO intakes (consuming 100% of meals) per flowsheets. Discussed food preferences to promote optimal intake to meet increased ENN r/t cellulitis and to promote skin integrity. Per chart review (pt has previous admissions 11/18, 2/19, 4/19), pt has hx of weight fluctuations may be r/t longstanding leg edema. On lasix; fluid shifts expected. Per flowsheets, noted w/ moderate 3+ bilateral leg edema; severe 4+ bipedal edema; stage I PU buttocks. Last BM 8/12.

## 2019-08-13 NOTE — CONSULT NOTE ADULT - SUBJECTIVE AND OBJECTIVE BOX
Patient admitted to hospital for cellulitis of the left lower extremity. Patient does not recall when the symptoms began.  Patient with history of bullous pemphigoid. He has home nursing who has been taking care of the condition.                          11.4   6.09  )-----------( 217      ( 13 Aug 2019 07:20 )             36.1   08-13    144  |  109<H>  |  20  ----------------------------<  93  3.5   |  28  |  1.06    Ca    8.5      13 Aug 2019 07:20  Phos  3.3     08-13  Mg     1.7     08-13    TPro  6.6  /  Alb  2.9<L>  /  TBili  0.5  /  DBili  x   /  AST  14  /  ALT  10  /  AlkPhos  67  08-12  < from: Xray Foot AP + Lateral + Oblique, Left (08.12.19 @ 14:23) >    INTERPRETATION:  Left foot. Patient has local cellulitis and edema. 2   images submitted.    There is extensive edema of the foot particularly on the dorsum.    There is partial ossification of the soft tissues are seen off the   inferior calcaneus.    There is a mild hammertoe deformity of the outer toes.    There is some mild first MP degeneration.    No bone destruction or fracture is evident.    IMPRESSION: Main finding is extensive foot edema. Other findings as above.    < end of copied text >      Noted are multiple bullaes on the left foot, some of them has been drained. There's erythema present. No malodor or deep wounds.

## 2019-08-13 NOTE — PROGRESS NOTE ADULT - PROBLEM SELECTOR PLAN 1
Fall secondary to being off balance after using the toilet  PT recommended home PT  No factures found on xray Fall secondary to being off balance after using the toilet  Lower ext cellulitis/ swelling   PT recommended home PT  Xrays negative  for fracture Fall secondary to Lower ext cellulitis/ swelling   PT therapy   Xrays negative  for fracture, bilateral dopplers negative for DVT  compression stockings   podiatry consult  fungal cream to left foot Fall secondary to Lower ext swelling   PT therapy   Xrays negative  for fracture, bilateral dopplers negative for DVT  compression therapy  podiatry consult  fungal cream to left foot

## 2019-08-13 NOTE — DIETITIAN INITIAL EVALUATION ADULT. - PERTINENT MEDS FT
Enoxaparin, rocephin IVPB, allopurinol, colace, proscar, lasix, amitzia, melatonin, MVI w/ minerals, flomax

## 2019-08-13 NOTE — DIETITIAN INITIAL EVALUATION ADULT. - PERTINENT LABORATORY DATA
(8/13) Hgb 11.4 L, Hct 36.1 L, K 3.5 wnl, Cl 109 H, CO2 28 wnl, BUN 20 wnl, Creat 1.06 wnl, Glu 93 wnl, eGFR 58 L

## 2019-08-13 NOTE — PROGRESS NOTE ADULT - SUBJECTIVE AND OBJECTIVE BOX
CC: f/u for cellulitis    Patient reports  urinates a lot   REVIEW OF SYSTEMS:  All other review of systems negative (Comprehensive ROS)    Antimicrobials Day #  :    Other Medications Reviewed    T(F): 98.2 (08-13-19 @ 15:18), Max: 98.2 (08-13-19 @ 15:18)  HR: 83 (08-13-19 @ 15:18)  BP: 127/57 (08-13-19 @ 15:18)  RR: 14 (08-13-19 @ 15:18)  SpO2: 100% (08-13-19 @ 15:18)  Wt(kg): --    PHYSICAL EXAM:  General: alert, no acute distress  Eyes:  anicteric, no conjunctival injection, no discharge  Oropharynx: no lesions or injection 	  Neck: supple, without adenopathy  Lungs: clear to auscultation  Heart: regular rate and rhythm; no murmur, rubs or gallops  Abdomen: soft, nondistended, nontender, without mass or organomegaly  Skin: no lesions  Extremities: right leg mild edema, no redness. left leg edema, left dorsal foot with flaking peeling skin, dry, no tendernee  Neurologic: alert,  moves all extremities    LAB RESULTS:                        11.4   6.09  )-----------( 217      ( 13 Aug 2019 07:20 )             36.1     08-13    144  |  109<H>  |  20  ----------------------------<  93  3.5   |  28  |  1.06    Ca    8.5      13 Aug 2019 07:20  Phos  3.3     08-13  Mg     1.7     08-13    TPro  6.6  /  Alb  2.9<L>  /  TBili  0.5  /  DBili  x   /  AST  14  /  ALT  10  /  AlkPhos  67  08-12    LIVER FUNCTIONS - ( 12 Aug 2019 11:50 )  Alb: 2.9 g/dL / Pro: 6.6 g/dL / ALK PHOS: 67 U/L / ALT: 10 U/L DA / AST: 14 U/L / GGT: x             MICROBIOLOGY:  RECENT CULTURES:  08-12 @ 11:50 .Blood Blood-Peripheral     No growth to date.          RADIOLOGY REVIEWED:  < from: Xray Foot AP + Lateral + Oblique, Left (08.12.19 @ 14:23) >  IMPRESSION: Main finding is extensive foot edema. Other findings as above.    < end of copied text >      < from: US Duplex Venous Lower Ext Complete, Bilateral (08.12.19 @ 12:40) >  IMPRESSION:     No evidence of deep venous thrombosis in either lower extremity.      < end of copied text >          Assessment:  Patient with venous stasis, pemphigoid admitted for evaluation of red edematous legs, had a blister on left foot that popped, had a few doses of ceftriaxone now stopped since process deemed just severe venous stasis dermatitis.   Plan:  monitor off antibiotics for now  control edema  local wound care

## 2019-08-13 NOTE — PROGRESS NOTE ADULT - SUBJECTIVE AND OBJECTIVE BOX
Patient is a 89y old  Male who presents with a chief complaint of LLE Cellulitis.  Pt with complain of lower extremity pain.  Pt had a restless night  noting he could not get comfortable with pain in legs.  NO sob or chest pain       Patient seen and examined at bedside.    ALLERGIES:  penicillin (Unknown)    MEDICATIONS  (STANDING):  allopurinol 300 milliGRAM(s) Oral Once  cefTRIAXone   IVPB      cefTRIAXone   IVPB 2000 milliGRAM(s) IV Intermittent every 24 hours  docusate sodium 100 milliGRAM(s) Oral three times a day  enoxaparin Injectable 40 milliGRAM(s) SubCutaneous daily  finasteride 5 milliGRAM(s) Oral daily  furosemide    Tablet 80 milliGRAM(s) Oral daily  levothyroxine 125 MICROGram(s) Oral daily  lubiprostone 8 MICROGram(s) Oral two times a day  melatonin 6 milliGRAM(s) Oral at bedtime  multivitamin/minerals 1 Tablet(s) Oral daily  pantoprazole    Tablet 40 milliGRAM(s) Oral before breakfast  tamsulosin 0.4 milliGRAM(s) Oral at bedtime    MEDICATIONS  (PRN):  acetaminophen   Tablet .. 650 milliGRAM(s) Oral every 6 hours PRN Temp greater or equal to 38C (100.4F), Mild Pain (1 - 3), Moderate Pain (4 - 6)  senna 2 Tablet(s) Oral at bedtime PRN Constipation    Vital Signs Last 24 Hrs  T(F): 98.1 (13 Aug 2019 05:15), Max: 98.1 (12 Aug 2019 12:48)  HR: 61 (13 Aug 2019 05:15) (55 - 61)  BP: 133/49 (13 Aug 2019 05:15) (133/49 - 157/68)  RR: 14 (13 Aug 2019 05:15) (14 - 17)  SpO2: 99% (13 Aug 2019 05:15) (99% - 100%)  I&O's Summary    12 Aug 2019 07:01  -  13 Aug 2019 07:00  --------------------------------------------------------  IN: 720 mL / OUT: 200 mL / NET: 520 mL      PHYSICAL EXAM:  General: NAD, A/O x 2  ENT: MMM  Neck: Supple, No JVD  Lungs: Clear to auscultation bilaterally, Non labored breathing   Cardio: RRR, S1/S2, No murmurs  Abdomen: Soft, Nontender, Nondistended; Bowel sounds present  Extremities: No calf tenderness, bilateral erythema and  pitting edema    LABS:                        11.4   6.09  )-----------( 217      ( 13 Aug 2019 07:20 )             36.1     08-13    144  |  109  |  20  ----------------------------<  93  3.5   |  28  |  1.06    Ca    8.5      13 Aug 2019 07:20  Phos  3.3     08-13  Mg     1.7     08-13    TPro  6.6  /  Alb  2.9  /  TBili  0.5  /  DBili  x   /  AST  14  /  ALT  10  /  AlkPhos  67  08-12    eGFR if Non African American: 58 mL/min/1.73M2 (08-13-19 @ 07:20)  eGFR if : 67 mL/min/1.73M2 (08-13-19 @ 07:20)    PT/INR - ( 12 Aug 2019 11:50 )   PT: 13.2 sec;   INR: 1.17 ratio         PTT - ( 12 Aug 2019 11:50 )  PTT:36.3 sec                              RADIOLOGY & ADDITIONAL TESTS:    Care Discussed with Consultants/Other Providers: Patient is a 89y old  Male who presents with a chief complaint of LLE Cellulitis.  Pt with complain of lower extremity pain.  Pt had a restless night  noting he could not get comfortable with pain in legs.  NO sob or chest pain       Patient seen and examined at bedside.    ALLERGIES:  penicillin (Unknown)    MEDICATIONS  (STANDING):  allopurinol 300 milliGRAM(s) Oral Once  cefTRIAXone   IVPB      cefTRIAXone   IVPB 2000 milliGRAM(s) IV Intermittent every 24 hours  docusate sodium 100 milliGRAM(s) Oral three times a day  enoxaparin Injectable 40 milliGRAM(s) SubCutaneous daily  finasteride 5 milliGRAM(s) Oral daily  furosemide    Tablet 80 milliGRAM(s) Oral daily  levothyroxine 125 MICROGram(s) Oral daily  lubiprostone 8 MICROGram(s) Oral two times a day  melatonin 6 milliGRAM(s) Oral at bedtime  multivitamin/minerals 1 Tablet(s) Oral daily  pantoprazole    Tablet 40 milliGRAM(s) Oral before breakfast  tamsulosin 0.4 milliGRAM(s) Oral at bedtime    MEDICATIONS  (PRN):  acetaminophen   Tablet .. 650 milliGRAM(s) Oral every 6 hours PRN Temp greater or equal to 38C (100.4F), Mild Pain (1 - 3), Moderate Pain (4 - 6)  senna 2 Tablet(s) Oral at bedtime PRN Constipation    Vital Signs Last 24 Hrs  T(F): 98.1 (13 Aug 2019 05:15), Max: 98.1 (12 Aug 2019 12:48)  HR: 61 (13 Aug 2019 05:15) (55 - 61)  BP: 133/49 (13 Aug 2019 05:15) (133/49 - 157/68)  RR: 14 (13 Aug 2019 05:15) (14 - 17)  SpO2: 99% (13 Aug 2019 05:15) (99% - 100%)  I&O's Summary    12 Aug 2019 07:01  -  13 Aug 2019 07:00  --------------------------------------------------------  IN: 720 mL / OUT: 200 mL / NET: 520 mL      PHYSICAL EXAM:  General: NAD, A/O x 2  ENT: MMM  Neck: Supple, No JVD  Lungs: Clear to auscultation bilaterally, Non labored breathing   Cardio: RRR, S1/S2, No murmurs  Abdomen: Soft, Nontender, Nondistended; Bowel sounds present  Extremities: No calf tenderness, bilateral erythema and  pitting edema, odor from left toes, crusting inbetween toes, some open sore on left foot    LABS:                        11.4   6.09  )-----------( 217      ( 13 Aug 2019 07:20 )             36.1     08-13    144  |  109  |  20  ----------------------------<  93  3.5   |  28  |  1.06    Ca    8.5      13 Aug 2019 07:20  Phos  3.3     08-13  Mg     1.7     08-13    TPro  6.6  /  Alb  2.9  /  TBili  0.5  /  DBili  x   /  AST  14  /  ALT  10  /  AlkPhos  67  08-12    eGFR if Non African American: 58 mL/min/1.73M2 (08-13-19 @ 07:20)  eGFR if : 67 mL/min/1.73M2 (08-13-19 @ 07:20)    PT/INR - ( 12 Aug 2019 11:50 )   PT: 13.2 sec;   INR: 1.17 ratio         PTT - ( 12 Aug 2019 11:50 )  PTT:36.3 sec                              RADIOLOGY & ADDITIONAL TESTS:    Care Discussed with Consultants/Other Providers:

## 2019-08-13 NOTE — PROGRESS NOTE ADULT - ASSESSMENT
pt is a 89y/o male with pmhx of Afib, BPH, Dementia, PVD, Arthritis, spinal stenosis, HTN, Hypothyroid, +constipation presents to the ED s/p fall.  +Weakness, Fatigue, poor balance, hx of frequent falls        Had echo no significant new findings pt is a 89y/o male with pmhx of Afib, BPH, Dementia, PVD, Arthritis, spinal stenosis, HTN, Hypothyroid, +constipation presents to the ED s/p fall.  +Weakness, Fatigue, poor balance, hx of frequent falls  Had echo no significant new findings

## 2019-08-13 NOTE — PROGRESS NOTE ADULT - PROBLEM SELECTOR PLAN 4
plan to continue home compression therapy   On home lasix dose  Discoloration of skin  consistent with venous dermatitis cellulitis /edema / venous stasis   will place on lasix IV to aid in diuresis of lower extremities   Discoloration of skin  consistent with venous dermatitis  compression ace bandages to lower extremities   podiatry marya cellulitis /edema / venous stasis   will place on lasix IV to aid in diuresis of lower extremities   Discoloration of skin  consistent with venous dermatitis  compression ace bandages to lower extremities   podiatry consult appreciated

## 2019-08-13 NOTE — CONSULT NOTE ADULT - ASSESSMENT
left lower extremity cellulitis  Left foot multiple bullaes    T: Patient examined     Continue with IV antibiotics as per ID/Medicine     Recommend local wound care at this time      Agree with dermatology assessment

## 2019-08-14 ENCOUNTER — TRANSCRIPTION ENCOUNTER (OUTPATIENT)
Age: 84
End: 2019-08-14

## 2019-08-14 VITALS
RESPIRATION RATE: 14 BRPM | OXYGEN SATURATION: 100 % | HEART RATE: 71 BPM | SYSTOLIC BLOOD PRESSURE: 122 MMHG | DIASTOLIC BLOOD PRESSURE: 51 MMHG | TEMPERATURE: 98 F

## 2019-08-14 LAB
ANION GAP SERPL CALC-SCNC: 2 MMOL/L — LOW (ref 5–17)
BUN SERPL-MCNC: 26 MG/DL — HIGH (ref 7–23)
CALCIUM SERPL-MCNC: 9.1 MG/DL — SIGNIFICANT CHANGE UP (ref 8.4–10.5)
CHLORIDE SERPL-SCNC: 114 MMOL/L — HIGH (ref 96–108)
CO2 SERPL-SCNC: 31 MMOL/L — SIGNIFICANT CHANGE UP (ref 22–31)
CREAT SERPL-MCNC: 1.34 MG/DL — HIGH (ref 0.5–1.3)
GLUCOSE SERPL-MCNC: 111 MG/DL — HIGH (ref 70–99)
HCT VFR BLD CALC: 38.1 % — LOW (ref 39–50)
HGB BLD-MCNC: 12.2 G/DL — LOW (ref 13–17)
MCHC RBC-ENTMCNC: 29.2 PG — SIGNIFICANT CHANGE UP (ref 27–34)
MCHC RBC-ENTMCNC: 32 GM/DL — SIGNIFICANT CHANGE UP (ref 32–36)
MCV RBC AUTO: 91.1 FL — SIGNIFICANT CHANGE UP (ref 80–100)
NRBC # BLD: 0 /100 WBCS — SIGNIFICANT CHANGE UP (ref 0–0)
PLATELET # BLD AUTO: 234 K/UL — SIGNIFICANT CHANGE UP (ref 150–400)
POTASSIUM SERPL-MCNC: 3.5 MMOL/L — SIGNIFICANT CHANGE UP (ref 3.5–5.3)
POTASSIUM SERPL-SCNC: 3.5 MMOL/L — SIGNIFICANT CHANGE UP (ref 3.5–5.3)
RBC # BLD: 4.18 M/UL — LOW (ref 4.2–5.8)
RBC # FLD: 16.1 % — HIGH (ref 10.3–14.5)
SODIUM SERPL-SCNC: 147 MMOL/L — HIGH (ref 135–145)
WBC # BLD: 7.78 K/UL — SIGNIFICANT CHANGE UP (ref 3.8–10.5)
WBC # FLD AUTO: 7.78 K/UL — SIGNIFICANT CHANGE UP (ref 3.8–10.5)

## 2019-08-14 PROCEDURE — 99239 HOSP IP/OBS DSCHRG MGMT >30: CPT

## 2019-08-14 RX ORDER — FUROSEMIDE 40 MG
40 TABLET ORAL
Refills: 0 | Status: DISCONTINUED | OUTPATIENT
Start: 2019-08-14 | End: 2019-08-14

## 2019-08-14 RX ORDER — FUROSEMIDE 40 MG
1 TABLET ORAL
Qty: 14 | Refills: 0
Start: 2019-08-14 | End: 2019-08-27

## 2019-08-14 RX ORDER — FUROSEMIDE 40 MG
40 TABLET ORAL ONCE
Refills: 0 | Status: COMPLETED | OUTPATIENT
Start: 2019-08-14 | End: 2019-08-14

## 2019-08-14 RX ADMIN — Medication 40 MILLIGRAM(S): at 09:36

## 2019-08-14 RX ADMIN — Medication 40 MILLIGRAM(S): at 06:06

## 2019-08-14 RX ADMIN — Medication 1 APPLICATION(S): at 06:07

## 2019-08-14 RX ADMIN — PANTOPRAZOLE SODIUM 40 MILLIGRAM(S): 20 TABLET, DELAYED RELEASE ORAL at 08:37

## 2019-08-14 RX ADMIN — Medication 1 APPLICATION(S): at 16:36

## 2019-08-14 RX ADMIN — FINASTERIDE 5 MILLIGRAM(S): 5 TABLET, FILM COATED ORAL at 14:01

## 2019-08-14 RX ADMIN — Medication 300 MILLIGRAM(S): at 06:06

## 2019-08-14 RX ADMIN — Medication 1 TABLET(S): at 14:01

## 2019-08-14 RX ADMIN — LUBIPROSTONE 8 MICROGRAM(S): 24 CAPSULE, GELATIN COATED ORAL at 06:07

## 2019-08-14 RX ADMIN — ENOXAPARIN SODIUM 40 MILLIGRAM(S): 100 INJECTION SUBCUTANEOUS at 14:00

## 2019-08-14 RX ADMIN — Medication 125 MICROGRAM(S): at 06:07

## 2019-08-14 NOTE — DISCHARGE NOTE PROVIDER - HOSPITAL COURSE
Patent is a 89 year old male w/ pmh including afib, bph, constipation, dementia, edema, gerd, gout, hypothyroidism came to  ER per the advice of his visiting nursing who was concerned for cellulitis development on chronic lower extremity limbs.   patient found not to have cellulitis.  Patient seen by podiatry and infectious disease.    patient did fine to have candida pedis.    Patient to go back to home [facility] with new wound care orders and physical therapy.    PCP Mary notified.

## 2019-08-14 NOTE — DISCHARGE NOTE PROVIDER - NSDCCPCAREPLAN_GEN_ALL_CORE_FT
PRINCIPAL DISCHARGE DIAGNOSIS  Diagnosis: Cellulitis of foot, left  Assessment and Plan of Treatment: Foot found not to have cellulitis but candidia pedis

## 2019-08-14 NOTE — PROGRESS NOTE ADULT - SUBJECTIVE AND OBJECTIVE BOX
Patient seen and examined at bedside. NAD.                        12.2   7.78  )-----------( 234      ( 14 Aug 2019 07:15 )             38.1     08-14    147<H>  |  114<H>  |  26<H>  ----------------------------<  111<H>  3.5   |  31  |  1.34<H>    Ca    9.1      14 Aug 2019 07:15  Phos  3.3     08-13  Mg     1.7     08-13      < from: Xray Foot AP + Lateral + Oblique, Left (08.12.19 @ 14:23) >    INTERPRETATION:  Left foot. Patient has local cellulitis and edema. 2   images submitted.    There is extensive edema of the foot particularly on the dorsum.    There is partial ossification of the soft tissues are seen off the   inferior calcaneus.    There is a mild hammertoe deformity of the outer toes.    There is some mild first MP degeneration.    No bone destruction or fracture is evident.    IMPRESSION: Main finding is extensive foot edema. Other findings as above.    < end of copied text >      Noted are serous bullaes on the left foot, some of them has been drained.  No malodor or deep wounds. Bilateral lower extremity noted.

## 2019-08-14 NOTE — PROGRESS NOTE ADULT - SUBJECTIVE AND OBJECTIVE BOX
Patient is a 89y old  Male who presents with a chief complaint of LLE Cellulitis (14 Aug 2019 11:50)      Patient seen and examined at bedside.  Patient denies any chest pain or shortness of breath.   ALLERGIES:  penicillin (Unknown)    MEDICATIONS:  acetaminophen   Tablet .. 650 milliGRAM(s) Oral every 6 hours PRN  clotrimazole 1% Cream 1 Application(s) Topical two times a day  docusate sodium 100 milliGRAM(s) Oral three times a day  enoxaparin Injectable 40 milliGRAM(s) SubCutaneous daily  finasteride 5 milliGRAM(s) Oral daily  furosemide   Injectable 40 milliGRAM(s) IV Push two times a day  levothyroxine 125 MICROGram(s) Oral daily  lubiprostone 8 MICROGram(s) Oral two times a day  melatonin 6 milliGRAM(s) Oral at bedtime  multivitamin 1 Tablet(s) Oral daily  pantoprazole    Tablet 40 milliGRAM(s) Oral before breakfast  senna 2 Tablet(s) Oral at bedtime PRN  tamsulosin 0.4 milliGRAM(s) Oral at bedtime    Vital Signs Last 24 Hrs  T(F): 97.6 (14 Aug 2019 06:02), Max: 98.2 (13 Aug 2019 15:18)  HR: 90 (14 Aug 2019 06:02) (82 - 90)  BP: 122/48 (14 Aug 2019 06:02) (103/51 - 127/57)  RR: 14 (14 Aug 2019 06:02) (14 - 14)  SpO2: 98% (14 Aug 2019 06:02) (98% - 100%)  I&O's Summary    13 Aug 2019 07:01  -  14 Aug 2019 07:00  --------------------------------------------------------  IN: 1680 mL / OUT: 600 mL / NET: 1080 mL    14 Aug 2019 07:01  -  14 Aug 2019 12:16  --------------------------------------------------------  IN: 600 mL / OUT: 0 mL / NET: 600 mL        PHYSICAL EXAM:  General: NAD, A/O x 3  ENT: MMM  Neck: Supple, No JVD  Lungs: decreased at bases   Cardio: RRR, S1/S2, 2/6 systolic murmur  Abdomen: Soft, Nontender, Nondistended; Bowel sounds present  Extremities: No cyanosis, bilateral edema, legs in compression dressings    LABS:                        12.2   7.78  )-----------( 234      ( 14 Aug 2019 07:15 )             38.1     08-14    147  |  114  |  26  ----------------------------<  111  3.5   |  31  |  1.34    Ca    9.1      14 Aug 2019 07:15  Phos  3.3     08-13  Mg     1.7     08-13    TPro  6.6  /  Alb  2.9  /  TBili  0.5  /  DBili  x   /  AST  14  /  ALT  10  /  AlkPhos  67  08-12    eGFR if Non African American: 44 mL/min/1.73M2 (08-14-19 @ 07:15)  eGFR if : 51 mL/min/1.73M2 (08-14-19 @ 07:15)    PT/INR - ( 12 Aug 2019 11:50 )   PT: 13.2 sec;   INR: 1.17 ratio         PTT - ( 12 Aug 2019 11:50 )  PTT:36.3 sec                              Culture - Blood (collected 12 Aug 2019 11:50)  Source: .Blood Blood-Peripheral  Preliminary Report (13 Aug 2019 17:02):    No growth to date.    Culture - Blood (collected 12 Aug 2019 11:50)  Source: .Blood Blood-Peripheral  Preliminary Report (13 Aug 2019 17:02):    No growth to date.        RADIOLOGY & ADDITIONAL TESTS:    Care Discussed with Consultants/Other Providers:

## 2019-08-14 NOTE — PROGRESS NOTE ADULT - ASSESSMENT
Patent is a 89 year old male w/ pmh including afib, bph, constipation, dementia, edema, gerd, gout, hypothyroidism coming to  ER with complaints of increasing edema b/l le L>R.    #lower extremity edema  -chronic venous stasis   - antibiotics were stopped 8/13  - id consult agrees no infection   - pod consult appreciated  - lasix  - tylenol for pain  - doppler negative for dvt  -consistent with candida pedis   -underlying history of uncomplicated pemphigoid     #hypokalemia   - repleted     #gout  - allopurinol    #bph  - stable  - finasteride  - flomax    #CKD stage 3A  - stable  - monitor cr   - avoid nephrotoxic medications     #gerd  - ppi    #hypothyroidism  - levothyroxine    #insomnia  - melatonin    Dispo: back to [facility] with wound care and pt

## 2019-08-14 NOTE — PROGRESS NOTE ADULT - ASSESSMENT
Bilateral lower extremity edema  Left foot multiple bullaes    T:   1. Patient examined  2. Recommend local wound care at this time.  3. Patient has own podiatrist. Patient to follow up with is podiatrist as outpatient.

## 2019-08-14 NOTE — DISCHARGE NOTE NURSING/CASE MANAGEMENT/SOCIAL WORK - NSDCDPATPORTLINK_GEN_ALL_CORE
You can access the [facility] Portal, offered by [facility], by registering with the following website: [email or web address]

## 2019-08-14 NOTE — DISCHARGE NOTE PROVIDER - CARE PROVIDER_API CALL
Olvin Hernandez (DO)  Internal Medicine  [address] [id number]  Phone: [phone]  Fax: [phone]  Follow Up Time:

## 2019-08-30 ENCOUNTER — INPATIENT (INPATIENT)
Facility: HOSPITAL | Age: 84
LOS: 4 days | Discharge: HOME | DRG: 602 | End: 2019-09-04
Attending: INTERNAL MEDICINE | Admitting: FAMILY MEDICINE
Payer: MEDICARE

## 2019-08-30 VITALS
SYSTOLIC BLOOD PRESSURE: 131 MMHG | HEIGHT: 70 IN | TEMPERATURE: 102 F | DIASTOLIC BLOOD PRESSURE: 71 MMHG | WEIGHT: 187.39 LBS | OXYGEN SATURATION: 97 % | RESPIRATION RATE: 19 BRPM | HEART RATE: 100 BPM

## 2019-08-30 DIAGNOSIS — Z98.890 OTHER SPECIFIED POSTPROCEDURAL STATES: Chronic | ICD-10-CM

## 2019-08-30 DIAGNOSIS — L03.119 CELLULITIS OF UNSPECIFIED PART OF LIMB: ICD-10-CM

## 2019-08-30 DIAGNOSIS — I48.91 UNSPECIFIED ATRIAL FIBRILLATION: ICD-10-CM

## 2019-08-30 DIAGNOSIS — N40.0 BENIGN PROSTATIC HYPERPLASIA WITHOUT LOWER URINARY TRACT SYMPTOMS: ICD-10-CM

## 2019-08-30 DIAGNOSIS — M48.00 SPINAL STENOSIS, SITE UNSPECIFIED: ICD-10-CM

## 2019-08-30 DIAGNOSIS — E03.9 HYPOTHYROIDISM, UNSPECIFIED: ICD-10-CM

## 2019-08-30 LAB
ALBUMIN SERPL ELPH-MCNC: 2.8 G/DL — LOW (ref 3.3–5)
ALP SERPL-CCNC: 72 U/L — SIGNIFICANT CHANGE UP (ref 40–120)
ALT FLD-CCNC: 14 U/L DA — SIGNIFICANT CHANGE UP (ref 10–45)
ANION GAP SERPL CALC-SCNC: 9 MMOL/L — SIGNIFICANT CHANGE UP (ref 5–17)
APPEARANCE UR: CLEAR — SIGNIFICANT CHANGE UP
APTT BLD: 34.9 SEC — SIGNIFICANT CHANGE UP (ref 27.5–36.3)
AST SERPL-CCNC: 25 U/L — SIGNIFICANT CHANGE UP (ref 10–40)
BACTERIA # UR AUTO: NEGATIVE /HPF — SIGNIFICANT CHANGE UP
BASOPHILS # BLD AUTO: 0.03 K/UL — SIGNIFICANT CHANGE UP (ref 0–0.2)
BASOPHILS NFR BLD AUTO: 0.3 % — SIGNIFICANT CHANGE UP (ref 0–2)
BILIRUB SERPL-MCNC: 0.8 MG/DL — SIGNIFICANT CHANGE UP (ref 0.2–1.2)
BILIRUB UR-MCNC: NEGATIVE — SIGNIFICANT CHANGE UP
BUN SERPL-MCNC: 31 MG/DL — HIGH (ref 7–23)
CALCIUM SERPL-MCNC: 8.9 MG/DL — SIGNIFICANT CHANGE UP (ref 8.4–10.5)
CHLORIDE SERPL-SCNC: 103 MMOL/L — SIGNIFICANT CHANGE UP (ref 96–108)
CO2 SERPL-SCNC: 29 MMOL/L — SIGNIFICANT CHANGE UP (ref 22–31)
COLOR SPEC: YELLOW — SIGNIFICANT CHANGE UP
CREAT SERPL-MCNC: 1.37 MG/DL — HIGH (ref 0.5–1.3)
DIFF PNL FLD: ABNORMAL
EOSINOPHIL # BLD AUTO: 0 K/UL — SIGNIFICANT CHANGE UP (ref 0–0.5)
EOSINOPHIL NFR BLD AUTO: 0 % — SIGNIFICANT CHANGE UP (ref 0–6)
EPI CELLS # UR: SIGNIFICANT CHANGE UP
GLUCOSE SERPL-MCNC: 112 MG/DL — HIGH (ref 70–99)
GLUCOSE UR QL: NEGATIVE — SIGNIFICANT CHANGE UP
HCT VFR BLD CALC: 36 % — LOW (ref 39–50)
HGB BLD-MCNC: 11.7 G/DL — LOW (ref 13–17)
IMM GRANULOCYTES NFR BLD AUTO: 0.6 % — SIGNIFICANT CHANGE UP (ref 0–1.5)
INR BLD: 1.33 RATIO — HIGH (ref 0.88–1.16)
KETONES UR-MCNC: NEGATIVE — SIGNIFICANT CHANGE UP
LACTATE SERPL-SCNC: 1.3 MMOL/L — SIGNIFICANT CHANGE UP (ref 0.7–2)
LEUKOCYTE ESTERASE UR-ACNC: ABNORMAL
LYMPHOCYTES # BLD AUTO: 0.64 K/UL — LOW (ref 1–3.3)
LYMPHOCYTES # BLD AUTO: 6.4 % — LOW (ref 13–44)
MCHC RBC-ENTMCNC: 29.5 PG — SIGNIFICANT CHANGE UP (ref 27–34)
MCHC RBC-ENTMCNC: 32.5 GM/DL — SIGNIFICANT CHANGE UP (ref 32–36)
MCV RBC AUTO: 90.9 FL — SIGNIFICANT CHANGE UP (ref 80–100)
MONOCYTES # BLD AUTO: 0.89 K/UL — SIGNIFICANT CHANGE UP (ref 0–0.9)
MONOCYTES NFR BLD AUTO: 9 % — SIGNIFICANT CHANGE UP (ref 2–14)
NEUTROPHILS # BLD AUTO: 8.31 K/UL — HIGH (ref 1.8–7.4)
NEUTROPHILS NFR BLD AUTO: 83.7 % — HIGH (ref 43–77)
NITRITE UR-MCNC: NEGATIVE — SIGNIFICANT CHANGE UP
NRBC # BLD: 0 /100 WBCS — SIGNIFICANT CHANGE UP (ref 0–0)
PH UR: 5 — SIGNIFICANT CHANGE UP (ref 5–8)
PLATELET # BLD AUTO: 262 K/UL — SIGNIFICANT CHANGE UP (ref 150–400)
POTASSIUM SERPL-MCNC: 3.7 MMOL/L — SIGNIFICANT CHANGE UP (ref 3.5–5.3)
POTASSIUM SERPL-SCNC: 3.7 MMOL/L — SIGNIFICANT CHANGE UP (ref 3.5–5.3)
PROT SERPL-MCNC: 6.9 G/DL — SIGNIFICANT CHANGE UP (ref 6–8.3)
PROT UR-MCNC: 15
PROTHROM AB SERPL-ACNC: 15 SEC — HIGH (ref 10–12.9)
RBC # BLD: 3.96 M/UL — LOW (ref 4.2–5.8)
RBC # FLD: 16.2 % — HIGH (ref 10.3–14.5)
RBC CASTS # UR COMP ASSIST: SIGNIFICANT CHANGE UP /HPF (ref 0–4)
SODIUM SERPL-SCNC: 141 MMOL/L — SIGNIFICANT CHANGE UP (ref 135–145)
SP GR SPEC: 1.01 — SIGNIFICANT CHANGE UP (ref 1.01–1.02)
UROBILINOGEN FLD QL: NEGATIVE — SIGNIFICANT CHANGE UP
WBC # BLD: 9.93 K/UL — SIGNIFICANT CHANGE UP (ref 3.8–10.5)
WBC # FLD AUTO: 9.93 K/UL — SIGNIFICANT CHANGE UP (ref 3.8–10.5)
WBC UR QL: SIGNIFICANT CHANGE UP /HPF (ref 0–5)

## 2019-08-30 PROCEDURE — 99285 EMERGENCY DEPT VISIT HI MDM: CPT

## 2019-08-30 PROCEDURE — 93010 ELECTROCARDIOGRAM REPORT: CPT

## 2019-08-30 PROCEDURE — 71045 X-RAY EXAM CHEST 1 VIEW: CPT | Mod: 26

## 2019-08-30 PROCEDURE — 99223 1ST HOSP IP/OBS HIGH 75: CPT

## 2019-08-30 RX ORDER — SODIUM CHLORIDE 9 MG/ML
2300 INJECTION INTRAMUSCULAR; INTRAVENOUS; SUBCUTANEOUS ONCE
Refills: 0 | Status: COMPLETED | OUTPATIENT
Start: 2019-08-30 | End: 2019-08-30

## 2019-08-30 RX ORDER — SODIUM CHLORIDE 9 MG/ML
1000 INJECTION, SOLUTION INTRAVENOUS
Refills: 0 | Status: DISCONTINUED | OUTPATIENT
Start: 2019-08-30 | End: 2019-09-01

## 2019-08-30 RX ORDER — VANCOMYCIN HCL 1 G
1000 VIAL (EA) INTRAVENOUS DAILY
Refills: 0 | Status: DISCONTINUED | OUTPATIENT
Start: 2019-08-30 | End: 2019-09-04

## 2019-08-30 RX ORDER — PANTOPRAZOLE SODIUM 20 MG/1
40 TABLET, DELAYED RELEASE ORAL
Refills: 0 | Status: DISCONTINUED | OUTPATIENT
Start: 2019-08-30 | End: 2019-09-04

## 2019-08-30 RX ORDER — ACETAMINOPHEN 500 MG
650 TABLET ORAL EVERY 6 HOURS
Refills: 0 | Status: DISCONTINUED | OUTPATIENT
Start: 2019-08-30 | End: 2019-09-04

## 2019-08-30 RX ORDER — ENOXAPARIN SODIUM 100 MG/ML
40 INJECTION SUBCUTANEOUS DAILY
Refills: 0 | Status: DISCONTINUED | OUTPATIENT
Start: 2019-08-30 | End: 2019-09-04

## 2019-08-30 RX ORDER — TAMSULOSIN HYDROCHLORIDE 0.4 MG/1
0.4 CAPSULE ORAL AT BEDTIME
Refills: 0 | Status: DISCONTINUED | OUTPATIENT
Start: 2019-08-30 | End: 2019-09-04

## 2019-08-30 RX ORDER — VANCOMYCIN HCL 1 G
1000 VIAL (EA) INTRAVENOUS ONCE
Refills: 0 | Status: COMPLETED | OUTPATIENT
Start: 2019-08-30 | End: 2019-08-30

## 2019-08-30 RX ORDER — ALLOPURINOL 300 MG
300 TABLET ORAL DAILY
Refills: 0 | Status: DISCONTINUED | OUTPATIENT
Start: 2019-08-30 | End: 2019-09-04

## 2019-08-30 RX ORDER — MORPHINE SULFATE 50 MG/1
4 CAPSULE, EXTENDED RELEASE ORAL ONCE
Refills: 0 | Status: DISCONTINUED | OUTPATIENT
Start: 2019-08-30 | End: 2019-08-30

## 2019-08-30 RX ORDER — FINASTERIDE 5 MG/1
5 TABLET, FILM COATED ORAL DAILY
Refills: 0 | Status: DISCONTINUED | OUTPATIENT
Start: 2019-08-30 | End: 2019-09-04

## 2019-08-30 RX ADMIN — SODIUM CHLORIDE 2300 MILLILITER(S): 9 INJECTION INTRAMUSCULAR; INTRAVENOUS; SUBCUTANEOUS at 20:12

## 2019-08-30 NOTE — ED PROVIDER NOTE - EKG #1 DATE/TIME
[>50% of Time Spent on Counseling and Coordination of Care for  ___] : Greater than 50% of the encounter time was spent on counseling and coordination of care for [unfilled]
30-Aug-2019 20:22

## 2019-08-30 NOTE — H&P ADULT - NSHPLABSRESULTS_GEN_ALL_CORE
11.7   9.93  )-----------( 262      ( 30 Aug 2019 19:25 )             36.0           141  |  103  |  31<H>  ----------------------------<  112<H>  3.7   |  29  |  1.37<H>    Ca    8.9      30 Aug 2019 19:25    TPro  6.9  /  Alb  2.8<L>  /  TBili  0.8  /  DBili  x   /  AST  25  /  ALT  14  /  AlkPhos  72            Urinalysis Basic - ( 30 Aug 2019 21:57 )    Color: Yellow / Appearance: Clear / S.015 / pH: x  Gluc: x / Ketone: Negative  / Bili: Negative / Urobili: Negative   Blood: x / Protein: 15 / Nitrite: Negative   Leuk Esterase: Trace / RBC: 0-4 /HPF / WBC 0-2 /HPF   Sq Epi: x / Non Sq Epi: Neg.-Few / Bacteria: Negative /HPF    PT/INR - ( 30 Aug 2019 19:25 )   PT: 15.0 sec;   INR: 1.33 ratio         PTT - ( 30 Aug 2019 19:25 )  PTT:34.9 sec    Lactate Trend   @ 19:25 Lactate:1.3       Labs reviewed:     CXR personally reviewed: Clear lung fields bilaterally    ECG reviewed and interpreted: AFIB 90

## 2019-08-30 NOTE — ED PROVIDER NOTE - ATTENDING CONTRIBUTION TO CARE
I have personally seen and examined this patient. I have fully participated in the care of this patient. I have reviewed all pertinent clinical information, including history physical exam, plan and the PA's note and agree except as noted  89 y/o M w/ pmh including afib, bph, constipation, dementia, edema, gerd, gout, hypothyroidism with recent admit for LE edema/cellulitis sent in from the [facility] increased edema B/L, fever, confusion, decreased PO intake x 1 day. Denies chest pain, shortness of breath, abdominal pain, nausea, vomiting, dysuria.  PMD- Mary  PE: elderly male NAD,  lungs: clear  CVA: s1S2  abd: soft , NT, ext: extensive edema

## 2019-08-30 NOTE — ED PROVIDER NOTE - CARE PLAN
Principal Discharge DX:	Sepsis, due to unspecified organism Principal Discharge DX:	Cellulitis of lower extremity, unspecified laterality

## 2019-08-30 NOTE — ED PROVIDER NOTE - CLINICAL SUMMARY MEDICAL DECISION MAKING FREE TEXT BOX
89 y/o M w/ pmh including afib, bph, constipation, dementia, edema, gerd, gout, hypothyroidism with recent admit for LE edema/cellulitis sent in from the [facility] increased edema B/L, fever, confusion, decreased PO intake x 1 day likely infectious in etiology- Sepsis work up, fluids, ABX, ADMIT

## 2019-08-30 NOTE — ED ADULT TRIAGE NOTE - CHIEF COMPLAINT QUOTE
Patient BIB EMS from [facility] for fever, increased swelling in lower extremities and change in mental status since approx. noon today.  in field.

## 2019-08-30 NOTE — ED PROVIDER NOTE - OBJECTIVE STATEMENT
89 y/o h/o Afib sent in from the [facility] fever, confusion, decreased PO intake x 1 day. 89 y/o M w/ pmh including afib, bph, constipation, dementia, edema, gerd, gout, hypothyroidism with recent admit for LE edema/cellulitis sent in from the [facility] increased edema B/L, fever, confusion, decreased PO intake x 1 day. Denies chest pain, shortness of breath, abdominal pain, nausea, vomiting, dysuria.  PMD- Mary 89 y/o M w/ pmh including afib, bph, constipation, dementia, edema, gerd, gout, hypothyroidism with recent admit for LE edema/cellulitis sent in from the [facility] increased edema B/L, redness, fever, confusion, decreased PO intake x 1 day. Denies chest pain, shortness of breath, abdominal pain, nausea, vomiting, dysuria.  PMD- Mary

## 2019-08-30 NOTE — ED PROVIDER NOTE - SKIN COLOR
significant B/L erythema LE's significant B/L erythema LE's LLE>RLE with ulceration on dorsum foot oozing pus

## 2019-08-30 NOTE — ED ADULT NURSE NOTE - CHIEF COMPLAINT QUOTE
Patient BIB EMS from [facility] for fever, increased swelling in lower extremities and change in mental status since approx. noon today.  in field.

## 2019-08-30 NOTE — H&P ADULT - HISTORY OF PRESENT ILLNESS
89 y/o M w/ pmh including afib, bph, constipation, dementia, edema, gerd, gout, hypothyroidism with recent admit for LE edema/cellulitis sent in from the [facility] for increased edema B/L, redness, fever, confusion, decreased PO intake x 1 day. Denies chest pain, shortness of breath, abdominal pain, nausea, vomiting, dysuria. family at bedside, is able to ambulate with walker at assisted living, but today he has'nt been able to do so. temp of 102 in ed. per family he has pemphigoid and gets blisters which get infected.

## 2019-08-30 NOTE — H&P ADULT - ASSESSMENT
89 y/o M w/ pmh including afib, bph, constipation, dementia, edema, gerd, gout, hypothyroidism admitted for cellulitis of b/l LE. doppler no dvt 19.    CAPRINI SCORE [CLOT]    AGE RELATED RISK FACTORS                                                       MOBILITY RELATED FACTORS  [ ] Age 41-60 years                                            (1 Point)                  [x ] Bed rest                                                        (1 Point)  [ ] Age: 61-74 years                                           (2 Points)                 [ ] Plaster cast                                                   (2 Points)  [x ] Age= 75 years                                              (3 Points)                 [ ] Bed bound for more than 72 hours                 (2 Points)    DISEASE RELATED RISK FACTORS                                               GENDER SPECIFIC FACTORS  [ x] Edema in the lower extremities                       (1 Point)                  [ ] Pregnancy                                                     (1 Point)  [ ] Varicose veins                                               (1 Point)                  [ ] Post-partum < 6 weeks                                   (1 Point)             [ ] BMI > 25 Kg/m2                                            (1 Point)                  [ ] Hormonal therapy  or oral contraception          (1 Point)                 [ ] Sepsis (in the previous month)                        (1 Point)                  [ ] History of pregnancy complications                 (1 point)  [ ] Pneumonia or serious lung disease                                               [ ] Unexplained or recurrent                     (1 Point)           (in the previous month)                               (1 Point)  [ ] Abnormal pulmonary function test                     (1 Point)                 SURGERY RELATED RISK FACTORS  [ ] Acute myocardial infarction                              (1 Point)                 [ ]  Section                                             (1 Point)  [ ] Congestive heart failure (in the previous month)  (1 Point)               [ ] Minor surgery                                                  (1 Point)   [ ] Inflammatory bowel disease                             (1 Point)                 [ ] Arthroscopic surgery                                        (2 Points)  [ ] Central venous access                                      (2 Points)                [ ] General surgery lasting more than 45 minutes   (2 Points)       [ ] Stroke (in the previous month)                          (5 Points)               [ ] Elective arthroplasty                                         (5 Points)                                                                                                                                               HEMATOLOGY RELATED FACTORS                                                 TRAUMA RELATED RISK FACTORS  [ ] Prior episodes of VTE                                     (3 Points)                 [ ] Fracture of the5 hip, pelvis, or leg                       (5 Points)  [ ] Positive family history for VTE                         (3 Points)                 [ ] Acute spinal cord injury (in the previous month)  (5 Points)  [ ] Prothrombin 18571 A                                     (3 Points)                 [ ] Paralysis  (less than 1 month)                             (5 Points)  [ ] Factor V Leiden                                             (3 Points)                  [ ] Multiple Trauma within 1 month                        (5 Points)  [ ] Lupus anticoagulants                                     (3 Points)                                                           [ ] Anticardiolipin antibodies                               (3 Points)                                                       [ ] High homocysteine in the blood                      (3 Points)                                             [ ] Other congenital or acquired thrombophilia      (3 Points)                                                [ ] Heparin induced thrombocytopenia                  (3 Points)                                          Total Score [   5       ]

## 2019-08-30 NOTE — H&P ADULT - NSHPPHYSICALEXAM_GEN_ALL_CORE
Vital Signs Last 24 Hrs  T(C): 37.3 (30 Aug 2019 20:45), Max: 38.9 (30 Aug 2019 18:59)  T(F): 99.2 (30 Aug 2019 20:45), Max: 102 (30 Aug 2019 18:59)  HR: 89 (30 Aug 2019 20:45) (89 - 100)  BP: 127/70 (30 Aug 2019 20:45) (127/70 - 131/71)  BP(mean): --  RR: 18 (30 Aug 2019 20:45) (18 - 19)  SpO2: 99% (30 Aug 2019 20:45) (97% - 99%)    GENERAL- NAD  EAR/NOSE/MOUTH/THROAT - no pharyngeal exudates, no oral lesions,  MMM  EYES- RENETTA, conjunctiva and Sclera clear  NECK- supple  RESPIRATORY-  clear to auscultation bilaterally  CARDIOVASCULAR - SIS2, IRIR  GI - soft NT BS present  EXTREMITIES- B/L LE  edema, cellulitic changes left > right LE blistering+ LEFT FOOT  NEUROLOGY- no gross focal deficits  SKIN- no rashes, warm to touch  PSYCHIATRY- AAO X 1  MUSCULOSKELETAL- ROM restricted b/l LE

## 2019-08-31 LAB
ALBUMIN SERPL ELPH-MCNC: 2.6 G/DL — LOW (ref 3.3–5)
ALP SERPL-CCNC: 68 U/L — SIGNIFICANT CHANGE UP (ref 40–120)
ALT FLD-CCNC: 16 U/L DA — SIGNIFICANT CHANGE UP (ref 10–45)
ANION GAP SERPL CALC-SCNC: 6 MMOL/L — SIGNIFICANT CHANGE UP (ref 5–17)
AST SERPL-CCNC: 39 U/L — SIGNIFICANT CHANGE UP (ref 10–40)
BASOPHILS # BLD AUTO: 0.05 K/UL — SIGNIFICANT CHANGE UP (ref 0–0.2)
BASOPHILS NFR BLD AUTO: 0.6 % — SIGNIFICANT CHANGE UP (ref 0–2)
BILIRUB SERPL-MCNC: 0.8 MG/DL — SIGNIFICANT CHANGE UP (ref 0.2–1.2)
BUN SERPL-MCNC: 29 MG/DL — HIGH (ref 7–23)
CALCIUM SERPL-MCNC: 8.7 MG/DL — SIGNIFICANT CHANGE UP (ref 8.4–10.5)
CHLORIDE SERPL-SCNC: 105 MMOL/L — SIGNIFICANT CHANGE UP (ref 96–108)
CO2 SERPL-SCNC: 31 MMOL/L — SIGNIFICANT CHANGE UP (ref 22–31)
CREAT SERPL-MCNC: 1.28 MG/DL — SIGNIFICANT CHANGE UP (ref 0.5–1.3)
EOSINOPHIL # BLD AUTO: 0.23 K/UL — SIGNIFICANT CHANGE UP (ref 0–0.5)
EOSINOPHIL NFR BLD AUTO: 2.6 % — SIGNIFICANT CHANGE UP (ref 0–6)
GLUCOSE SERPL-MCNC: 93 MG/DL — SIGNIFICANT CHANGE UP (ref 70–99)
HCT VFR BLD CALC: 34.9 % — LOW (ref 39–50)
HGB BLD-MCNC: 11 G/DL — LOW (ref 13–17)
IMM GRANULOCYTES NFR BLD AUTO: 0.3 % — SIGNIFICANT CHANGE UP (ref 0–1.5)
LYMPHOCYTES # BLD AUTO: 1.25 K/UL — SIGNIFICANT CHANGE UP (ref 1–3.3)
LYMPHOCYTES # BLD AUTO: 14 % — SIGNIFICANT CHANGE UP (ref 13–44)
MCHC RBC-ENTMCNC: 29.1 PG — SIGNIFICANT CHANGE UP (ref 27–34)
MCHC RBC-ENTMCNC: 31.5 GM/DL — LOW (ref 32–36)
MCV RBC AUTO: 92.3 FL — SIGNIFICANT CHANGE UP (ref 80–100)
MONOCYTES # BLD AUTO: 0.98 K/UL — HIGH (ref 0–0.9)
MONOCYTES NFR BLD AUTO: 11 % — SIGNIFICANT CHANGE UP (ref 2–14)
NEUTROPHILS # BLD AUTO: 6.36 K/UL — SIGNIFICANT CHANGE UP (ref 1.8–7.4)
NEUTROPHILS NFR BLD AUTO: 71.5 % — SIGNIFICANT CHANGE UP (ref 43–77)
NRBC # BLD: 0 /100 WBCS — SIGNIFICANT CHANGE UP (ref 0–0)
PLATELET # BLD AUTO: 230 K/UL — SIGNIFICANT CHANGE UP (ref 150–400)
POTASSIUM SERPL-MCNC: 3.8 MMOL/L — SIGNIFICANT CHANGE UP (ref 3.5–5.3)
POTASSIUM SERPL-SCNC: 3.8 MMOL/L — SIGNIFICANT CHANGE UP (ref 3.5–5.3)
PROT SERPL-MCNC: 6.6 G/DL — SIGNIFICANT CHANGE UP (ref 6–8.3)
RBC # BLD: 3.78 M/UL — LOW (ref 4.2–5.8)
RBC # FLD: 16.6 % — HIGH (ref 10.3–14.5)
SODIUM SERPL-SCNC: 142 MMOL/L — SIGNIFICANT CHANGE UP (ref 135–145)
WBC # BLD: 8.9 K/UL — SIGNIFICANT CHANGE UP (ref 3.8–10.5)
WBC # FLD AUTO: 8.9 K/UL — SIGNIFICANT CHANGE UP (ref 3.8–10.5)

## 2019-08-31 PROCEDURE — 99233 SBSQ HOSP IP/OBS HIGH 50: CPT

## 2019-08-31 PROCEDURE — 93970 EXTREMITY STUDY: CPT | Mod: 26

## 2019-08-31 RX ORDER — FUROSEMIDE 40 MG
80 TABLET ORAL DAILY
Refills: 0 | Status: DISCONTINUED | OUTPATIENT
Start: 2019-09-01 | End: 2019-09-04

## 2019-08-31 RX ORDER — LEVOTHYROXINE SODIUM 125 MCG
125 TABLET ORAL DAILY
Refills: 0 | Status: DISCONTINUED | OUTPATIENT
Start: 2019-08-31 | End: 2019-09-04

## 2019-08-31 RX ORDER — TRAMADOL HYDROCHLORIDE 50 MG/1
50 TABLET ORAL EVERY 8 HOURS
Refills: 0 | Status: DISCONTINUED | OUTPATIENT
Start: 2019-08-31 | End: 2019-09-04

## 2019-08-31 RX ORDER — SENNA PLUS 8.6 MG/1
2 TABLET ORAL AT BEDTIME
Refills: 0 | Status: DISCONTINUED | OUTPATIENT
Start: 2019-08-31 | End: 2019-09-02

## 2019-08-31 RX ORDER — DOCUSATE SODIUM 100 MG
100 CAPSULE ORAL
Refills: 0 | Status: DISCONTINUED | OUTPATIENT
Start: 2019-08-31 | End: 2019-09-02

## 2019-08-31 RX ADMIN — TAMSULOSIN HYDROCHLORIDE 0.4 MILLIGRAM(S): 0.4 CAPSULE ORAL at 20:39

## 2019-08-31 RX ADMIN — PANTOPRAZOLE SODIUM 40 MILLIGRAM(S): 20 TABLET, DELAYED RELEASE ORAL at 05:51

## 2019-08-31 RX ADMIN — Medication 650 MILLIGRAM(S): at 06:22

## 2019-08-31 RX ADMIN — TRAMADOL HYDROCHLORIDE 50 MILLIGRAM(S): 50 TABLET ORAL at 07:16

## 2019-08-31 RX ADMIN — FINASTERIDE 5 MILLIGRAM(S): 5 TABLET, FILM COATED ORAL at 13:03

## 2019-08-31 RX ADMIN — Medication 650 MILLIGRAM(S): at 14:00

## 2019-08-31 RX ADMIN — SODIUM CHLORIDE 60 MILLILITER(S): 9 INJECTION, SOLUTION INTRAVENOUS at 20:40

## 2019-08-31 RX ADMIN — Medication 1 APPLICATION(S): at 05:50

## 2019-08-31 RX ADMIN — Medication 1 APPLICATION(S): at 18:05

## 2019-08-31 RX ADMIN — ENOXAPARIN SODIUM 40 MILLIGRAM(S): 100 INJECTION SUBCUTANEOUS at 13:03

## 2019-08-31 RX ADMIN — SENNA PLUS 2 TABLET(S): 8.6 TABLET ORAL at 22:52

## 2019-08-31 RX ADMIN — Medication 650 MILLIGRAM(S): at 20:42

## 2019-08-31 RX ADMIN — Medication 250 MILLIGRAM(S): at 00:30

## 2019-08-31 RX ADMIN — Medication 250 MILLIGRAM(S): at 20:39

## 2019-08-31 RX ADMIN — Medication 300 MILLIGRAM(S): at 13:02

## 2019-08-31 RX ADMIN — Medication 125 MICROGRAM(S): at 07:17

## 2019-08-31 RX ADMIN — Medication 650 MILLIGRAM(S): at 21:42

## 2019-08-31 RX ADMIN — Medication 650 MILLIGRAM(S): at 07:36

## 2019-08-31 RX ADMIN — Medication 650 MILLIGRAM(S): at 13:03

## 2019-08-31 RX ADMIN — TRAMADOL HYDROCHLORIDE 50 MILLIGRAM(S): 50 TABLET ORAL at 08:15

## 2019-08-31 NOTE — CONSULT NOTE ADULT - ASSESSMENT
89 yo M, h/o longstanding leg edema and pemphigoid, scale, blisters  Here recently for concerns of cellulitis R leg, but afebrile, normal WBC, all c/w stasis only  He returns with fever, brief lethargy and more prominent L LE erythema  T 102, findings more typical for L thigh lymphangitis on this presentation    Plan:  Agree with Stephanie for now  Will screen for MRSA  He has tolerated Cephs- with mostly nonpurulent quality, Beta Strep most likely here, possibly switch to IV Cefazolin and then po Ceph  Elevation, local care

## 2019-08-31 NOTE — PROGRESS NOTE ADULT - ASSESSMENT
89 y/o M w/ pmh including afib, bph, constipation, dementia, edema, gerd, gout, hypothyroidism with recent admit for LE edema/cellulitis sent in from the [facility] for increased edema B/L, redness, fever, confusion, decreased PO intake x 1 day. Pt was febrile T 102 in ED.    # worsening redness swelling and pain on left leg. probably 2/2 left leg cellulitis   - per pt's family, pt has a h/o bullous pemphigoid and recent candida pedis   - c/w Vanco and zosyn. check Vanco trough  - c/w Clotrimazole between the toes  - ID consult  - wound consult  - f/u venous doppler  - PT consult    # Bullous pemphigoid  - c/w local wound care  - pt on local wound care at home, no other medical treatment    # BPH   - c/w flomax    # A fib not on AC  - HR controlled    # leg edema  - pt is on lasix 80mg qD from home.     # GERD   - c/w protonix    # gout  - c/w allopurinol    # Hypothyroid  - cont synthroid     # constipation  - c/w Lactulose prn    # DVT ppx: lovenox 40mg subq 89 y/o M w/ pmh including afib, bph, constipation, dementia, edema, gerd, gout, hypothyroidism with recent admit for LE edema/cellulitis sent in from the [facility] for increased edema B/L, redness, fever, confusion, decreased PO intake x 1 day. Pt was febrile T 102 in ED.    # worsening redness swelling and pain on left leg. probably 2/2 left leg cellulitis   - per pt's family, pt has a h/o bullous pemphigoid and recent candida pedis   - c/w Vancomycin. check Vanco trough  - c/w Clotrimazole between the toes  - ID consult  - wound consult  - f/u venous doppler  - PT consult    # Bullous pemphigoid  - c/w local wound care  - pt on local wound care at home, no other medical treatment    # BPH   - c/w flomax    # A fib not on AC  - HR controlled    # leg edema  - pt is on lasix 80mg qD from home.     # GERD   - c/w protonix    # gout  - c/w allopurinol    # Hypothyroid  - cont synthroid     # constipation  - c/w Lactulose prn    # DVT ppx: lovenox 40mg subq

## 2019-08-31 NOTE — CONSULT NOTE ADULT - SUBJECTIVE AND OBJECTIVE BOX
HPI:   Patient is a 89y male with Afib, mild dementia, at assisted living, longstanding leg edema, seen by Dr Olivas in past for bullous pemphigoid (foot, leg, and forearm blisters), poorly tolerant of steroids, who was referred to ED last night b/c of mild lethargy, fever, and prominent erythema L LE.  T102 on arrival, covered with Vanco.  Pt believes leg more red for 2-3 days.  No recent resp, GI or  Sxs.    REVIEW OF SYSTEMS:  All other review of systems negative (Comprehensive ROS)    PAST MEDICAL & SURGICAL HISTORY:  Spinal stenosis  Atrial fibrillation, unspecified type  Arthritis  CN (constipation)  BPH (benign prostatic hyperplasia)  GERD (gastroesophageal reflux disease)  Hypothyroidism  Gout  Edema  Dementia  S/P hernia repair      Allergies  penicillin (Unknown)      Antimicrobials Day # 1   vancomycin  IVPB 1000 milliGRAM(s) IV Intermittent daily    Other Medications:  acetaminophen   Tablet .. 650 milliGRAM(s) Oral every 6 hours PRN  allopurinol 300 milliGRAM(s) Oral daily  clotrimazole 1% Cream 1 Application(s) Topical two times a day  enoxaparin Injectable 40 milliGRAM(s) SubCutaneous daily  finasteride 5 milliGRAM(s) Oral daily  levothyroxine 125 MICROGram(s) Oral daily  morphine  - Injectable 4 milliGRAM(s) IV Push once  pantoprazole    Tablet 40 milliGRAM(s) Oral before breakfast  sodium chloride 0.45%. 1000 milliLiter(s) IV Continuous <Continuous>  tamsulosin 0.4 milliGRAM(s) Oral at bedtime  traMADol 50 milliGRAM(s) Oral every 8 hours PRN      FAMILY HISTORY:  No pertinent family history in first degree relatives      SOCIAL HISTORY:  Smoking: no    ETOH: no     Single, resident assisted living     T(F): 98.6 (19 @ 16:31), Max: 99.2 (19 @ 20:45)  HR: 67 (19 @ 16:31)  BP: 111/51 (19 @ 16:31)  RR: 16 (19 @ 16:31)  SpO2: 100% (19 @ 16:31)  Wt(kg): --    PHYSICAL EXAM:  General: alert, no acute distress  Eyes:  anicteric, no conjunctival injection, no discharge  Oropharynx: no lesions or injection 	  Neck: supple, without adenopathy  Lungs: clear to auscultation  Heart: irregular rhythm; no murmur, rubs or gallops  Abdomen: soft, nondistended, nontender, without mass or organomegaly  Skin: legs with wraps/dressings in place; no UE blisters (previous scale, foot blisters, stasis noted)  Extremities: leg edema; erythema to L superior L thigh, c/w lymphangitis   Neurologic: alert, moves all extremities    LAB RESULTS:                        11.0   8.90  )-----------( 230      ( 31 Aug 2019 05:40 )             34.9         142  |  105  |  29<H>  ----------------------------<  93  3.8   |  31  |  1.28    Ca    8.7      31 Aug 2019 05:40    TPro  6.6  /  Alb  2.6<L>  /  TBili  0.8  /  DBili  x   /  AST  39  /  ALT  16  /  AlkPhos  68      Urinalysis Basic - ( 30 Aug 2019 21:57 )    Color: Yellow / Appearance: Clear / S.015 / pH: x  Gluc: x / Ketone: Negative  / Bili: Negative / Urobili: Negative   Blood: x / Protein: 15 / Nitrite: Negative   Leuk Esterase: Trace / RBC: 0-4 /HPF / WBC 0-2 /HPF   Sq Epi: x / Non Sq Epi: Neg.-Few / Bacteria: Negative /HPF    MICROBIOLOGY:  RECENT CULTURES:  Pending    RADIOLOGY REVIEWED:  US Duplex Venous Lower Ext Complete, Bilateral (19 @ 15:15) >  No evidence of deep venous thrombosis in either lower extremity.

## 2019-08-31 NOTE — PATIENT PROFILE ADULT - HAS THE PATIENT EXPERIENCED ANY OF THE FOLLOWING WITHIN THE WEEK PRIOR TO ADMISSION?
Returned call from [facility] voicemail. Pt spouse informing our office of spouse death last night at home.  Thanked the entire team and [place] for our care and compassion during the time of his treatment - expressed gratitude towards giving them an additio no

## 2019-08-31 NOTE — PROGRESS NOTE ADULT - SUBJECTIVE AND OBJECTIVE BOX
Patient is a 89y old  Male who presents with a chief complaint of fever (30 Aug 2019 23:16)      Patient seen and examined at bedside. No overnight events reported. Pt reports b/l leg pain tolerable. No other new complaints. Fever resolved.     ALLERGIES:  penicillin (Unknown)    MEDICATIONS  (STANDING):  allopurinol 300 milliGRAM(s) Oral daily  clotrimazole 1% Cream 1 Application(s) Topical two times a day  enoxaparin Injectable 40 milliGRAM(s) SubCutaneous daily  finasteride 5 milliGRAM(s) Oral daily  levothyroxine 125 MICROGram(s) Oral daily  morphine  - Injectable 4 milliGRAM(s) IV Push once  pantoprazole    Tablet 40 milliGRAM(s) Oral before breakfast  sodium chloride 0.45%. 1000 milliLiter(s) (60 mL/Hr) IV Continuous <Continuous>  tamsulosin 0.4 milliGRAM(s) Oral at bedtime  vancomycin  IVPB 1000 milliGRAM(s) IV Intermittent daily    MEDICATIONS  (PRN):  acetaminophen   Tablet .. 650 milliGRAM(s) Oral every 6 hours PRN Mild Pain (1 - 3)  traMADol 50 milliGRAM(s) Oral every 8 hours PRN Moderate Pain (4 - 6)    Vital Signs Last 24 Hrs  T(F): 97.7 (31 Aug 2019 10:31), Max: 102 (30 Aug 2019 18:59)  HR: 89 (31 Aug 2019 10:31) (79 - 100)  BP: 90/42 (31 Aug 2019 10:31) (90/42 - 131/71)  RR: 15 (31 Aug 2019 10:31) (15 - 19)  SpO2: 99% (31 Aug 2019 10:31) (97% - 100%)  I&O's Summary    30 Aug 2019 07:  -  31 Aug 2019 07:00  --------------------------------------------------------  IN: 1000 mL / OUT: 0 mL / NET: 1000 mL    31 Aug 2019 07:  -  31 Aug 2019 16:00  --------------------------------------------------------  IN: 240 mL / OUT: 0 mL / NET: 240 mL          GENERAL: NAD, well-developed  HEAD:  Atraumatic, Normocephalic  EYES: EOMI, PERRLA, conjunctiva and sclera clear  NECK: Supple, No JVD  CHEST/LUNG: Clear to auscultation bilaterally; No wheeze  HEART: Regular rate and rhythm; No murmurs, rubs, or gallops  ABDOMEN: Soft, Nontender, Nondistended; Bowel sounds present  EXTREMITIES:  2+ Peripheral Pulses, No clubbing, cyanosis, or edema  PSYCH: AAOx3  NEUROLOGY: non-focal  SKIN: No rashes or lesions    LABS:                        11.0   8.90  )-----------( 230      ( 31 Aug 2019 05:40 )             34.9         142  |  105  |  29  ----------------------------<  93  3.8   |  31  |  1.28    Ca    8.7      31 Aug 2019 05:40    TPro  6.6  /  Alb  2.6  /  TBili  0.8  /  DBili  x   /  AST  39  /  ALT  16  /  AlkPhos  68          eGFR if Non African American: 46 mL/min/1.73M2 (19 @ 05:40)  eGFR if African American: 54 mL/min/1.73M2 (19 @ 05:40)    PT/INR - ( 30 Aug 2019 19:25 )   PT: 15.0 sec;   INR: 1.33 ratio         PTT - ( 30 Aug 2019 19:25 )  PTT:34.9 sec  Lactate, Blood: 1.3 mmol/L ( @ 19:25)      Urinalysis Basic - ( 30 Aug 2019 21:57 )    Color: Yellow / Appearance: Clear / S.015 / pH: x  Gluc: x / Ketone: Negative  / Bili: Negative / Urobili: Negative   Blood: x / Protein: 15 / Nitrite: Negative   Leuk Esterase: Trace / RBC: 0-4 /HPF / WBC 0-2 /HPF   Sq Epi: x / Non Sq Epi: Neg.-Few / Bacteria: Negative /HPF      RADIOLOGY & ADDITIONAL TESTS:    Care Discussed with Consultants/Other Providers: Patient is a 89y old  Male who presents with a chief complaint of fever (30 Aug 2019 23:16)      Patient seen and examined at bedside. No overnight events reported. Pt reports b/l leg pain tolerable. No other new complaints. Fever resolved.     ALLERGIES:  penicillin (Unknown)    MEDICATIONS  (STANDING):  allopurinol 300 milliGRAM(s) Oral daily  clotrimazole 1% Cream 1 Application(s) Topical two times a day  enoxaparin Injectable 40 milliGRAM(s) SubCutaneous daily  finasteride 5 milliGRAM(s) Oral daily  levothyroxine 125 MICROGram(s) Oral daily  morphine  - Injectable 4 milliGRAM(s) IV Push once  pantoprazole    Tablet 40 milliGRAM(s) Oral before breakfast  sodium chloride 0.45%. 1000 milliLiter(s) (60 mL/Hr) IV Continuous <Continuous>  tamsulosin 0.4 milliGRAM(s) Oral at bedtime  vancomycin  IVPB 1000 milliGRAM(s) IV Intermittent daily    MEDICATIONS  (PRN):  acetaminophen   Tablet .. 650 milliGRAM(s) Oral every 6 hours PRN Mild Pain (1 - 3)  traMADol 50 milliGRAM(s) Oral every 8 hours PRN Moderate Pain (4 - 6)    Vital Signs Last 24 Hrs  T(F): 97.7 (31 Aug 2019 10:31), Max: 102 (30 Aug 2019 18:59)  HR: 89 (31 Aug 2019 10:31) (79 - 100)  BP: 90/42 (31 Aug 2019 10:31) (90/42 - 131/71)  RR: 15 (31 Aug 2019 10:31) (15 - 19)  SpO2: 99% (31 Aug 2019 10:31) (97% - 100%)  I&O's Summary    30 Aug 2019 07:  -  31 Aug 2019 07:00  --------------------------------------------------------  IN: 1000 mL / OUT: 0 mL / NET: 1000 mL    31 Aug 2019 07:  -  31 Aug 2019 16:00  --------------------------------------------------------  IN: 240 mL / OUT: 0 mL / NET: 240 mL          GENERAL: NAD, awake alert   HEAD:  Atraumatic, Normocephalic  EYES: EOMI, PERRLA, conjunctiva and sclera clear  NECK: Supple, No JVD  CHEST/LUNG: Clear to auscultation bilaterally; No wheeze  HEART: s1 s2; No murmurs, regular rhythm  ABDOMEN: Soft, Nontender, Nondistended; Bowel sounds present  EXTREMITIES:  bilateral leg 3+ pitting edema (L>R). erythema from left ankle up to L knee. increased warmth. + broken blisters with clear drainage on left ankle   NEUROLOGY: non-focal      LABS:                        11.0   8.90  )-----------( 230      ( 31 Aug 2019 05:40 )             34.9         142  |  105  |  29  ----------------------------<  93  3.8   |  31  |  1.28    Ca    8.7      31 Aug 2019 05:40    TPro  6.6  /  Alb  2.6  /  TBili  0.8  /  DBili  x   /  AST  39  /  ALT  16  /  AlkPhos  68          eGFR if Non African American: 46 mL/min/1.73M2 (19 @ 05:40)  eGFR if African American: 54 mL/min/1.73M2 (19 @ 05:40)    PT/INR - ( 30 Aug 2019 19:25 )   PT: 15.0 sec;   INR: 1.33 ratio         PTT - ( 30 Aug 2019 19:25 )  PTT:34.9 sec  Lactate, Blood: 1.3 mmol/L ( @ 19:25)      Urinalysis Basic - ( 30 Aug 2019 21:57 )    Color: Yellow / Appearance: Clear / S.015 / pH: x  Gluc: x / Ketone: Negative  / Bili: Negative / Urobili: Negative   Blood: x / Protein: 15 / Nitrite: Negative   Leuk Esterase: Trace / RBC: 0-4 /HPF / WBC 0-2 /HPF   Sq Epi: x / Non Sq Epi: Neg.-Few / Bacteria: Negative /HPF      RADIOLOGY & ADDITIONAL TESTS:    Care Discussed with Consultants/Other Providers:

## 2019-09-01 LAB
ANION GAP SERPL CALC-SCNC: 6 MMOL/L — SIGNIFICANT CHANGE UP (ref 5–17)
BASOPHILS # BLD AUTO: 0.03 K/UL — SIGNIFICANT CHANGE UP (ref 0–0.2)
BASOPHILS NFR BLD AUTO: 0.4 % — SIGNIFICANT CHANGE UP (ref 0–2)
BUN SERPL-MCNC: 28 MG/DL — HIGH (ref 7–23)
CALCIUM SERPL-MCNC: 8.3 MG/DL — LOW (ref 8.4–10.5)
CHLORIDE SERPL-SCNC: 104 MMOL/L — SIGNIFICANT CHANGE UP (ref 96–108)
CO2 SERPL-SCNC: 30 MMOL/L — SIGNIFICANT CHANGE UP (ref 22–31)
CREAT SERPL-MCNC: 1.25 MG/DL — SIGNIFICANT CHANGE UP (ref 0.5–1.3)
CULTURE RESULTS: NO GROWTH — SIGNIFICANT CHANGE UP
EOSINOPHIL # BLD AUTO: 0.33 K/UL — SIGNIFICANT CHANGE UP (ref 0–0.5)
EOSINOPHIL NFR BLD AUTO: 4.5 % — SIGNIFICANT CHANGE UP (ref 0–6)
GLUCOSE SERPL-MCNC: 98 MG/DL — SIGNIFICANT CHANGE UP (ref 70–99)
HCT VFR BLD CALC: 32.2 % — LOW (ref 39–50)
HGB BLD-MCNC: 10.3 G/DL — LOW (ref 13–17)
IMM GRANULOCYTES NFR BLD AUTO: 0.3 % — SIGNIFICANT CHANGE UP (ref 0–1.5)
LYMPHOCYTES # BLD AUTO: 1.23 K/UL — SIGNIFICANT CHANGE UP (ref 1–3.3)
LYMPHOCYTES # BLD AUTO: 16.8 % — SIGNIFICANT CHANGE UP (ref 13–44)
MCHC RBC-ENTMCNC: 28.8 PG — SIGNIFICANT CHANGE UP (ref 27–34)
MCHC RBC-ENTMCNC: 32 GM/DL — SIGNIFICANT CHANGE UP (ref 32–36)
MCV RBC AUTO: 89.9 FL — SIGNIFICANT CHANGE UP (ref 80–100)
MONOCYTES # BLD AUTO: 0.69 K/UL — SIGNIFICANT CHANGE UP (ref 0–0.9)
MONOCYTES NFR BLD AUTO: 9.4 % — SIGNIFICANT CHANGE UP (ref 2–14)
NEUTROPHILS # BLD AUTO: 5.02 K/UL — SIGNIFICANT CHANGE UP (ref 1.8–7.4)
NEUTROPHILS NFR BLD AUTO: 68.6 % — SIGNIFICANT CHANGE UP (ref 43–77)
NRBC # BLD: 0 /100 WBCS — SIGNIFICANT CHANGE UP (ref 0–0)
PLATELET # BLD AUTO: 222 K/UL — SIGNIFICANT CHANGE UP (ref 150–400)
POTASSIUM SERPL-MCNC: 3.5 MMOL/L — SIGNIFICANT CHANGE UP (ref 3.5–5.3)
POTASSIUM SERPL-SCNC: 3.5 MMOL/L — SIGNIFICANT CHANGE UP (ref 3.5–5.3)
RBC # BLD: 3.58 M/UL — LOW (ref 4.2–5.8)
RBC # FLD: 16.3 % — HIGH (ref 10.3–14.5)
SODIUM SERPL-SCNC: 140 MMOL/L — SIGNIFICANT CHANGE UP (ref 135–145)
SPECIMEN SOURCE: SIGNIFICANT CHANGE UP
WBC # BLD: 7.32 K/UL — SIGNIFICANT CHANGE UP (ref 3.8–10.5)
WBC # FLD AUTO: 7.32 K/UL — SIGNIFICANT CHANGE UP (ref 3.8–10.5)

## 2019-09-01 PROCEDURE — 99233 SBSQ HOSP IP/OBS HIGH 50: CPT

## 2019-09-01 RX ORDER — SODIUM HYPOCHLORITE 0.125 %
1 SOLUTION, NON-ORAL MISCELLANEOUS DAILY
Refills: 0 | Status: COMPLETED | OUTPATIENT
Start: 2019-09-01 | End: 2019-09-04

## 2019-09-01 RX ORDER — COLLAGENASE CLOSTRIDIUM HIST. 250 UNIT/G
1 OINTMENT (GRAM) TOPICAL DAILY
Refills: 0 | Status: DISCONTINUED | OUTPATIENT
Start: 2019-09-04 | End: 2019-09-04

## 2019-09-01 RX ADMIN — Medication 1 APPLICATION(S): at 17:18

## 2019-09-01 RX ADMIN — Medication 100 MILLIGRAM(S): at 06:12

## 2019-09-01 RX ADMIN — Medication 125 MICROGRAM(S): at 06:13

## 2019-09-01 RX ADMIN — Medication 1 APPLICATION(S): at 06:12

## 2019-09-01 RX ADMIN — FINASTERIDE 5 MILLIGRAM(S): 5 TABLET, FILM COATED ORAL at 11:53

## 2019-09-01 RX ADMIN — Medication 80 MILLIGRAM(S): at 06:12

## 2019-09-01 RX ADMIN — Medication 1 APPLICATION(S): at 17:39

## 2019-09-01 RX ADMIN — TRAMADOL HYDROCHLORIDE 50 MILLIGRAM(S): 50 TABLET ORAL at 20:31

## 2019-09-01 RX ADMIN — TRAMADOL HYDROCHLORIDE 50 MILLIGRAM(S): 50 TABLET ORAL at 13:47

## 2019-09-01 RX ADMIN — TRAMADOL HYDROCHLORIDE 50 MILLIGRAM(S): 50 TABLET ORAL at 21:32

## 2019-09-01 RX ADMIN — TAMSULOSIN HYDROCHLORIDE 0.4 MILLIGRAM(S): 0.4 CAPSULE ORAL at 20:31

## 2019-09-01 RX ADMIN — PANTOPRAZOLE SODIUM 40 MILLIGRAM(S): 20 TABLET, DELAYED RELEASE ORAL at 06:13

## 2019-09-01 RX ADMIN — TRAMADOL HYDROCHLORIDE 50 MILLIGRAM(S): 50 TABLET ORAL at 17:48

## 2019-09-01 RX ADMIN — Medication 250 MILLIGRAM(S): at 20:32

## 2019-09-01 RX ADMIN — ENOXAPARIN SODIUM 40 MILLIGRAM(S): 100 INJECTION SUBCUTANEOUS at 11:52

## 2019-09-01 RX ADMIN — Medication 300 MILLIGRAM(S): at 11:53

## 2019-09-01 NOTE — PROGRESS NOTE ADULT - ASSESSMENT
89 y/o M w/ pmh including afib, bph, constipation, dementia, edema, gerd, gout, hypothyroidism with recent admit for LE edema/cellulitis sent in from the [facility] for increased edema B/L, redness, fever, confusion, decreased PO intake x 1 day. Pt was febrile T 102 in ED.    # worsening redness swelling and pain on left leg 2/2 left leg cellulitis/ lymphangitis   - per pt's family, pt has a h/o bullous pemphigoid and recent candida pedis   - c/w Vancomycin. check Vanco trough  - per ID, can consider switching to IV Cefazolen and then po Ceph  - c/w Clotrimazole between the toes  - Blood culture: negative  - venous doppler: neg DVT  - PT consult    # Bullous pemphigoid  - c/w local wound care as per plastic consult    # left knee pain/ swelling in the setting of h/o gout  - r/o gout flair  - will consider starting prednisone 30mg qD  - f/u uric acid level  - cont allopurinol     # BPH   - c/w flomax    # A fib not on AC  - HR controlled    # leg edema  - pt is on lasix 80mg qD from home.     # GERD   - c/w protonix    # gout  - c/w allopurinol    # Hypothyroid  - cont synthroid     # constipation  - c/w Lactulose prn    # DVT ppx: lovenox 40mg subq 89 y/o M w/ pmh including afib, bph, constipation, dementia, edema, gerd, gout, hypothyroidism with recent admit for LE edema/cellulitis sent in from the [facility] for increased edema B/L, redness, fever, confusion, decreased PO intake x 1 day. Pt was febrile T 102 in ED.    # worsening redness swelling and pain on left leg 2/2 left leg cellulitis/ lymphangitis   - per pt's family, pt has a h/o bullous pemphigoid and recent candida pedis   - c/w Vancomycin. check Vanco trough  - per ID, can consider switching to IV Cefazolen and then po Ceph  - c/w Clotrimazole between the toes  - Blood culture: negative  - venous doppler: neg DVT  - PT consult    # Bullous pemphigoid  - c/w local wound care as per plastic consult    # left knee pain/ swelling in the setting of h/o gout  - r/o gout flair  - start prednisone 30mg qD  - f/u uric acid level, US left knee, X ray of knee   - will consider ortho consult  - cont allopurinol     # BPH   - c/w flomax    # A fib not on AC  - HR controlled    # leg edema  - pt is on lasix 80mg qD from home.     # GERD   - c/w protonix    # gout  - c/w allopurinol    # Hypothyroid  - cont synthroid     # constipation  - c/w Lactulose prn    # DVT ppx: lovenox 40mg subq 89 y/o M w/ pmh including afib, bph, constipation, dementia, edema, gerd, gout, hypothyroidism with recent admit for LE edema/cellulitis sent in from the [facility] for increased edema B/L, redness, fever, confusion, decreased PO intake x 1 day. Pt was febrile T 102 in ED.    # worsening redness swelling and pain on left leg 2/2 left leg cellulitis/ lymphangitis   - per pt's family, pt has a h/o bullous pemphigoid and recent candida pedis   - c/w Vancomycin. check Vanco trough  - per ID, can consider switching to IV Cefazolen and then po Ceph  - c/w Clotrimazole between the toes  - Blood culture: negative  - venous doppler: neg DVT  - PT consult    # Bullous pemphigoid  - c/w local wound care as per plastic consult    # left knee pain/ swelling in the setting of h/o gout  - r/o gout flair  - Pt doesn't want Prednisone right now, wants to think about it  - f/u uric acid level, US left knee, X ray of knee   - will consider ortho consult  - cont allopurinol     # BPH   - c/w flomax    # A fib not on AC  - HR controlled    # leg edema  - pt is on lasix 80mg qD from home.     # GERD   - c/w protonix    # gout  - c/w allopurinol    # Hypothyroid  - cont synthroid     # constipation  - c/w Lactulose prn    # DVT ppx: lovenox 40mg subq

## 2019-09-01 NOTE — CONSULT NOTE ADULT - SUBJECTIVE AND OBJECTIVE BOX
CC:  Patient is a 89y old  Male who presents with a chief complaint of fever (31 Aug 2019 19:13).  Patient speaks well but appears confused and forgetful      HPI:  89 y/o M w/ pmh including afib, bph, constipation, dementia, edema, gerd, gout, hypothyroidism with recent admit for LE edema/cellulitis sent in from the [facility] for increased edema B/L, redness, fever, confusion, decreased PO intake x 1 day. Denies chest pain, shortness of breath, abdominal pain, nausea, vomiting, dysuria. family at bedside, is able to ambulate with walker at assisted living, but today he has'nt been able to do so. temp of 102 in ed. per family he has pemphigoid and gets blisters which get infected. (30 Aug 2019 23:16)    Patient states he had a nurse caring for wounds prior to admission, also he states he had recently seen Dr. Olivas      PAST MEDICAL & SURGICAL HISTORY:  Spinal stenosis  Atrial fibrillation, unspecified type  Arthritis  CN (constipation)  BPH (benign prostatic hyperplasia)  GERD (gastroesophageal reflux disease)  Hypothyroidism  Gout  Edema  Dementia  S/P hernia repair      Allergies    penicillin (Unknown)    Intolerances        SOCIAL HISTORY          Smoking: Yes [ ]  No [ ]   ______pk yrs          ETOH  Yes [ ]  No [ ]  Social [ ]          DRUGS:  Yes [ ]  No [ ]  if so what______________    FAMILY HISTORY:  No pertinent family history in first degree relatives      MEDICATIONS  (STANDING):  allopurinol 300 milliGRAM(s) Oral daily  clotrimazole 1% Cream 1 Application(s) Topical two times a day  docusate sodium 100 milliGRAM(s) Oral two times a day  enoxaparin Injectable 40 milliGRAM(s) SubCutaneous daily  finasteride 5 milliGRAM(s) Oral daily  furosemide    Tablet 80 milliGRAM(s) Oral daily  levothyroxine 125 MICROGram(s) Oral daily  morphine  - Injectable 4 milliGRAM(s) IV Push once  pantoprazole    Tablet 40 milliGRAM(s) Oral before breakfast  senna 2 Tablet(s) Oral at bedtime  tamsulosin 0.4 milliGRAM(s) Oral at bedtime  vancomycin  IVPB 1000 milliGRAM(s) IV Intermittent daily    MEDICATIONS  (PRN):  acetaminophen   Tablet .. 650 milliGRAM(s) Oral every 6 hours PRN Mild Pain (1 - 3)  traMADol 50 milliGRAM(s) Oral every 8 hours PRN Moderate Pain (4 - 6)         Review of systems:  General:  no fever or chills  Pulmonary:  no SOB  Cardiac:  denies chest pain, palpitations  GI:  no nausea, vomitting, or abddominal pain  :  no increase frequency, or burning  Heme:  no easy bruising with minor trauma  Musculoskeletal:  states he was ambulatory prior to being in hospital  Skin:    history of rash  Neuro:  no weakness         Vital Signs Last 24 Hrs  T(C): 37.1 (01 Sep 2019 08:23), Max: 37.4 (31 Aug 2019 20:31)  T(F): 98.7 (01 Sep 2019 08:23), Max: 99.3 (31 Aug 2019 20:31)  HR: 90 (01 Sep 2019 08:23) (67 - 90)  BP: 119/56 (01 Sep 2019 08:23) (104/48 - 136/52)  BP(mean): --  RR: 15 (01 Sep 2019 08:23) (15 - 16)  SpO2: 94% (01 Sep 2019 08:23) (94% - 100%)    Physical Exam:    General:   no distress  Extremities: edema and redness right leg.  LLE superficial ulcer medial and lateral and dorsal foot, also superficial lesion medial left lower leg, no odor greenish drainage multiple areas.  Edema and redness of leg extending proximally to medial thigh.  The left knee has prepatellar  intense redness and tenderness with pressure on patella.  The knee does have more localized edema compared to the left leg which has more edema compared to the right leg.  Unclear if zinc oxider or silvadene residue or other cream is being applied.                            10.3   7.32  )-----------( 222      ( 01 Sep 2019 05:45 )             32.2     09-    140  |  104  |  28<H>  ----------------------------<  98  3.5   |  30  |  1.25    Ca    8.3<L>      01 Sep 2019 05:45    TPro  6.6  /  Alb  2.6<L>  /  TBili  0.8  /  DBili  x   /  AST  39  /  ALT  16  /  AlkPhos  68  08-31    PT/INR - ( 30 Aug 2019 19:25 )   PT: 15.0 sec;   INR: 1.33 ratio         PTT - ( 30 Aug 2019 19:25 )  PTT:34.9 sec  Urinalysis Basic - ( 30 Aug 2019 21:57 )    Color: Yellow / Appearance: Clear / S.015 / pH: x  Gluc: x / Ketone: Negative  / Bili: Negative / Urobili: Negative   Blood: x / Protein: 15 / Nitrite: Negative   Leuk Esterase: Trace / RBC: 0-4 /HPF / WBC 0-2 /HPF   Sq Epi: x / Non Sq Epi: Neg.-Few / Bacteria: Negative /HPF        RADIOLOGY & ADDITIONAL STUDIES:  EXAM:  FOOT LEFT (MINIMUM 3 VIEWS)      PROCEDURE DATE:  2019        INTERPRETATION:  Left foot. Patient has local cellulitis and edema. 2   images submitted.    There is extensive edema of the foot particularly on the dorsum.    There is partial ossification of the soft tissues are seen off the   inferior calcaneus.    There is a mild hammertoe deformity of the outer toes.    There is some mild first MP degeneration.    No bone destruction or fracture is evident.    IMPRESSION: Main finding is extensive foot edema. Other findings as above.                  MANISHA STRICKLAND M.D., ATTENDING RADIOLOGIST  This document has been electronically signed. Aug 12 2019  2:57PM        Risks, benefits, and alternatives to treatment discussed. All questions answered with understanding.    Procedure Performed:  (  )Yes     ( x )No  Name of Procedure:      [  ]Debridement     [  ]I&D    [  ]Laceration Repair     [  ]Other:  (  )partial thickness     (  )full thickness     (  )subcutaneous     (  )muscle/tendon     (  )bone  (  )sharp     (  )surgical

## 2019-09-01 NOTE — PROGRESS NOTE ADULT - ASSESSMENT
89 yo M, h/o longstanding leg edema and pemphigoid, scale, blisters  Here recently for concerns of cellulitis R leg, but afebrile, normal WBC, all c/w stasis only  He returns with fever, brief lethargy and more prominent L LE erythema  T 102, findings c/w L thigh lymphangitis on this presentation  Afebrile, alert, thigh erythema fading  L knee pain, erythema, edema  Bld Cxs negative  Awaiting MRSA screen    Plan:  continue Vanco for now- responding  Plastics input appreciated, wound care as outlined, agree with further assessment of L knee, ?effusion- could be at risk for secondary infection or Gout

## 2019-09-01 NOTE — PROGRESS NOTE ADULT - SUBJECTIVE AND OBJECTIVE BOX
CC: f/u for L LE cellulitis    Patient reports still with L thigh/knee pain    REVIEW OF SYSTEMS:  All other review of systems negative (Comprehensive ROS)    Antimicrobials Day # 2    vancomycin  IVPB 1000 milliGRAM(s) IV Intermittent daily    Other Medications Reviewed    T(F): 98.5 (19 @ 16:12), Max: 99.3 (19 @ 20:31)  HR: 88 (19 @ 16:12)  BP: 117/41 (19 @ 16:12)  RR: 17 (19 @ 16:12)  SpO2: 99% (19 @ 16:12)  Wt(kg): --    PHYSICAL EXAM:  General: alert, no acute distress  Eyes:  anicteric, no conjunctival injection, no discharge  Oropharynx: no lesions or injection 	  Neck: supple, without adenopathy  Lungs: clear to auscultation  Heart: regular rate and rhythm; no murmur, rubs or gallops  Abdomen: soft, nondistended, nontender, without mass or organomegaly  Skin: scale, stasis legs, foot blisters  Extremities: b/l leg edema; L thigh erythema fading; more prominent leg/knee  Neurologic: alert, moves all extremities    LAB RESULTS:                        10.3   7.32  )-----------( 222      ( 01 Sep 2019 05:45 )             32.2         140  |  104  |  28<H>  ----------------------------<  98  3.5   |  30  |  1.25    Ca    8.3<L>      01 Sep 2019 05:45    TPro  6.6  /  Alb  2.6<L>  /  TBili  0.8  /  DBili  x   /  AST  39  /  ALT  16  /  AlkPhos  68      Urinalysis Basic - ( 30 Aug 2019 21:57 )    Color: Yellow / Appearance: Clear / S.015 / pH: x  Gluc: x / Ketone: Negative  / Bili: Negative / Urobili: Negative   Blood: x / Protein: 15 / Nitrite: Negative   Leuk Esterase: Trace / RBC: 0-4 /HPF / WBC 0-2 /HPF   Sq Epi: x / Non Sq Epi: Neg.-Few / Bacteria: Negative /HPF      MICROBIOLOGY:  RECENT CULTURES:   @ 21:57 .Urine Clean Catch (Midstream)     No growth     @ 19:25 .Blood Blood-Peripheral     No growth to date.    RADIOLOGY REVIEWED

## 2019-09-01 NOTE — PROGRESS NOTE ADULT - SUBJECTIVE AND OBJECTIVE BOX
Patient is a 89y old  Male who presents with a chief complaint of fever (01 Sep 2019 16:13)      Patient seen and examined at bedside. No overnight events reported.   Pt continues to c/o left leg and left knee pain. No fever or chills    ALLERGIES:  penicillin (Unknown)    MEDICATIONS  (STANDING):  allopurinol 300 milliGRAM(s) Oral daily  clotrimazole 1% Cream 1 Application(s) Topical two times a day  Dakins Solution - 1/2 Strength 1 Application(s) Topical daily  docusate sodium 100 milliGRAM(s) Oral two times a day  enoxaparin Injectable 40 milliGRAM(s) SubCutaneous daily  finasteride 5 milliGRAM(s) Oral daily  furosemide    Tablet 80 milliGRAM(s) Oral daily  levothyroxine 125 MICROGram(s) Oral daily  morphine  - Injectable 4 milliGRAM(s) IV Push once  pantoprazole    Tablet 40 milliGRAM(s) Oral before breakfast  senna 2 Tablet(s) Oral at bedtime  tamsulosin 0.4 milliGRAM(s) Oral at bedtime  vancomycin  IVPB 1000 milliGRAM(s) IV Intermittent daily    MEDICATIONS  (PRN):  acetaminophen   Tablet .. 650 milliGRAM(s) Oral every 6 hours PRN Mild Pain (1 - 3)  traMADol 50 milliGRAM(s) Oral every 8 hours PRN Moderate Pain (4 - 6)    Vital Signs Last 24 Hrs  T(F): 98.5 (01 Sep 2019 16:12), Max: 99.3 (31 Aug 2019 20:31)  HR: 88 (01 Sep 2019 16:12) (74 - 90)  BP: 117/41 (01 Sep 2019 16:12) (104/48 - 136/52)  RR: 17 (01 Sep 2019 16:12) (15 - 17)  SpO2: 99% (01 Sep 2019 16:12) (94% - 99%)  I&O's Summary    31 Aug 2019 07:  -  01 Sep 2019 07:00  --------------------------------------------------------  IN: 240 mL / OUT: 300 mL / NET: -60 mL    01 Sep 2019 07:01  -  01 Sep 2019 16:45  --------------------------------------------------------  IN: 360 mL / OUT: 0 mL / NET: 360 mL          GENERAL: NAD  HEAD:  Atraumatic, Normocephalic  EYES: EOMI, PERRLA, sclera clear  NECK: Supple  CHEST/LUNG: Clear to auscultation bilaterally; No wheeze  HEART: Regular rate and rhythm; + grade 2/6 systolic murmur  ABDOMEN: Soft, Nontender, Nondistended; Bowel sounds present  EXTREMITIES: bilateral leg edema L >R, 3+ pitting edema on L. erythema and increased warmth on Left ankle pass the knee. left knee is swollen, erythematous and tender to touch  NEUROLOGY: non-focal      LABS:                        10.3   7.32  )-----------( 222      ( 01 Sep 2019 05:45 )             32.2         140  |  104  |  28  ----------------------------<  98  3.5   |  30  |  1.25    Ca    8.3      01 Sep 2019 05:45    TPro  6.6  /  Alb  2.6  /  TBili  0.8  /  DBili  x   /  AST  39  /  ALT  16  /  AlkPhos  68          eGFR if Non African American: 48 mL/min/1.73M2 (19 @ 05:45)  eGFR if African American: 55 mL/min/1.73M2 (19 @ 05:45)    PT/INR - ( 30 Aug 2019 19:25 )   PT: 15.0 sec;   INR: 1.33 ratio         PTT - ( 30 Aug 2019 19:25 )  PTT:34.9 sec  Lactate, Blood: 1.3 mmol/L ( @ 19:25)      Urinalysis Basic - ( 30 Aug 2019 21:57 )    Color: Yellow / Appearance: Clear / S.015 / pH: x  Gluc: x / Ketone: Negative  / Bili: Negative / Urobili: Negative   Blood: x / Protein: 15 / Nitrite: Negative   Leuk Esterase: Trace / RBC: 0-4 /HPF / WBC 0-2 /HPF   Sq Epi: x / Non Sq Epi: Neg.-Few / Bacteria: Negative /HPF      Culture - Urine (collected 30 Aug 2019 21:57)  Source: .Urine Clean Catch (Midstream)  Final Report (01 Sep 2019 07:00):    No growth    Culture - Blood (collected 30 Aug 2019 19:25)  Source: .Blood Blood-Peripheral  Preliminary Report (01 Sep 2019 02:01):    No growth to date.    Culture - Blood (collected 30 Aug 2019 19:25)  Source: .Blood Blood-Peripheral  Preliminary Report (01 Sep 2019 02:01):    No growth to date.      RADIOLOGY & ADDITIONAL TESTS:    Care Discussed with Consultants/Other Providers:

## 2019-09-01 NOTE — CONSULT NOTE ADULT - ASSESSMENT
Bullous phemphigoid possible lesion/ulcers left foot and leg that appears infected with cellulitis and lymphangitis.  Left knee also appears inflamed (patient has history of arthritis and gout)  -local wound care, dakins for 3 days then santyl  -consider xray +/- ortho consult for left knee inflammation  -discussed with Dr. Hill

## 2019-09-02 LAB
ANION GAP SERPL CALC-SCNC: 5 MMOL/L — SIGNIFICANT CHANGE UP (ref 5–17)
BASOPHILS # BLD AUTO: 0.03 K/UL — SIGNIFICANT CHANGE UP (ref 0–0.2)
BASOPHILS NFR BLD AUTO: 0.5 % — SIGNIFICANT CHANGE UP (ref 0–2)
BUN SERPL-MCNC: 28 MG/DL — HIGH (ref 7–23)
CALCIUM SERPL-MCNC: 8.3 MG/DL — LOW (ref 8.4–10.5)
CHLORIDE SERPL-SCNC: 106 MMOL/L — SIGNIFICANT CHANGE UP (ref 96–108)
CO2 SERPL-SCNC: 31 MMOL/L — SIGNIFICANT CHANGE UP (ref 22–31)
CREAT SERPL-MCNC: 1.08 MG/DL — SIGNIFICANT CHANGE UP (ref 0.5–1.3)
EOSINOPHIL # BLD AUTO: 0.56 K/UL — HIGH (ref 0–0.5)
EOSINOPHIL NFR BLD AUTO: 9 % — HIGH (ref 0–6)
GLUCOSE SERPL-MCNC: 90 MG/DL — SIGNIFICANT CHANGE UP (ref 70–99)
HCT VFR BLD CALC: 31.6 % — LOW (ref 39–50)
HGB BLD-MCNC: 10 G/DL — LOW (ref 13–17)
IMM GRANULOCYTES NFR BLD AUTO: 0.3 % — SIGNIFICANT CHANGE UP (ref 0–1.5)
LYMPHOCYTES # BLD AUTO: 1.1 K/UL — SIGNIFICANT CHANGE UP (ref 1–3.3)
LYMPHOCYTES # BLD AUTO: 17.8 % — SIGNIFICANT CHANGE UP (ref 13–44)
MCHC RBC-ENTMCNC: 28.7 PG — SIGNIFICANT CHANGE UP (ref 27–34)
MCHC RBC-ENTMCNC: 31.6 GM/DL — LOW (ref 32–36)
MCV RBC AUTO: 90.8 FL — SIGNIFICANT CHANGE UP (ref 80–100)
MONOCYTES # BLD AUTO: 0.66 K/UL — SIGNIFICANT CHANGE UP (ref 0–0.9)
MONOCYTES NFR BLD AUTO: 10.7 % — SIGNIFICANT CHANGE UP (ref 2–14)
NEUTROPHILS # BLD AUTO: 3.82 K/UL — SIGNIFICANT CHANGE UP (ref 1.8–7.4)
NEUTROPHILS NFR BLD AUTO: 61.7 % — SIGNIFICANT CHANGE UP (ref 43–77)
NRBC # BLD: 0 /100 WBCS — SIGNIFICANT CHANGE UP (ref 0–0)
PLATELET # BLD AUTO: 231 K/UL — SIGNIFICANT CHANGE UP (ref 150–400)
POTASSIUM SERPL-MCNC: 3.1 MMOL/L — LOW (ref 3.5–5.3)
POTASSIUM SERPL-SCNC: 3.1 MMOL/L — LOW (ref 3.5–5.3)
RBC # BLD: 3.48 M/UL — LOW (ref 4.2–5.8)
RBC # FLD: 16.1 % — HIGH (ref 10.3–14.5)
SODIUM SERPL-SCNC: 142 MMOL/L — SIGNIFICANT CHANGE UP (ref 135–145)
URATE SERPL-MCNC: 3.5 MG/DL — SIGNIFICANT CHANGE UP (ref 3.4–8.8)
WBC # BLD: 6.19 K/UL — SIGNIFICANT CHANGE UP (ref 3.8–10.5)
WBC # FLD AUTO: 6.19 K/UL — SIGNIFICANT CHANGE UP (ref 3.8–10.5)

## 2019-09-02 PROCEDURE — 76881 US COMPL JOINT R-T W/IMG: CPT | Mod: 26,LT

## 2019-09-02 PROCEDURE — 99233 SBSQ HOSP IP/OBS HIGH 50: CPT

## 2019-09-02 RX ORDER — POTASSIUM CHLORIDE 20 MEQ
40 PACKET (EA) ORAL ONCE
Refills: 0 | Status: COMPLETED | OUTPATIENT
Start: 2019-09-02 | End: 2019-09-02

## 2019-09-02 RX ADMIN — TAMSULOSIN HYDROCHLORIDE 0.4 MILLIGRAM(S): 0.4 CAPSULE ORAL at 22:05

## 2019-09-02 RX ADMIN — TRAMADOL HYDROCHLORIDE 50 MILLIGRAM(S): 50 TABLET ORAL at 06:12

## 2019-09-02 RX ADMIN — TRAMADOL HYDROCHLORIDE 50 MILLIGRAM(S): 50 TABLET ORAL at 22:59

## 2019-09-02 RX ADMIN — Medication 30 MILLIGRAM(S): at 12:55

## 2019-09-02 RX ADMIN — Medication 1 APPLICATION(S): at 12:56

## 2019-09-02 RX ADMIN — Medication 1 APPLICATION(S): at 12:25

## 2019-09-02 RX ADMIN — Medication 250 MILLIGRAM(S): at 22:42

## 2019-09-02 RX ADMIN — Medication 1 APPLICATION(S): at 18:52

## 2019-09-02 RX ADMIN — Medication 100 MILLIGRAM(S): at 06:12

## 2019-09-02 RX ADMIN — PANTOPRAZOLE SODIUM 40 MILLIGRAM(S): 20 TABLET, DELAYED RELEASE ORAL at 06:12

## 2019-09-02 RX ADMIN — FINASTERIDE 5 MILLIGRAM(S): 5 TABLET, FILM COATED ORAL at 11:50

## 2019-09-02 RX ADMIN — Medication 80 MILLIGRAM(S): at 06:12

## 2019-09-02 RX ADMIN — ENOXAPARIN SODIUM 40 MILLIGRAM(S): 100 INJECTION SUBCUTANEOUS at 12:57

## 2019-09-02 RX ADMIN — Medication 125 MICROGRAM(S): at 06:12

## 2019-09-02 RX ADMIN — Medication 300 MILLIGRAM(S): at 12:57

## 2019-09-02 RX ADMIN — Medication 40 MILLIEQUIVALENT(S): at 11:50

## 2019-09-02 RX ADMIN — TRAMADOL HYDROCHLORIDE 50 MILLIGRAM(S): 50 TABLET ORAL at 22:04

## 2019-09-02 NOTE — PROGRESS NOTE ADULT - ASSESSMENT
89 y/o M w/ pmh including afib, bph, constipation, dementia, edema, gerd, gout, hypothyroidism with recent admit for LE edema/cellulitis sent in from the [facility] for increased edema B/L, redness, fever, confusion, decreased PO intake x 1 day. Pt was febrile T 102 in ED.    # worsening redness swelling and pain on left leg 2/2 left leg lymphangitis - resolving  - h/o bullous pemphigoid and recent candida pedis   - per ID, c/w Vancomycin. check Vanco trough  - c/w Clotrimazole between the toes  - Blood culture: negative  - venous doppler: neg DVT  - PT consult    # Bullous pemphigoid  - c/w local wound care as per plastic consult    # left knee pain/ swelling in the setting of h/o gout  - r/o gout flair r/o secondary infection  - c/w Vancomycin  - Prednisone 30mg qD and continue with allopurinol  - f/u uric acid level, US left knee, X ray of knee   - follow up ortho consult    # Diarrhea   - hold senna/ colace and send for C diff if diarrhea persists    # hyperkalemia - 2/2 diarrhea  - repleted with po potassium     # BPH   - c/w flomax    # A fib not on AC  - HR controlled    # leg edema  - pt is on lasix 80mg qD from home.     # GERD   - c/w protonix    # h/o gout  - c/w allopurinol    # Hypothyroid  - cont synthroid     # DVT ppx: lovenox 40mg subq

## 2019-09-02 NOTE — PROGRESS NOTE ADULT - ASSESSMENT
89 yo M, h/o longstanding leg edema and pemphigoid, scale, blisters  Here recently for concerns of cellulitis R leg, but afebrile, normal WBC, all c/w stasis only  He returns with fever, brief lethargy and more prominent L LE erythema  T 102, findings now c/w L thigh lymphangitis  Afebrile, alert, thigh erythema resolved on Vanco  L knee pain, erythema persists- joint effusion noted  Bld Cxs negative  Awaiting MRSA screen    Plan:  Continue Vanco for now- lymphangitis resolved  Wound care as outlined  L knee effusion could be at risk for secondary infection or Gout- favor diagnostic arthrocentesis  Check stool Cdiff  d/w pt and family at bedside  d/w Dr Hill 89 yo M, h/o longstanding leg edema and pemphigoid, scale, blisters  Here recently for concerns of cellulitis R leg, but afebrile, normal WBC, all c/w stasis only  He returns with fever, brief lethargy and more prominent L LE erythema  T 102, findings now c/w L thigh lymphangitis  Afebrile, alert, thigh erythema resolved on Vanco  L knee pain, erythema persists- joint effusion noted  Bld Cxs negative  Awaiting MRSA screen    Plan:  Continue Vanco for now- lymphangitis resolved  Wound care as outlined  L knee effusion could be at risk for secondary infection or Gout- favor diagnostic arthrocentesis  D/c Senna, check stool Cdiff if persists  d/w pt and family at bedside  d/w Dr Hill

## 2019-09-02 NOTE — PROGRESS NOTE ADULT - SUBJECTIVE AND OBJECTIVE BOX
CC: f/u for L LE cellulitis    Patient reports still with L knee pain, and now mult loose stool    REVIEW OF SYSTEMS:  All other review of systems negative (Comprehensive ROS)    Antimicrobials Day # 3   vancomycin  IVPB 1000 milliGRAM(s) IV Intermittent daily    Other Medications Reviewed    Vital Signs Last 24 Hrs  T(F): 98.4 (02 Sep 2019 05:55), Max: 98.5 (01 Sep 2019 16:12)  HR: 87 (02 Sep 2019 05:55) (74 - 88)  BP: 121/69 (02 Sep 2019 05:55) (107/50 - 121/69)  BP(mean): --  RR: 16 (02 Sep 2019 05:55) (16 - 17)  SpO2: 99% (02 Sep 2019 05:55) (98% - 99%)    PHYSICAL EXAM:  General: alert, no acute distress  Eyes:  anicteric, no conjunctival injection, no discharge  Oropharynx: no lesions or injection 	  Neck: supple, without adenopathy  Lungs: clear to auscultation  Heart: regular rate and rhythm; no murmur, rubs or gallops  Abdomen: soft, nondistended, nontender, without mass or organomegaly  Skin: scale, stasis legs, foot blisters  Extremities: b/l leg edema imoroved; L thigh erythema resolved; erythema L knee with tenderness, effusion  Neurologic: alert, moves all extremities    LAB RESULTS:                        10.0   6.19  )-----------( 231      ( 02 Sep 2019 06:00 )             31.6   09-02    142  |  106  |  28<H>  ----------------------------<  90  3.1<L>   |  31  |  1.08    Ca    8.3<L>      02 Sep 2019 06:00      Urinalysis Basic - ( 30 Aug 2019 21:57 )    Color: Yellow / Appearance: Clear / S.015 / pH: x  Gluc: x / Ketone: Negative  / Bili: Negative / Urobili: Negative   Blood: x / Protein: 15 / Nitrite: Negative   Leuk Esterase: Trace / RBC: 0-4 /HPF / WBC 0-2 /HPF   Sq Epi: x / Non Sq Epi: Neg.-Few / Bacteria: Negative /HPF    MICROBIOLOGY:  RECENT CULTURES:   @ 21:57 .Urine Clean Catch (Midstream)     No growth     @ 19:25 .Blood Blood-Peripheral     No growth to date.    RADIOLOGY REVIEWED:  US Joint Complete, Left (19 @ 09:41) >  Attention to the area of clinical concern involving the anterior left   knee demonstrates a roughly 8 x 6 x 1 cm collection most suggestive of a   joint effusion.

## 2019-09-02 NOTE — PROGRESS NOTE ADULT - SUBJECTIVE AND OBJECTIVE BOX
Patient is a 89y old  Male who presents with a chief complaint of fever (02 Sep 2019 14:08)      Patient seen and examined at bedside. No overnight events reported. + two episodes of watery diarrhea today as per patient    ALLERGIES:  penicillin (Unknown)    MEDICATIONS  (STANDING):  allopurinol 300 milliGRAM(s) Oral daily  clotrimazole 1% Cream 1 Application(s) Topical two times a day  Dakins Solution - 1/2 Strength 1 Application(s) Topical daily  enoxaparin Injectable 40 milliGRAM(s) SubCutaneous daily  finasteride 5 milliGRAM(s) Oral daily  furosemide    Tablet 80 milliGRAM(s) Oral daily  levothyroxine 125 MICROGram(s) Oral daily  morphine  - Injectable 4 milliGRAM(s) IV Push once  pantoprazole    Tablet 40 milliGRAM(s) Oral before breakfast  predniSONE   Tablet 30 milliGRAM(s) Oral daily  tamsulosin 0.4 milliGRAM(s) Oral at bedtime  vancomycin  IVPB 1000 milliGRAM(s) IV Intermittent daily    MEDICATIONS  (PRN):  acetaminophen   Tablet .. 650 milliGRAM(s) Oral every 6 hours PRN Mild Pain (1 - 3)  traMADol 50 milliGRAM(s) Oral every 8 hours PRN Moderate Pain (4 - 6)    Vital Signs Last 24 Hrs  T(F): 98.4 (02 Sep 2019 05:55), Max: 98.5 (01 Sep 2019 16:12)  HR: 87 (02 Sep 2019 05:55) (74 - 88)  BP: 121/69 (02 Sep 2019 05:55) (107/50 - 121/69)  RR: 16 (02 Sep 2019 05:55) (16 - 17)  SpO2: 99% (02 Sep 2019 05:55) (98% - 99%)  I&O's Summary    01 Sep 2019 07:01  -  02 Sep 2019 07:00  --------------------------------------------------------  IN: 560 mL / OUT: 0 mL / NET: 560 mL          GENERAL: NAD, well-developed  HEAD:  Atraumatic, Normocephalic  EYES: EOMI, PERRLA, conjunctiva and sclera clear  NECK: Supple  CHEST/LUNG: Clear to auscultation bilaterally; No wheeze  HEART: Regular rate and rhythm; + murmur  ABDOMEN: Soft, Nontender, Nondistended; Bowel sounds present  EXTREMITIES: LLL edema improve. erythema line drawn, erythema improved. L thigh erythema resolved and erythema persists on anterior L knee with tenderness  NEUROLOGY: non-focal  SKIN: No rashes or lesions    LABS:                        10.0   6.19  )-----------( 231      ( 02 Sep 2019 06:00 )             31.6         142  |  106  |  28  ----------------------------<  90  3.1   |  31  |  1.08    Ca    8.3      02 Sep 2019 06:00    TPro  6.6  /  Alb  2.6  /  TBili  0.8  /  DBili  x   /  AST  39  /  ALT  16  /  AlkPhos  68        eGFR if Non African American: 57 mL/min/1.73M2 (19 @ 06:00)  eGFR if African American: 66 mL/min/1.73M2 (19 @ 06:00)    PT/INR - ( 30 Aug 2019 19:25 )   PT: 15.0 sec;   INR: 1.33 ratio         PTT - ( 30 Aug 2019 19:25 )  PTT:34.9 sec  Lactate, Blood: 1.3 mmol/L ( @ 19:25)      Urinalysis Basic - ( 30 Aug 2019 21:57 )    Color: Yellow / Appearance: Clear / S.015 / pH: x  Gluc: x / Ketone: Negative  / Bili: Negative / Urobili: Negative   Blood: x / Protein: 15 / Nitrite: Negative   Leuk Esterase: Trace / RBC: 0-4 /HPF / WBC 0-2 /HPF   Sq Epi: x / Non Sq Epi: Neg.-Few / Bacteria: Negative /HPF        Culture - Urine (collected 30 Aug 2019 21:57)  Source: .Urine Clean Catch (Midstream)  Final Report (01 Sep 2019 07:00):    No growth    Culture - Blood (collected 30 Aug 2019 19:25)  Source: .Blood Blood-Peripheral  Preliminary Report (01 Sep 2019 02:01):    No growth to date.    Culture - Blood (collected 30 Aug 2019 19:25)  Source: .Blood Blood-Peripheral  Preliminary Report (01 Sep 2019 02:01):    No growth to date.      RADIOLOGY & ADDITIONAL TESTS:    Care Discussed with Consultants/Other Providers: Dr. tejeda

## 2019-09-03 LAB
ANION GAP SERPL CALC-SCNC: 7 MMOL/L — SIGNIFICANT CHANGE UP (ref 5–17)
BASOPHILS # BLD AUTO: 0.01 K/UL — SIGNIFICANT CHANGE UP (ref 0–0.2)
BASOPHILS NFR BLD AUTO: 0.1 % — SIGNIFICANT CHANGE UP (ref 0–2)
BUN SERPL-MCNC: 28 MG/DL — HIGH (ref 7–23)
CALCIUM SERPL-MCNC: 7.9 MG/DL — LOW (ref 8.4–10.5)
CHLORIDE SERPL-SCNC: 105 MMOL/L — SIGNIFICANT CHANGE UP (ref 96–108)
CO2 SERPL-SCNC: 29 MMOL/L — SIGNIFICANT CHANGE UP (ref 22–31)
CREAT SERPL-MCNC: 1.02 MG/DL — SIGNIFICANT CHANGE UP (ref 0.5–1.3)
EOSINOPHIL # BLD AUTO: 0.01 K/UL — SIGNIFICANT CHANGE UP (ref 0–0.5)
EOSINOPHIL NFR BLD AUTO: 0.1 % — SIGNIFICANT CHANGE UP (ref 0–6)
GLUCOSE BLDC GLUCOMTR-MCNC: 159 MG/DL — HIGH (ref 70–99)
GLUCOSE SERPL-MCNC: 111 MG/DL — HIGH (ref 70–99)
HCT VFR BLD CALC: 30.8 % — LOW (ref 39–50)
HGB BLD-MCNC: 9.8 G/DL — LOW (ref 13–17)
IMM GRANULOCYTES NFR BLD AUTO: 0.4 % — SIGNIFICANT CHANGE UP (ref 0–1.5)
LYMPHOCYTES # BLD AUTO: 0.71 K/UL — LOW (ref 1–3.3)
LYMPHOCYTES # BLD AUTO: 10.5 % — LOW (ref 13–44)
MCHC RBC-ENTMCNC: 28.4 PG — SIGNIFICANT CHANGE UP (ref 27–34)
MCHC RBC-ENTMCNC: 31.8 GM/DL — LOW (ref 32–36)
MCV RBC AUTO: 89.3 FL — SIGNIFICANT CHANGE UP (ref 80–100)
MONOCYTES # BLD AUTO: 0.6 K/UL — SIGNIFICANT CHANGE UP (ref 0–0.9)
MONOCYTES NFR BLD AUTO: 8.9 % — SIGNIFICANT CHANGE UP (ref 2–14)
NEUTROPHILS # BLD AUTO: 5.39 K/UL — SIGNIFICANT CHANGE UP (ref 1.8–7.4)
NEUTROPHILS NFR BLD AUTO: 80 % — HIGH (ref 43–77)
NRBC # BLD: 0 /100 WBCS — SIGNIFICANT CHANGE UP (ref 0–0)
PLATELET # BLD AUTO: 227 K/UL — SIGNIFICANT CHANGE UP (ref 150–400)
POTASSIUM SERPL-MCNC: 3.7 MMOL/L — SIGNIFICANT CHANGE UP (ref 3.5–5.3)
POTASSIUM SERPL-SCNC: 3.7 MMOL/L — SIGNIFICANT CHANGE UP (ref 3.5–5.3)
RBC # BLD: 3.45 M/UL — LOW (ref 4.2–5.8)
RBC # FLD: 16 % — HIGH (ref 10.3–14.5)
SODIUM SERPL-SCNC: 141 MMOL/L — SIGNIFICANT CHANGE UP (ref 135–145)
VANCOMYCIN TROUGH SERPL-MCNC: 5.8 UG/ML — LOW (ref 10–20)
WBC # BLD: 6.75 K/UL — SIGNIFICANT CHANGE UP (ref 3.8–10.5)
WBC # FLD AUTO: 6.75 K/UL — SIGNIFICANT CHANGE UP (ref 3.8–10.5)

## 2019-09-03 PROCEDURE — 99223 1ST HOSP IP/OBS HIGH 75: CPT

## 2019-09-03 PROCEDURE — 99233 SBSQ HOSP IP/OBS HIGH 50: CPT

## 2019-09-03 PROCEDURE — 73562 X-RAY EXAM OF KNEE 3: CPT | Mod: 26,LT

## 2019-09-03 RX ORDER — DOCUSATE SODIUM 100 MG
100 CAPSULE ORAL
Refills: 0 | Status: DISCONTINUED | OUTPATIENT
Start: 2019-09-03 | End: 2019-09-04

## 2019-09-03 RX ADMIN — TRAMADOL HYDROCHLORIDE 50 MILLIGRAM(S): 50 TABLET ORAL at 10:13

## 2019-09-03 RX ADMIN — Medication 80 MILLIGRAM(S): at 06:12

## 2019-09-03 RX ADMIN — Medication 1 APPLICATION(S): at 06:12

## 2019-09-03 RX ADMIN — Medication 300 MILLIGRAM(S): at 10:11

## 2019-09-03 RX ADMIN — Medication 250 MILLIGRAM(S): at 20:23

## 2019-09-03 RX ADMIN — FINASTERIDE 5 MILLIGRAM(S): 5 TABLET, FILM COATED ORAL at 10:10

## 2019-09-03 RX ADMIN — PANTOPRAZOLE SODIUM 40 MILLIGRAM(S): 20 TABLET, DELAYED RELEASE ORAL at 06:12

## 2019-09-03 RX ADMIN — TAMSULOSIN HYDROCHLORIDE 0.4 MILLIGRAM(S): 0.4 CAPSULE ORAL at 20:24

## 2019-09-03 RX ADMIN — Medication 1 APPLICATION(S): at 10:11

## 2019-09-03 RX ADMIN — Medication 1 APPLICATION(S): at 18:37

## 2019-09-03 RX ADMIN — Medication 100 MILLIGRAM(S): at 18:37

## 2019-09-03 RX ADMIN — TRAMADOL HYDROCHLORIDE 50 MILLIGRAM(S): 50 TABLET ORAL at 11:13

## 2019-09-03 RX ADMIN — Medication 30 MILLIGRAM(S): at 06:12

## 2019-09-03 RX ADMIN — ENOXAPARIN SODIUM 40 MILLIGRAM(S): 100 INJECTION SUBCUTANEOUS at 10:11

## 2019-09-03 RX ADMIN — Medication 125 MICROGRAM(S): at 06:12

## 2019-09-03 NOTE — PROGRESS NOTE ADULT - SUBJECTIVE AND OBJECTIVE BOX
Patient is a 89y old  Male who presents with a chief complaint of fever (02 Sep 2019 14:38)      Patient seen and examined at bedside. No overnight events reported. Pt reports left knee pain improved. No more diarrhea and complained of constipation today.    ALLERGIES:  penicillin (Unknown)    MEDICATIONS  (STANDING):  allopurinol 300 milliGRAM(s) Oral daily  clotrimazole 1% Cream 1 Application(s) Topical two times a day  Dakins Solution - 1/2 Strength 1 Application(s) Topical daily  enoxaparin Injectable 40 milliGRAM(s) SubCutaneous daily  finasteride 5 milliGRAM(s) Oral daily  furosemide    Tablet 80 milliGRAM(s) Oral daily  levothyroxine 125 MICROGram(s) Oral daily  pantoprazole    Tablet 40 milliGRAM(s) Oral before breakfast  predniSONE   Tablet 30 milliGRAM(s) Oral daily  tamsulosin 0.4 milliGRAM(s) Oral at bedtime  vancomycin  IVPB 1000 milliGRAM(s) IV Intermittent daily    MEDICATIONS  (PRN):  acetaminophen   Tablet .. 650 milliGRAM(s) Oral every 6 hours PRN Mild Pain (1 - 3)  traMADol 50 milliGRAM(s) Oral every 8 hours PRN Moderate Pain (4 - 6)    Vital Signs Last 24 Hrs  T(F): 97.7 (03 Sep 2019 09:58), Max: 98.4 (02 Sep 2019 16:08)  HR: 59 (03 Sep 2019 09:58) (59 - 78)  BP: 119/40 (03 Sep 2019 09:58) (116/64 - 120/61)  RR: 16 (03 Sep 2019 09:58) (16 - 18)  SpO2: 99% (03 Sep 2019 09:58) (99% - 100%)  I&O's Summary    02 Sep 2019 07:01  -  03 Sep 2019 07:00  --------------------------------------------------------  IN: 800 mL / OUT: 0 mL / NET: 800 mL    GENERAL: NAD  HEAD:  Atraumatic, Normocephalic  EYES: EOMI, PERRLA, conjunctiva and sclera clear  NECK: Supple  CHEST/LUNG: Clear to auscultation bilaterally; No wheeze  HEART: Regular rate and rhythm; No murmurs, rubs, or gallops  ABDOMEN: Soft, Nontender, Nondistended; Bowel sounds present  EXTREMITIES: draining opened blisters on left ankle. erythema and swelling on left lower leg markedly improved. persistent erythema on anterior knee, but tenderness improved.   NEUROLOGY: non-focal  SKIN: No rashes or lesions    LABS:                        9.8    6.75  )-----------( 227      ( 03 Sep 2019 05:30 )             30.8     09-03    141  |  105  |  28  ----------------------------<  111  3.7   |  29  |  1.02    Ca    7.9      03 Sep 2019 05:30      eGFR if Non African American: 61 mL/min/1.73M2 (09-03-19 @ 05:30)  eGFR if : 71 mL/min/1.73M2 (09-03-19 @ 05:30)    Culture - Urine (collected 30 Aug 2019 21:57)  Source: .Urine Clean Catch (Midstream)  Final Report (01 Sep 2019 07:00):    No growth    Culture - Blood (collected 30 Aug 2019 19:25)  Source: .Blood Blood-Peripheral  Preliminary Report (01 Sep 2019 02:01):    No growth to date.    Culture - Blood (collected 30 Aug 2019 19:25)  Source: .Blood Blood-Peripheral  Preliminary Report (01 Sep 2019 02:01):    No growth to date.      RADIOLOGY & ADDITIONAL TESTS:    Care Discussed with Consultants/Other Providers:

## 2019-09-03 NOTE — CONSULT NOTE ADULT - ASSESSMENT
89 yo M with PMH significant for prior hx of gout, quiescent on allopurinol and sUA in range on this admission, open skin lesions 2/2 pemphigoid and recent admission for cellulitis, admitted with fevers, worsening LLE lymphangitis, and AMS. Now s/p significant improvement in lymphangitis on Abx, Bcx negative, and erythema receding from prior delineation but still overlying L knee. On exam L knee without large intraarticular effusion, most appears to be overlying soft tissue, and no TTP over joint lines. Given infection, there is concern for septic joint but this is not as likely as joint appears to be improving rather than worsening and appears responsive to prednisone. 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 Did offer arthrocentesis to evaluate but pt not amenable at present. Would like to avoid procedure until after he has attempted to ambulate on it tomorrow with PT.     - c/w Abx per ID, prednisone 30mg daily for now  - PT to work with patient tomorrow to improve mobility  - if continued pain in L knee limiting ambulation or worsening pain/ new fevers/ concern for septic joint would recommend tap -- will return tomorrow afternoon to evaluate if patient will be amenable after PT

## 2019-09-03 NOTE — DIETITIAN INITIAL EVALUATION ADULT. - OTHER INFO
89 y/o M w/ PMH including AFIB, BPH, constipation, Dementia, edema, GERD, Gout, Hypothyroidism admitted for cellulitis of b/l LE. Patient noted with (+2) LE edema noted. PO intake noted to vary from %, patient noted confused however reports a good appetite. Patient with c/o constipation today however noted with diarrhea yesterday colace & senna discontinued (9/2). Will provide prune juice on tray. Patient reports no problem chewing or swallowing.

## 2019-09-03 NOTE — CHART NOTE - NSCHARTNOTEFT_GEN_A_CORE
contacted earlier by Dr Lechuga that her office was contacted for rheumatology consult by mistake.   Contacted Dr Mallory Alonso rheumatology service [phone] for consult.

## 2019-09-03 NOTE — DIETITIAN INITIAL EVALUATION ADULT. - 25 CAL
Problem: PHYSICAL THERAPY ADULT  Goal: Performs mobility at highest level of function for planned discharge setting  See evaluation for individualized goals  Treatment/Interventions: Functional transfer training, LE strengthening/ROM, Therapeutic exercise, Endurance training, Patient/family training, Bed mobility, Gait training, Equipment eval/education  Equipment Recommended: Elena Burgess       See flowsheet documentation for full assessment, interventions and recommendations  Prognosis: Good  Problem List: Decreased strength, Decreased range of motion, Decreased endurance, Impaired balance, Decreased mobility, Impaired judgement, Decreased cognition, Decreased safety awareness, Pain  Assessment: Pt seen for high complexity physical therapy evaluation  Pt is a 77 y/o male w/ history/comorbidities of recent R hip re-implant p periprosthetic fx, CAD, PTCA/stent, HTN, HLD, DM II who is admitted from [facility] for aciute L sided weakness, dysarthria, as well as HA, R thigh pain  Given tPA on admit  Dx is r/o CVA, as well as R hip hematoma  CT and MRI both - for CVA  Given pain, fall risk, unclear medical dx, and need for ICU monitoring, note unstable clinical picture  PT consulted to assess mobility, d/c needs  Pt presents w/ decreased functional mob, standing and sitting balance, endurance, B LE strength R > L due to R LE pain, minimally decreased L LE coordination, barriers at home  will benefit from skilled PT to correct for the above problems  Recommend return to rehab when stable        Recommendation:  (recommend return to rehab when stable)     PT - OK to Discharge: (S) Yes (to rehab when stable)    See flowsheet documentation for full assessment  [id number]

## 2019-09-03 NOTE — PROGRESS NOTE ADULT - ASSESSMENT
89 yo M, h/o longstanding leg edema and pemphigoid, scale, blisters  Here recently for concerns of cellulitis R leg, but afebrile, normal WBC, all c/w stasis only  He returns with fever, brief lethargy and more prominent L LE erythema  T 102, findings now c/w L thigh lymphangitis  Afebrile, alert, thigh erythema resolved on Vanco  L knee pain,  joint effusion noted- better today, Prednisone started; Xray suggestive of CPPD  Bld Cxs negative  MRSA screen not performed    Plan:  Continue Vanco for now- lymphangitis resolved  Wound care as outlined  If continues to improve, d/c planning with completion Tx for cellulitis via po regimen- Keflex 500 mg po TID x 5 days should suffice

## 2019-09-03 NOTE — CDI QUERY NOTE - NSCDIOTHERTXTBX_GEN_ALL_CORE_HH
Presents with cellulitis/lymphangitis.    ER note - · Presenting Symptoms: confusion.    ID note - referred to ED last night b/c of mild lethargy.      Please provide your diagnosis for associated with confusion, lethargy.        Thank you

## 2019-09-03 NOTE — CONSULT NOTE ADULT - SUBJECTIVE AND OBJECTIVE BOX
AME PERDUEGAETANO  [id number]    HISTORY OF PRESENT ILLNESS: 89 y/o M w/ pmh including afib, bph, constipation, dementia, edema, gerd, gout, hypothyroidism with recent admission for LE edema/cellulitis, sent in from the [facility] for increased edema B/L, LLE redness, fever, confusion, decreased PO intake x 1 day in setting of Tm 102 on day of admission and open wounds on LE. Has been on Abx for lymphangitis of LLE with significant improvement in pain and swelling. Consulted to evaluate L knee effusion in setting of surrounding lymphangitis. Was started on prednisone 30mg for suspected concomitant crystalline arthritis flare given chondrocalcinosis on imaging with significantly less TTP over joint reported by patient. Reports prior hx of gout in toes, never crystal proven and quiescent on allopurinol in the last few years. Pt declined arthrocentesis.     PAST MEDICAL & SURGICAL HISTORY:  Spinal stenosis  Atrial fibrillation, unspecified type  Arthritis  CN (constipation)  BPH (benign prostatic hyperplasia)  GERD (gastroesophageal reflux disease)  Hypothyroidism  Gout  Edema  Dementia  S/P hernia repair    FAMILY HISTORY: No pertinent family history in first degree relatives    Review of Systems:  Gen:  No further fevers/chills since admission  HEENT: No blurry vision  CVS: No chest pain/palpitations  Resp: No SOB/wheezing  GI: No N/V/C/abdominal pain, prior diarrhea has resolved   MSK: improving L knee pain s/p steroids, improving pain/swelling in LLE on Abx  Skin: Improving LLE erythema, open lesions on L foot, ecchymoses diffusely on b/l UE  Neuro: No headaches    MEDICATIONS  (STANDING):  allopurinol 300 milliGRAM(s) Oral daily  clotrimazole 1% Cream 1 Application(s) Topical two times a day  Dakins Solution - 1/2 Strength 1 Application(s) Topical daily  docusate sodium 100 milliGRAM(s) Oral two times a day  enoxaparin Injectable 40 milliGRAM(s) SubCutaneous daily  finasteride 5 milliGRAM(s) Oral daily  furosemide    Tablet 80 milliGRAM(s) Oral daily  levothyroxine 125 MICROGram(s) Oral daily  pantoprazole    Tablet 40 milliGRAM(s) Oral before breakfast  predniSONE   Tablet 30 milliGRAM(s) Oral daily  tamsulosin 0.4 milliGRAM(s) Oral at bedtime  vancomycin  IVPB 1000 milliGRAM(s) IV Intermittent daily    MEDICATIONS  (PRN):  acetaminophen   Tablet .. 650 milliGRAM(s) Oral every 6 hours PRN Mild Pain (1 - 3)  traMADol 50 milliGRAM(s) Oral every 8 hours PRN Moderate Pain (4 - 6)      Allergies --penicillin (Unknown)    Vital Signs Last 24 Hrs  T(C): 37 (03 Sep 2019 20:32), Max: 37 (03 Sep 2019 20:32)  T(F): 98.6 (03 Sep 2019 20:32), Max: 98.6 (03 Sep 2019 20:32)  HR: 64 (03 Sep 2019 20:32) (59 - 66)  BP: 124/99 (03 Sep 2019 20:32) (116/64 - 133/57)  BP(mean): 60 (03 Sep 2019 09:58) (60 - 60)  RR: 17 (03 Sep 2019 20:32) (16 - 18)  SpO2: 97% (03 Sep 2019 20:32) (97% - 100%)    Physical Exam:  General: NAD, awake, alert, answering all questions appropriately   HEENT: EOMI, MMM  CVS: +S1/S2, RRR  Resp: CTA b/l  GI: Soft, NT/ND  MSK: LLE with erythema from knee distally with pitting edema and warmth, erythema seems to have receeded from previous demarcation. No appreciable joint effusion, appears to be more soft tissue swelling, no TTP over medial or lateral joint lines. Lower part of leg wrapped in ACE wrap, + wounds on dorsum of foot, dressings clean/dry/intact.   Skin: ecchymoses diffusely on b/l UE, no bullae or unroofed lesions aside from L foot appreciated.     LABS:                        9.8    6.75  )-----------( 227      ( 03 Sep 2019 05:30 )             30.8     09-03    141  |  105  |  28<H>  ----------------------------<  111<H>  3.7   |  29  |  1.02    Ca    7.9<L>      03 Sep 2019 05:30    RADIOLOGY & ADDITIONAL STUDIES:  < from: Xray Knee 3 Views, Left (09.03.19 @ 15:49) >Tricompartmental osteoarthritis and small joint effusion. Chondrocalcinosis suggestive of CPPD

## 2019-09-03 NOTE — PROGRESS NOTE ADULT - ASSESSMENT
89 y/o M w/ pmh including afib, bph, constipation, dementia, edema, gerd, gout, hypothyroidism with recent admit for LE edema/cellulitis sent in from the [facility] for increased edema B/L, redness, fever, confusion, decreased PO intake x 1 day. Pt was febrile T 102 in ED.      # confusion/ lethargy 2/2 metabolic encephalopathy 2/2 LLL lymphangitis   - resolved    # left leg lymphangitis - resolving  - h/o bullous pemphigoid and recent candida pedis   - per ID, c/w Vancomycin.   - Vanco trough low 5.8  - c/w Clotrimazole between the toes  - Blood culture: negative  - venous doppler: neg DVT  - PT consult    # Bullous pemphigoid  - c/w local wound care as per plastic consult    # left knee pain/ swelling in the setting of h/o gout  - pt states that knee pain is improving with prednisone.  - r/o gout flair r/o secondary infection  - c/w Vancomycin  - Prednisone 30mg qD and continue with allopurinol  - f/u uric acid level, US left knee, X ray of knee   - follow up rheum consult    # Diarrhea - resolved.     # hyperkalemia - resolved. K 3.7 today.    # BPH   - c/w flomax    # A fib not on AC  - HR controlled    # leg edema  - pt is on lasix 80mg qD from home.     # GERD   - c/w protonix    # h/o gout  - c/w allopurinol    # Hypothyroid  - cont synthroid     # DVT ppx: lovenox 40mg subq

## 2019-09-03 NOTE — PROGRESS NOTE ADULT - SUBJECTIVE AND OBJECTIVE BOX
CC: f/u for L LE cellulitis    Patient reports L knee pain less, no diarrhea    REVIEW OF SYSTEMS:  All other review of systems negative (Comprehensive ROS)    Antimicrobials Day # 4  vancomycin  IVPB 1000 milliGRAM(s) IV Intermittent daily  Prednisone started yest    Other Medications Reviewed    Vital Signs Last 24 Hrs  T(F): 97.7 (03 Sep 2019 15:53), Max: 97.9 (02 Sep 2019 22:07)  HR: 66 (03 Sep 2019 15:53) (59 - 70)  BP: 133/57 (03 Sep 2019 15:53) (116/64 - 133/57)  BP(mean): 60 (03 Sep 2019 09:58) (60 - 60)  RR: 16 (03 Sep 2019 15:53) (16 - 18)  SpO2: 99% (03 Sep 2019 15:53) (99% - 100%)    PHYSICAL EXAM:  General: alert, no acute distress  Eyes:  anicteric, no conjunctival injection, no discharge  Oropharynx: no lesions or injection 	  Neck: supple, without adenopathy  Lungs: clear to auscultation  Heart: regular rate and rhythm; no murmur, rubs or gallops  Abdomen: soft, nondistended, nontender, without mass or organomegaly  Skin: scale, stasis legs, foot blisters  Extremities: b/l leg edema improved; L thigh erythema resolved; erythema L knee fading. less swelling  Neurologic: alert, moves all extremities    LAB RESULTS:                        10.0   6.19  )-----------( 231      ( 02 Sep 2019 06:00 )             31.6   09-    142  |  106  |  28<H>  ----------------------------<  90  3.1<L>   |  31  |  1.08    Ca    8.3<L>      02 Sep 2019 06:00      Urinalysis Basic - ( 30 Aug 2019 21:57 )    Color: Yellow / Appearance: Clear / S.015 / pH: x  Gluc: x / Ketone: Negative  / Bili: Negative / Urobili: Negative   Blood: x / Protein: 15 / Nitrite: Negative   Leuk Esterase: Trace / RBC: 0-4 /HPF / WBC 0-2 /HPF   Sq Epi: x / Non Sq Epi: Neg.-Few / Bacteria: Negative /HPF    MICROBIOLOGY:  RECENT CULTURES:   @ 21:57 .Urine Clean Catch (Midstream)     No growth     @ 19:25 .Blood Blood-Peripheral     No growth to date.    RADIOLOGY REVIEWED:  Xray Knee 3 Views, Left (19 @ 15:49) >  Tricompartmental osteoarthritis and small joint effusion  Chondrocalcinosis suggestive of CPPD..      US Joint Complete, Left (19 @ 09:41) >  Attention to the area of clinical concern involving the anterior left   knee demonstrates a roughly 8 x 6 x 1 cm collection most suggestive of a   joint effusion.

## 2019-09-04 ENCOUNTER — TRANSCRIPTION ENCOUNTER (OUTPATIENT)
Age: 84
End: 2019-09-04

## 2019-09-04 VITALS
SYSTOLIC BLOOD PRESSURE: 120 MMHG | TEMPERATURE: 98 F | HEART RATE: 78 BPM | DIASTOLIC BLOOD PRESSURE: 56 MMHG | RESPIRATION RATE: 16 BRPM | OXYGEN SATURATION: 100 %

## 2019-09-04 LAB
ANION GAP SERPL CALC-SCNC: 5 MMOL/L — SIGNIFICANT CHANGE UP (ref 5–17)
BASOPHILS # BLD AUTO: 0.02 K/UL — SIGNIFICANT CHANGE UP (ref 0–0.2)
BASOPHILS NFR BLD AUTO: 0.2 % — SIGNIFICANT CHANGE UP (ref 0–2)
BUN SERPL-MCNC: 28 MG/DL — HIGH (ref 7–23)
CALCIUM SERPL-MCNC: 8.4 MG/DL — SIGNIFICANT CHANGE UP (ref 8.4–10.5)
CHLORIDE SERPL-SCNC: 105 MMOL/L — SIGNIFICANT CHANGE UP (ref 96–108)
CO2 SERPL-SCNC: 32 MMOL/L — HIGH (ref 22–31)
CREAT SERPL-MCNC: 1.11 MG/DL — SIGNIFICANT CHANGE UP (ref 0.5–1.3)
EOSINOPHIL # BLD AUTO: 0.21 K/UL — SIGNIFICANT CHANGE UP (ref 0–0.5)
EOSINOPHIL NFR BLD AUTO: 2.2 % — SIGNIFICANT CHANGE UP (ref 0–6)
GLUCOSE SERPL-MCNC: 82 MG/DL — SIGNIFICANT CHANGE UP (ref 70–99)
HCT VFR BLD CALC: 33.8 % — LOW (ref 39–50)
HGB BLD-MCNC: 10.8 G/DL — LOW (ref 13–17)
IMM GRANULOCYTES NFR BLD AUTO: 0.5 % — SIGNIFICANT CHANGE UP (ref 0–1.5)
LYMPHOCYTES # BLD AUTO: 1.79 K/UL — SIGNIFICANT CHANGE UP (ref 1–3.3)
LYMPHOCYTES # BLD AUTO: 18.8 % — SIGNIFICANT CHANGE UP (ref 13–44)
MCHC RBC-ENTMCNC: 29.2 PG — SIGNIFICANT CHANGE UP (ref 27–34)
MCHC RBC-ENTMCNC: 32 GM/DL — SIGNIFICANT CHANGE UP (ref 32–36)
MCV RBC AUTO: 91.4 FL — SIGNIFICANT CHANGE UP (ref 80–100)
MONOCYTES # BLD AUTO: 0.84 K/UL — SIGNIFICANT CHANGE UP (ref 0–0.9)
MONOCYTES NFR BLD AUTO: 8.8 % — SIGNIFICANT CHANGE UP (ref 2–14)
NEUTROPHILS # BLD AUTO: 6.61 K/UL — SIGNIFICANT CHANGE UP (ref 1.8–7.4)
NEUTROPHILS NFR BLD AUTO: 69.5 % — SIGNIFICANT CHANGE UP (ref 43–77)
NRBC # BLD: 0 /100 WBCS — SIGNIFICANT CHANGE UP (ref 0–0)
PLATELET # BLD AUTO: 258 K/UL — SIGNIFICANT CHANGE UP (ref 150–400)
POTASSIUM SERPL-MCNC: 3.3 MMOL/L — LOW (ref 3.5–5.3)
POTASSIUM SERPL-SCNC: 3.3 MMOL/L — LOW (ref 3.5–5.3)
RBC # BLD: 3.7 M/UL — LOW (ref 4.2–5.8)
RBC # FLD: 15.9 % — HIGH (ref 10.3–14.5)
SODIUM SERPL-SCNC: 142 MMOL/L — SIGNIFICANT CHANGE UP (ref 135–145)
WBC # BLD: 9.52 K/UL — SIGNIFICANT CHANGE UP (ref 3.8–10.5)
WBC # FLD AUTO: 9.52 K/UL — SIGNIFICANT CHANGE UP (ref 3.8–10.5)

## 2019-09-04 PROCEDURE — 99239 HOSP IP/OBS DSCHRG MGMT >30: CPT

## 2019-09-04 RX ORDER — SENNA PLUS 8.6 MG/1
1 TABLET ORAL AT BEDTIME
Refills: 0 | Status: DISCONTINUED | OUTPATIENT
Start: 2019-09-04 | End: 2019-09-04

## 2019-09-04 RX ORDER — MUPIROCIN 20 MG/G
1 OINTMENT TOPICAL
Qty: 0 | Refills: 0 | DISCHARGE
Start: 2019-09-04

## 2019-09-04 RX ORDER — CEPHALEXIN 500 MG
1 CAPSULE ORAL
Qty: 0 | Refills: 0 | DISCHARGE

## 2019-09-04 RX ORDER — POTASSIUM CHLORIDE 20 MEQ
40 PACKET (EA) ORAL ONCE
Refills: 0 | Status: COMPLETED | OUTPATIENT
Start: 2019-09-04 | End: 2019-09-04

## 2019-09-04 RX ORDER — MUPIROCIN 20 MG/G
1 OINTMENT TOPICAL
Refills: 0 | Status: DISCONTINUED | OUTPATIENT
Start: 2019-09-05 | End: 2019-09-04

## 2019-09-04 RX ORDER — COLLAGENASE CLOSTRIDIUM HIST. 250 UNIT/G
1 OINTMENT (GRAM) TOPICAL
Qty: 0 | Refills: 0 | DISCHARGE
Start: 2019-09-04

## 2019-09-04 RX ADMIN — Medication 80 MILLIGRAM(S): at 05:08

## 2019-09-04 RX ADMIN — Medication 30 MILLIGRAM(S): at 05:09

## 2019-09-04 RX ADMIN — Medication 100 MILLIGRAM(S): at 05:07

## 2019-09-04 RX ADMIN — TRAMADOL HYDROCHLORIDE 50 MILLIGRAM(S): 50 TABLET ORAL at 10:40

## 2019-09-04 RX ADMIN — Medication 1 APPLICATION(S): at 10:36

## 2019-09-04 RX ADMIN — FINASTERIDE 5 MILLIGRAM(S): 5 TABLET, FILM COATED ORAL at 10:36

## 2019-09-04 RX ADMIN — Medication 40 MILLIEQUIVALENT(S): at 10:37

## 2019-09-04 RX ADMIN — Medication 125 MICROGRAM(S): at 05:08

## 2019-09-04 RX ADMIN — ENOXAPARIN SODIUM 40 MILLIGRAM(S): 100 INJECTION SUBCUTANEOUS at 10:37

## 2019-09-04 RX ADMIN — TRAMADOL HYDROCHLORIDE 50 MILLIGRAM(S): 50 TABLET ORAL at 11:15

## 2019-09-04 RX ADMIN — Medication 1 APPLICATION(S): at 05:07

## 2019-09-04 RX ADMIN — PANTOPRAZOLE SODIUM 40 MILLIGRAM(S): 20 TABLET, DELAYED RELEASE ORAL at 05:08

## 2019-09-04 RX ADMIN — Medication 300 MILLIGRAM(S): at 10:36

## 2019-09-04 NOTE — PROGRESS NOTE ADULT - ASSESSMENT
89 y/o M w/ pmh including afib, bph, constipation, dementia, edema, gerd, gout, hypothyroidism with recent admit for LE edema/cellulitis sent in from the [facility] for increased edema B/L, redness, fever, confusion, decreased PO intake x 1 day. Pt was febrile T 102 in ED.      # confusion/ lethargy 2/2 metabolic encephalopathy 2/2 LLL lymphangitis   - resolved    # left leg lymphangitis - resolving   - h/o bullous pemphigoid and recent candida pedis   - per ID, switch to Keflex 500mg PO TID X 5 days  - c/w Clotrimazole between the toes  - Blood culture: negative  - venous doppler: neg DVT  - PT consult    # Bullous pemphigoid  - c/w local wound care as per plastic consult: Santyl daily with gauze and diana. apply ace bandaging when OOBC and elevated leg when in bed.    # left knee pain/ swelling  likely 2/2 pseudogout  left knee X ray: chondrocalcinosis suggestive of CPPD  - c/w prednisone 30mg total of 5 days.   - follow up rheum consult    # Constipation - dulcolax today    # hyperkalemia - K 3.3 today. replete     # BPH   - c/w flomax    # A fib not on AC  - HR controlled    # leg edema  - pt is on lasix 80mg qD from home.     # GERD   - c/w protonix    # h/o gout  - c/w allopurinol    # Hypothyroid  - cont synthroid     # DVT ppx: lovenox 40mg subq    DISPO: DC today to [facility]. spend 33 min on discharge today.

## 2019-09-04 NOTE — PROGRESS NOTE ADULT - PROVIDER SPECIALTY LIST ADULT
Infectious Disease
Internal Medicine
Plastic Surgery
Internal Medicine

## 2019-09-04 NOTE — DISCHARGE NOTE PROVIDER - CARE PROVIDER_API CALL
Olvin Hernandez (DO)  Internal Medicine  [address] [id number]  Phone: [phone]  Fax: [phone]  Follow Up Time:

## 2019-09-04 NOTE — PROGRESS NOTE ADULT - ASSESSMENT
89 yo M, h/o longstanding leg edema and pemphigoid, scale, blisters  Here recently for concerns of cellulitis R leg, but afebrile, normal WBC, all c/w stasis only  He returns with fever, brief lethargy and more prominent L LE erythema  T 102, findings c/w L thigh lymphangitis  Afebrile, alert, thigh erythema resolved on Vanco  L knee pain,  joint effusion noted- better with addition of Prednisone; Xray suggestive of CPPD  Bld Cxs negative  MRSA screen not performed    Plan:  D/c planning to rehab  Wound care as outlined  Keflex 500 mg po TID x 5 days should suffice- expect he would tolerate even with PCN allergy- suspect Beta Strep infection most likely here  D/w Dr Man

## 2019-09-04 NOTE — DISCHARGE NOTE NURSING/CASE MANAGEMENT/SOCIAL WORK - PATIENT PORTAL LINK FT
You can access the FollowMyHealth Patient Portal offered by [facility] by registering at the following website: [email or web address]. By joining [facility]’s FollowMyHealth portal, you will also be able to view your health information using other applications (apps) compatible with our system.

## 2019-09-04 NOTE — DISCHARGE NOTE PROVIDER - NSDCCPCAREPLAN_GEN_ALL_CORE_FT
PRINCIPAL DISCHARGE DIAGNOSIS  Diagnosis: Acute lymphangitis of limb  Assessment and Plan of Treatment: PRINCIPAL DISCHARGE DIAGNOSIS  Diagnosis: Acute lymphangitis of limb  Assessment and Plan of Treatment:       SECONDARY DISCHARGE DIAGNOSES  Diagnosis: Bullous pemphigoid  Assessment and Plan of Treatment: Wound Care instruction:   clean left leg and foot with normal saline, pat dry, apply bactroban ointment and leave to air. Wound care daily.

## 2019-09-04 NOTE — PROGRESS NOTE ADULT - SUBJECTIVE AND OBJECTIVE BOX
CC: f/u for L LE cellulitis    Patient reports L knee pain continues to improve, only 1 BM today    REVIEW OF SYSTEMS:  All other review of systems negative (Comprehensive ROS)    Antimicrobials Day # 5  vancomycin  IVPB 1000 milliGRAM(s) IV Intermittent daily    Other Medications Reviewed    Vital Signs Last 24 Hrs  T(F): 97.7 (04 Sep 2019 11:05), Max: 98.6 (03 Sep 2019 20:32)  HR: 78 (04 Sep 2019 11:05) (64 - 80)  BP: 120/56 (04 Sep 2019 11:05) (120/56 - 129/71)  BP(mean): --  RR: 16 (04 Sep 2019 11:05) (15 - 17)  SpO2: 100% (04 Sep 2019 11:05) (97% - 100%)    PHYSICAL EXAM:  General: alert, no acute distress  Eyes:  anicteric, no conjunctival injection, no discharge  Oropharynx: no lesions or injection 	  Neck: supple, without adenopathy  Lungs: clear to auscultation  Heart: regular rate and rhythm; no murmur, rubs or gallops  Abdomen: soft, nondistended, nontender, without mass or organomegaly  Skin: scale, stasis legs, foot blisters  Extremities: b/l leg edema much improved; L thigh erythema resolved; erythema L knee almost resolved, less swelling  Neurologic: alert, moves all extremities    LAB RESULTS:                        10.8   9.52  )-----------( 258      ( 04 Sep 2019 05:40 )             33.8   09-04    142  |  105  |  28<H>  ----------------------------<  82  3.3<L>   |  32<H>  |  1.11    Ca    8.4      04 Sep 2019 05:40    Urinalysis Basic - ( 30 Aug 2019 21:57 )    Color: Yellow / Appearance: Clear / S.015 / pH: x  Gluc: x / Ketone: Negative  / Bili: Negative / Urobili: Negative   Blood: x / Protein: 15 / Nitrite: Negative   Leuk Esterase: Trace / RBC: 0-4 /HPF / WBC 0-2 /HPF   Sq Epi: x / Non Sq Epi: Neg.-Few / Bacteria: Negative /HPF    MICROBIOLOGY:  RECENT CULTURES:   @ 21:57 .Urine Clean Catch (Midstream)     No growth     @ 19:25 .Blood Blood-Peripheral     No growth to date.    RADIOLOGY REVIEWED:  Xray Knee 3 Views, Left (19 @ 15:49) >  Tricompartmental osteoarthritis and small joint effusion  Chondrocalcinosis suggestive of CPPD..      US Joint Complete, Left (19 @ 09:41) >  Attention to the area of clinical concern involving the anterior left   knee demonstrates a roughly 8 x 6 x 1 cm collection most suggestive of a   joint effusion.

## 2019-09-04 NOTE — PHYSICAL THERAPY INITIAL EVALUATION ADULT - ADDITIONAL COMMENTS
pt from [facility], uses rolling walker to ambulate, needs assist w/some ADL's

## 2019-09-04 NOTE — DISCHARGE NOTE PROVIDER - HOSPITAL COURSE
89 y/o M w/ pmh including afib, bph, constipation, dementia, edema, gerd, gout, hypothyroidism with recent admit for LE edema/cellulitis sent in from the [facility] for increased edema B/L, redness, fever, confusion, decreased PO intake x 1 day.    Pt found to have L lower leg lymphangitis and left knee pseudogout    - s/p IV vanco and will be discharged on keflex for 5 days    - cont with prednisone for 2 more days    - cont wound care for pemphoid blisters on left ankle        updated pt's PCP with hospital course and discharge plan.

## 2019-09-04 NOTE — DISCHARGE NOTE PROVIDER - NSDCFUSCHEDAPPT_GEN_ALL_CORE_FT
AME PEREZ ; 09/17/2019 ; NPP Card[facility] AME PEREZ ; 09/17/2019 ; NPP Cardio [facility]

## 2019-09-04 NOTE — PROGRESS NOTE ADULT - SUBJECTIVE AND OBJECTIVE BOX
Patient is a 89y old  Male who presents with a chief complaint of fever (03 Sep 2019 22:22)      Patient seen and examined at bedside. No overnight events reported. left knee pain improved per pt.     ALLERGIES:  penicillin (Unknown)    MEDICATIONS  (STANDING):  allopurinol 300 milliGRAM(s) Oral daily  clotrimazole 1% Cream 1 Application(s) Topical two times a day  collagenase Ointment 1 Application(s) Topical daily  docusate sodium 100 milliGRAM(s) Oral two times a day  enoxaparin Injectable 40 milliGRAM(s) SubCutaneous daily  finasteride 5 milliGRAM(s) Oral daily  furosemide    Tablet 80 milliGRAM(s) Oral daily  levothyroxine 125 MICROGram(s) Oral daily  pantoprazole    Tablet 40 milliGRAM(s) Oral before breakfast  predniSONE   Tablet 30 milliGRAM(s) Oral daily  tamsulosin 0.4 milliGRAM(s) Oral at bedtime  vancomycin  IVPB 1000 milliGRAM(s) IV Intermittent daily    MEDICATIONS  (PRN):  acetaminophen   Tablet .. 650 milliGRAM(s) Oral every 6 hours PRN Mild Pain (1 - 3)  traMADol 50 milliGRAM(s) Oral every 8 hours PRN Moderate Pain (4 - 6)    Vital Signs Last 24 Hrs  T(F): 97.7 (04 Sep 2019 11:05), Max: 98.6 (03 Sep 2019 20:32)  HR: 78 (04 Sep 2019 11:05) (64 - 80)  BP: 120/56 (04 Sep 2019 11:05) (120/56 - 133/57)  RR: 16 (04 Sep 2019 11:05) (15 - 17)  SpO2: 100% (04 Sep 2019 11:05) (97% - 100%)  I&O's Summary    03 Sep 2019 07:01  -  04 Sep 2019 07:00  --------------------------------------------------------  IN: 200 mL / OUT: 0 mL / NET: 200 mL    GENERAL: NAD, well-developed  HEAD:  Atraumatic, Normocephalic  EYES: EOMI, PERRLA, conjunctiva and sclera clear  NECK: Supple  CHEST/LUNG: Clear to auscultation bilaterally; No wheeze  HEART: Regular rate and rhythm; + systolic murmurs  ABDOMEN: Soft, Nontender, Nondistended; Bowel sounds present  EXTREMITIES:  left knee swelling and erythema improved.  erythema on LLE significantly improved.   NEUROLOGY: non-focal  SKIN: No rashes or lesions    LABS:                        10.8   9.52  )-----------( 258      ( 04 Sep 2019 05:40 )             33.8     09-04    142  |  105  |  28  ----------------------------<  82  3.3   |  32  |  1.11    Ca    8.4      04 Sep 2019 05:40      eGFR if Non African American: 55 mL/min/1.73M2 (09-04-19 @ 05:40)  eGFR if African American: 64 mL/min/1.73M2 (09-04-19 @ 05:40)      POCT Blood Glucose.: 159 mg/dL (03 Sep 2019 21:38)      Culture - Urine (collected 30 Aug 2019 21:57)  Source: .Urine Clean Catch (Midstream)  Final Report (01 Sep 2019 07:00):    No growth    Culture - Blood (collected 30 Aug 2019 19:25)  Source: .Blood Blood-Peripheral  Preliminary Report (01 Sep 2019 02:01):    No growth to date.    Culture - Blood (collected 30 Aug 2019 19:25)  Source: .Blood Blood-Peripheral  Preliminary Report (01 Sep 2019 02:01):    No growth to date.      RADIOLOGY & ADDITIONAL TESTS:    Care Discussed with Consultants/Other Providers: Dr. tejeda

## 2019-09-04 NOTE — PROGRESS NOTE ADULT - ASSESSMENT
Resolving cellulitis and lymphangitis LLE with decrease inflammation of left knee joint.  No draining bullous lesion of lower leg and foot.  Lesions crusty.  -discontinue santyl and begin bactroban ointment (2 week limit)

## 2019-09-04 NOTE — PROGRESS NOTE ADULT - SUBJECTIVE AND OBJECTIVE BOX
( x ) No complaints (  ) Complains of:   left knee better    T(F): 97.7 (09-04-19 @ 11:05), Max: 98.6 (09-03-19 @ 20:32)  HR: 78 (09-04-19 @ 11:05) (64 - 80)  BP: 120/56 (09-04-19 @ 11:05) (120/56 - 129/71)  RR: 16 (09-04-19 @ 11:05) (15 - 17)  SpO2: 100% (09-04-19 @ 11:05) (97% - 100%)        Wound Location 1:  few unruptured bilsters of left leg and foot, mostly crusty nondraining lesions of medial left leg, forsum of foot and medial and lateral glaborous skin.  Erythema and edema improved.  No prepatellar tenderness                                     10.8   9.52  )-----------( 258      ( 04 Sep 2019 05:40 )             33.8     CBC Full  -  ( 04 Sep 2019 05:40 )  WBC Count : 9.52 K/uL  RBC Count : 3.70 M/uL  Hemoglobin : 10.8 g/dL  Hematocrit : 33.8 %  Platelet Count - Automated : 258 K/uL  Mean Cell Volume : 91.4 fl  Mean Cell Hemoglobin : 29.2 pg  Mean Cell Hemoglobin Concentration : 32.0 gm/dL  Auto Neutrophil # : 6.61 K/uL  Auto Lymphocyte # : 1.79 K/uL  Auto Monocyte # : 0.84 K/uL  Auto Eosinophil # : 0.21 K/uL  Auto Basophil # : 0.02 K/uL  Auto Neutrophil % : 69.5 %  Auto Lymphocyte % : 18.8 %  Auto Monocyte % : 8.8 %  Auto Eosinophil % : 2.2 %  Auto Basophil % : 0.2 %    142|105|28<82  3.3|32|1.11  8.4,--,--  09-04 @ 05:40                  Procedure Performed:  (  )Yes     ( xNo  Name of Procedure:  (  )debridement    (  )I&D    (  )Other:  (  )partial thickness     (  )full thickness     (  )subcutaneous     (  )muscle/tendon     (  )bone  (  )sharp     (  )surgical

## 2019-09-17 ENCOUNTER — APPOINTMENT (OUTPATIENT)
Dept: CARDIOLOGY | Facility: CLINIC | Age: 84
End: 2019-09-17

## 2019-10-01 PROCEDURE — 85610 PROTHROMBIN TIME: CPT

## 2019-10-01 PROCEDURE — 80053 COMPREHEN METABOLIC PANEL: CPT

## 2019-10-01 PROCEDURE — 93005 ELECTROCARDIOGRAM TRACING: CPT

## 2019-10-01 PROCEDURE — 85027 COMPLETE CBC AUTOMATED: CPT

## 2019-10-01 PROCEDURE — 84550 ASSAY OF BLOOD/URIC ACID: CPT

## 2019-10-01 PROCEDURE — 82962 GLUCOSE BLOOD TEST: CPT

## 2019-10-01 PROCEDURE — 85730 THROMBOPLASTIN TIME PARTIAL: CPT

## 2019-10-01 PROCEDURE — 93970 EXTREMITY STUDY: CPT

## 2019-10-01 PROCEDURE — 97163 PT EVAL HIGH COMPLEX 45 MIN: CPT

## 2019-10-01 PROCEDURE — 73562 X-RAY EXAM OF KNEE 3: CPT

## 2019-10-01 PROCEDURE — 80048 BASIC METABOLIC PNL TOTAL CA: CPT

## 2019-10-01 PROCEDURE — 71045 X-RAY EXAM CHEST 1 VIEW: CPT

## 2019-10-01 PROCEDURE — 84100 ASSAY OF PHOSPHORUS: CPT

## 2019-10-01 PROCEDURE — 97162 PT EVAL MOD COMPLEX 30 MIN: CPT

## 2019-10-01 PROCEDURE — 36415 COLL VENOUS BLD VENIPUNCTURE: CPT

## 2019-10-01 PROCEDURE — 87640 STAPH A DNA AMP PROBE: CPT

## 2019-10-01 PROCEDURE — 99285 EMERGENCY DEPT VISIT HI MDM: CPT | Mod: 25

## 2019-10-01 PROCEDURE — 87040 BLOOD CULTURE FOR BACTERIA: CPT

## 2019-10-01 PROCEDURE — 83605 ASSAY OF LACTIC ACID: CPT

## 2019-10-01 PROCEDURE — 36000 PLACE NEEDLE IN VEIN: CPT

## 2019-10-01 PROCEDURE — 80202 ASSAY OF VANCOMYCIN: CPT

## 2019-10-01 PROCEDURE — 76881 US COMPL JOINT R-T W/IMG: CPT

## 2019-10-01 PROCEDURE — 73630 X-RAY EXAM OF FOOT: CPT

## 2019-10-01 PROCEDURE — 81001 URINALYSIS AUTO W/SCOPE: CPT

## 2019-10-01 PROCEDURE — 83735 ASSAY OF MAGNESIUM: CPT

## 2019-10-01 PROCEDURE — 96365 THER/PROPH/DIAG IV INF INIT: CPT

## 2019-10-01 PROCEDURE — 87086 URINE CULTURE/COLONY COUNT: CPT

## 2019-10-12 NOTE — ED ADULT NURSE NOTE - OBJECTIVE STATEMENT
- Continue to monitor renal function. Pt presents A&Ox3 from assisted living facility via EMS for Pt presents A&Ox3 from assisted living facility via EMS for fall while getting out of shower this AM.  Pt states has fallen in the past in similar manner.   Denies LOC.  States falls due to weakness in lower extremities.   Head injury s/p fall today, lac to back of the head.  Lac, bleeding controlled.  Tdap up to date as per pt due to recent fall one month ago.  Pt denies dizziness, SOB, CP, n/v/d/c.

## 2019-12-30 ENCOUNTER — INPATIENT (INPATIENT)
Facility: HOSPITAL | Age: 84
LOS: 3 days | Discharge: NURSING, REHAB OR LONG-TERM CARE | DRG: 194 | End: 2020-01-03
Attending: INTERNAL MEDICINE | Admitting: INTERNAL MEDICINE
Payer: MEDICARE

## 2019-12-30 VITALS
SYSTOLIC BLOOD PRESSURE: 135 MMHG | HEIGHT: 71 IN | DIASTOLIC BLOOD PRESSURE: 58 MMHG | OXYGEN SATURATION: 96 % | HEART RATE: 76 BPM | WEIGHT: 175.05 LBS | RESPIRATION RATE: 16 BRPM | TEMPERATURE: 99 F

## 2019-12-30 DIAGNOSIS — Z98.890 OTHER SPECIFIED POSTPROCEDURAL STATES: Chronic | ICD-10-CM

## 2019-12-30 LAB
ALBUMIN SERPL ELPH-MCNC: 3.2 G/DL — LOW (ref 3.3–5)
ALP SERPL-CCNC: 65 U/L — SIGNIFICANT CHANGE UP (ref 40–120)
ALT FLD-CCNC: 11 U/L — SIGNIFICANT CHANGE UP (ref 10–45)
ANION GAP SERPL CALC-SCNC: 8 MMOL/L — SIGNIFICANT CHANGE UP (ref 5–17)
APPEARANCE UR: CLEAR — SIGNIFICANT CHANGE UP
APTT BLD: 33.9 SEC — SIGNIFICANT CHANGE UP (ref 27.5–36.3)
AST SERPL-CCNC: 15 U/L — SIGNIFICANT CHANGE UP (ref 10–40)
BASOPHILS # BLD AUTO: 0.03 K/UL — SIGNIFICANT CHANGE UP (ref 0–0.2)
BASOPHILS NFR BLD AUTO: 0.4 % — SIGNIFICANT CHANGE UP (ref 0–2)
BILIRUB SERPL-MCNC: 0.5 MG/DL — SIGNIFICANT CHANGE UP (ref 0.2–1.2)
BILIRUB UR-MCNC: NEGATIVE — SIGNIFICANT CHANGE UP
BUN SERPL-MCNC: 44 MG/DL — HIGH (ref 7–23)
CALCIUM SERPL-MCNC: 8.8 MG/DL — SIGNIFICANT CHANGE UP (ref 8.4–10.5)
CHLORIDE SERPL-SCNC: 102 MMOL/L — SIGNIFICANT CHANGE UP (ref 96–108)
CO2 SERPL-SCNC: 29 MMOL/L — SIGNIFICANT CHANGE UP (ref 22–31)
COLOR SPEC: YELLOW — SIGNIFICANT CHANGE UP
CREAT SERPL-MCNC: 1.49 MG/DL — HIGH (ref 0.5–1.3)
DIFF PNL FLD: ABNORMAL
EOSINOPHIL # BLD AUTO: 0.03 K/UL — SIGNIFICANT CHANGE UP (ref 0–0.5)
EOSINOPHIL NFR BLD AUTO: 0.4 % — SIGNIFICANT CHANGE UP (ref 0–6)
GLUCOSE SERPL-MCNC: 110 MG/DL — HIGH (ref 70–99)
GLUCOSE UR QL: NEGATIVE — SIGNIFICANT CHANGE UP
HCT VFR BLD CALC: 38.6 % — LOW (ref 39–50)
HGB BLD-MCNC: 12.3 G/DL — LOW (ref 13–17)
IMM GRANULOCYTES NFR BLD AUTO: 0.1 % — SIGNIFICANT CHANGE UP (ref 0–1.5)
INR BLD: 1.19 RATIO — HIGH (ref 0.88–1.16)
KETONES UR-MCNC: NEGATIVE — SIGNIFICANT CHANGE UP
LACTATE SERPL-SCNC: 1.3 MMOL/L — SIGNIFICANT CHANGE UP (ref 0.7–2)
LEUKOCYTE ESTERASE UR-ACNC: NEGATIVE — SIGNIFICANT CHANGE UP
LYMPHOCYTES # BLD AUTO: 0.55 K/UL — LOW (ref 1–3.3)
LYMPHOCYTES # BLD AUTO: 7.1 % — LOW (ref 13–44)
MCHC RBC-ENTMCNC: 28 PG — SIGNIFICANT CHANGE UP (ref 27–34)
MCHC RBC-ENTMCNC: 31.9 GM/DL — LOW (ref 32–36)
MCV RBC AUTO: 87.9 FL — SIGNIFICANT CHANGE UP (ref 80–100)
MONOCYTES # BLD AUTO: 0.82 K/UL — SIGNIFICANT CHANGE UP (ref 0–0.9)
MONOCYTES NFR BLD AUTO: 10.6 % — SIGNIFICANT CHANGE UP (ref 2–14)
NEUTROPHILS # BLD AUTO: 6.31 K/UL — SIGNIFICANT CHANGE UP (ref 1.8–7.4)
NEUTROPHILS NFR BLD AUTO: 81.4 % — HIGH (ref 43–77)
NITRITE UR-MCNC: NEGATIVE — SIGNIFICANT CHANGE UP
NRBC # BLD: 0 /100 WBCS — SIGNIFICANT CHANGE UP (ref 0–0)
PH UR: 5 — SIGNIFICANT CHANGE UP (ref 5–8)
PLATELET # BLD AUTO: 188 K/UL — SIGNIFICANT CHANGE UP (ref 150–400)
POTASSIUM SERPL-MCNC: 3.4 MMOL/L — LOW (ref 3.5–5.3)
POTASSIUM SERPL-SCNC: 3.4 MMOL/L — LOW (ref 3.5–5.3)
PROT SERPL-MCNC: 7.4 G/DL — SIGNIFICANT CHANGE UP (ref 6–8.3)
PROT UR-MCNC: NEGATIVE — SIGNIFICANT CHANGE UP
PROTHROM AB SERPL-ACNC: 13.4 SEC — HIGH (ref 10–12.9)
RBC # BLD: 4.39 M/UL — SIGNIFICANT CHANGE UP (ref 4.2–5.8)
RBC # FLD: 17.1 % — HIGH (ref 10.3–14.5)
SODIUM SERPL-SCNC: 139 MMOL/L — SIGNIFICANT CHANGE UP (ref 135–145)
SP GR SPEC: 1.01 — SIGNIFICANT CHANGE UP (ref 1.01–1.02)
UROBILINOGEN FLD QL: NEGATIVE — SIGNIFICANT CHANGE UP
WBC # BLD: 7.75 K/UL — SIGNIFICANT CHANGE UP (ref 3.8–10.5)
WBC # FLD AUTO: 7.75 K/UL — SIGNIFICANT CHANGE UP (ref 3.8–10.5)

## 2019-12-30 PROCEDURE — 99223 1ST HOSP IP/OBS HIGH 75: CPT

## 2019-12-30 PROCEDURE — 99285 EMERGENCY DEPT VISIT HI MDM: CPT

## 2019-12-30 PROCEDURE — 93010 ELECTROCARDIOGRAM REPORT: CPT

## 2019-12-30 PROCEDURE — 71045 X-RAY EXAM CHEST 1 VIEW: CPT | Mod: 26

## 2019-12-30 RX ORDER — SODIUM CHLORIDE 9 MG/ML
2300 INJECTION INTRAMUSCULAR; INTRAVENOUS; SUBCUTANEOUS ONCE
Refills: 0 | Status: COMPLETED | OUTPATIENT
Start: 2019-12-30 | End: 2019-12-30

## 2019-12-30 RX ADMIN — SODIUM CHLORIDE 2300 MILLILITER(S): 9 INJECTION INTRAMUSCULAR; INTRAVENOUS; SUBCUTANEOUS at 22:40

## 2019-12-30 NOTE — ED ADULT NURSE NOTE - NSIMPLEMENTINTERV_GEN_ALL_ED
Implemented All Fall with Harm Risk Interventions:  [place] to call system. Call bell, personal items and telephone within reach. Instruct patient to call for assistance. Room bathroom lighting operational. Non-slip footwear when patient is off stretcher. Physically safe environment: no spills, clutter or unnecessary equipment. Stretcher in lowest position, wheels locked, appropriate side rails in place. Provide visual cue, wrist band, yellow gown, etc. Monitor gait and stability. Monitor for mental status changes and reorient to person, place, and time. Review medications for side effects contributing to fall risk. Reinforce activity limits and safety measures with patient and family. Provide visual clues: red socks.

## 2019-12-30 NOTE — ED ADULT NURSE NOTE - CHIEF COMPLAINT QUOTE
Patient BIB EMS from [facility] for "severe" back pain. Per EMS, patient has PMH chronic back pain that is worse today. Per EMS, patient unable to ambulate at the scene. Denies acute injury.

## 2019-12-30 NOTE — H&P ADULT - NSHPPHYSICALEXAM_GEN_ALL_CORE
Vital Signs (24 Hrs):  T(C): 38.5 (12-30-19 @ 22:19), Max: 38.5 (12-30-19 @ 22:19)  HR: 68 (12-30-19 @ 23:54) (68 - 76)  BP: 116/48 (12-30-19 @ 23:54) (116/48 - 135/58)  RR: 16 (12-30-19 @ 23:54) (16 - 16)  SpO2: 98% (12-30-19 @ 23:54) (96% - 98%)  Daily Height in cm: 180.34 (30 Dec 2019 21:58)

## 2019-12-30 NOTE — H&P ADULT - ASSESSMENT
89 y/o male with HTN, arthritis, BPH, hypothyroid, chronic back pain, sent from the [facility], for generalized weakness, myalgias, cough.  +RSV, ?LLL infiltrate  Renal insuff, PVD  Hypokalemia    Admit  O2 2 LPM via NC prn for O2 sat <92%]  Gentle IV Hydration  Levaquin pending cultures  FFup labs  Cont Current meds: Lasix, KCl, Levothyroxine, Finasteride, Flomax, Famotidine  Leg dopplers to r/o DVT  DVT prophylaxis w/ subcut Lovenox    CAPRINI SCORE [CLOT]  [x ] Age= 75 years                                              (3 Points)          [x ] Edema in the lower extremities                       (1 Point)                    [ x] BMI > 25 Kg/m2                                            (1 Point)                                Total Score [  5   ]  Caprini Score 0 - 2:  Low Risk, No VTE Prophylaxis required for most patients, encourage ambulation  Caprini Score 3 - 6:  At Risk, pharmacologic VTE prophylaxis is indicated for most patients (in the absence of a contraindication)  Caprini Score Greater than or = 7:  High Risk, pharmacologic VTE prophylaxis is indicated for most patients (in the absence of a contraindication)

## 2019-12-30 NOTE — H&P ADULT - HISTORY OF PRESENT ILLNESS
89 y/o male with HTN, arthritis, BPH, hypothyroid, chronic back pain, sent from the [facility], as per staff, noted to be weak, unable to get up.  Pt usually able to get up by himself, and walks with a walker.  Pt states he has had a cough for 2-3 days, c/o myalgia, denies chest pain, dyspnea, diarrhea, dysuria.   In the ED, noted to have temp of 101.3.  +RSV.

## 2019-12-30 NOTE — ED PROVIDER NOTE - CLINICAL SUMMARY MEDICAL DECISION MAKING FREE TEXT BOX
fever, myalgias, cough from assisted living.  no resp distress.  flu vs other viral uri vs pna.  r/o uti.  check cxr, labs. give iv fluids, abx.

## 2019-12-30 NOTE — ED ADULT NURSE NOTE - OBJECTIVE STATEMENT
Patient presents to ED complaining of back pain, patient states he has chronic back pain, hes complaining of a cough that started yesterday. Patient presents to ED complaining of back pain, patient states he has chronic back pain that is worse today. He is also  complaining of a cough that started yesterday. Patient describes the cough as "dry" and intermittent. Denies difficulty breathing, denies chest pain. Patient states he feels "lousy". Patient is a poor historian. States he feels fatigued.

## 2019-12-30 NOTE — ED ADULT TRIAGE NOTE - CHIEF COMPLAINT QUOTE
Patient BIB EMS from [facility] for "severe" back pain. Per EMS, patient has PMH chronic back pain that is worse today. Per EMS, patient unable to ambulate at the scene. Denies acute injury.

## 2019-12-30 NOTE — H&P ADULT - RS GEN PE MLT RESP DETAILS PC
breath sounds equal/bilateral coarse rhonchi, occ exp wheeze, bilateral dec breath sounds at bases/airway patent

## 2019-12-30 NOTE — ED PROVIDER NOTE - OBJECTIVE STATEMENT
pt c/o moderate dry cough for last 3 days, assoc c general weakness today, myalgias, chills. no noted fever. no chest pain, sob. no dysuria. +urinary frequency (chronic).

## 2019-12-30 NOTE — ED PROVIDER NOTE - MUSCULOSKELETAL, MLM
Spine appears normal, range of motion is not limited, no muscle or joint tenderness. moderate bilat lower leg pitting edema

## 2019-12-30 NOTE — H&P ADULT - GASTROINTESTINAL DETAILS
DISPLAY PLAN FREE TEXT
no distention/bowel sounds normal/nontender/soft/no masses palpable/no rebound tenderness

## 2019-12-31 DIAGNOSIS — L12.9 PEMPHIGOID, UNSPECIFIED: ICD-10-CM

## 2019-12-31 DIAGNOSIS — J18.9 PNEUMONIA, UNSPECIFIED ORGANISM: ICD-10-CM

## 2019-12-31 DIAGNOSIS — J12.9 VIRAL PNEUMONIA, UNSPECIFIED: ICD-10-CM

## 2019-12-31 DIAGNOSIS — N40.0 BENIGN PROSTATIC HYPERPLASIA WITHOUT LOWER URINARY TRACT SYMPTOMS: ICD-10-CM

## 2019-12-31 DIAGNOSIS — F03.90 UNSPECIFIED DEMENTIA WITHOUT BEHAVIORAL DISTURBANCE: ICD-10-CM

## 2019-12-31 DIAGNOSIS — E03.9 HYPOTHYROIDISM, UNSPECIFIED: ICD-10-CM

## 2019-12-31 DIAGNOSIS — M10.9 GOUT, UNSPECIFIED: ICD-10-CM

## 2019-12-31 LAB
ANION GAP SERPL CALC-SCNC: 8 MMOL/L — SIGNIFICANT CHANGE UP (ref 5–17)
BASOPHILS # BLD AUTO: 0.02 K/UL — SIGNIFICANT CHANGE UP (ref 0–0.2)
BASOPHILS NFR BLD AUTO: 0.3 % — SIGNIFICANT CHANGE UP (ref 0–2)
BUN SERPL-MCNC: 36 MG/DL — HIGH (ref 7–23)
CALCIUM SERPL-MCNC: 8.3 MG/DL — LOW (ref 8.4–10.5)
CHLORIDE SERPL-SCNC: 105 MMOL/L — SIGNIFICANT CHANGE UP (ref 96–108)
CO2 SERPL-SCNC: 29 MMOL/L — SIGNIFICANT CHANGE UP (ref 22–31)
CREAT SERPL-MCNC: 1.38 MG/DL — HIGH (ref 0.5–1.3)
EOSINOPHIL # BLD AUTO: 0.06 K/UL — SIGNIFICANT CHANGE UP (ref 0–0.5)
EOSINOPHIL NFR BLD AUTO: 0.9 % — SIGNIFICANT CHANGE UP (ref 0–6)
FLU A RESULT: SIGNIFICANT CHANGE UP
FLU A RESULT: SIGNIFICANT CHANGE UP
FLUAV AG NPH QL: SIGNIFICANT CHANGE UP
FLUBV AG NPH QL: SIGNIFICANT CHANGE UP
GLUCOSE SERPL-MCNC: 95 MG/DL — SIGNIFICANT CHANGE UP (ref 70–99)
HCT VFR BLD CALC: 34.7 % — LOW (ref 39–50)
HGB BLD-MCNC: 11.1 G/DL — LOW (ref 13–17)
IMM GRANULOCYTES NFR BLD AUTO: 0.6 % — SIGNIFICANT CHANGE UP (ref 0–1.5)
LYMPHOCYTES # BLD AUTO: 1.02 K/UL — SIGNIFICANT CHANGE UP (ref 1–3.3)
LYMPHOCYTES # BLD AUTO: 16 % — SIGNIFICANT CHANGE UP (ref 13–44)
MCHC RBC-ENTMCNC: 28.6 PG — SIGNIFICANT CHANGE UP (ref 27–34)
MCHC RBC-ENTMCNC: 32 GM/DL — SIGNIFICANT CHANGE UP (ref 32–36)
MCV RBC AUTO: 89.4 FL — SIGNIFICANT CHANGE UP (ref 80–100)
MONOCYTES # BLD AUTO: 0.8 K/UL — SIGNIFICANT CHANGE UP (ref 0–0.9)
MONOCYTES NFR BLD AUTO: 12.5 % — SIGNIFICANT CHANGE UP (ref 2–14)
NEUTROPHILS # BLD AUTO: 4.44 K/UL — SIGNIFICANT CHANGE UP (ref 1.8–7.4)
NEUTROPHILS NFR BLD AUTO: 69.7 % — SIGNIFICANT CHANGE UP (ref 43–77)
NRBC # BLD: 0 /100 WBCS — SIGNIFICANT CHANGE UP (ref 0–0)
PLATELET # BLD AUTO: 160 K/UL — SIGNIFICANT CHANGE UP (ref 150–400)
POTASSIUM SERPL-MCNC: 3.6 MMOL/L — SIGNIFICANT CHANGE UP (ref 3.5–5.3)
POTASSIUM SERPL-SCNC: 3.6 MMOL/L — SIGNIFICANT CHANGE UP (ref 3.5–5.3)
RAPID RVP RESULT: DETECTED
RBC # BLD: 3.88 M/UL — LOW (ref 4.2–5.8)
RBC # FLD: 17.2 % — HIGH (ref 10.3–14.5)
RSV RESULT: DETECTED
RSV RNA RESP QL NAA+PROBE: DETECTED
RSV RNA SPEC QL NAA+PROBE: DETECTED
SODIUM SERPL-SCNC: 142 MMOL/L — SIGNIFICANT CHANGE UP (ref 135–145)
WBC # BLD: 6.38 K/UL — SIGNIFICANT CHANGE UP (ref 3.8–10.5)
WBC # FLD AUTO: 6.38 K/UL — SIGNIFICANT CHANGE UP (ref 3.8–10.5)

## 2019-12-31 PROCEDURE — 99232 SBSQ HOSP IP/OBS MODERATE 35: CPT

## 2019-12-31 RX ORDER — ACETAMINOPHEN 500 MG
650 TABLET ORAL EVERY 6 HOURS
Refills: 0 | Status: DISCONTINUED | OUTPATIENT
Start: 2019-12-31 | End: 2020-01-03

## 2019-12-31 RX ORDER — MUPIROCIN 20 MG/G
1 OINTMENT TOPICAL
Refills: 0 | Status: DISCONTINUED | OUTPATIENT
Start: 2019-12-31 | End: 2020-01-03

## 2019-12-31 RX ORDER — LACTULOSE 10 G/15ML
1 SOLUTION ORAL
Qty: 0 | Refills: 0 | DISCHARGE

## 2019-12-31 RX ORDER — FUROSEMIDE 40 MG
80 TABLET ORAL DAILY
Refills: 0 | Status: DISCONTINUED | OUTPATIENT
Start: 2019-12-31 | End: 2020-01-02

## 2019-12-31 RX ORDER — FAMOTIDINE 10 MG/ML
20 INJECTION INTRAVENOUS DAILY
Refills: 0 | Status: DISCONTINUED | OUTPATIENT
Start: 2019-12-31 | End: 2020-01-03

## 2019-12-31 RX ORDER — OMEPRAZOLE 10 MG/1
1 CAPSULE, DELAYED RELEASE ORAL
Qty: 0 | Refills: 0 | DISCHARGE

## 2019-12-31 RX ORDER — LUBIPROSTONE 24 UG/1
8 CAPSULE, GELATIN COATED ORAL
Refills: 0 | Status: DISCONTINUED | OUTPATIENT
Start: 2019-12-31 | End: 2020-01-02

## 2019-12-31 RX ORDER — BUDESONIDE, MICRONIZED 100 %
0.5 POWDER (GRAM) MISCELLANEOUS EVERY 12 HOURS
Refills: 0 | Status: DISCONTINUED | OUTPATIENT
Start: 2019-12-31 | End: 2020-01-03

## 2019-12-31 RX ORDER — POTASSIUM CHLORIDE 20 MEQ
20 PACKET (EA) ORAL ONCE
Refills: 0 | Status: COMPLETED | OUTPATIENT
Start: 2019-12-31 | End: 2019-12-31

## 2019-12-31 RX ORDER — TRAMADOL HYDROCHLORIDE 50 MG/1
1 TABLET ORAL
Qty: 0 | Refills: 0 | DISCHARGE

## 2019-12-31 RX ORDER — LANOLIN ALCOHOL/MO/W.PET/CERES
6 CREAM (GRAM) TOPICAL AT BEDTIME
Refills: 0 | Status: DISCONTINUED | OUTPATIENT
Start: 2019-12-31 | End: 2020-01-03

## 2019-12-31 RX ORDER — ALLOPURINOL 300 MG
300 TABLET ORAL DAILY
Refills: 0 | Status: DISCONTINUED | OUTPATIENT
Start: 2019-12-31 | End: 2020-01-03

## 2019-12-31 RX ORDER — CHOLECALCIFEROL (VITAMIN D3) 125 MCG
1000 CAPSULE ORAL DAILY
Refills: 0 | Status: DISCONTINUED | OUTPATIENT
Start: 2019-12-31 | End: 2020-01-03

## 2019-12-31 RX ORDER — LANOLIN ALCOHOL/MO/W.PET/CERES
1 CREAM (GRAM) TOPICAL
Qty: 0 | Refills: 0 | DISCHARGE

## 2019-12-31 RX ORDER — LEVOTHYROXINE SODIUM 125 MCG
125 TABLET ORAL DAILY
Refills: 0 | Status: DISCONTINUED | OUTPATIENT
Start: 2019-12-31 | End: 2020-01-03

## 2019-12-31 RX ORDER — CEPHALEXIN 500 MG
1 CAPSULE ORAL
Qty: 0 | Refills: 0 | DISCHARGE

## 2019-12-31 RX ORDER — IPRATROPIUM/ALBUTEROL SULFATE 18-103MCG
3 AEROSOL WITH ADAPTER (GRAM) INHALATION EVERY 6 HOURS
Refills: 0 | Status: DISCONTINUED | OUTPATIENT
Start: 2019-12-31 | End: 2020-01-03

## 2019-12-31 RX ORDER — ENOXAPARIN SODIUM 100 MG/ML
40 INJECTION SUBCUTANEOUS DAILY
Refills: 0 | Status: DISCONTINUED | OUTPATIENT
Start: 2019-12-31 | End: 2020-01-01

## 2019-12-31 RX ORDER — MAGNESIUM OXIDE 400 MG ORAL TABLET 241.3 MG
400 TABLET ORAL DAILY
Refills: 0 | Status: DISCONTINUED | OUTPATIENT
Start: 2019-12-31 | End: 2020-01-03

## 2019-12-31 RX ORDER — SODIUM CHLORIDE 9 MG/ML
1000 INJECTION, SOLUTION INTRAVENOUS
Refills: 0 | Status: COMPLETED | OUTPATIENT
Start: 2019-12-31 | End: 2019-12-31

## 2019-12-31 RX ORDER — IPRATROPIUM/ALBUTEROL SULFATE 18-103MCG
3 AEROSOL WITH ADAPTER (GRAM) INHALATION EVERY 8 HOURS
Refills: 0 | Status: DISCONTINUED | OUTPATIENT
Start: 2019-12-31 | End: 2019-12-31

## 2019-12-31 RX ORDER — FINASTERIDE 5 MG/1
5 TABLET, FILM COATED ORAL DAILY
Refills: 0 | Status: DISCONTINUED | OUTPATIENT
Start: 2019-12-31 | End: 2020-01-03

## 2019-12-31 RX ORDER — LANOLIN ALCOHOL/MO/W.PET/CERES
0 CREAM (GRAM) TOPICAL
Qty: 0 | Refills: 0 | DISCHARGE

## 2019-12-31 RX ORDER — WHEAT DEXTRIN 3 G/4 G
1 POWDER IN PACKET (EA) ORAL
Qty: 0 | Refills: 0 | DISCHARGE

## 2019-12-31 RX ORDER — HYDROXYZINE HCL 10 MG
10 TABLET ORAL DAILY
Refills: 0 | Status: DISCONTINUED | OUTPATIENT
Start: 2019-12-31 | End: 2019-12-31

## 2019-12-31 RX ORDER — MULTIVIT-MIN/FERROUS GLUCONATE 9 MG/15 ML
1 LIQUID (ML) ORAL DAILY
Refills: 0 | Status: DISCONTINUED | OUTPATIENT
Start: 2019-12-31 | End: 2019-12-31

## 2019-12-31 RX ORDER — GUAIFENESIN/DEXTROMETHORPHAN 600MG-30MG
10 TABLET, EXTENDED RELEASE 12 HR ORAL EVERY 4 HOURS
Refills: 0 | Status: DISCONTINUED | OUTPATIENT
Start: 2019-12-31 | End: 2020-01-03

## 2019-12-31 RX ORDER — POTASSIUM CHLORIDE 20 MEQ
20 PACKET (EA) ORAL DAILY
Refills: 0 | Status: DISCONTINUED | OUTPATIENT
Start: 2019-12-31 | End: 2020-01-03

## 2019-12-31 RX ORDER — TAMSULOSIN HYDROCHLORIDE 0.4 MG/1
0.4 CAPSULE ORAL AT BEDTIME
Refills: 0 | Status: DISCONTINUED | OUTPATIENT
Start: 2019-12-31 | End: 2020-01-01

## 2019-12-31 RX ADMIN — MAGNESIUM OXIDE 400 MG ORAL TABLET 400 MILLIGRAM(S): 241.3 TABLET ORAL at 15:31

## 2019-12-31 RX ADMIN — Medication 20 MILLIEQUIVALENT(S): at 02:04

## 2019-12-31 RX ADMIN — Medication 40 MILLIGRAM(S): at 11:42

## 2019-12-31 RX ADMIN — Medication 3 MILLILITER(S): at 15:16

## 2019-12-31 RX ADMIN — FAMOTIDINE 20 MILLIGRAM(S): 10 INJECTION INTRAVENOUS at 15:31

## 2019-12-31 RX ADMIN — MUPIROCIN 1 APPLICATION(S): 20 OINTMENT TOPICAL at 17:48

## 2019-12-31 RX ADMIN — FINASTERIDE 5 MILLIGRAM(S): 5 TABLET, FILM COATED ORAL at 15:32

## 2019-12-31 RX ADMIN — MUPIROCIN 1 APPLICATION(S): 20 OINTMENT TOPICAL at 05:39

## 2019-12-31 RX ADMIN — LUBIPROSTONE 8 MICROGRAM(S): 24 CAPSULE, GELATIN COATED ORAL at 05:38

## 2019-12-31 RX ADMIN — SODIUM CHLORIDE 2300 MILLILITER(S): 9 INJECTION INTRAMUSCULAR; INTRAVENOUS; SUBCUTANEOUS at 00:31

## 2019-12-31 RX ADMIN — Medication 6 MILLIGRAM(S): at 21:13

## 2019-12-31 RX ADMIN — Medication 1000 UNIT(S): at 15:32

## 2019-12-31 RX ADMIN — Medication 0.5 MILLIGRAM(S): at 21:40

## 2019-12-31 RX ADMIN — Medication 650 MILLIGRAM(S): at 09:16

## 2019-12-31 RX ADMIN — Medication 3 MILLILITER(S): at 21:40

## 2019-12-31 RX ADMIN — Medication 80 MILLIGRAM(S): at 05:38

## 2019-12-31 RX ADMIN — Medication 20 MILLIEQUIVALENT(S): at 11:42

## 2019-12-31 RX ADMIN — ENOXAPARIN SODIUM 40 MILLIGRAM(S): 100 INJECTION SUBCUTANEOUS at 15:31

## 2019-12-31 RX ADMIN — LUBIPROSTONE 8 MICROGRAM(S): 24 CAPSULE, GELATIN COATED ORAL at 17:48

## 2019-12-31 RX ADMIN — Medication 3 MILLILITER(S): at 07:21

## 2019-12-31 RX ADMIN — Medication 300 MILLIGRAM(S): at 15:32

## 2019-12-31 RX ADMIN — Medication 125 MICROGRAM(S): at 05:38

## 2019-12-31 RX ADMIN — SODIUM CHLORIDE 60 MILLILITER(S): 9 INJECTION, SOLUTION INTRAVENOUS at 01:53

## 2019-12-31 RX ADMIN — Medication 1 TABLET(S): at 15:32

## 2019-12-31 RX ADMIN — TAMSULOSIN HYDROCHLORIDE 0.4 MILLIGRAM(S): 0.4 CAPSULE ORAL at 21:13

## 2019-12-31 NOTE — GOALS OF CARE CONVERSATION - ADVANCED CARE PLANNING - CONVERSATION DETAILS
Reviewed old medical record, printed out MOLST DNR/I signed by patient April 2019. He is A&Ox3. Reviewed GOC, he states his wishes continue to be DNR/I. HCP in chart, son Salbador tsang, sons Khoi then Nash alternate agents.  MD made aware that GOC / MOLST reviewed with patient and remain unchanged.

## 2019-12-31 NOTE — PROGRESS NOTE ADULT - PROBLEM SELECTOR PLAN 5
continue home bactroban  anticipate worsening with steroids that are needed for RSV infection  Continue close monitoring

## 2020-01-01 DIAGNOSIS — E87.6 HYPOKALEMIA: ICD-10-CM

## 2020-01-01 DIAGNOSIS — I82.409 ACUTE EMBOLISM AND THROMBOSIS OF UNSPECIFIED DEEP VEINS OF UNSPECIFIED LOWER EXTREMITY: ICD-10-CM

## 2020-01-01 LAB
ANION GAP SERPL CALC-SCNC: 10 MMOL/L — SIGNIFICANT CHANGE UP (ref 5–17)
BASOPHILS # BLD AUTO: 0.02 K/UL — SIGNIFICANT CHANGE UP (ref 0–0.2)
BASOPHILS NFR BLD AUTO: 0.3 % — SIGNIFICANT CHANGE UP (ref 0–2)
BUN SERPL-MCNC: 38 MG/DL — HIGH (ref 7–23)
CALCIUM SERPL-MCNC: 8.3 MG/DL — LOW (ref 8.4–10.5)
CHLORIDE SERPL-SCNC: 105 MMOL/L — SIGNIFICANT CHANGE UP (ref 96–108)
CO2 SERPL-SCNC: 27 MMOL/L — SIGNIFICANT CHANGE UP (ref 22–31)
CREAT SERPL-MCNC: 1.35 MG/DL — HIGH (ref 0.5–1.3)
CULTURE RESULTS: NO GROWTH — SIGNIFICANT CHANGE UP
EOSINOPHIL # BLD AUTO: 0.21 K/UL — SIGNIFICANT CHANGE UP (ref 0–0.5)
EOSINOPHIL NFR BLD AUTO: 3 % — SIGNIFICANT CHANGE UP (ref 0–6)
GLUCOSE SERPL-MCNC: 131 MG/DL — HIGH (ref 70–99)
HCT VFR BLD CALC: 35.4 % — LOW (ref 39–50)
HGB BLD-MCNC: 11.2 G/DL — LOW (ref 13–17)
IMM GRANULOCYTES NFR BLD AUTO: 0.4 % — SIGNIFICANT CHANGE UP (ref 0–1.5)
LYMPHOCYTES # BLD AUTO: 0.49 K/UL — LOW (ref 1–3.3)
LYMPHOCYTES # BLD AUTO: 7 % — LOW (ref 13–44)
MCHC RBC-ENTMCNC: 27.7 PG — SIGNIFICANT CHANGE UP (ref 27–34)
MCHC RBC-ENTMCNC: 31.6 GM/DL — LOW (ref 32–36)
MCV RBC AUTO: 87.6 FL — SIGNIFICANT CHANGE UP (ref 80–100)
MONOCYTES # BLD AUTO: 0.59 K/UL — SIGNIFICANT CHANGE UP (ref 0–0.9)
MONOCYTES NFR BLD AUTO: 8.4 % — SIGNIFICANT CHANGE UP (ref 2–14)
NEUTROPHILS # BLD AUTO: 5.66 K/UL — SIGNIFICANT CHANGE UP (ref 1.8–7.4)
NEUTROPHILS NFR BLD AUTO: 80.9 % — HIGH (ref 43–77)
NRBC # BLD: 0 /100 WBCS — SIGNIFICANT CHANGE UP (ref 0–0)
PLATELET # BLD AUTO: 165 K/UL — SIGNIFICANT CHANGE UP (ref 150–400)
POTASSIUM SERPL-MCNC: 3.1 MMOL/L — LOW (ref 3.5–5.3)
POTASSIUM SERPL-SCNC: 3.1 MMOL/L — LOW (ref 3.5–5.3)
RBC # BLD: 4.04 M/UL — LOW (ref 4.2–5.8)
RBC # FLD: 17.3 % — HIGH (ref 10.3–14.5)
SODIUM SERPL-SCNC: 142 MMOL/L — SIGNIFICANT CHANGE UP (ref 135–145)
SPECIMEN SOURCE: SIGNIFICANT CHANGE UP
WBC # BLD: 7 K/UL — SIGNIFICANT CHANGE UP (ref 3.8–10.5)
WBC # FLD AUTO: 7 K/UL — SIGNIFICANT CHANGE UP (ref 3.8–10.5)

## 2020-01-01 PROCEDURE — 93970 EXTREMITY STUDY: CPT | Mod: 26

## 2020-01-01 PROCEDURE — 99233 SBSQ HOSP IP/OBS HIGH 50: CPT

## 2020-01-01 RX ORDER — POTASSIUM CHLORIDE 20 MEQ
40 PACKET (EA) ORAL ONCE
Refills: 0 | Status: COMPLETED | OUTPATIENT
Start: 2020-01-01 | End: 2020-01-01

## 2020-01-01 RX ORDER — ENOXAPARIN SODIUM 100 MG/ML
80 INJECTION SUBCUTANEOUS EVERY 12 HOURS
Refills: 0 | Status: DISCONTINUED | OUTPATIENT
Start: 2020-01-01 | End: 2020-01-02

## 2020-01-01 RX ORDER — ENOXAPARIN SODIUM 100 MG/ML
40 INJECTION SUBCUTANEOUS ONCE
Refills: 0 | Status: COMPLETED | OUTPATIENT
Start: 2020-01-01 | End: 2020-01-01

## 2020-01-01 RX ORDER — BENZOCAINE AND MENTHOL 5; 1 G/100ML; G/100ML
1 LIQUID ORAL
Refills: 0 | Status: DISCONTINUED | OUTPATIENT
Start: 2020-01-01 | End: 2020-01-03

## 2020-01-01 RX ORDER — TAMSULOSIN HYDROCHLORIDE 0.4 MG/1
0.8 CAPSULE ORAL AT BEDTIME
Refills: 0 | Status: DISCONTINUED | OUTPATIENT
Start: 2020-01-01 | End: 2020-01-03

## 2020-01-01 RX ADMIN — Medication 3 MILLILITER(S): at 21:31

## 2020-01-01 RX ADMIN — FAMOTIDINE 20 MILLIGRAM(S): 10 INJECTION INTRAVENOUS at 13:17

## 2020-01-01 RX ADMIN — Medication 300 MILLIGRAM(S): at 13:16

## 2020-01-01 RX ADMIN — Medication 0.5 MILLIGRAM(S): at 10:09

## 2020-01-01 RX ADMIN — TAMSULOSIN HYDROCHLORIDE 0.8 MILLIGRAM(S): 0.4 CAPSULE ORAL at 21:04

## 2020-01-01 RX ADMIN — Medication 40 MILLIGRAM(S): at 05:21

## 2020-01-01 RX ADMIN — LUBIPROSTONE 8 MICROGRAM(S): 24 CAPSULE, GELATIN COATED ORAL at 18:44

## 2020-01-01 RX ADMIN — LUBIPROSTONE 8 MICROGRAM(S): 24 CAPSULE, GELATIN COATED ORAL at 05:21

## 2020-01-01 RX ADMIN — MAGNESIUM OXIDE 400 MG ORAL TABLET 400 MILLIGRAM(S): 241.3 TABLET ORAL at 13:19

## 2020-01-01 RX ADMIN — Medication 10 MILLILITER(S): at 21:10

## 2020-01-01 RX ADMIN — ENOXAPARIN SODIUM 40 MILLIGRAM(S): 100 INJECTION SUBCUTANEOUS at 13:16

## 2020-01-01 RX ADMIN — Medication 3 MILLILITER(S): at 15:26

## 2020-01-01 RX ADMIN — Medication 40 MILLIEQUIVALENT(S): at 13:16

## 2020-01-01 RX ADMIN — Medication 20 MILLIEQUIVALENT(S): at 13:17

## 2020-01-01 RX ADMIN — Medication 125 MICROGRAM(S): at 05:21

## 2020-01-01 RX ADMIN — Medication 1 TABLET(S): at 13:17

## 2020-01-01 RX ADMIN — Medication 1000 UNIT(S): at 13:19

## 2020-01-01 RX ADMIN — Medication 6 MILLIGRAM(S): at 21:04

## 2020-01-01 RX ADMIN — Medication 3 MILLILITER(S): at 04:13

## 2020-01-01 RX ADMIN — ENOXAPARIN SODIUM 80 MILLIGRAM(S): 100 INJECTION SUBCUTANEOUS at 18:43

## 2020-01-01 RX ADMIN — ENOXAPARIN SODIUM 40 MILLIGRAM(S): 100 INJECTION SUBCUTANEOUS at 16:24

## 2020-01-01 RX ADMIN — Medication 0.5 MILLIGRAM(S): at 21:32

## 2020-01-01 RX ADMIN — MUPIROCIN 1 APPLICATION(S): 20 OINTMENT TOPICAL at 18:44

## 2020-01-01 RX ADMIN — Medication 80 MILLIGRAM(S): at 05:21

## 2020-01-01 RX ADMIN — FINASTERIDE 5 MILLIGRAM(S): 5 TABLET, FILM COATED ORAL at 13:18

## 2020-01-01 RX ADMIN — MUPIROCIN 1 APPLICATION(S): 20 OINTMENT TOPICAL at 05:20

## 2020-01-01 NOTE — CONSULT NOTE ADULT - SUBJECTIVE AND OBJECTIVE BOX
HPI:  89 y/o male with HTN, arthritis, BPH, hypothyroid, chronic back pain, sent from the [facility], as per staff, noted to be weak, unable to get up.  Pt usually able to get up by himself, and walks with a walker.  Pt states he has had a cough for 2-3 days, c/o myalgia, denies chest pain, dyspnea, diarrhea, dysuria.   In the ED, noted to have temp of 101.3.  Admitted for RSV infection.    BPH on Flomax 0.8 mg and Finasteride 5 mg daily. Patient has chronic incomplete bladder emptying with episodes of urge incontinence that are more bothersome. No hematuria or recent UTI.      PAST MEDICAL & SURGICAL HISTORY:  Spinal stenosis  Atrial fibrillation, unspecified type  Arthritis  CN (constipation)  BPH (benign prostatic hyperplasia)  GERD (gastroesophageal reflux disease)  Hypothyroidism  Gout  Edema  Dementia  S/P hernia repair      REVIEW OF SYSTEMS:    CONSTITUTIONAL:  no fevers or chills now  RESPIRATORY: No shortness of breath now  CARDIOVASCULAR: No chest pain  GASTROINTESTINAL: No abdominal or epigastric pain. No nausea, vomiting,  No diarrhea or constipation.   NEUROLOGICAL: No mental status changes  PSYCH: No depression, no mood changes      MEDICATIONS  (STANDING):  albuterol/ipratropium for Nebulization 3 milliLiter(s) Nebulizer every 6 hours  allopurinol 300 milliGRAM(s) Oral daily  buDESOnide    Inhalation Suspension 0.5 milliGRAM(s) Inhalation every 12 hours  cholecalciferol 1000 Unit(s) Oral daily  enoxaparin Injectable 40 milliGRAM(s) SubCutaneous daily  famotidine    Tablet 20 milliGRAM(s) Oral daily  finasteride 5 milliGRAM(s) Oral daily  furosemide    Tablet 80 milliGRAM(s) Oral daily  levothyroxine 125 MICROGram(s) Oral daily  lubiprostone 8 MICROGram(s) Oral two times a day  magnesium oxide 400 milliGRAM(s) Oral daily  melatonin 6 milliGRAM(s) Oral at bedtime  multivitamin 1 Tablet(s) Oral daily  mupirocin 2% Ointment 1 Application(s) Topical two times a day  potassium chloride    Tablet ER 20 milliEquivalent(s) Oral daily  predniSONE   Tablet 40 milliGRAM(s) Oral daily  tamsulosin 0.8 milliGRAM(s) Oral at bedtime    MEDICATIONS  (PRN):  acetaminophen   Tablet .. 650 milliGRAM(s) Oral every 6 hours PRN Temp greater or equal to 38C (100.4F), Mild Pain (1 - 3)  benzocaine 15 mG/menthol 3.6 mG (Sugar-Free) Lozenge 1 Lozenge Oral every 2 hours PRN Sore Throat  guaifenesin/dextromethorphan  Syrup 10 milliLiter(s) Oral every 4 hours PRN Cough  Hydroxyzine HCl 10 milliGRAM(s) 10 milliGRAM(s) Oral daily PRN Anxiety      Allergies    penicillin (Unknown)    SOCIAL HISTORY: No illicit drug use    FAMILY HISTORY:  No pertinent family history in first degree relatives      Vital Signs Last 24 Hrs  T(C): 36.7 (2020 04:46), Max: 37.3 (31 Dec 2019 22:22)  T(F): 98.1 (2020 04:46), Max: 99.2 (31 Dec 2019 22:22)  HR: 75 (2020 10:11) (60 - 76)  BP: 125/66    PHYSICAL EXAM:    Constitutional: NAD, well-developed  Respiratory: No accessory respiratory muscle use  Abd: Soft, NT/ND   : Bladder NT  Neurological: A/O x 3  Psychiatric: Normal mood, normal affect          LABS:                        11.2   7.00  )-----------( 165      ( 2020 07:00 )             35.4         142  |  105  |  38<H>  ----------------------------<  131<H>  3.1<L>   |  27  |  1.35<H>    Ca    8.3<L>      2020 07:00    TPro  7.4  /  Alb  3.2<L>  /  TBili  0.5  /  DBili  x   /  AST  15  /  ALT  11  /  AlkPhos  65  12-30    PT/INR - ( 30 Dec 2019 22:35 )   PT: 13.4 sec;   INR: 1.19 ratio         PTT - ( 30 Dec 2019 22:35 )  PTT:33.9 sec  Urinalysis Basic - ( 30 Dec 2019 23:00 )    Color: Yellow / Appearance: Clear / S.010 / pH: x  Gluc: x / Ketone: Negative  / Bili: Negative / Urobili: Negative   Blood: x / Protein: Negative / Nitrite: Negative   Leuk Esterase: Negative / RBC: 5-10 /HPF / WBC Negative /HPF   Sq Epi: x / Non Sq Epi: Neg.-Few / Bacteria: Negative /HPF        Culture - Urine (collected 19 @ 23:00)  Source: .Urine Clean Catch (Midstream)  Final Report (20 @ 05:31):    No growth    Culture - Blood (collected 19 @ 22:35)  Source: .Blood Blood-Peripheral  Preliminary Report (20 @ 10:01):    No growth to date.    Culture - Blood (collected 19 @ 22:35)  Source: .Blood Blood-Peripheral  Preliminary Report (20 @ 10:01):    No growth to date.

## 2020-01-01 NOTE — CONSULT NOTE ADULT - ASSESSMENT
Admitted with RSV infection.    BPH with chronic incomplete bladder emptying. On Flomax 0.8 mg and Finasteride 5 mg. Incontinence more bothersome but otherwise stable urologically.    PLAN:  - Continue Flomax 0.8 mg daily and Finasteride 5 mg daily.  - Monitor voiding  - Outpt  f/u  - Consider chronic Demarco in the future if symptoms worsen - D/w patient and family

## 2020-01-01 NOTE — PROGRESS NOTE ADULT - PROBLEM SELECTOR PLAN 2
venous doppler: right posterior tibial and popliteal vein DVT  start Lovenox 80mg q12  vascular consult

## 2020-01-01 NOTE — PROGRESS NOTE ADULT - PROBLEM SELECTOR PLAN 6
pt c/o urinary frequency. UA negative  flomax increased to 0.8mg qHS. finasteride 5mg  - per urology, cont this medication regimen. consider shah in the future. follow up as outpatient.

## 2020-01-02 DIAGNOSIS — N17.9 ACUTE KIDNEY FAILURE, UNSPECIFIED: ICD-10-CM

## 2020-01-02 DIAGNOSIS — J20.5 ACUTE BRONCHITIS DUE TO RESPIRATORY SYNCYTIAL VIRUS: ICD-10-CM

## 2020-01-02 LAB
ANION GAP SERPL CALC-SCNC: 11 MMOL/L — SIGNIFICANT CHANGE UP (ref 5–17)
BUN SERPL-MCNC: 46 MG/DL — HIGH (ref 7–23)
CALCIUM SERPL-MCNC: 8.6 MG/DL — SIGNIFICANT CHANGE UP (ref 8.4–10.5)
CHLORIDE SERPL-SCNC: 104 MMOL/L — SIGNIFICANT CHANGE UP (ref 96–108)
CO2 SERPL-SCNC: 28 MMOL/L — SIGNIFICANT CHANGE UP (ref 22–31)
CREAT SERPL-MCNC: 1.51 MG/DL — HIGH (ref 0.5–1.3)
GLUCOSE SERPL-MCNC: 158 MG/DL — HIGH (ref 70–99)
POTASSIUM SERPL-MCNC: 3.5 MMOL/L — SIGNIFICANT CHANGE UP (ref 3.5–5.3)
POTASSIUM SERPL-SCNC: 3.5 MMOL/L — SIGNIFICANT CHANGE UP (ref 3.5–5.3)
SODIUM SERPL-SCNC: 143 MMOL/L — SIGNIFICANT CHANGE UP (ref 135–145)

## 2020-01-02 PROCEDURE — 99233 SBSQ HOSP IP/OBS HIGH 50: CPT

## 2020-01-02 RX ORDER — SODIUM CHLORIDE 9 MG/ML
1000 INJECTION, SOLUTION INTRAVENOUS
Refills: 0 | Status: DISCONTINUED | OUTPATIENT
Start: 2020-01-02 | End: 2020-01-03

## 2020-01-02 RX ORDER — ENOXAPARIN SODIUM 100 MG/ML
80 INJECTION SUBCUTANEOUS
Refills: 0 | Status: COMPLETED | OUTPATIENT
Start: 2020-01-03 | End: 2020-01-03

## 2020-01-02 RX ADMIN — Medication 40 MILLIGRAM(S): at 05:38

## 2020-01-02 RX ADMIN — Medication 6 MILLIGRAM(S): at 22:18

## 2020-01-02 RX ADMIN — MUPIROCIN 1 APPLICATION(S): 20 OINTMENT TOPICAL at 17:47

## 2020-01-02 RX ADMIN — Medication 125 MICROGRAM(S): at 05:39

## 2020-01-02 RX ADMIN — Medication 1 TABLET(S): at 13:42

## 2020-01-02 RX ADMIN — Medication 300 MILLIGRAM(S): at 13:43

## 2020-01-02 RX ADMIN — FINASTERIDE 5 MILLIGRAM(S): 5 TABLET, FILM COATED ORAL at 13:43

## 2020-01-02 RX ADMIN — Medication 600 MILLIGRAM(S): at 17:46

## 2020-01-02 RX ADMIN — Medication 0.5 MILLIGRAM(S): at 21:21

## 2020-01-02 RX ADMIN — Medication 3 MILLILITER(S): at 03:59

## 2020-01-02 RX ADMIN — FAMOTIDINE 20 MILLIGRAM(S): 10 INJECTION INTRAVENOUS at 13:42

## 2020-01-02 RX ADMIN — LUBIPROSTONE 8 MICROGRAM(S): 24 CAPSULE, GELATIN COATED ORAL at 05:38

## 2020-01-02 RX ADMIN — Medication 3 MILLILITER(S): at 09:05

## 2020-01-02 RX ADMIN — TAMSULOSIN HYDROCHLORIDE 0.8 MILLIGRAM(S): 0.4 CAPSULE ORAL at 22:17

## 2020-01-02 RX ADMIN — Medication 3 MILLILITER(S): at 21:20

## 2020-01-02 RX ADMIN — MUPIROCIN 1 APPLICATION(S): 20 OINTMENT TOPICAL at 05:39

## 2020-01-02 RX ADMIN — Medication 20 MILLIEQUIVALENT(S): at 13:42

## 2020-01-02 RX ADMIN — Medication 80 MILLIGRAM(S): at 05:38

## 2020-01-02 RX ADMIN — Medication 3 MILLILITER(S): at 15:28

## 2020-01-02 RX ADMIN — MAGNESIUM OXIDE 400 MG ORAL TABLET 400 MILLIGRAM(S): 241.3 TABLET ORAL at 13:42

## 2020-01-02 RX ADMIN — Medication 1000 UNIT(S): at 13:42

## 2020-01-02 RX ADMIN — Medication 0.5 MILLIGRAM(S): at 09:02

## 2020-01-02 RX ADMIN — Medication 10 MILLILITER(S): at 05:43

## 2020-01-02 RX ADMIN — ENOXAPARIN SODIUM 80 MILLIGRAM(S): 100 INJECTION SUBCUTANEOUS at 05:38

## 2020-01-02 NOTE — PROGRESS NOTE ADULT - PROBLEM SELECTOR PLAN 7
continue home bactroban  Continue close monitoring pt c/o urinary frequency. UA negative  flomax increased to 0.8mg qHS. finasteride 5mg  - per urology, cont this medication regimen. consider shah in the future. follow up as outpatient.

## 2020-01-02 NOTE — PROGRESS NOTE ADULT - PROBLEM SELECTOR PLAN 1
+ RSV  continue oral steroids   antibiotics stopped 12/31  continue to monitor for wheezing  O2 therapy to keep O2 sat >92%
cont pulmicort  nebs q6 and prn  prednisone 40mg daily  Mucinex q12
started oral steroids due to wheezing   antibiotics stopped 12/31  continue to monitor for wheezing  O2 therapy to keep O2 sat >92%

## 2020-01-02 NOTE — PROGRESS NOTE ADULT - PROBLEM SELECTOR PLAN 3
K 3.1 yesterday  monitor bmp ANTONINA on CKD3   baseline Cr 1.1  Hold lasix and avoid nephrotoxic meds  gentle IVF for 8 hours  monitor BMP

## 2020-01-02 NOTE — PROGRESS NOTE ADULT - PROBLEM SELECTOR PLAN 6
pt c/o urinary frequency. UA negative  flomax increased to 0.8mg qHS. finasteride 5mg  - per urology, cont this medication regimen. consider shah in the future. follow up as outpatient. continue home meds

## 2020-01-02 NOTE — PROGRESS NOTE ADULT - PROBLEM SELECTOR PLAN 2
venous doppler: right posterior tibial and popliteal vein DVT  start Lovenox 80mg q12. change to eliquis 10mg q12 for 6 more days and then 5mg q12  Follow up with vascular as outpatient venous doppler: right posterior tibial and popliteal vein DVT  Lovenox 80mg q12  Follow up with vascular as outpatient

## 2020-01-02 NOTE — PHYSICAL THERAPY INITIAL EVALUATION ADULT - ADDITIONAL COMMENTS
Pt lives at the [facility].  Pt is independent with ambulation with RW, needs assist with ADLs

## 2020-01-03 ENCOUNTER — TRANSCRIPTION ENCOUNTER (OUTPATIENT)
Age: 85
End: 2020-01-03

## 2020-01-03 VITALS
DIASTOLIC BLOOD PRESSURE: 74 MMHG | OXYGEN SATURATION: 100 % | TEMPERATURE: 98 F | HEART RATE: 88 BPM | SYSTOLIC BLOOD PRESSURE: 145 MMHG | RESPIRATION RATE: 16 BRPM

## 2020-01-03 LAB
ANION GAP SERPL CALC-SCNC: 7 MMOL/L — SIGNIFICANT CHANGE UP (ref 5–17)
BASOPHILS # BLD AUTO: 0.02 K/UL — SIGNIFICANT CHANGE UP (ref 0–0.2)
BASOPHILS NFR BLD AUTO: 0.3 % — SIGNIFICANT CHANGE UP (ref 0–2)
BUN SERPL-MCNC: 46 MG/DL — HIGH (ref 7–23)
CALCIUM SERPL-MCNC: 8.6 MG/DL — SIGNIFICANT CHANGE UP (ref 8.4–10.5)
CHLORIDE SERPL-SCNC: 108 MMOL/L — SIGNIFICANT CHANGE UP (ref 96–108)
CO2 SERPL-SCNC: 29 MMOL/L — SIGNIFICANT CHANGE UP (ref 22–31)
CREAT SERPL-MCNC: 1.21 MG/DL — SIGNIFICANT CHANGE UP (ref 0.5–1.3)
EOSINOPHIL # BLD AUTO: 0.02 K/UL — SIGNIFICANT CHANGE UP (ref 0–0.5)
EOSINOPHIL NFR BLD AUTO: 0.3 % — SIGNIFICANT CHANGE UP (ref 0–6)
GLUCOSE SERPL-MCNC: 99 MG/DL — SIGNIFICANT CHANGE UP (ref 70–99)
HCT VFR BLD CALC: 37 % — LOW (ref 39–50)
HGB BLD-MCNC: 11.8 G/DL — LOW (ref 13–17)
IMM GRANULOCYTES NFR BLD AUTO: 0.5 % — SIGNIFICANT CHANGE UP (ref 0–1.5)
LYMPHOCYTES # BLD AUTO: 0.97 K/UL — LOW (ref 1–3.3)
LYMPHOCYTES # BLD AUTO: 15.7 % — SIGNIFICANT CHANGE UP (ref 13–44)
MCHC RBC-ENTMCNC: 28.3 PG — SIGNIFICANT CHANGE UP (ref 27–34)
MCHC RBC-ENTMCNC: 31.9 GM/DL — LOW (ref 32–36)
MCV RBC AUTO: 88.7 FL — SIGNIFICANT CHANGE UP (ref 80–100)
MONOCYTES # BLD AUTO: 0.81 K/UL — SIGNIFICANT CHANGE UP (ref 0–0.9)
MONOCYTES NFR BLD AUTO: 13.1 % — SIGNIFICANT CHANGE UP (ref 2–14)
NEUTROPHILS # BLD AUTO: 4.32 K/UL — SIGNIFICANT CHANGE UP (ref 1.8–7.4)
NEUTROPHILS NFR BLD AUTO: 70.1 % — SIGNIFICANT CHANGE UP (ref 43–77)
NRBC # BLD: 0 /100 WBCS — SIGNIFICANT CHANGE UP (ref 0–0)
PLATELET # BLD AUTO: 159 K/UL — SIGNIFICANT CHANGE UP (ref 150–400)
POTASSIUM SERPL-MCNC: 3.9 MMOL/L — SIGNIFICANT CHANGE UP (ref 3.5–5.3)
POTASSIUM SERPL-SCNC: 3.9 MMOL/L — SIGNIFICANT CHANGE UP (ref 3.5–5.3)
RBC # BLD: 4.17 M/UL — LOW (ref 4.2–5.8)
RBC # FLD: 17.2 % — HIGH (ref 10.3–14.5)
SODIUM SERPL-SCNC: 144 MMOL/L — SIGNIFICANT CHANGE UP (ref 135–145)
WBC # BLD: 6.17 K/UL — SIGNIFICANT CHANGE UP (ref 3.8–10.5)
WBC # FLD AUTO: 6.17 K/UL — SIGNIFICANT CHANGE UP (ref 3.8–10.5)

## 2020-01-03 PROCEDURE — 99239 HOSP IP/OBS DSCHRG MGMT >30: CPT

## 2020-01-03 PROCEDURE — 87581 M.PNEUMON DNA AMP PROBE: CPT

## 2020-01-03 PROCEDURE — 80048 BASIC METABOLIC PNL TOTAL CA: CPT

## 2020-01-03 PROCEDURE — 81001 URINALYSIS AUTO W/SCOPE: CPT

## 2020-01-03 PROCEDURE — 85730 THROMBOPLASTIN TIME PARTIAL: CPT

## 2020-01-03 PROCEDURE — 51701 INSERT BLADDER CATHETER: CPT

## 2020-01-03 PROCEDURE — 85610 PROTHROMBIN TIME: CPT

## 2020-01-03 PROCEDURE — 87633 RESP VIRUS 12-25 TARGETS: CPT

## 2020-01-03 PROCEDURE — 71045 X-RAY EXAM CHEST 1 VIEW: CPT

## 2020-01-03 PROCEDURE — 87798 DETECT AGENT NOS DNA AMP: CPT

## 2020-01-03 PROCEDURE — 36415 COLL VENOUS BLD VENIPUNCTURE: CPT

## 2020-01-03 PROCEDURE — 97161 PT EVAL LOW COMPLEX 20 MIN: CPT

## 2020-01-03 PROCEDURE — 87040 BLOOD CULTURE FOR BACTERIA: CPT

## 2020-01-03 PROCEDURE — 80053 COMPREHEN METABOLIC PANEL: CPT

## 2020-01-03 PROCEDURE — 87631 RESP VIRUS 3-5 TARGETS: CPT

## 2020-01-03 PROCEDURE — 96361 HYDRATE IV INFUSION ADD-ON: CPT | Mod: XU

## 2020-01-03 PROCEDURE — 99285 EMERGENCY DEPT VISIT HI MDM: CPT | Mod: 25

## 2020-01-03 PROCEDURE — 94640 AIRWAY INHALATION TREATMENT: CPT

## 2020-01-03 PROCEDURE — 87086 URINE CULTURE/COLONY COUNT: CPT

## 2020-01-03 PROCEDURE — 93970 EXTREMITY STUDY: CPT

## 2020-01-03 PROCEDURE — 83605 ASSAY OF LACTIC ACID: CPT

## 2020-01-03 PROCEDURE — 97116 GAIT TRAINING THERAPY: CPT

## 2020-01-03 PROCEDURE — 96365 THER/PROPH/DIAG IV INF INIT: CPT | Mod: XU

## 2020-01-03 PROCEDURE — 87486 CHLMYD PNEUM DNA AMP PROBE: CPT

## 2020-01-03 PROCEDURE — 93005 ELECTROCARDIOGRAM TRACING: CPT

## 2020-01-03 PROCEDURE — 85027 COMPLETE CBC AUTOMATED: CPT

## 2020-01-03 RX ORDER — HYDROXYZINE HCL 10 MG
1 TABLET ORAL
Qty: 0 | Refills: 0 | DISCHARGE

## 2020-01-03 RX ORDER — APIXABAN 2.5 MG/1
2 TABLET, FILM COATED ORAL
Qty: 20 | Refills: 0
Start: 2020-01-03 | End: 2020-01-07

## 2020-01-03 RX ORDER — SODIUM CHLORIDE 0.65 %
1 AEROSOL, SPRAY (ML) NASAL
Qty: 1 | Refills: 0
Start: 2020-01-03 | End: 2020-01-09

## 2020-01-03 RX ORDER — POTASSIUM CHLORIDE 20 MEQ
1 PACKET (EA) ORAL
Qty: 0 | Refills: 0 | DISCHARGE

## 2020-01-03 RX ORDER — TAMSULOSIN HYDROCHLORIDE 0.4 MG/1
2 CAPSULE ORAL
Qty: 60 | Refills: 0
Start: 2020-01-03 | End: 2020-02-01

## 2020-01-03 RX ORDER — GUAIFENESIN/DEXTROMETHORPHAN 600MG-30MG
10 TABLET, EXTENDED RELEASE 12 HR ORAL
Qty: 2 | Refills: 0
Start: 2020-01-03 | End: 2020-01-09

## 2020-01-03 RX ORDER — BENZOCAINE AND MENTHOL 5; 1 G/100ML; G/100ML
1 LIQUID ORAL
Qty: 72 | Refills: 0
Start: 2020-01-03 | End: 2020-01-09

## 2020-01-03 RX ORDER — MULTIVIT-MIN/FERROUS GLUCONATE 9 MG/15 ML
1 LIQUID (ML) ORAL
Qty: 0 | Refills: 0 | DISCHARGE

## 2020-01-03 RX ORDER — SODIUM CHLORIDE 0.65 %
1 AEROSOL, SPRAY (ML) NASAL
Refills: 0 | Status: DISCONTINUED | OUTPATIENT
Start: 2020-01-03 | End: 2020-01-03

## 2020-01-03 RX ORDER — ACETAMINOPHEN 500 MG
2 TABLET ORAL
Qty: 56 | Refills: 0
Start: 2020-01-03 | End: 2020-01-09

## 2020-01-03 RX ORDER — TAMSULOSIN HYDROCHLORIDE 0.4 MG/1
1 CAPSULE ORAL
Qty: 0 | Refills: 0 | DISCHARGE

## 2020-01-03 RX ORDER — IPRATROPIUM/ALBUTEROL SULFATE 18-103MCG
3 AEROSOL WITH ADAPTER (GRAM) INHALATION
Qty: 0 | Refills: 0 | DISCHARGE
Start: 2020-01-03

## 2020-01-03 RX ORDER — BUDESONIDE, MICRONIZED 100 %
1 POWDER (GRAM) MISCELLANEOUS
Qty: 1 | Refills: 0
Start: 2020-01-03 | End: 2020-01-09

## 2020-01-03 RX ORDER — POTASSIUM CHLORIDE 20 MEQ
1 PACKET (EA) ORAL
Qty: 30 | Refills: 0
Start: 2020-01-03 | End: 2020-02-01

## 2020-01-03 RX ORDER — APIXABAN 2.5 MG/1
10 TABLET, FILM COATED ORAL EVERY 12 HOURS
Refills: 0 | Status: DISCONTINUED | OUTPATIENT
Start: 2020-01-03 | End: 2020-01-03

## 2020-01-03 RX ORDER — FUROSEMIDE 40 MG
1 TABLET ORAL
Qty: 30 | Refills: 0
Start: 2020-01-03 | End: 2020-02-01

## 2020-01-03 RX ADMIN — MAGNESIUM OXIDE 400 MG ORAL TABLET 400 MILLIGRAM(S): 241.3 TABLET ORAL at 13:10

## 2020-01-03 RX ADMIN — Medication 125 MICROGRAM(S): at 06:36

## 2020-01-03 RX ADMIN — Medication 40 MILLIGRAM(S): at 06:36

## 2020-01-03 RX ADMIN — Medication 1 SPRAY(S): at 13:11

## 2020-01-03 RX ADMIN — Medication 1000 UNIT(S): at 13:08

## 2020-01-03 RX ADMIN — Medication 600 MILLIGRAM(S): at 01:16

## 2020-01-03 RX ADMIN — FINASTERIDE 5 MILLIGRAM(S): 5 TABLET, FILM COATED ORAL at 13:08

## 2020-01-03 RX ADMIN — Medication 3 MILLILITER(S): at 05:35

## 2020-01-03 RX ADMIN — Medication 0.5 MILLIGRAM(S): at 09:41

## 2020-01-03 RX ADMIN — APIXABAN 10 MILLIGRAM(S): 2.5 TABLET, FILM COATED ORAL at 13:04

## 2020-01-03 RX ADMIN — Medication 300 MILLIGRAM(S): at 13:09

## 2020-01-03 RX ADMIN — MUPIROCIN 1 APPLICATION(S): 20 OINTMENT TOPICAL at 06:36

## 2020-01-03 RX ADMIN — Medication 600 MILLIGRAM(S): at 06:36

## 2020-01-03 RX ADMIN — Medication 3 MILLILITER(S): at 09:41

## 2020-01-03 RX ADMIN — ENOXAPARIN SODIUM 80 MILLIGRAM(S): 100 INJECTION SUBCUTANEOUS at 02:49

## 2020-01-03 RX ADMIN — FAMOTIDINE 20 MILLIGRAM(S): 10 INJECTION INTRAVENOUS at 13:08

## 2020-01-03 RX ADMIN — Medication 1 TABLET(S): at 13:10

## 2020-01-03 NOTE — DISCHARGE NOTE NURSING/CASE MANAGEMENT/SOCIAL WORK - NSDCFUADDAPPT_GEN_ALL_CORE_FT
appointment with Dr. Eli on Monday 1/6/20 and Lucretia Schneider on Tuesday 1/7 at 4:40 pm for psyche follow-up

## 2020-01-03 NOTE — DISCHARGE NOTE NURSING/CASE MANAGEMENT/SOCIAL WORK - PATIENT PORTAL LINK FT
You can access the FollowMyHealth Patient Portal offered by [facility] by registering at the following website: [email or web address]. By joining [facility]’s FollowMyHealth portal, you will also be able to view your health information using other applications (apps) compatible with our system.

## 2020-01-03 NOTE — DISCHARGE NOTE PROVIDER - CARE PROVIDER_API CALL
Olvin Hernandez (DO)  Internal Medicine  [address] [id number]  Phone: [phone]  Fax: [phone]  Follow Up Time:

## 2020-01-03 NOTE — PROGRESS NOTE ADULT - ASSESSMENT
89 y/o male with HTN, arthritis, BPH, hypothyroid, chronic back pain, sent from the [facility], for generalized weakness, myalgias, cough.      DVT prophylaxis w/ therapeutic lovenox    Dispo: Back to [facility] when respiratory status improves
89 y/o male with HTN, arthritis, BPH, hypothyroid, chronic back pain, sent from the [facility], for generalized weakness, myalgias, cough.      DVT prophylaxis w/ therapeutic lovenox    Dispo: Back to [facility] when respiratory status improves
BPH with incontinence and incomplete emptying. Symptoms stable.    PLAN:  - Continue BPH meds  - outpatient  f/u
89 y/o male with HTN, arthritis, BPH, hypothyroid, chronic back pain, sent from the [facility], for generalized weakness, myalgias, cough.      DVT prophylaxis w/ subcut Lovenox    Dispo: Back to [facility] when respiratory status improves

## 2020-01-03 NOTE — DISCHARGE NOTE PROVIDER - NSDCFUADDINST_GEN_ALL_CORE_FT
Please do wound care daily  clean left leg and foot with saline, pat dry then apply bactroban, leave to air and do dressing.

## 2020-01-03 NOTE — PROGRESS NOTE ADULT - REASON FOR ADMISSION
fever, generalized weakness, fatigue

## 2020-01-03 NOTE — DISCHARGE NOTE PROVIDER - HOSPITAL COURSE
89 y/o male with HTN, arthritis, BPH, hypothyroid, chronic back pain, sent from the [facility], for generalized weakness, myalgias, cough.    Pt found to have RSV bronchitis. Respiratory status improved and pt is stable for discharge.        PMD Dr. Hernandez notified    spend 40 min on discharge

## 2020-01-03 NOTE — DISCHARGE NOTE PROVIDER - NSDCMRMEDTOKEN_GEN_ALL_CORE_FT
allopurinol 300 mg oral tablet: 1 tab(s) orally once a day  Amitiza 8 mcg oral capsule: 1 cap(s) orally 2 times a day  apixaban 5 mg oral tablet: 2 tab(s) orally every 12 hours  budesonide 90 mcg/inh inhalation powder: 1 puff(s) inhaled 2 times a day   clean left leg and foot with normal saline, pat dry, apply bactroban ointment and leave to air: clean left leg and foot with normal saline, pat dry, apply bactroban ointment and leave to air. change dressing twice daily.  Eliquis 5 mg oral tablet: 1 tab(s) orally every 12 hours. Please start the medication on 1/8  famotidine 20 mg oral tablet: orally once a day  finasteride 5 mg oral tablet: 1 tab(s) orally once a day  Flomax 0.4 mg oral capsule: 2 cap(s) orally once a day (at bedtime)   guaifenesin-dextromethorphan 100 mg-10 mg/5 mL oral liquid: 10 milliliter(s) orally every 4 hours, As needed, Cough  hydrOXYzine hydrochloride 10 mg oral tablet: 1 tab(s) orally once a day  ipratropium-albuterol 0.5 mg-2.5 mg/3 mLinhalation solution: 3 milliliter(s) inhaled every 6 hours  Lasix 40 mg oral tablet: 1 tab(s) orally once a day   levothyroxine 125 mcg (0.125 mg) oral tablet: 1 tab(s) orally once a day  magnesium oxide 400 mg (241.3 mg elemental magnesium) oral tablet:   Melatonin 5 mg oral tablet: 2 tab(s) orally once a day (at bedtime)  menthol-benzocaine 3.6 mg-15 mg mucous membrane lozenge: 1 cap(s) mucous membrane every 2 hours, As Needed for sore throat  Multiple Vitamins oral capsule: 1 cap(s) orally once a day  mupirocin 2% topical ointment: 1 application topically 2 times a day for two weeks. apply on the left leg blisters. end day (9/18)  potassium chloride 10 mEq oral capsule, extended release: 1 cap(s) orally once a day   predniSONE 10 mg oral tablet: 1 tab(s) orally once a day   predniSONE 20 mg oral tablet: 2 tab(s) orally once a day   predniSONE 20 mg oral tablet: 1 tab(s) orally once a day   sodium chloride 0.65% nasal spray: 1 application nasal every 2 to 4 hours   Tylenol 325 mg oral tablet: 2 tab(s) orally every 6 hours, As needed, Temp greater or equal to 38C (100.4F), Mild Pain (1 - 3)  Vitamin D3 1000 intl units oral capsule: 1 cap(s) orally once a day allopurinol 300 mg oral tablet: 1 tab(s) orally once a day  Amitiza 8 mcg oral capsule: 1 cap(s) orally 2 times a day  apixaban 5 mg oral tablet: 2 tab(s) orally every 12 hours  budesonide 90 mcg/inh inhalation powder: 1 puff(s) inhaled 2 times a day   Eliquis 5 mg oral tablet: 1 tab(s) orally every 12 hours. Please start the medication on 1/8  famotidine 20 mg oral tablet: orally once a day  finasteride 5 mg oral tablet: 1 tab(s) orally once a day  Flomax 0.4 mg oral capsule: 2 cap(s) orally once a day (at bedtime)   guaifenesin-dextromethorphan 100 mg-10 mg/5 mL oral liquid: 10 milliliter(s) orally every 4 hours, As needed, Cough  hydrOXYzine hydrochloride 10 mg oral tablet: 1 tab(s) orally once a day  ipratropium-albuterol 0.5 mg-2.5 mg/3 mLinhalation solution: 3 milliliter(s) inhaled every 6 hours  Lasix 40 mg oral tablet: 1 tab(s) orally once a day   levothyroxine 125 mcg (0.125 mg) oral tablet: 1 tab(s) orally once a day  magnesium oxide 400 mg (241.3 mg elemental magnesium) oral tablet:   Melatonin 5 mg oral tablet: 2 tab(s) orally once a day (at bedtime)  menthol-benzocaine 3.6 mg-15 mg mucous membrane lozenge: 1 cap(s) mucous membrane every 2 hours, As Needed for sore throat  Multiple Vitamins oral capsule: 1 cap(s) orally once a day  mupirocin 2% topical ointment: 1 application topically 2 times a day for two weeks. apply on the left leg blisters. end day (9/18)  potassium chloride 10 mEq oral capsule, extended release: 1 cap(s) orally once a day   predniSONE 10 mg oral tablet: 1 tab(s) orally once a day   predniSONE 20 mg oral tablet: 2 tab(s) orally once a day   predniSONE 20 mg oral tablet: 1 tab(s) orally once a day   sodium chloride 0.65% nasal spray: 1 application nasal every 2 to 4 hours   Tylenol 325 mg oral tablet: 2 tab(s) orally every 6 hours, As needed, Temp greater or equal to 38C (100.4F), Mild Pain (1 - 3)  Vitamin D3 1000 intl units oral capsule: 1 cap(s) orally once a day

## 2020-01-03 NOTE — PROGRESS NOTE ADULT - SUBJECTIVE AND OBJECTIVE BOX
Admitted for respiratory infection. Still with severe cough and respiratory issues.    BPH with chronic incomplete bladder emptying. Still urger incontinence on Flomax / Proscar.        ROS  General: no fever  GI: no abdominal pain      VITAL SIGNS  Vital Signs Last 24 Hrs  T(C): 36.6 (03 Jan 2020 05:29), Max: 36.8 (02 Jan 2020 16:41)  T(F): 97.8 (03 Jan 2020 05:29), Max: 98.2 (02 Jan 2020 16:41)  HR: 82 (03 Jan 2020 05:37) (79 - 87)  BP: 141/64    PHYSICAL EXAM:  General: awake and alert  Abdomen: bladder NT        LABS:                        11.8   6.17  )-----------( 159      ( 03 Jan 2020 07:30 )             37.0     01-02    143  |  104  |  46<H>  ----------------------------<  158<H>  3.5   |  28  |  1.51<H>    Ca    8.6      02 Jan 2020 14:40            Prior notes/chart reviewed.
Patient is a 89y old  Male who presents with a chief complaint of fever, generalized weakness, fatigue (2020 13:29)      Patient seen and examined at bedside. No overnight events reported. Pt reports having sore throat and urinary frequency.    ALLERGIES:  penicillin (Unknown)    MEDICATIONS  (STANDING):  albuterol/ipratropium for Nebulization 3 milliLiter(s) Nebulizer every 6 hours  allopurinol 300 milliGRAM(s) Oral daily  buDESOnide    Inhalation Suspension 0.5 milliGRAM(s) Inhalation every 12 hours  cholecalciferol 1000 Unit(s) Oral daily  enoxaparin Injectable 80 milliGRAM(s) SubCutaneous every 12 hours  enoxaparin Injectable 40 milliGRAM(s) SubCutaneous once  famotidine    Tablet 20 milliGRAM(s) Oral daily  finasteride 5 milliGRAM(s) Oral daily  furosemide    Tablet 80 milliGRAM(s) Oral daily  levothyroxine 125 MICROGram(s) Oral daily  lubiprostone 8 MICROGram(s) Oral two times a day  magnesium oxide 400 milliGRAM(s) Oral daily  melatonin 6 milliGRAM(s) Oral at bedtime  multivitamin 1 Tablet(s) Oral daily  mupirocin 2% Ointment 1 Application(s) Topical two times a day  potassium chloride    Tablet ER 20 milliEquivalent(s) Oral daily  predniSONE   Tablet 40 milliGRAM(s) Oral daily  tamsulosin 0.8 milliGRAM(s) Oral at bedtime    MEDICATIONS  (PRN):  acetaminophen   Tablet .. 650 milliGRAM(s) Oral every 6 hours PRN Temp greater or equal to 38C (100.4F), Mild Pain (1 - 3)  benzocaine 15 mG/menthol 3.6 mG (Sugar-Free) Lozenge 1 Lozenge Oral every 2 hours PRN Sore Throat  guaifenesin/dextromethorphan  Syrup 10 milliLiter(s) Oral every 4 hours PRN Cough  Hydroxyzine HCl 10 milliGRAM(s) 10 milliGRAM(s) Oral daily PRN Anxiety    Vital Signs Last 24 Hrs  T(F): 98.1 (2020 04:46), Max: 99.2 (31 Dec 2019 22:22)  HR: 75 (2020 10:11) (60 - 76)  BP: 125/66 (2020 04:46) (124/54 - 136/52)  RR: 15 (2020 04:46) (15 - 18)  SpO2: 98% (2020 10:11) (97% - 100%)  I&O's Summary    31 Dec 2019 07:01  -  2020 07:00  --------------------------------------------------------  IN: 720 mL / OUT: 1 mL / NET: 719 mL    2020 07:01  -  2020 14:08  --------------------------------------------------------  IN: 650 mL / OUT: 0 mL / NET: 650 mL          GENERAL: NAD  HEAD:  Atraumatic, Normocephalic  EYES: EOMI, PERRLA, sclera clear  THROAT: erythematous pharynx, no exudate noted but view obstructed by tongue  NECK: Supple  CHEST/LUNG: + rales. + coarse breath sound  HEART: Regular rate and rhythm; + murmur  ABDOMEN: Soft, Nontender, Nondistended; Bowel sounds present  EXTREMITIES:    NEUROLOGY: non-focal  SKIN: + pemphigoid     LABS:                        11.2   7.00  )-----------( 165      ( 2020 07:00 )             35.4     -    142  |  105  |  38  ----------------------------<  131  3.1   |  27  |  1.35    Ca    8.3      2020 07:00    TPro  7.4  /  Alb  3.2  /  TBili  0.5  /  DBili  x   /  AST  15  /  ALT  11  /  AlkPhos  65  12-30        eGFR if Non African American: 44 mL/min/1.73M2 (20 @ 07:00)  eGFR if African American: 50 mL/min/1.73M2 (20 @ 07:00)    PT/INR - ( 30 Dec 2019 22:35 )   PT: 13.4 sec;   INR: 1.19 ratio         PTT - ( 30 Dec 2019 22:35 )  PTT:33.9 sec  Lactate, Blood: 1.3 mmol/L (12-30 @ 22:35)        Urinalysis Basic - ( 30 Dec 2019 23:00 )    Color: Yellow / Appearance: Clear / S.010 / pH: x  Gluc: x / Ketone: Negative  / Bili: Negative / Urobili: Negative   Blood: x / Protein: Negative / Nitrite: Negative   Leuk Esterase: Negative / RBC: 5-10 /HPF / WBC Negative /HPF   Sq Epi: x / Non Sq Epi: Neg.-Few / Bacteria: Negative /HPF        Culture - Urine (collected 30 Dec 2019 23:00)  Source: .Urine Clean Catch (Midstream)  Final Report (2020 05:31):    No growth    Culture - Blood (collected 30 Dec 2019 22:35)  Source: .Blood Blood-Peripheral  Preliminary Report (2020 10:01):    No growth to date.    Culture - Blood (collected 30 Dec 2019 22:35)  Source: .Blood Blood-Peripheral  Preliminary Report (2020 10:01):    No growth to date.      RADIOLOGY & ADDITIONAL TESTS:    Care Discussed with Consultants/Other Providers:
Patient is a 89y old  Male who presents with a chief complaint of fever, generalized weakness, fatigue (2020 14:08)      Patient seen and examined at bedside. No overnight events reported. Pt reports congestion in chest and still feels short of breath with minimal exertion. reports 2 episodes of loose stools today.    ALLERGIES:  penicillin (Unknown)    MEDICATIONS  (STANDING):  albuterol/ipratropium for Nebulization 3 milliLiter(s) Nebulizer every 6 hours  allopurinol 300 milliGRAM(s) Oral daily  buDESOnide    Inhalation Suspension 0.5 milliGRAM(s) Inhalation every 12 hours  cholecalciferol 1000 Unit(s) Oral daily  enoxaparin Injectable 80 milliGRAM(s) SubCutaneous every 12 hours  famotidine    Tablet 20 milliGRAM(s) Oral daily  finasteride 5 milliGRAM(s) Oral daily  furosemide    Tablet 80 milliGRAM(s) Oral daily  guaiFENesin  milliGRAM(s) Oral two times a day  levothyroxine 125 MICROGram(s) Oral daily  magnesium oxide 400 milliGRAM(s) Oral daily  melatonin 6 milliGRAM(s) Oral at bedtime  multivitamin 1 Tablet(s) Oral daily  mupirocin 2% Ointment 1 Application(s) Topical two times a day  potassium chloride    Tablet ER 20 milliEquivalent(s) Oral daily  tamsulosin 0.8 milliGRAM(s) Oral at bedtime    MEDICATIONS  (PRN):  acetaminophen   Tablet .. 650 milliGRAM(s) Oral every 6 hours PRN Temp greater or equal to 38C (100.4F), Mild Pain (1 - 3)  benzocaine 15 mG/menthol 3.6 mG (Sugar-Free) Lozenge 1 Lozenge Oral every 2 hours PRN Sore Throat  guaifenesin/dextromethorphan  Syrup 10 milliLiter(s) Oral every 4 hours PRN Cough  Hydroxyzine HCl 10 milliGRAM(s) 10 milliGRAM(s) Oral daily PRN Anxiety    Vital Signs Last 24 Hrs  T(F): 98.6 (2020 05:44), Max: 98.8 (2020 21:11)  HR: 79 (2020 09:03) (74 - 100)  BP: 134/66 (2020 05:44) (109/50 - 134/66)  RR: 16 (2020 05:44) (14 - 16)  SpO2: 96% (2020 09:03) (96% - 100%)  I&O's Summary    2020 07:01  -  2020 07:00  --------------------------------------------------------  IN: 1350 mL / OUT: 0 mL / NET: 1350 mL    2020 07:01  -  2020 14:18  --------------------------------------------------------  IN: 680 mL / OUT: 0 mL / NET: 680 mL      GENERAL: NAD  HEAD:  Atraumatic, Normocephalic  EYES: EOMI, PERRLA, sclera clear  NECK: Supple  CHEST/LUNG: Clear to auscultation bilaterally; No wheeze  HEART: Regular rate and rhythm; No murmurs, rubs, or gallops  ABDOMEN: Soft, Nontender, Nondistended; Bowel sounds present  EXTREMITIES:  + bilateral pitting edema (R>L)   NEUROLOGY: non-focal  SKIN: + pemphigoid     LABS:                        11.2   7.00  )-----------( 165      ( 2020 07:00 )             35.4     -    142  |  105  |  38  ----------------------------<  131  3.1   |  27  |  1.35    Ca    8.3      2020 07:00    TPro  7.4  /  Alb  3.2  /  TBili  0.5  /  DBili  x   /  AST  15  /  ALT  11  /  AlkPhos  65  12-30        eGFR if Non African American: 44 mL/min/1.73M2 (20 @ 07:00)  eGFR if African American: 50 mL/min/1.73M2 (20 @ 07:00)    PT/INR - ( 30 Dec 2019 22:35 )   PT: 13.4 sec;   INR: 1.19 ratio         PTT - ( 30 Dec 2019 22:35 )  PTT:33.9 sec  Lactate, Blood: 1.3 mmol/L (12-30 @ 22:35)      Urinalysis Basic - ( 30 Dec 2019 23:00 )    Color: Yellow / Appearance: Clear / S.010 / pH: x  Gluc: x / Ketone: Negative  / Bili: Negative / Urobili: Negative   Blood: x / Protein: Negative / Nitrite: Negative   Leuk Esterase: Negative / RBC: 5-10 /HPF / WBC Negative /HPF   Sq Epi: x / Non Sq Epi: Neg.-Few / Bacteria: Negative /HPF        Culture - Urine (collected 30 Dec 2019 23:00)  Source: .Urine Clean Catch (Midstream)  Final Report (2020 05:31):    No growth    Culture - Blood (collected 30 Dec 2019 22:35)  Source: .Blood Blood-Peripheral  Preliminary Report (2020 10:01):    No growth to date.    Culture - Blood (collected 30 Dec 2019 22:35)  Source: .Blood Blood-Peripheral  Preliminary Report (2020 10:01):    No growth to date.      RADIOLOGY & ADDITIONAL TESTS:    Care Discussed with Consultants/Other Providers:
Patient is a 89y old  Male who presents with a chief complaint of fever, generalized weakness, fatigue (30 Dec 2019 23:55)    HPI:  89 y/o male with HTN, arthritis, BPH, hypothyroid, chronic back pain, sent from the [facility], as per staff, noted to be weak, unable to get up.  Pt usually able to get up by himself, and walks with a walker.  Pt states he has had a cough for 2-3 days, c/o myalgia, denies chest pain, dyspnea, diarrhea, dysuria.   In the ED, noted to have temp of 101.3.  +RSV. (30 Dec 2019 23:55)    Patient seen and examined at bedside.  Reports breathing is a bit difficult, denies any chest pain   ALLERGIES:  penicillin (Unknown)    MEDICATIONS:  acetaminophen   Tablet .. 650 milliGRAM(s) Oral every 6 hours PRN  albuterol/ipratropium for Nebulization 3 milliLiter(s) Nebulizer every 8 hours  allopurinol 300 milliGRAM(s) Oral daily  cholecalciferol 1000 Unit(s) Oral daily  enoxaparin Injectable 40 milliGRAM(s) SubCutaneous daily  famotidine    Tablet 20 milliGRAM(s) Oral daily  finasteride 5 milliGRAM(s) Oral daily  furosemide    Tablet 80 milliGRAM(s) Oral daily  guaifenesin/dextromethorphan  Syrup 10 milliLiter(s) Oral every 4 hours PRN  Hydroxyzine HCl 10 milliGRAM(s) 10 milliGRAM(s) Oral daily PRN  levothyroxine 125 MICROGram(s) Oral daily  lubiprostone 8 MICROGram(s) Oral two times a day  magnesium oxide 400 milliGRAM(s) Oral daily  melatonin 6 milliGRAM(s) Oral at bedtime  multivitamin/minerals 1 Tablet(s) Oral daily  mupirocin 2% Ointment 1 Application(s) Topical two times a day  potassium chloride    Tablet ER 20 milliEquivalent(s) Oral daily  predniSONE   Tablet 40 milliGRAM(s) Oral daily  tamsulosin 0.4 milliGRAM(s) Oral at bedtime    Vital Signs Last 24 Hrs  T(F): 98.6 (31 Dec 2019 06:46), Max: 101.3 (30 Dec 2019 22:19)  HR: 60 (31 Dec 2019 07:24) (60 - 90)  BP: 129/48 (31 Dec 2019 06:46) (116/48 - 135/58)  RR: 15 (31 Dec 2019 06:46) (15 - 17)  SpO2: 99% (31 Dec 2019 07:24) (96% - 100%)  I&O's Summary      PHYSICAL EXAM:  General: NAD, A/O x 3  ENT: MMM  Neck: Supple, No JVD  Lungs: bilateral inspiratory wheeze  Cardio: RRR, S1/S2, 2/6 systolic murmur  Abdomen: Soft, Nontender, Nondistended; Bowel sounds present  Extremities: No cyanosis, bilateral +1 edema, left lower leg blistering with surrounding erythema     LABS:                        11.1   6.38  )-----------( 160      ( 31 Dec 2019 06:40 )             34.7         142  |  105  |  36  ----------------------------<  95  3.6   |  29  |  1.38    Ca    8.3      31 Dec 2019 06:40    TPro  7.4  /  Alb  3.2  /  TBili  0.5  /  DBili  x   /  AST  15  /  ALT  11  /  AlkPhos  65      eGFR if Non African American: 42 mL/min/1.73M2 (19 @ 06:40)  eGFR if : 49 mL/min/1.73M2 (19 @ 06:40)    PT/INR - ( 30 Dec 2019 22:35 )   PT: 13.4 sec;   INR: 1.19 ratio         PTT - ( 30 Dec 2019 22:35 )  PTT:33.9 sec  Lactate, Blood: 1.3 mmol/L ( @ 22:35)                          Urinalysis Basic - ( 30 Dec 2019 23:00 )    Color: Yellow / Appearance: Clear / S.010 / pH: x  Gluc: x / Ketone: Negative  / Bili: Negative / Urobili: Negative   Blood: x / Protein: Negative / Nitrite: Negative   Leuk Esterase: Negative / RBC: 5-10 /HPF / WBC Negative /HPF   Sq Epi: x / Non Sq Epi: Neg.-Few / Bacteria: Negative /HPF          RADIOLOGY & ADDITIONAL TESTS:    Care Discussed with Consultants/Other Providers:

## 2020-01-04 RX ORDER — IPRATROPIUM/ALBUTEROL SULFATE 18-103MCG
3 AEROSOL WITH ADAPTER (GRAM) INHALATION
Qty: 84 | Refills: 0
Start: 2020-01-04 | End: 2020-01-10

## 2020-01-08 RX ORDER — APIXABAN 2.5 MG/1
1 TABLET, FILM COATED ORAL
Qty: 60 | Refills: 0
Start: 2020-01-08 | End: 2020-02-06

## 2020-01-24 ENCOUNTER — APPOINTMENT (OUTPATIENT)
Dept: CARDIOLOGY | Facility: CLINIC | Age: 85
End: 2020-01-24
Payer: MEDICARE

## 2020-01-24 ENCOUNTER — NON-APPOINTMENT (OUTPATIENT)
Age: 85
End: 2020-01-24

## 2020-01-24 VITALS
BODY MASS INDEX: 28.14 KG/M2 | DIASTOLIC BLOOD PRESSURE: 61 MMHG | HEART RATE: 83 BPM | WEIGHT: 190 LBS | HEIGHT: 69 IN | RESPIRATION RATE: 17 BRPM | SYSTOLIC BLOOD PRESSURE: 132 MMHG | OXYGEN SATURATION: 99 %

## 2020-01-24 DIAGNOSIS — I25.10 ATHEROSCLEROTIC HEART DISEASE OF NATIVE CORONARY ARTERY W/OUT ANGINA PECTORIS: ICD-10-CM

## 2020-01-24 DIAGNOSIS — I10 ESSENTIAL (PRIMARY) HYPERTENSION: ICD-10-CM

## 2020-01-24 DIAGNOSIS — I35.0 NONRHEUMATIC AORTIC (VALVE) STENOSIS: ICD-10-CM

## 2020-01-24 DIAGNOSIS — R94.31 ABNORMAL ELECTROCARDIOGRAM [ECG] [EKG]: ICD-10-CM

## 2020-01-24 DIAGNOSIS — R60.9 EDEMA, UNSPECIFIED: ICD-10-CM

## 2020-01-24 PROCEDURE — 93000 ELECTROCARDIOGRAM COMPLETE: CPT

## 2020-01-24 PROCEDURE — 99215 OFFICE O/P EST HI 40 MIN: CPT

## 2020-01-24 RX ORDER — MAGNESIUM OXIDE/MAG AA CHELATE 300 MG
CAPSULE ORAL
Refills: 0 | Status: ACTIVE | COMMUNITY

## 2020-01-24 RX ORDER — APIXABAN 5 MG/1
5 TABLET, FILM COATED ORAL
Refills: 0 | Status: ACTIVE | COMMUNITY

## 2020-01-24 RX ORDER — RANITIDINE 150 MG/1
150 TABLET ORAL DAILY
Qty: 14 | Refills: 0 | Status: DISCONTINUED | COMMUNITY
Start: 2017-01-04 | End: 2020-01-24

## 2020-02-08 NOTE — PATIENT PROFILE ADULT - PRO INTERPRETER NEED 2
Group Therapy Note    Date: 2/8/2020    Group Start Time: 8095  Group End Time: 1430  Group Topic: Cognitive Skills    [facility]    Group Therapy Note    Attendees: 8    Patients learned about self care and worked together as a group to identify self care activities for the self care wheel with the 8 self care domains, which include the physical, emotional, mental, spiritual, personal, financial, social, and professional. Each patient created their own self care wheel to reflect things that they can do for themselves in each of the 8 self care domains. Notes:  Pt was present for group though did not engage in acitivity; pt used art supplies to create a yin and yang drawing then left group early. Before pt left, he seemed irritated with peers AEB his non-verbal body language with his eyes rolling when peer spoke. Status After Intervention:  Unchanged    Participation Level:  Active Listener    Participation Quality: Resistant      Speech:  normal      Thought Process/Content: Logical      Affective Functioning: Flat and Constricted/Restricted      Mood: anxious and depressed      Level of consciousness:  Alert      Response to Learning: Resistant      Endings: None Reported    Modes of Intervention: Education, Support, Socialization, Exploration, Clarifying, Problem-solving and Activity      Discipline Responsible: /Counselor      Signature:  KEVIN Kim-S, R-ISMAT English

## 2020-03-05 NOTE — ED PROVIDER NOTE - SKIN, MLM
Refill entered and routed to Dr Yaquelin Canales
Therese needs a refill of:    ALPRAZolam 0.25 MG Oral Tab TAKE 1 TABLET BY MOUTH EVERY MORNING AND 2 TABLETS BY MOUTH EVERY EVEING 270 tablet 1
Skin normal color for race, warm, dry and intact. No evidence of rash.

## 2020-03-08 ENCOUNTER — EMERGENCY (EMERGENCY)
Facility: HOSPITAL | Age: 85
LOS: 1 days | Discharge: HOME | End: 2020-03-08
Attending: EMERGENCY MEDICINE | Admitting: EMERGENCY MEDICINE
Payer: MEDICARE

## 2020-03-08 VITALS
OXYGEN SATURATION: 98 % | HEART RATE: 83 BPM | SYSTOLIC BLOOD PRESSURE: 107 MMHG | RESPIRATION RATE: 20 BRPM | TEMPERATURE: 98 F | DIASTOLIC BLOOD PRESSURE: 53 MMHG

## 2020-03-08 VITALS
DIASTOLIC BLOOD PRESSURE: 48 MMHG | SYSTOLIC BLOOD PRESSURE: 115 MMHG | OXYGEN SATURATION: 100 % | HEIGHT: 70.5 IN | HEART RATE: 80 BPM | WEIGHT: 192.02 LBS | RESPIRATION RATE: 15 BRPM

## 2020-03-08 DIAGNOSIS — Z98.890 OTHER SPECIFIED POSTPROCEDURAL STATES: Chronic | ICD-10-CM

## 2020-03-08 DIAGNOSIS — R19.7 DIARRHEA, UNSPECIFIED: ICD-10-CM

## 2020-03-08 LAB
ALBUMIN SERPL ELPH-MCNC: 3 G/DL — LOW (ref 3.3–5)
ALP SERPL-CCNC: 63 U/L — SIGNIFICANT CHANGE UP (ref 40–120)
ALT FLD-CCNC: 18 U/L — SIGNIFICANT CHANGE UP (ref 10–45)
ANION GAP SERPL CALC-SCNC: 8 MMOL/L — SIGNIFICANT CHANGE UP (ref 5–17)
AST SERPL-CCNC: 42 U/L — HIGH (ref 10–40)
BASOPHILS # BLD AUTO: 0.02 K/UL — SIGNIFICANT CHANGE UP (ref 0–0.2)
BASOPHILS NFR BLD AUTO: 0.3 % — SIGNIFICANT CHANGE UP (ref 0–2)
BILIRUB SERPL-MCNC: 1 MG/DL — SIGNIFICANT CHANGE UP (ref 0.2–1.2)
BUN SERPL-MCNC: 29 MG/DL — HIGH (ref 7–23)
CALCIUM SERPL-MCNC: 9.1 MG/DL — SIGNIFICANT CHANGE UP (ref 8.4–10.5)
CHLORIDE SERPL-SCNC: 108 MMOL/L — SIGNIFICANT CHANGE UP (ref 96–108)
CO2 SERPL-SCNC: 30 MMOL/L — SIGNIFICANT CHANGE UP (ref 22–31)
CREAT SERPL-MCNC: 1.31 MG/DL — HIGH (ref 0.5–1.3)
EOSINOPHIL # BLD AUTO: 0.17 K/UL — SIGNIFICANT CHANGE UP (ref 0–0.5)
EOSINOPHIL NFR BLD AUTO: 2.3 % — SIGNIFICANT CHANGE UP (ref 0–6)
GLUCOSE SERPL-MCNC: 117 MG/DL — HIGH (ref 70–99)
HCT VFR BLD CALC: 37.6 % — LOW (ref 39–50)
HGB BLD-MCNC: 11.8 G/DL — LOW (ref 13–17)
IMM GRANULOCYTES NFR BLD AUTO: 0.1 % — SIGNIFICANT CHANGE UP (ref 0–1.5)
LIDOCAIN IGE QN: 62 U/L — LOW (ref 73–393)
LYMPHOCYTES # BLD AUTO: 0.6 K/UL — LOW (ref 1–3.3)
LYMPHOCYTES # BLD AUTO: 8.1 % — LOW (ref 13–44)
MCHC RBC-ENTMCNC: 29.7 PG — SIGNIFICANT CHANGE UP (ref 27–34)
MCHC RBC-ENTMCNC: 31.4 GM/DL — LOW (ref 32–36)
MCV RBC AUTO: 94.7 FL — SIGNIFICANT CHANGE UP (ref 80–100)
MONOCYTES # BLD AUTO: 0.56 K/UL — SIGNIFICANT CHANGE UP (ref 0–0.9)
MONOCYTES NFR BLD AUTO: 7.6 % — SIGNIFICANT CHANGE UP (ref 2–14)
NEUTROPHILS # BLD AUTO: 6.03 K/UL — SIGNIFICANT CHANGE UP (ref 1.8–7.4)
NEUTROPHILS NFR BLD AUTO: 81.6 % — HIGH (ref 43–77)
NRBC # BLD: 0 /100 WBCS — SIGNIFICANT CHANGE UP (ref 0–0)
PLATELET # BLD AUTO: 248 K/UL — SIGNIFICANT CHANGE UP (ref 150–400)
POTASSIUM SERPL-MCNC: 4.8 MMOL/L — SIGNIFICANT CHANGE UP (ref 3.5–5.3)
POTASSIUM SERPL-SCNC: 4.8 MMOL/L — SIGNIFICANT CHANGE UP (ref 3.5–5.3)
PROT SERPL-MCNC: 7.5 G/DL — SIGNIFICANT CHANGE UP (ref 6–8.3)
RBC # BLD: 3.97 M/UL — LOW (ref 4.2–5.8)
RBC # FLD: 17.9 % — HIGH (ref 10.3–14.5)
SODIUM SERPL-SCNC: 146 MMOL/L — HIGH (ref 135–145)
WBC # BLD: 7.39 K/UL — SIGNIFICANT CHANGE UP (ref 3.8–10.5)
WBC # FLD AUTO: 7.39 K/UL — SIGNIFICANT CHANGE UP (ref 3.8–10.5)

## 2020-03-08 PROCEDURE — 80053 COMPREHEN METABOLIC PANEL: CPT

## 2020-03-08 PROCEDURE — 96360 HYDRATION IV INFUSION INIT: CPT

## 2020-03-08 PROCEDURE — 85027 COMPLETE CBC AUTOMATED: CPT

## 2020-03-08 PROCEDURE — 96361 HYDRATE IV INFUSION ADD-ON: CPT

## 2020-03-08 PROCEDURE — 99284 EMERGENCY DEPT VISIT MOD MDM: CPT | Mod: 25

## 2020-03-08 PROCEDURE — 83690 ASSAY OF LIPASE: CPT

## 2020-03-08 PROCEDURE — 99284 EMERGENCY DEPT VISIT MOD MDM: CPT

## 2020-03-08 RX ORDER — MAGNESIUM OXIDE 400 MG ORAL TABLET 241.3 MG
0 TABLET ORAL
Qty: 0 | Refills: 0 | DISCHARGE

## 2020-03-08 RX ORDER — SODIUM CHLORIDE 9 MG/ML
1000 INJECTION, SOLUTION INTRAVENOUS ONCE
Refills: 0 | Status: DISCONTINUED | OUTPATIENT
Start: 2020-03-08 | End: 2020-03-08

## 2020-03-08 RX ORDER — LUBIPROSTONE 24 UG/1
1 CAPSULE, GELATIN COATED ORAL
Qty: 0 | Refills: 0 | DISCHARGE

## 2020-03-08 RX ORDER — SODIUM CHLORIDE 9 MG/ML
1000 INJECTION INTRAMUSCULAR; INTRAVENOUS; SUBCUTANEOUS ONCE
Refills: 0 | Status: COMPLETED | OUTPATIENT
Start: 2020-03-08 | End: 2020-03-08

## 2020-03-08 RX ADMIN — SODIUM CHLORIDE 1000 MILLILITER(S): 9 INJECTION INTRAMUSCULAR; INTRAVENOUS; SUBCUTANEOUS at 06:01

## 2020-03-08 RX ADMIN — SODIUM CHLORIDE 1000 MILLILITER(S): 9 INJECTION INTRAMUSCULAR; INTRAVENOUS; SUBCUTANEOUS at 07:01

## 2020-03-08 RX ADMIN — SODIUM CHLORIDE 1000 MILLILITER(S): 9 INJECTION INTRAMUSCULAR; INTRAVENOUS; SUBCUTANEOUS at 05:00

## 2020-03-08 RX ADMIN — SODIUM CHLORIDE 1000 MILLILITER(S): 9 INJECTION INTRAMUSCULAR; INTRAVENOUS; SUBCUTANEOUS at 06:00

## 2020-03-08 NOTE — ED ADULT NURSE NOTE - CHPI ED NUR SYMPTOMS NEG
no burning urination/no blood in stool/no chills/no abdominal distension/no fever/no hematuria/no dysuria

## 2020-03-08 NOTE — ED PROVIDER NOTE - PATIENT PORTAL LINK FT
You can access the FollowMyHealth Patient Portal offered by [facility] by registering at the following website: [email or web address]. By joining [facility]’s FollowMyHealth portal, you will also be able to view your health information using other applications (apps) compatible with our system.

## 2020-03-08 NOTE — ED ADULT NURSE NOTE - OBJECTIVE STATEMENT
89 yr old male BIB EMS from [facility] c/o n/v/d since yesterday;  4+ pitting edema noted to b/l lower extremities and b/l feet; as per paper work pt was started on vancomycin 02/26/2020; assisted living sent copy of [facility] only

## 2020-03-08 NOTE — ED PROVIDER NOTE - ALCOHOL USE HISTORY SINGLE SELECT
Detail Level: Zone
Continue Regimen: Doxycycline 150mg 1 daily with a meal\\nPanoxyl 4% wash \\nDifferin 0.1% gel at hs
Plan: Discussed scheduling consult with 
unknown

## 2020-03-08 NOTE — ED PROVIDER NOTE - CLINICAL SUMMARY MEDICAL DECISION MAKING FREE TEXT BOX
Large volume diarrhea starting this afternoon and unrelenting all after c/o weakness but appears well will follow labs and if negative can go back after hydration Large volume diarrhea starting this afternoon and unrelenting all after c/o weakness but appears well will follow labs and if negative can go back after hydration labs show prerenal azotemia; plan to bolus fluids and return to ecf Large volume diarrhea starting over 1-2 days unrelenting and with episodes of vomiting.. but appears well. Diarrhea and known CD speaks against obstruction abdomen non tender all after c/o weakness will follow labs and if negative can go back after hydration labs show prerenal azotemia; plan to bolus fluids and return to e[facility]

## 2020-03-08 NOTE — ED PROVIDER NOTE - PROGRESS NOTE DETAILS
Pt interviewed with son doing better no diarrhea witnessed in ED labs show prefrenal azotemia. Plan to d/c to eCF with instruction for continued vancomycin tx supportive tx for diarrhea and vomitng and to monitor temps and white counts

## 2020-05-01 ENCOUNTER — INPATIENT (INPATIENT)
Facility: HOSPITAL | Age: 85
LOS: 3 days | Discharge: HOME | DRG: 683 | End: 2020-05-05
Attending: HOSPITALIST | Admitting: FAMILY MEDICINE
Payer: MEDICARE

## 2020-05-01 VITALS
HEIGHT: 72 IN | RESPIRATION RATE: 16 BRPM | OXYGEN SATURATION: 98 % | SYSTOLIC BLOOD PRESSURE: 114 MMHG | TEMPERATURE: 98 F | DIASTOLIC BLOOD PRESSURE: 61 MMHG | WEIGHT: 199.96 LBS | HEART RATE: 85 BPM

## 2020-05-01 DIAGNOSIS — Z98.890 OTHER SPECIFIED POSTPROCEDURAL STATES: Chronic | ICD-10-CM

## 2020-05-01 LAB
ALBUMIN SERPL ELPH-MCNC: 3.4 G/DL — SIGNIFICANT CHANGE UP (ref 3.3–5)
ALP SERPL-CCNC: 62 U/L — SIGNIFICANT CHANGE UP (ref 40–120)
ALT FLD-CCNC: 15 U/L — SIGNIFICANT CHANGE UP (ref 10–45)
ANION GAP SERPL CALC-SCNC: 11 MMOL/L — SIGNIFICANT CHANGE UP (ref 5–17)
APTT BLD: 39 SEC — HIGH (ref 27.5–36.3)
AST SERPL-CCNC: 25 U/L — SIGNIFICANT CHANGE UP (ref 10–40)
BASOPHILS # BLD AUTO: 0.03 K/UL — SIGNIFICANT CHANGE UP (ref 0–0.2)
BASOPHILS NFR BLD AUTO: 0.3 % — SIGNIFICANT CHANGE UP (ref 0–2)
BILIRUB SERPL-MCNC: 0.4 MG/DL — SIGNIFICANT CHANGE UP (ref 0.2–1.2)
BUN SERPL-MCNC: 54 MG/DL — HIGH (ref 7–23)
CALCIUM SERPL-MCNC: 9.4 MG/DL — SIGNIFICANT CHANGE UP (ref 8.4–10.5)
CHLORIDE SERPL-SCNC: 99 MMOL/L — SIGNIFICANT CHANGE UP (ref 96–108)
CO2 SERPL-SCNC: 30 MMOL/L — SIGNIFICANT CHANGE UP (ref 22–31)
CREAT SERPL-MCNC: 2.02 MG/DL — HIGH (ref 0.5–1.3)
EOSINOPHIL # BLD AUTO: 0.04 K/UL — SIGNIFICANT CHANGE UP (ref 0–0.5)
EOSINOPHIL NFR BLD AUTO: 0.4 % — SIGNIFICANT CHANGE UP (ref 0–6)
GLUCOSE SERPL-MCNC: 117 MG/DL — HIGH (ref 70–99)
HCT VFR BLD CALC: 36 % — LOW (ref 39–50)
HGB BLD-MCNC: 11.5 G/DL — LOW (ref 13–17)
IMM GRANULOCYTES NFR BLD AUTO: 0.3 % — SIGNIFICANT CHANGE UP (ref 0–1.5)
INR BLD: 1.51 RATIO — HIGH (ref 0.88–1.16)
LYMPHOCYTES # BLD AUTO: 1.04 K/UL — SIGNIFICANT CHANGE UP (ref 1–3.3)
LYMPHOCYTES # BLD AUTO: 11.4 % — LOW (ref 13–44)
MCHC RBC-ENTMCNC: 29.9 PG — SIGNIFICANT CHANGE UP (ref 27–34)
MCHC RBC-ENTMCNC: 31.9 GM/DL — LOW (ref 32–36)
MCV RBC AUTO: 93.8 FL — SIGNIFICANT CHANGE UP (ref 80–100)
MONOCYTES # BLD AUTO: 0.73 K/UL — SIGNIFICANT CHANGE UP (ref 0–0.9)
MONOCYTES NFR BLD AUTO: 8 % — SIGNIFICANT CHANGE UP (ref 2–14)
NEUTROPHILS # BLD AUTO: 7.23 K/UL — SIGNIFICANT CHANGE UP (ref 1.8–7.4)
NEUTROPHILS NFR BLD AUTO: 79.6 % — HIGH (ref 43–77)
NRBC # BLD: 0 /100 WBCS — SIGNIFICANT CHANGE UP (ref 0–0)
PLATELET # BLD AUTO: 261 K/UL — SIGNIFICANT CHANGE UP (ref 150–400)
POTASSIUM SERPL-MCNC: 3.7 MMOL/L — SIGNIFICANT CHANGE UP (ref 3.5–5.3)
POTASSIUM SERPL-SCNC: 3.7 MMOL/L — SIGNIFICANT CHANGE UP (ref 3.5–5.3)
PROT SERPL-MCNC: 7.8 G/DL — SIGNIFICANT CHANGE UP (ref 6–8.3)
PROTHROM AB SERPL-ACNC: 17.1 SEC — HIGH (ref 10–12.9)
RBC # BLD: 3.84 M/UL — LOW (ref 4.2–5.8)
RBC # FLD: 15.9 % — HIGH (ref 10.3–14.5)
SODIUM SERPL-SCNC: 140 MMOL/L — SIGNIFICANT CHANGE UP (ref 135–145)
TROPONIN I SERPL-MCNC: <.017 NG/ML — LOW (ref 0.02–0.06)
WBC # BLD: 9.1 K/UL — SIGNIFICANT CHANGE UP (ref 3.8–10.5)
WBC # FLD AUTO: 9.1 K/UL — SIGNIFICANT CHANGE UP (ref 3.8–10.5)

## 2020-05-01 PROCEDURE — 73130 X-RAY EXAM OF HAND: CPT | Mod: 26,RT

## 2020-05-01 PROCEDURE — 73502 X-RAY EXAM HIP UNI 2-3 VIEWS: CPT | Mod: 26,RT

## 2020-05-01 PROCEDURE — 71045 X-RAY EXAM CHEST 1 VIEW: CPT | Mod: 26

## 2020-05-01 PROCEDURE — 73080 X-RAY EXAM OF ELBOW: CPT | Mod: 26,RT

## 2020-05-01 PROCEDURE — 73090 X-RAY EXAM OF FOREARM: CPT | Mod: 26,LT

## 2020-05-01 PROCEDURE — 70450 CT HEAD/BRAIN W/O DYE: CPT | Mod: 26

## 2020-05-01 PROCEDURE — 99285 EMERGENCY DEPT VISIT HI MDM: CPT

## 2020-05-01 PROCEDURE — 93010 ELECTROCARDIOGRAM REPORT: CPT

## 2020-05-01 PROCEDURE — 73060 X-RAY EXAM OF HUMERUS: CPT | Mod: 26,RT

## 2020-05-01 RX ORDER — LUBIPROSTONE 24 UG/1
1 CAPSULE, GELATIN COATED ORAL
Qty: 0 | Refills: 0 | DISCHARGE

## 2020-05-01 RX ORDER — HYDROXYZINE HCL 10 MG
1 TABLET ORAL
Qty: 0 | Refills: 0 | DISCHARGE

## 2020-05-01 RX ORDER — MAGNESIUM OXIDE 400 MG ORAL TABLET 241.3 MG
0 TABLET ORAL
Qty: 0 | Refills: 0 | DISCHARGE

## 2020-05-01 RX ORDER — TETANUS TOXOID, REDUCED DIPHTHERIA TOXOID AND ACELLULAR PERTUSSIS VACCINE, ADSORBED 5; 2.5; 8; 8; 2.5 [IU]/.5ML; [IU]/.5ML; UG/.5ML; UG/.5ML; UG/.5ML
0.5 SUSPENSION INTRAMUSCULAR ONCE
Refills: 0 | Status: COMPLETED | OUTPATIENT
Start: 2020-05-01 | End: 2020-05-01

## 2020-05-01 RX ORDER — FAMOTIDINE 10 MG/ML
0 INJECTION INTRAVENOUS
Qty: 0 | Refills: 0 | DISCHARGE

## 2020-05-01 RX ORDER — LEVOTHYROXINE SODIUM 125 MCG
1 TABLET ORAL
Qty: 0 | Refills: 0 | DISCHARGE

## 2020-05-01 RX ORDER — TRAMADOL HYDROCHLORIDE 50 MG/1
25 TABLET ORAL ONCE
Refills: 0 | Status: DISCONTINUED | OUTPATIENT
Start: 2020-05-01 | End: 2020-05-01

## 2020-05-01 RX ORDER — SODIUM CHLORIDE 9 MG/ML
500 INJECTION INTRAMUSCULAR; INTRAVENOUS; SUBCUTANEOUS ONCE
Refills: 0 | Status: COMPLETED | OUTPATIENT
Start: 2020-05-01 | End: 2020-05-01

## 2020-05-01 RX ORDER — TRAMADOL HYDROCHLORIDE 50 MG/1
0 TABLET ORAL
Qty: 0 | Refills: 0 | DISCHARGE

## 2020-05-01 RX ADMIN — TRAMADOL HYDROCHLORIDE 25 MILLIGRAM(S): 50 TABLET ORAL at 21:25

## 2020-05-01 RX ADMIN — TETANUS TOXOID, REDUCED DIPHTHERIA TOXOID AND ACELLULAR PERTUSSIS VACCINE, ADSORBED 0.5 MILLILITER(S): 5; 2.5; 8; 8; 2.5 SUSPENSION INTRAMUSCULAR at 21:24

## 2020-05-01 NOTE — ED ADULT NURSE NOTE - PAIN: ALLEVIATING FACTORS
Patient requests all Lab and Radiology Results on their Discharge Instructions
distraction/splinting/repositioning

## 2020-05-01 NOTE — ED PROVIDER NOTE - PHYSICAL EXAMINATION
multiple ecchymoses in different stages of healing   (+) skin tears to Rt hand/fingers   dry clean and intact dsg to distal foreram - pt refused to remove the dsg to examine the wound;      (++) TTP over elbow with edema to the area; (++) over Rt shoulder with limited ROM of both shoulder and Rt elbow   wrist/hand - no bony TTP, no snuff box TTP, 2+ radial pulse, NVI   no TTP over Ac joint   (+) TTP over Rt hip with limited ROM of the hip joint   (+)compression stocking to b/l LE

## 2020-05-01 NOTE — ED PROVIDER NOTE - CARE PLAN
Principal Discharge DX:	Elbow fracture Principal Discharge DX:	Elbow fracture  Secondary Diagnosis:	Acute renal failure, unspecified acute renal failure type Principal Discharge DX:	Supracondylar fracture of humerus, closed, right, initial encounter  Secondary Diagnosis:	Acute renal failure, unspecified acute renal failure type

## 2020-05-01 NOTE — ED PROVIDER NOTE - ATTENDING CONTRIBUTION TO CARE
pt bibems from [facility] c/o R arm pain after fall today. pt was walking with walker and fall onto r arm. denies feeling ill prior to fall. no chest pain , sob, fever, n/v. denies LOC, HA, neck/back pain.  no numbness/weakness.    exam:   General: NAD.   HEENT: eyes perrl, nose normal, OP no erythema/exudate/swelling.  head without swelling/tenderness/ discoloration or other signs of trauma  cor: RRR, s1s2, 2+rad pulses.   lungs: ctabl, no resp distress.   abd: soft, ntnd.   neuro: a&ox3, cn2-12 intact, VALERIO, 5/5 strength c nl sensation all extremities, nl coordination.   MSK: no c/t/L spine tenderness.  nontender pelvis/hips.  RUE: moderate ttp and swelling posterior elbow, pain c ROM.  R wrist/hand/fingers no focal ttp, no swelling. 2+ rad pulses. nl distal sensation R fingers  Skin: skin tears RUE     AP: s/p fall, likely mechanical, with RUE pain.  check xrays, r/o fx humerus.  check ct head r/o bleed as pt on AC. reassess

## 2020-05-01 NOTE — ED PROVIDER NOTE - RADIOLOGY FINDINGS
reviewed by me/CXR no acute abnormality/xr R humerus, forearm, hand: R elbow fx CXR no acute abnormality/xr R humerus, forearm, hand: R elbow fx.  R hip: no fx/reviewed by me

## 2020-05-01 NOTE — ED ADULT NURSE NOTE - OBJECTIVE STATEMENT
As per transfer form, Resident had a fall in his room.  He pressed the lifeline and complained that he was unable to stand up and he complained that he had right shoulder and right arm and right hip pain.  Pt has mild bleeding on his right hand and bruising on his right arm. As per transfer form, Resident had a fall in his room.  He pressed the lifeline and complained that he was unable to stand up and he complained that he had right shoulder and right arm and right hip pain.  Pt has mild bleeding on his right hand and bruising on his right arm.  Pt has multiple skin tears on his right and left forearms ,and "a skin condition" on his bilateral lower shins.  Pt states he has a visiting nurse come daily and the dressings were done today, pt refused for RN to take down dressing.  Pt has scalp open area (called it skin cancer) and has a "salve I put on it".

## 2020-05-01 NOTE — ED PROVIDER NOTE - OBJECTIVE STATEMENT
Pt is 89 y/o male with PMhx of HTN, BPH, PE on Eliquis (as per family) here for eval s/p mechanical fall. Pt was walking with a walker from the bathroom to resting chair, lost his balance in the process of transferring from the walker to the chair; Fell onto his Rt side, no head injury or LOC, fell onto RUE and now with pain to entire RUE and Rt hip. Denies cp, sob, palpitations before or after the incident; (+) b/l LE edema which has been at baseline, also has multiple skin tears, with the most recent one to Lt forearm, dressed by VN earlier today and pt refused to remove the dressing; Unsure of adacel; Denies covid sx or covid exposure

## 2020-05-01 NOTE — ED PROVIDER NOTE - GASTROINTESTINAL NEGATIVE STATEMENT, MLM
Strong peripheral pulses no abdominal pain, no bloating, no constipation, no diarrhea, no nausea and no vomiting.

## 2020-05-01 NOTE — ED ADULT TRIAGE NOTE - CHIEF COMPLAINT QUOTE
state he became weak and fell, injury to left arm and right shoulder, patient on Eliquis state he became weak and fell, injury to right arm and right shoulder noted and also c/o right hip pain, patient on Eliquis

## 2020-05-01 NOTE — ED PROVIDER NOTE - CLINICAL SUMMARY MEDICAL DECISION MAKING FREE TEXT BOX
Pt is 89 y/o male with PMhx of HTN, BPH, PE on Eliquis (as per family) here for eval s/p mechanical fall. Pt was walking with a walker from the bathroom to resting chair, lost his balance in the process of transferring from the walker to the chair; Fell onto his Rt side, no head injury or LOC, fell onto RUE and now with pain to entire RUE and Rt hip. Denies cp, sob, palpitations before or after the incident; (+) b/l LE edema which has been at baseline, also has multiple skin tears, with the most recent one to Lt forearm, dressed by VN earlier today and pt refused to remove the dressing; Unsure of adacel; Denies covid sx or covid exposure. will get palin RUE and hip xrays, Ct head, pain control, will reasses; Pt is 89 y/o male with PMhx of HTN, BPH, PE on Eliquis (as per family) here for eval s/p mechanical fall. Pt was walking with a walker from the bathroom to resting chair, lost his balance in the process of transferring from the walker to the chair; Fell onto his Rt side, no head injury or LOC, fell onto RUE and now with pain to entire RUE and Rt hip. Denies cp, sob, palpitations before or after the incident; (+) b/l LE edema which has been at baseline, also has multiple skin tears, with the most recent one to Lt forearm, dressed by VN earlier today and pt refused to remove the dressing; Unsure of adacel; Denies covid sx or covid exposure. will get palin RUE and hip xrays, Ct head, pain control, will reasses;    dr ochoa: update: xray shows R elbow fx. pt put in sling by RN.  pt dependent on walker. will be unable to use walker given RUE fx. d/w assisted living, will be unable to provide personal aid that pt will need immediately. labs checked, showing acute renal insufficiency.  will admit for iv hydration, ortho eval, likely need rehab. d/w son by phone

## 2020-05-01 NOTE — ED ADULT NURSE NOTE - CHIEF COMPLAINT QUOTE
state he became weak and fell, injury to right arm and right shoulder noted and also c/o right hip pain, patient on Eliquis

## 2020-05-02 DIAGNOSIS — I48.91 UNSPECIFIED ATRIAL FIBRILLATION: ICD-10-CM

## 2020-05-02 DIAGNOSIS — R60.9 EDEMA, UNSPECIFIED: ICD-10-CM

## 2020-05-02 DIAGNOSIS — M10.9 GOUT, UNSPECIFIED: ICD-10-CM

## 2020-05-02 DIAGNOSIS — S42.409A UNSPECIFIED FRACTURE OF LOWER END OF UNSPECIFIED HUMERUS, INITIAL ENCOUNTER FOR CLOSED FRACTURE: ICD-10-CM

## 2020-05-02 DIAGNOSIS — N40.0 BENIGN PROSTATIC HYPERPLASIA WITHOUT LOWER URINARY TRACT SYMPTOMS: ICD-10-CM

## 2020-05-02 DIAGNOSIS — Z29.9 ENCOUNTER FOR PROPHYLACTIC MEASURES, UNSPECIFIED: ICD-10-CM

## 2020-05-02 DIAGNOSIS — N17.9 ACUTE KIDNEY FAILURE, UNSPECIFIED: ICD-10-CM

## 2020-05-02 DIAGNOSIS — S42.411A DISPLACED SIMPLE SUPRACONDYLAR FRACTURE WITHOUT INTERCONDYLAR FRACTURE OF RIGHT HUMERUS, INITIAL ENCOUNTER FOR CLOSED FRACTURE: ICD-10-CM

## 2020-05-02 LAB
ALBUMIN SERPL ELPH-MCNC: 3.1 G/DL — LOW (ref 3.3–5)
ALP SERPL-CCNC: 61 U/L — SIGNIFICANT CHANGE UP (ref 40–120)
ALT FLD-CCNC: 12 U/L — SIGNIFICANT CHANGE UP (ref 10–45)
ANION GAP SERPL CALC-SCNC: 9 MMOL/L — SIGNIFICANT CHANGE UP (ref 5–17)
APPEARANCE UR: CLEAR — SIGNIFICANT CHANGE UP
AST SERPL-CCNC: 23 U/L — SIGNIFICANT CHANGE UP (ref 10–40)
BASOPHILS # BLD AUTO: 0.04 K/UL — SIGNIFICANT CHANGE UP (ref 0–0.2)
BASOPHILS NFR BLD AUTO: 0.5 % — SIGNIFICANT CHANGE UP (ref 0–2)
BILIRUB SERPL-MCNC: 0.6 MG/DL — SIGNIFICANT CHANGE UP (ref 0.2–1.2)
BILIRUB UR-MCNC: NEGATIVE — SIGNIFICANT CHANGE UP
BUN SERPL-MCNC: 52 MG/DL — HIGH (ref 7–23)
CALCIUM SERPL-MCNC: 9.2 MG/DL — SIGNIFICANT CHANGE UP (ref 8.4–10.5)
CHLORIDE SERPL-SCNC: 102 MMOL/L — SIGNIFICANT CHANGE UP (ref 96–108)
CO2 SERPL-SCNC: 30 MMOL/L — SIGNIFICANT CHANGE UP (ref 22–31)
COLOR SPEC: YELLOW — SIGNIFICANT CHANGE UP
CREAT SERPL-MCNC: 1.7 MG/DL — HIGH (ref 0.5–1.3)
DIFF PNL FLD: NEGATIVE — SIGNIFICANT CHANGE UP
EOSINOPHIL # BLD AUTO: 0.06 K/UL — SIGNIFICANT CHANGE UP (ref 0–0.5)
EOSINOPHIL NFR BLD AUTO: 0.7 % — SIGNIFICANT CHANGE UP (ref 0–6)
GLUCOSE SERPL-MCNC: 101 MG/DL — HIGH (ref 70–99)
GLUCOSE UR QL: NEGATIVE — SIGNIFICANT CHANGE UP
HCT VFR BLD CALC: 33.7 % — LOW (ref 39–50)
HGB BLD-MCNC: 10.7 G/DL — LOW (ref 13–17)
IMM GRANULOCYTES NFR BLD AUTO: 0.3 % — SIGNIFICANT CHANGE UP (ref 0–1.5)
INR BLD: 1.5 RATIO — HIGH (ref 0.88–1.16)
KETONES UR-MCNC: NEGATIVE — SIGNIFICANT CHANGE UP
LEUKOCYTE ESTERASE UR-ACNC: NEGATIVE — SIGNIFICANT CHANGE UP
LYMPHOCYTES # BLD AUTO: 1.42 K/UL — SIGNIFICANT CHANGE UP (ref 1–3.3)
LYMPHOCYTES # BLD AUTO: 16.4 % — SIGNIFICANT CHANGE UP (ref 13–44)
MCHC RBC-ENTMCNC: 30.1 PG — SIGNIFICANT CHANGE UP (ref 27–34)
MCHC RBC-ENTMCNC: 31.8 GM/DL — LOW (ref 32–36)
MCV RBC AUTO: 94.9 FL — SIGNIFICANT CHANGE UP (ref 80–100)
MONOCYTES # BLD AUTO: 0.88 K/UL — SIGNIFICANT CHANGE UP (ref 0–0.9)
MONOCYTES NFR BLD AUTO: 10.2 % — SIGNIFICANT CHANGE UP (ref 2–14)
NEUTROPHILS # BLD AUTO: 6.22 K/UL — SIGNIFICANT CHANGE UP (ref 1.8–7.4)
NEUTROPHILS NFR BLD AUTO: 71.9 % — SIGNIFICANT CHANGE UP (ref 43–77)
NITRITE UR-MCNC: NEGATIVE — SIGNIFICANT CHANGE UP
NRBC # BLD: 0 /100 WBCS — SIGNIFICANT CHANGE UP (ref 0–0)
PH UR: 6.5 — SIGNIFICANT CHANGE UP (ref 5–8)
PLATELET # BLD AUTO: 231 K/UL — SIGNIFICANT CHANGE UP (ref 150–400)
POTASSIUM SERPL-MCNC: 3.8 MMOL/L — SIGNIFICANT CHANGE UP (ref 3.5–5.3)
POTASSIUM SERPL-SCNC: 3.8 MMOL/L — SIGNIFICANT CHANGE UP (ref 3.5–5.3)
PROT SERPL-MCNC: 7.1 G/DL — SIGNIFICANT CHANGE UP (ref 6–8.3)
PROT UR-MCNC: NEGATIVE — SIGNIFICANT CHANGE UP
PROTHROM AB SERPL-ACNC: 17 SEC — HIGH (ref 10–12.9)
RBC # BLD: 3.55 M/UL — LOW (ref 4.2–5.8)
RBC # FLD: 15.8 % — HIGH (ref 10.3–14.5)
SARS-COV-2 RNA SPEC QL NAA+PROBE: SIGNIFICANT CHANGE UP
SODIUM SERPL-SCNC: 141 MMOL/L — SIGNIFICANT CHANGE UP (ref 135–145)
SP GR SPEC: 1.01 — SIGNIFICANT CHANGE UP (ref 1.01–1.02)
UROBILINOGEN FLD QL: NEGATIVE — SIGNIFICANT CHANGE UP
WBC # BLD: 8.65 K/UL — SIGNIFICANT CHANGE UP (ref 3.8–10.5)
WBC # FLD AUTO: 8.65 K/UL — SIGNIFICANT CHANGE UP (ref 3.8–10.5)

## 2020-05-02 PROCEDURE — 29105 APPLICATION LONG ARM SPLINT: CPT | Mod: RT

## 2020-05-02 PROCEDURE — 99221 1ST HOSP IP/OBS SF/LOW 40: CPT | Mod: 25

## 2020-05-02 PROCEDURE — 73080 X-RAY EXAM OF ELBOW: CPT | Mod: 26,RT

## 2020-05-02 PROCEDURE — 12345: CPT | Mod: NC

## 2020-05-02 RX ORDER — APIXABAN 2.5 MG/1
2.5 TABLET, FILM COATED ORAL EVERY 12 HOURS
Refills: 0 | Status: DISCONTINUED | OUTPATIENT
Start: 2020-05-02 | End: 2020-05-05

## 2020-05-02 RX ORDER — HEPARIN SODIUM 5000 [USP'U]/ML
5000 INJECTION INTRAVENOUS; SUBCUTANEOUS EVERY 8 HOURS
Refills: 0 | Status: DISCONTINUED | OUTPATIENT
Start: 2020-05-02 | End: 2020-05-02

## 2020-05-02 RX ORDER — FINASTERIDE 5 MG/1
5 TABLET, FILM COATED ORAL DAILY
Refills: 0 | Status: DISCONTINUED | OUTPATIENT
Start: 2020-05-02 | End: 2020-05-05

## 2020-05-02 RX ORDER — HYDROMORPHONE HYDROCHLORIDE 2 MG/ML
0.5 INJECTION INTRAMUSCULAR; INTRAVENOUS; SUBCUTANEOUS EVERY 4 HOURS
Refills: 0 | Status: DISCONTINUED | OUTPATIENT
Start: 2020-05-02 | End: 2020-05-05

## 2020-05-02 RX ORDER — OXYCODONE HYDROCHLORIDE 5 MG/1
5 TABLET ORAL EVERY 4 HOURS
Refills: 0 | Status: DISCONTINUED | OUTPATIENT
Start: 2020-05-02 | End: 2020-05-02

## 2020-05-02 RX ORDER — ACETAMINOPHEN 500 MG
650 TABLET ORAL EVERY 6 HOURS
Refills: 0 | Status: DISCONTINUED | OUTPATIENT
Start: 2020-05-02 | End: 2020-05-05

## 2020-05-02 RX ORDER — HYDROMORPHONE HYDROCHLORIDE 2 MG/ML
0.5 INJECTION INTRAMUSCULAR; INTRAVENOUS; SUBCUTANEOUS ONCE
Refills: 0 | Status: DISCONTINUED | OUTPATIENT
Start: 2020-05-02 | End: 2020-05-02

## 2020-05-02 RX ORDER — SODIUM CHLORIDE 0.65 %
1 AEROSOL, SPRAY (ML) NASAL DAILY
Refills: 0 | Status: DISCONTINUED | OUTPATIENT
Start: 2020-05-02 | End: 2020-05-05

## 2020-05-02 RX ORDER — LANOLIN ALCOHOL/MO/W.PET/CERES
6 CREAM (GRAM) TOPICAL AT BEDTIME
Refills: 0 | Status: DISCONTINUED | OUTPATIENT
Start: 2020-05-02 | End: 2020-05-05

## 2020-05-02 RX ORDER — TAMSULOSIN HYDROCHLORIDE 0.4 MG/1
0.8 CAPSULE ORAL AT BEDTIME
Refills: 0 | Status: DISCONTINUED | OUTPATIENT
Start: 2020-05-02 | End: 2020-05-05

## 2020-05-02 RX ORDER — ALLOPURINOL 300 MG
300 TABLET ORAL DAILY
Refills: 0 | Status: DISCONTINUED | OUTPATIENT
Start: 2020-05-02 | End: 2020-05-05

## 2020-05-02 RX ORDER — MUPIROCIN 20 MG/G
1 OINTMENT TOPICAL EVERY 12 HOURS
Refills: 0 | Status: DISCONTINUED | OUTPATIENT
Start: 2020-05-02 | End: 2020-05-05

## 2020-05-02 RX ORDER — TRAMADOL HYDROCHLORIDE 50 MG/1
50 TABLET ORAL EVERY 12 HOURS
Refills: 0 | Status: DISCONTINUED | OUTPATIENT
Start: 2020-05-02 | End: 2020-05-05

## 2020-05-02 RX ORDER — OXYCODONE HYDROCHLORIDE 5 MG/1
5 TABLET ORAL EVERY 4 HOURS
Refills: 0 | Status: DISCONTINUED | OUTPATIENT
Start: 2020-05-02 | End: 2020-05-03

## 2020-05-02 RX ORDER — FUROSEMIDE 40 MG
20 TABLET ORAL DAILY
Refills: 0 | Status: DISCONTINUED | OUTPATIENT
Start: 2020-05-02 | End: 2020-05-03

## 2020-05-02 RX ORDER — ONDANSETRON 8 MG/1
4 TABLET, FILM COATED ORAL EVERY 6 HOURS
Refills: 0 | Status: DISCONTINUED | OUTPATIENT
Start: 2020-05-02 | End: 2020-05-04

## 2020-05-02 RX ORDER — PANTOPRAZOLE SODIUM 20 MG/1
40 TABLET, DELAYED RELEASE ORAL
Refills: 0 | Status: DISCONTINUED | OUTPATIENT
Start: 2020-05-02 | End: 2020-05-05

## 2020-05-02 RX ORDER — HYDROMORPHONE HYDROCHLORIDE 2 MG/ML
1 INJECTION INTRAMUSCULAR; INTRAVENOUS; SUBCUTANEOUS ONCE
Refills: 0 | Status: DISCONTINUED | OUTPATIENT
Start: 2020-05-02 | End: 2020-05-02

## 2020-05-02 RX ADMIN — Medication 20 MILLIGRAM(S): at 09:22

## 2020-05-02 RX ADMIN — SODIUM CHLORIDE 500 MILLILITER(S): 9 INJECTION INTRAMUSCULAR; INTRAVENOUS; SUBCUTANEOUS at 01:03

## 2020-05-02 RX ADMIN — OXYCODONE HYDROCHLORIDE 5 MILLIGRAM(S): 5 TABLET ORAL at 02:36

## 2020-05-02 RX ADMIN — HYDROMORPHONE HYDROCHLORIDE 1 MILLIGRAM(S): 2 INJECTION INTRAMUSCULAR; INTRAVENOUS; SUBCUTANEOUS at 09:21

## 2020-05-02 RX ADMIN — HYDROMORPHONE HYDROCHLORIDE 0.5 MILLIGRAM(S): 2 INJECTION INTRAMUSCULAR; INTRAVENOUS; SUBCUTANEOUS at 06:09

## 2020-05-02 RX ADMIN — TAMSULOSIN HYDROCHLORIDE 0.8 MILLIGRAM(S): 0.4 CAPSULE ORAL at 20:50

## 2020-05-02 RX ADMIN — FINASTERIDE 5 MILLIGRAM(S): 5 TABLET, FILM COATED ORAL at 12:01

## 2020-05-02 RX ADMIN — Medication 300 MILLIGRAM(S): at 13:38

## 2020-05-02 RX ADMIN — APIXABAN 2.5 MILLIGRAM(S): 2.5 TABLET, FILM COATED ORAL at 19:06

## 2020-05-02 RX ADMIN — HEPARIN SODIUM 5000 UNIT(S): 5000 INJECTION INTRAVENOUS; SUBCUTANEOUS at 09:22

## 2020-05-02 RX ADMIN — HYDROMORPHONE HYDROCHLORIDE 0.5 MILLIGRAM(S): 2 INJECTION INTRAMUSCULAR; INTRAVENOUS; SUBCUTANEOUS at 00:25

## 2020-05-02 RX ADMIN — OXYCODONE HYDROCHLORIDE 5 MILLIGRAM(S): 5 TABLET ORAL at 19:06

## 2020-05-02 RX ADMIN — SODIUM CHLORIDE 500 MILLILITER(S): 9 INJECTION INTRAMUSCULAR; INTRAVENOUS; SUBCUTANEOUS at 00:05

## 2020-05-02 RX ADMIN — PANTOPRAZOLE SODIUM 40 MILLIGRAM(S): 20 TABLET, DELAYED RELEASE ORAL at 09:23

## 2020-05-02 NOTE — PROGRESS NOTE ADULT - SUBJECTIVE AND OBJECTIVE BOX
Patient is a 89y old  Male who presents with a chief complaint of fall (02 May 2020 00:18)      SUBJECTIVE:  Patient seen and examined at bedside.      See HPI for pertinent ROS.    All other ROS reviewed and negative except as otherwise stated above.      ALLERGIES:  penicillin (Unknown)      MEDICATIONS:  MEDICATIONS  (STANDING):  allopurinol 300 milliGRAM(s) Oral daily  finasteride 5 milliGRAM(s) Oral daily  furosemide    Tablet 20 milliGRAM(s) Oral daily  heparin   Injectable 5000 Unit(s) SubCutaneous every 8 hours  mupirocin 2% Ointment 1 Application(s) Topical every 12 hours  pantoprazole    Tablet 40 milliGRAM(s) Oral before breakfast  tamsulosin 0.8 milliGRAM(s) Oral at bedtime    MEDICATIONS  (PRN):  melatonin 6 milliGRAM(s) Oral at bedtime PRN Insomnia  ondansetron Injectable 4 milliGRAM(s) IV Push every 6 hours PRN Nausea  oxyCODONE    IR 5 milliGRAM(s) Oral every 4 hours PRN Moderate Pain (4 - 6)  sodium chloride 0.65% Nasal 1 Spray(s) Both Nostrils daily PRN Nasal Congestion      VITALS:  Vital Signs Last 24 Hrs  T(F): 97.9 (02 May 2020 02:44), Max: 99.4 (01 May 2020 22:50)  HR: 66 (02 May 2020 00:45) (66 - 88)  BP: 119/48 (02 May 2020 02:44) (114/61 - 119/48)  RR: 16 (02 May 2020 02:44) (16 - 17)  SpO2: 100% (02 May 2020 02:44) (97% - 100%)  I&O's Summary    01 May 2020 07:01  -  02 May 2020 07:00  --------------------------------------------------------  IN: 500 mL / OUT: 0 mL / NET: 500 mL          PHYSICAL EXAM:  General: NAD, A/O x 3  Abdomen: Soft, Nontender, Nondistended; Bowel sounds present  Extremities: No calf tenderness, No pitting edema      LABS:                        10.7   8.65  )-----------( 231      ( 02 May 2020 07:00 )             33.7         140  |  99  |  54  ----------------------------<  117  3.7   |  30  |  2.02    Ca    9.4      01 May 2020 22:45    TPro  7.8  /  Alb  3.4  /  TBili  0.4  /  DBili  x   /  AST  25  /  ALT  15  /  AlkPhos  62      eGFR if : 31 mL/min/1.73M2 (20 @ 22:45)  eGFR if Non African American: 27 mL/min/1.73M2 (20 @ 22:45)    PT/INR - ( 01 May 2020 23:00 )   PT: 17.1 sec;   INR: 1.51 ratio      PTT - ( 01 May 2020 23:00 )  PTT:39.0 sec    CARDIAC MARKERS ( 01 May 2020 22:45 )  <.017 ng/mL / x     / x     / x     / x                            Urinalysis Basic - ( 02 May 2020 00:45 )    Color: Yellow / Appearance: Clear / S.010 / pH: x  Gluc: x / Ketone: Negative  / Bili: Negative / Urobili: Negative   Blood: x / Protein: Negative / Nitrite: Negative   Leuk Esterase: Negative / RBC: x / WBC x   Sq Epi: x / Non Sq Epi: x / Bacteria: x                RADIOLOGY & ADDITIONAL TESTS:        Care Discussed with Attending. Patient is a 89y old  Male who presents with a chief complaint of fall (02 May 2020 00:18)      SUBJECTIVE:  Patient seen and examined at bedside, complains of right arm and elbow pain. Otherwise, pt denies chest pain, palpitations, nausea, vomiting, abdominal pain, diarrhea, constipation      See HPI for pertinent ROS.    All other ROS reviewed and negative except as otherwise stated above.      ALLERGIES:  penicillin (Unknown)      MEDICATIONS:  MEDICATIONS  (STANDING):  allopurinol 300 milliGRAM(s) Oral daily  finasteride 5 milliGRAM(s) Oral daily  furosemide    Tablet 20 milliGRAM(s) Oral daily  heparin   Injectable 5000 Unit(s) SubCutaneous every 8 hours  mupirocin 2% Ointment 1 Application(s) Topical every 12 hours  pantoprazole    Tablet 40 milliGRAM(s) Oral before breakfast  tamsulosin 0.8 milliGRAM(s) Oral at bedtime    MEDICATIONS  (PRN):  melatonin 6 milliGRAM(s) Oral at bedtime PRN Insomnia  ondansetron Injectable 4 milliGRAM(s) IV Push every 6 hours PRN Nausea  oxyCODONE    IR 5 milliGRAM(s) Oral every 4 hours PRN Moderate Pain (4 - 6)  sodium chloride 0.65% Nasal 1 Spray(s) Both Nostrils daily PRN Nasal Congestion      VITALS:  Vital Signs Last 24 Hrs  T(F): 97.9 (02 May 2020 02:44), Max: 99.4 (01 May 2020 22:50)  HR: 66 (02 May 2020 00:45) (66 - 88)  BP: 119/48 (02 May 2020 02:44) (114/61 - 119/48)  RR: 16 (02 May 2020 02:44) (16 - 17)  SpO2: 100% (02 May 2020 02:44) (97% - 100%)  I&O's Summary    01 May 2020 07:01  -  02 May 2020 07:00  --------------------------------------------------------  IN: 500 mL / OUT: 0 mL / NET: 500 mL        PHYSICAL EXAM:    Constitutional: WDWN resting comfortably in bed; NAD  Head: NC/AT  Eyes: PERRLA, EOMI, clear conjunctiva  ENT: no nasal discharge; no oropharyngeal erythema or exudates; moist oral mucosa  Neck: supple; no JVD or thyromegaly  Respiratory: CTA B/L; no W/R/R, no retractions  Cardiac: irregular rhythm, normal +S1/S2; 3/6 diastolic murmur best heard at RUSB and mitral area. PMI non-displaced  Gastrointestinal: soft, NT/ND; no rebound or guarding; +BS, no hepatosplenomegaly  Extremities: WWP, no clubbing or cyanosis; trace LE edema b/l  Vascular: 2+ radial, DP/PT pulses B/L  Neurologic: AAOx3; hard of hearing, answers questions appropriately, follows commands, moves all extremities      LABS:                        10.7   8.65  )-----------( 231      ( 02 May 2020 07:00 )             33.7         140  |  99  |  54  ----------------------------<  117  3.7   |  30  |  2.02    Ca    9.4      01 May 2020 22:45    TPro  7.8  /  Alb  3.4  /  TBili  0.4  /  DBili  x   /  AST  25  /  ALT  15  /  AlkPhos  62      eGFR if : 31 mL/min/1.73M2 (20 @ 22:45)  eGFR if Non African American: 27 mL/min/1.73M2 (20 @ 22:45)    PT/INR - ( 01 May 2020 23:00 )   PT: 17.1 sec;   INR: 1.51 ratio      PTT - ( 01 May 2020 23:00 )  PTT:39.0 sec    CARDIAC MARKERS ( 01 May 2020 22:45 )  <.017 ng/mL / x     / x     / x     / x                            Urinalysis Basic - ( 02 May 2020 00:45 )    Color: Yellow / Appearance: Clear / S.010 / pH: x  Gluc: x / Ketone: Negative  / Bili: Negative / Urobili: Negative   Blood: x / Protein: Negative / Nitrite: Negative   Leuk Esterase: Negative / RBC: x / WBC x   Sq Epi: x / Non Sq Epi: x / Bacteria: x                RADIOLOGY & ADDITIONAL TESTS:    < from: CT Head No Cont (20 @ 22:49) >  FINDINGS:    Beam hardening artifact slightly obscures evaluation of the posterior fossa and brainstem.    There is no acute intra-axial or extra-axial hemorrhage. No mass effect or shift of the midline. The basal cisterns are not effaced. There is cerebral volume loss with prominence of the ventricles and sulci. There are patchy areas of low attenuation within the periventricular and subcortical white matter which are nonspecificbut likely the sequela of chronic microvascular change. There is no CT evidence of a large vascular territory infarct.    Scattered chronic mucosal wall thickening of the visualized paranasal sinuses. Trace partially opacified lateral aspect of bilateral mastoid air cells with adjacent sclerosis. Replaced bilateral ocular lenses. Partially imaged advanced degenerative changes of the atlantoaxial joint with ligamentous hypertrophy and bony productive changes.    Minimal right superior periorbital soft tissue swelling. Limited visualized bilateral globes appear symmetric. Replaced bilateral ocular lenses.     No significant scalp soft tissue swelling. No acute displaced calvarium fracture.     IMPRESSION:    No acute intracranial hemorrhage, mass effect, or acute calvarium fracture. Appropriate involutional changes and chronic microvascular ischemic gliosis. Minimal right superior periorbital soft tissue swelling. If there are new or persistent symptoms, consider further evaluation with MRIprovided no contraindications.    < end of copied text >    < from: Xray Elbow AP + Lateral + Oblique, Right (20 @ 22:30) >  INTERPRETATION:  Right elbow  HISTORY: Right arm injury    Two views of the right elbow show displaced buckle fracture of the supracondylar humerus. The joint spaces are maintained.  IMPRESSION: Distal humeral fracture.  The above findings correspond to a note entered into the patient's electronic record by the emergency department staff at the time of the study.      < end of copied text >      < from: Xray Humerus, Right (20 @ 22:42) >  HISTORY: Arm injury    Comparison: Elbow series obtained the same evening.   Three views of the right humerus show no evidence of proximal fracture nordestructive change. The joint spaces are maintained. Incidentally noted is a questionable fracture, lower scapula.  Distal humeral fracture is again noted.  IMPRESSION: No acute humeral pathology proximally. Questionable scapular fracture. Clinical correlation is suggested.  The finding of questionable scapular fracture was submitted to the emergency department staff via the hospital's image transfer software system at the time of dictation.    < end of copied text >        Care Discussed with Attending.

## 2020-05-02 NOTE — H&P ADULT - NSHPPHYSICALEXAM_GEN_ALL_CORE
Vital Signs Last 24 Hrs  T(C): 37.4 (01 May 2020 22:50), Max: 37.4 (01 May 2020 22:50)  T(F): 99.4 (01 May 2020 22:50), Max: 99.4 (01 May 2020 22:50)  HR: 88 (01 May 2020 22:50) (85 - 88)  BP: 114/61 (01 May 2020 20:29) (114/61 - 114/61)  BP(mean): --  RR: 17 (01 May 2020 22:50) (16 - 17)  SpO2: 100% (01 May 2020 22:50) (98% - 100%)    GENERAL- NAD  EAR/NOSE/MOUTH/THROAT - no pharyngeal exudates, no oral lesions,  MMM  EYES- RENETTA, conjunctiva and Sclera clear  NECK- supple  RESPIRATORY-  clear to auscultation bilaterally  CARDIOVASCULAR - SIS2, RRR  GI - soft NT BS present  EXTREMITIES- B/L LE edema, tenderness right elbow  NEUROLOGY- no gross focal deficits  PSYCHIATRY- AAO X 3  MUSCULOSKELETAL- ROM restricted to right UE due to pain/fracture

## 2020-05-02 NOTE — H&P ADULT - HISTORY OF PRESENT ILLNESS
89y/o M w/ pmh including afib, PE on eliquis, bph, constipation, LE edema, gerd, gout, hypothyroidism,  LE cellulitis sent in from the [facility] s/p fall,  Pt was walking with a walker from the bathroom to resting chair, lost his balance in the process of transferring from the walker to the chair; Fell onto his Rt side, no head injury or LOC, fell onto RUE and now with 10/10 aching, pain to entire RUE and Rt hip, unable to move right elbow, Denies cp, sob, palpitations before or after the incident; (+) b/l LE edema which has been at baseline, also has multiple skin tears, with the most recent one to Lt forearm, dressed by VN earlier today and pt refused to remove the dressing; Unsure of adacel; Denies covid sx or covid exposure    says his son is his HCP , he states he is DNR/DNI, and understands what it means says ' if anything happens to me don't try to bring me back, just let me go''

## 2020-05-02 NOTE — H&P ADULT - ASSESSMENT
89y/o M w/ pmh including afib, PE on eliquis, bph, constipation, LE edema, gerd, gout, hypothyroidism,  LE cellulitis sent in from the [facility] s/p fall, admitted for intractable pain, evalute for REHAB when stable, DNR/DNI    CAPRINI SCORE [CLOT]    AGE RELATED RISK FACTORS                                                       MOBILITY RELATED FACTORS  [ ] Age 41-60 years                                            (1 Point)                  [X ] Bed rest                                                        (1 Point)  [ ] Age: 61-74 years                                           (2 Points)                 [ ] Plaster cast                                                   (2 Points)  [X ] Age= 75 years                                              (3 Points)                 [ ] Bed bound for more than 72 hours                 (2 Points)    DISEASE RELATED RISK FACTORS                                               GENDER SPECIFIC FACTORS  [X ] Edema in the lower extremities                       (1 Point)                  [ ] Pregnancy                                                     (1 Point)  [ ] Varicose veins                                               (1 Point)                  [ ] Post-partum < 6 weeks                                   (1 Point)             [ ] BMI > 25 Kg/m2                                            (1 Point)                  [ ] Hormonal therapy  or oral contraception          (1 Point)                 [ ] Sepsis (in the previous month)                        (1 Point)                  [ ] History of pregnancy complications                 (1 point)  [ ] Pneumonia or serious lung disease                                               [ ] Unexplained or recurrent                     (1 Point)           (in the previous month)                               (1 Point)  [ ] Abnormal pulmonary function test                     (1 Point)                 SURGERY RELATED RISK FACTORS  [ ] Acute myocardial infarction                              (1 Point)                 [ ]  Section                                             (1 Point)  [ ] Congestive heart failure (in the previous month)  (1 Point)               [ ] Minor surgery                                                  (1 Point)   [ ] Inflammatory bowel disease                             (1 Point)                 [ ] Arthroscopic surgery                                        (2 Points)  [ ] Central venous access                                      (2 Points)                [ ] General surgery lasting more than 45 minutes   (2 Points)       [ ] Stroke (in the previous month)                          (5 Points)               [ ] Elective arthroplasty                                         (5 Points)                                                                                                                                               HEMATOLOGY RELATED FACTORS                                                 TRAUMA RELATED RISK FACTORS  [ ] Prior episodes of VTE                                     (3 Points)                 [ ] Fracture of the hip, pelvis, or leg                       (5 Points)  [ ] Positive family history for VTE                         (3 Points)                 [ ] Acute spinal cord injury (in the previous month)  (5 Points)  [ ] Prothrombin 98214 A                                     (3 Points)                 [ ] Paralysis  (less than 1 month)                             (5 Points)  [ ] Factor V Leiden                                             (3 Points)                  [ ] Multiple Trauma within 1 month                        (5 Points)  [ ] Lupus anticoagulants                                     (3 Points)                                                           [ ] Anticardiolipin antibodies                               (3 Points)                                                       [ ] High homocysteine in the blood                      (3 Points)                                             [ ] Other congenital or acquired thrombophilia      (3 Points)                                                [ ] Heparin induced thrombocytopenia                  (3 Points)                                          Total Score [     5     ]

## 2020-05-02 NOTE — H&P ADULT - PROBLEM SELECTOR PLAN 1
S/O MECHANICAL FALL  admit to medical floor  pain meds- oxycodone prn  ortho eval in am  ? surgical requirement, will hold Eliquis for now until seen by ortho  dvt ppx- heparin

## 2020-05-02 NOTE — PROGRESS NOTE ADULT - ASSESSMENT
89y/o M w/ pmh including afib, PE on eliquis, bph, constipation, LE edema, gerd, gout, hypothyroidism,  LE cellulitis sent in from the [facility] s/p fall, admitted for intractable pain, evalute for REHAB when stable, DNR/DNI    CAPRINI SCORE [CLOT]    AGE RELATED RISK FACTORS                                                       MOBILITY RELATED FACTORS  [ ] Age 41-60 years                                            (1 Point)                  [X ] Bed rest                                                        (1 Point)  [ ] Age: 61-74 years                                           (2 Points)                 [ ] Plaster cast                                                   (2 Points)  [X ] Age= 75 years                                              (3 Points)                 [ ] Bed bound for more than 72 hours                 (2 Points)    DISEASE RELATED RISK FACTORS                                               GENDER SPECIFIC FACTORS  [X ] Edema in the lower extremities                       (1 Point)                  [ ] Pregnancy                                                     (1 Point)  [ ] Varicose veins                                               (1 Point)                  [ ] Post-partum < 6 weeks                                   (1 Point)             [ ] BMI > 25 Kg/m2                                            (1 Point)                  [ ] Hormonal therapy  or oral contraception          (1 Point)                 [ ] Sepsis (in the previous month)                        (1 Point)                  [ ] History of pregnancy complications                 (1 point)  [ ] Pneumonia or serious lung disease                                               [ ] Unexplained or recurrent                     (1 Point)           (in the previous month)                               (1 Point)  [ ] Abnormal pulmonary function test                     (1 Point)                 SURGERY RELATED RISK FACTORS  [ ] Acute myocardial infarction                              (1 Point)                 [ ]  Section                                             (1 Point)  [ ] Congestive heart failure (in the previous month)  (1 Point)               [ ] Minor surgery                                                  (1 Point)   [ ] Inflammatory bowel disease                             (1 Point)                 [ ] Arthroscopic surgery                                        (2 Points)  [ ] Central venous access                                      (2 Points)                [ ] General surgery lasting more than 45 minutes   (2 Points)       [ ] Stroke (in the previous month)                          (5 Points)               [ ] Elective arthroplasty                                         (5 Points)                                                                                                                                               HEMATOLOGY RELATED FACTORS                                                 TRAUMA RELATED RISK FACTORS  [ ] Prior episodes of VTE                                     (3 Points)                 [ ] Fracture of the hip, pelvis, or leg                       (5 Points)  [ ] Positive family history for VTE                         (3 Points)                 [ ] Acute spinal cord injury (in the previous month)  (5 Points)  [ ] Prothrombin 20069 A                                     (3 Points)                 [ ] Paralysis  (less than 1 month)                             (5 Points)  [ ] Factor V Leiden                                             (3 Points)                  [ ] Multiple Trauma within 1 month                        (5 Points)  [ ] Lupus anticoagulants                                     (3 Points)                                                           [ ] Anticardiolipin antibodies                               (3 Points)                                                       [ ] High homocysteine in the blood                      (3 Points)                                             [ ] Other congenital or acquired thrombophilia      (3 Points)                                                [ ] Heparin induced thrombocytopenia                  (3 Points)                                          Total Score [     5     ] 89y/o M w/ pmh including afib, PE on eliquis, bph, constipation, LE edema, gerd, gout, hypothyroidism,  LE cellulitis sent in from the [facility] s/p fall, admitted for intractable pain, evalute for REHAB when stable, DNR/DNI    #Acute displaced R distal humerus fracture:  - pain control  - keep sling for now  - Ortho consulted    #ANTONINA: Baseline Cr 1.2-1.3 as of 2020  - suspects prerenal due to multiple diuretics  - COVID PCR pending  - hold dyazide  - ok to continue Lasix for now    #Paroxysmal Afib:  - rate currently controlled without meds  - Eliquis on hold for now, Ortho eval pending for R humerus fracture    #BPH:  - continue Flomax and finasteride    #HTN:  - hold dyazide  - ok to continue Lasix 20mg daily for now    #Gout:  - continue allopurinol    #DVT ppx:  - will use HSQ since Eliquis on hold right now    Son Salbador updated on status       CAPRINI SCORE [CLOT]    AGE RELATED RISK FACTORS                                                       MOBILITY RELATED FACTORS  [ ] Age 41-60 years                                            (1 Point)                  [X ] Bed rest                                                        (1 Point)  [ ] Age: 61-74 years                                           (2 Points)                 [ ] Plaster cast                                                   (2 Points)  [X ] Age= 75 years                                              (3 Points)                 [ ] Bed bound for more than 72 hours                 (2 Points)    DISEASE RELATED RISK FACTORS                                               GENDER SPECIFIC FACTORS  [X ] Edema in the lower extremities                       (1 Point)                  [ ] Pregnancy                                                     (1 Point)  [ ] Varicose veins                                               (1 Point)                  [ ] Post-partum < 6 weeks                                   (1 Point)             [ ] BMI > 25 Kg/m2                                            (1 Point)                  [ ] Hormonal therapy  or oral contraception          (1 Point)                 [ ] Sepsis (in the previous month)                        (1 Point)                  [ ] History of pregnancy complications                 (1 point)  [ ] Pneumonia or serious lung disease                                               [ ] Unexplained or recurrent                     (1 Point)           (in the previous month)                               (1 Point)  [ ] Abnormal pulmonary function test                     (1 Point)                 SURGERY RELATED RISK FACTORS  [ ] Acute myocardial infarction                              (1 Point)                 [ ]  Section                                             (1 Point)  [ ] Congestive heart failure (in the previous month)  (1 Point)               [ ] Minor surgery                                                  (1 Point)   [ ] Inflammatory bowel disease                             (1 Point)                 [ ] Arthroscopic surgery                                        (2 Points)  [ ] Central venous access                                      (2 Points)                [ ] General surgery lasting more than 45 minutes   (2 Points)       [ ] Stroke (in the previous month)                          (5 Points)               [ ] Elective arthroplasty                                         (5 Points)                                                                                                                                               HEMATOLOGY RELATED FACTORS                                                 TRAUMA RELATED RISK FACTORS  [ ] Prior episodes of VTE                                     (3 Points)                 [ ] Fracture of the hip, pelvis, or leg                       (5 Points)  [ ] Positive family history for VTE                         (3 Points)                 [ ] Acute spinal cord injury (in the previous month)  (5 Points)  [ ] Prothrombin 10959 A                                     (3 Points)                 [ ] Paralysis  (less than 1 month)                             (5 Points)  [ ] Factor V Leiden                                             (3 Points)                  [ ] Multiple Trauma within 1 month                        (5 Points)  [ ] Lupus anticoagulants                                     (3 Points)                                                           [ ] Anticardiolipin antibodies                               (3 Points)                                                       [ ] High homocysteine in the blood                      (3 Points)                                             [ ] Other congenital or acquired thrombophilia      (3 Points)                                                [ ] Heparin induced thrombocytopenia                  (3 Points)                                          Total Score [     5     ]

## 2020-05-02 NOTE — H&P ADULT - NSHPLABSRESULTS_GEN_ALL_CORE
11.5   9.10  )-----------( 261      ( 01 May 2020 22:45 )             36.0       05-01    140  |  99  |  54<H>  ----------------------------<  117<H>  3.7   |  30  |  2.02<H>    Ca    9.4      01 May 2020 22:45    TPro  7.8  /  Alb  3.4  /  TBili  0.4  /  DBili  x   /  AST  25  /  ALT  15  /  AlkPhos  62  05-01                  PT/INR - ( 01 May 2020 23:00 )   PT: 17.1 sec;   INR: 1.51 ratio         PTT - ( 01 May 2020 23:00 )  PTT:39.0 sec    Lactate Trend      CARDIAC MARKERS ( 01 May 2020 22:45 )  <.017 ng/mL / x     / x     / x     / x            CAPILLARY BLOOD GLUCOSE      Labs reviewed:     CXR personally reviewed: increased bronchovascular markings    < from: CT Head No Cont (05.01.20 @ 22:49) >      IMPRESSION:    No acute intracranial hemorrhage, mass effect, or acute calvarium fracture. Appropriate involutional changes and chronic microvascular ischemic gliosis. Minimal right superior periorbital soft tissue swelling. If there are new or persistent symptoms, consider further evaluation with MRI provided no contraindications.    < end of copied text >        ECG reviewed and interpreted: sinus at 58     x ray elbow- right - fracture

## 2020-05-02 NOTE — H&P ADULT - PROBLEM SELECTOR PLAN 2
gfr- 27, creat 2.02  will hold dyazide  baseline creat- 1.3 to 1.5  will continue Lasix FOR LE edema  monitor renal function  r/o UTI

## 2020-05-02 NOTE — CONSULT NOTE ADULT - SUBJECTIVE AND OBJECTIVE BOX
AME PEREZ      89y Male w/ pmh including afib, PE on eliquis, bph, constipation, LE edema, gerd, gout, hypothyroidism,  LE cellulitis sent in from the [facility] s/p fall,  Pt was walking with a walker from the bathroom to resting chair, lost his balance in the process of transferring from the walker to the chair; Fell onto his Rt side, no head injury or LOC, fell onto RUE and now with 10/10 aching, pain to entire RUE and Rt hip, unable to move right elbow, Denies cp, sob, palpitations before or after the incident; (+) b/l LE edema which has been at baseline, also has multiple skin tears, with the most recent one to Lt forearm,                                                                                                                                                         PAST MEDICAL HISTORY:  Arthritis     Atrial fibrillation, unspecified type     BPH (benign prostatic hyperplasia)     CN (constipation)     Dementia     Edema     GERD (gastroesophageal reflux disease)     Gout     Hypothyroidism     Spinal stenosis.     PAST SURGICAL HISTORY:  S/P hernia repair.       T(F): 98.1  HR: 73  BP: 119/52  RR: 16  SpO2: 98%                        10.7   8.65  )-----------( 231      ( 02 May 2020 07:00 )             33.7                     05-02    141  |  102  |  52<H>  ----------------------------<  101<H>  3.8   |  30  |  1.70<H>    Ca    9.2      02 May 2020 07:00    TPro  7.1  /  Alb  3.1<L>  /  TBili  0.6  /  DBili  x   /  AST  23  /  ALT  12  /  AlkPhos  61  05-02      Physical Exam :    -   Right arm with dressings over wrist and hand, skin intact about elbow with moderate swelling and early ecchymosis, no erythema  -   Marked pain with ROM, + crepitus, diffuse tenderness to palpation  -   Distal Neurvascular status intact grossly.   -   Warm well perfused; capillary refill <3 seconds   -   (+)Finger flexion/extension 5/5  -   (+) Sensation to light touch        EXAM:  ELBOW RIGHT (3VIEWS)      PROCEDURE DATE:  05/01/2020        INTERPRETATION:  Right elbow    HISTORY: Right arm injury      Two views of the right elbow show displaced buckle fracture of the supracondylar humerus. The joint spaces are maintained.    IMPRESSION: Distal humeral fracture.    The above findings correspond to a note entered into the patient's electronic record by the emergency department staff at the time of the study.      Thank you for this referral.                  MARK FALK M.D., ATTENDING RADIOLOGIST  This document has been electronically signed. May  2 2020  7:38AM

## 2020-05-02 NOTE — CONSULT NOTE ADULT - PROBLEM SELECTOR RECOMMENDATION 9
No surgical intervention  Long arm splint applied  Ice, elevation  Repeat x-rays in splint  NWB right upper extremity

## 2020-05-03 ENCOUNTER — TRANSCRIPTION ENCOUNTER (OUTPATIENT)
Age: 85
End: 2020-05-03

## 2020-05-03 LAB
ANION GAP SERPL CALC-SCNC: 11 MMOL/L — SIGNIFICANT CHANGE UP (ref 5–17)
BUN SERPL-MCNC: 56 MG/DL — HIGH (ref 7–23)
CALCIUM SERPL-MCNC: 9 MG/DL — SIGNIFICANT CHANGE UP (ref 8.4–10.5)
CHLORIDE SERPL-SCNC: 102 MMOL/L — SIGNIFICANT CHANGE UP (ref 96–108)
CO2 SERPL-SCNC: 27 MMOL/L — SIGNIFICANT CHANGE UP (ref 22–31)
CREAT SERPL-MCNC: 1.99 MG/DL — HIGH (ref 0.5–1.3)
GLUCOSE SERPL-MCNC: 133 MG/DL — HIGH (ref 70–99)
MAGNESIUM SERPL-MCNC: 1.9 MG/DL — SIGNIFICANT CHANGE UP (ref 1.6–2.6)
POTASSIUM SERPL-MCNC: 3.6 MMOL/L — SIGNIFICANT CHANGE UP (ref 3.5–5.3)
POTASSIUM SERPL-SCNC: 3.6 MMOL/L — SIGNIFICANT CHANGE UP (ref 3.5–5.3)
SODIUM SERPL-SCNC: 140 MMOL/L — SIGNIFICANT CHANGE UP (ref 135–145)

## 2020-05-03 PROCEDURE — 99233 SBSQ HOSP IP/OBS HIGH 50: CPT

## 2020-05-03 RX ORDER — SENNA PLUS 8.6 MG/1
2 TABLET ORAL AT BEDTIME
Refills: 0 | Status: DISCONTINUED | OUTPATIENT
Start: 2020-05-03 | End: 2020-05-05

## 2020-05-03 RX ORDER — TRAMADOL HYDROCHLORIDE 50 MG/1
1 TABLET ORAL
Qty: 0 | Refills: 0 | DISCHARGE
Start: 2020-05-03

## 2020-05-03 RX ORDER — IPRATROPIUM BROMIDE 0.2 MG/ML
0 SOLUTION, NON-ORAL INHALATION
Qty: 0 | Refills: 0 | DISCHARGE

## 2020-05-03 RX ORDER — AZELASTINE 137 UG/1
1 SPRAY, METERED NASAL
Qty: 0 | Refills: 0 | DISCHARGE

## 2020-05-03 RX ORDER — VANCOMYCIN HCL 1 G
1 VIAL (EA) INTRAVENOUS
Qty: 0 | Refills: 0 | DISCHARGE

## 2020-05-03 RX ORDER — SODIUM CHLORIDE 0.65 %
0 AEROSOL, SPRAY (ML) NASAL
Qty: 0 | Refills: 0 | DISCHARGE
Start: 2020-05-03

## 2020-05-03 RX ORDER — POLYETHYLENE GLYCOL 3350 17 G/17G
17 POWDER, FOR SOLUTION ORAL
Qty: 0 | Refills: 0 | DISCHARGE
Start: 2020-05-03

## 2020-05-03 RX ORDER — SENNA PLUS 8.6 MG/1
2 TABLET ORAL
Qty: 0 | Refills: 0 | DISCHARGE
Start: 2020-05-03

## 2020-05-03 RX ORDER — POLYETHYLENE GLYCOL 3350 17 G/17G
17 POWDER, FOR SOLUTION ORAL DAILY
Refills: 0 | Status: DISCONTINUED | OUTPATIENT
Start: 2020-05-03 | End: 2020-05-05

## 2020-05-03 RX ORDER — TRIAMTERENE/HYDROCHLOROTHIAZID 75 MG-50MG
1 TABLET ORAL
Qty: 0 | Refills: 0 | DISCHARGE

## 2020-05-03 RX ORDER — FUROSEMIDE 40 MG
3 TABLET ORAL
Qty: 0 | Refills: 0 | DISCHARGE

## 2020-05-03 RX ORDER — BUDESONIDE, MICRONIZED 100 %
1 POWDER (GRAM) MISCELLANEOUS
Qty: 0 | Refills: 0 | DISCHARGE

## 2020-05-03 RX ORDER — FINASTERIDE 5 MG/1
1 TABLET, FILM COATED ORAL
Qty: 0 | Refills: 0 | DISCHARGE
Start: 2020-05-03

## 2020-05-03 RX ORDER — APIXABAN 2.5 MG/1
1 TABLET, FILM COATED ORAL
Qty: 0 | Refills: 0 | DISCHARGE
Start: 2020-05-03

## 2020-05-03 RX ADMIN — PANTOPRAZOLE SODIUM 40 MILLIGRAM(S): 20 TABLET, DELAYED RELEASE ORAL at 12:11

## 2020-05-03 RX ADMIN — SENNA PLUS 2 TABLET(S): 8.6 TABLET ORAL at 20:41

## 2020-05-03 RX ADMIN — POLYETHYLENE GLYCOL 3350 17 GRAM(S): 17 POWDER, FOR SOLUTION ORAL at 20:41

## 2020-05-03 RX ADMIN — Medication 300 MILLIGRAM(S): at 12:10

## 2020-05-03 RX ADMIN — FINASTERIDE 5 MILLIGRAM(S): 5 TABLET, FILM COATED ORAL at 12:11

## 2020-05-03 RX ADMIN — MUPIROCIN 1 APPLICATION(S): 20 OINTMENT TOPICAL at 05:44

## 2020-05-03 RX ADMIN — Medication 20 MILLIGRAM(S): at 05:44

## 2020-05-03 RX ADMIN — APIXABAN 2.5 MILLIGRAM(S): 2.5 TABLET, FILM COATED ORAL at 16:45

## 2020-05-03 RX ADMIN — HYDROMORPHONE HYDROCHLORIDE 0.5 MILLIGRAM(S): 2 INJECTION INTRAMUSCULAR; INTRAVENOUS; SUBCUTANEOUS at 20:37

## 2020-05-03 RX ADMIN — MUPIROCIN 1 APPLICATION(S): 20 OINTMENT TOPICAL at 16:45

## 2020-05-03 RX ADMIN — APIXABAN 2.5 MILLIGRAM(S): 2.5 TABLET, FILM COATED ORAL at 05:43

## 2020-05-03 RX ADMIN — TAMSULOSIN HYDROCHLORIDE 0.8 MILLIGRAM(S): 0.4 CAPSULE ORAL at 20:41

## 2020-05-03 NOTE — DISCHARGE NOTE PROVIDER - NSDCFUSCHEDAPPT_GEN_ALL_CORE_FT
AME PEREZ ; 05/29/2020 ; [facility]  AME PEREZ ; 05/29/2020 ; [facility]  AME PEREZ ; 05/29/2020 ; [facility]  AME PEREZ ; 05/29/2020 ; [facility]  AME PEREZ ; 05/29/2020 ; [facility]  AME PEREZ ; 05/29/2020 ; [facility]  AME PEREZ ; 05/29/2020 ; [facility]  AME PEREZ ; 05/29/2020 ; [facility]  AME PEREZ ; 05/29/2020 ; [facility]  AME PEREZ ; 05/29/2020 ; [facility]

## 2020-05-03 NOTE — DISCHARGE NOTE PROVIDER - NSDCCPCAREPLAN_GEN_ALL_CORE_FT
PRINCIPAL DISCHARGE DIAGNOSIS  Diagnosis: Fracture, supracondylar, elbow, closed  Assessment and Plan of Treatment: -Consulted by ortho Dr Hernandez- no surgical intervation  -Long arm splint to immobilize; non weight bearing  -Keep elevated  -Pain control        SECONDARY DISCHARGE DIAGNOSES  Diagnosis: Acute renal failure, unspecified acute renal failure type  Assessment and Plan of Treatment: -Stop diuretics  -Monitor BUN, creat PRINCIPAL DISCHARGE DIAGNOSIS  Diagnosis: Fracture, supracondylar, elbow, closed  Assessment and Plan of Treatment: -Consulted by ortho Dr Hernandez- no surgical intervation  -Long arm splint to immobilize; non weight bearing  -Keep elevated  -Pain control  -Follow up with ortho; repeat Xray.        SECONDARY DISCHARGE DIAGNOSES  Diagnosis: Acute renal failure, unspecified acute renal failure type  Assessment and Plan of Treatment: -Stop diuretics  -Monitor BUN, creat PRINCIPAL DISCHARGE DIAGNOSIS  Diagnosis: Fracture, supracondylar, elbow, closed  Assessment and Plan of Treatment: -Consulted by ortho Dr Hernandez- no surgical intervation  -Long arm splint to immobilize; non weight bearing  -Keep elevated  -Pain control  -Follow up with ortho; repeat Xray.        SECONDARY DISCHARGE DIAGNOSES  Diagnosis: Acute UTI  Assessment and Plan of Treatment: Patient was started on Levaquin for empiric treatment of UTI. Please continue Levaquin 500mg q48hrs for 4 days (2 doses total)    Diagnosis: Acute renal failure, unspecified acute renal failure type  Assessment and Plan of Treatment: -Stop diuretics  -Monitor BUN, creat

## 2020-05-03 NOTE — DISCHARGE NOTE PROVIDER - CARE PROVIDERS DIRECT ADDRESSES
,DirectAddress_Unknown,[email or web address]

## 2020-05-03 NOTE — PHYSICAL THERAPY INITIAL EVALUATION ADULT - ACTIVE RANGE OF MOTION EXAMINATION, REHAB EVAL
Impaired Right UE/deficits as listed below/Left UE Active ROM was WNL (within normal limits)/bilateral lower extremity Active ROM was WNL (within normal limits)

## 2020-05-03 NOTE — PHYSICAL THERAPY INITIAL EVALUATION ADULT - ORIENTATION, REHAB EVAL
Patient needs glimiprzide for 90 days with refills to [facility] [place], pls call pt when sent
done
place/person

## 2020-05-03 NOTE — PROGRESS NOTE ADULT - SUBJECTIVE AND OBJECTIVE BOX
S/P application of Splint for Right Supracondylar fracture.    X-ray reviewed with Dr Cat  Right supracondylar fracture stable in splint .  Will follow .

## 2020-05-03 NOTE — OCCUPATIONAL THERAPY INITIAL EVALUATION ADULT - PLANNED THERAPY INTERVENTIONS, OT EVAL
parent/caregiver training.../ADL retraining/bed mobility training/strengthening/transfer training/ROM

## 2020-05-03 NOTE — PROGRESS NOTE ADULT - SUBJECTIVE AND OBJECTIVE BOX
Patient is a 89y old  Male who presents with a chief complaint of fall (02 May 2020 13:38)      SUBJECTIVE:  Patient seen and examined at bedside.      See HPI for pertinent ROS.    All other ROS reviewed and negative except as otherwise stated above.      ALLERGIES:  penicillin (Unknown)      MEDICATIONS:  MEDICATIONS  (STANDING):  allopurinol 300 milliGRAM(s) Oral daily  apixaban 2.5 milliGRAM(s) Oral every 12 hours  finasteride 5 milliGRAM(s) Oral daily  mupirocin 2% Ointment 1 Application(s) Topical every 12 hours  pantoprazole    Tablet 40 milliGRAM(s) Oral before breakfast  tamsulosin 0.8 milliGRAM(s) Oral at bedtime    MEDICATIONS  (PRN):  acetaminophen   Tablet .. 650 milliGRAM(s) Oral every 6 hours PRN Temp greater or equal to 38C (100.4F), Mild Pain (1 - 3)  HYDROmorphone  Injectable 0.5 milliGRAM(s) IV Push every 4 hours PRN Breakthrough pain  melatonin 6 milliGRAM(s) Oral at bedtime PRN Insomnia  ondansetron Injectable 4 milliGRAM(s) IV Push every 6 hours PRN Nausea  oxyCODONE    IR 5 milliGRAM(s) Oral every 4 hours PRN Severe Pain (7 - 10)  sodium chloride 0.65% Nasal 1 Spray(s) Both Nostrils daily PRN Nasal Congestion  traMADol 50 milliGRAM(s) Oral every 12 hours PRN Moderate Pain (4 - 6)      VITALS:  Vital Signs Last 24 Hrs  T(F): 98.3 (03 May 2020 05:00), Max: 98.3 (03 May 2020 05:00)  HR: 80 (03 May 2020 05:00) (73 - 94)  BP: 128/66 (03 May 2020 05:00) (119/52 - 139/60)  RR: 17 (03 May 2020 05:00) (16 - 18)  SpO2: 96% (03 May 2020 05:00) (96% - 98%)  I&O's Summary    02 May 2020 07:01  -  03 May 2020 07:00  --------------------------------------------------------  IN: 0 mL / OUT: 300 mL / NET: -300 mL          PHYSICAL EXAM:  General: NAD, A/O x 3  Abdomen: Soft, Nontender, Nondistended; Bowel sounds present  Extremities: No calf tenderness, No pitting edema      LABS:                        10.7   8.65  )-----------( 231      ( 02 May 2020 07:00 )             33.7     05-    140  |  102  |  56  ----------------------------<  133  3.6   |  27  |  1.99    Ca    9.0      03 May 2020 06:15  Mg     1.9         TPro  7.1  /  Alb  3.1  /  TBili  0.6  /  DBili  x   /  AST  23  /  ALT  12  /  AlkPhos  61      eGFR if Non African American: 27 mL/min/1.73M2 (20 @ 06:15)  eGFR if African American: 32 mL/min/1.73M2 (20 @ 06:15)    PT/INR - ( 02 May 2020 07:00 )   PT: 17.0 sec;   INR: 1.50 ratio         PTT - ( 01 May 2020 23:00 )  PTT:39.0 sec    CARDIAC MARKERS ( 01 May 2020 22:45 )  <.017 ng/mL / x     / x     / x     / x            Urinalysis Basic - ( 02 May 2020 00:45 )    Color: Yellow / Appearance: Clear / S.010 / pH: x  Gluc: x / Ketone: Negative  / Bili: Negative / Urobili: Negative   Blood: x / Protein: Negative / Nitrite: Negative   Leuk Esterase: Negative / RBC: x / WBC x   Sq Epi: x / Non Sq Epi: x / Bacteria: x    COVID-19 PCR: NotDetec (20 @ 00:05)    RADIOLOGY & ADDITIONAL TESTS:    Care Discussed with Attending. Patient is a 89y old  Male who presents with a chief complaint of fall (02 May 2020 13:38)      SUBJECTIVE:  Patient seen and examined at bedside, right arm/ elbow pain is better controlled today. He denies headache, dizziness, chest pain, palpitations          ALLERGIES:  penicillin (Unknown)      MEDICATIONS:  MEDICATIONS  (STANDING):  allopurinol 300 milliGRAM(s) Oral daily  apixaban 2.5 milliGRAM(s) Oral every 12 hours  finasteride 5 milliGRAM(s) Oral daily  mupirocin 2% Ointment 1 Application(s) Topical every 12 hours  pantoprazole    Tablet 40 milliGRAM(s) Oral before breakfast  tamsulosin 0.8 milliGRAM(s) Oral at bedtime    MEDICATIONS  (PRN):  acetaminophen   Tablet .. 650 milliGRAM(s) Oral every 6 hours PRN Temp greater or equal to 38C (100.4F), Mild Pain (1 - 3)  HYDROmorphone  Injectable 0.5 milliGRAM(s) IV Push every 4 hours PRN Breakthrough pain  melatonin 6 milliGRAM(s) Oral at bedtime PRN Insomnia  ondansetron Injectable 4 milliGRAM(s) IV Push every 6 hours PRN Nausea  oxyCODONE    IR 5 milliGRAM(s) Oral every 4 hours PRN Severe Pain (7 - 10)  sodium chloride 0.65% Nasal 1 Spray(s) Both Nostrils daily PRN Nasal Congestion  traMADol 50 milliGRAM(s) Oral every 12 hours PRN Moderate Pain (4 - 6)      VITALS:  Vital Signs Last 24 Hrs  T(F): 98.3 (03 May 2020 05:00), Max: 98.3 (03 May 2020 05:00)  HR: 80 (03 May 2020 05:00) (73 - 94)  BP: 128/66 (03 May 2020 05:00) (119/52 - 139/60)  RR: 17 (03 May 2020 05:00) (16 - 18)  SpO2: 96% (03 May 2020 05:00) (96% - 98%)  I&O's Summary    02 May 2020 07:01  -  03 May 2020 07:00  --------------------------------------------------------  IN: 0 mL / OUT: 300 mL / NET: -300 mL              LABS:                        10.7   8.65  )-----------( 231      ( 02 May 2020 07:00 )             33.7         140  |  102  |  56  ----------------------------<  133  3.6   |  27  |  1.99    Ca    9.0      03 May 2020 06:15  Mg     1.9         TPro  7.1  /  Alb  3.1  /  TBili  0.6  /  DBili  x   /  AST  23  /  ALT  12  /  AlkPhos  61      eGFR if Non African American: 27 mL/min/1.73M2 (20 @ 06:15)  eGFR if African American: 32 mL/min/1.73M2 (20 @ 06:15)    PT/INR - ( 02 May 2020 07:00 )   PT: 17.0 sec;   INR: 1.50 ratio         PTT - ( 01 May 2020 23:00 )  PTT:39.0 sec    CARDIAC MARKERS ( 01 May 2020 22:45 )  <.017 ng/mL / x     / x     / x     / x            Urinalysis Basic - ( 02 May 2020 00:45 )    Color: Yellow / Appearance: Clear / S.010 / pH: x  Gluc: x / Ketone: Negative  / Bili: Negative / Urobili: Negative   Blood: x / Protein: Negative / Nitrite: Negative   Leuk Esterase: Negative / RBC: x / WBC x   Sq Epi: x / Non Sq Epi: x / Bacteria: x    COVID-19 PCR: NotDetec (20 @ 00:05)    RADIOLOGY & ADDITIONAL TESTS:    Care Discussed with Attending. Patient is a 89y old  Male who presents with a chief complaint of fall (02 May 2020 13:38)      SUBJECTIVE:  Patient seen and examined at bedside, right arm/ elbow pain is better controlled today. He denies headache, dizziness, chest pain, palpitations          ALLERGIES:  penicillin (Unknown)      MEDICATIONS:  MEDICATIONS  (STANDING):  allopurinol 300 milliGRAM(s) Oral daily  apixaban 2.5 milliGRAM(s) Oral every 12 hours  finasteride 5 milliGRAM(s) Oral daily  mupirocin 2% Ointment 1 Application(s) Topical every 12 hours  pantoprazole    Tablet 40 milliGRAM(s) Oral before breakfast  tamsulosin 0.8 milliGRAM(s) Oral at bedtime    MEDICATIONS  (PRN):  acetaminophen   Tablet .. 650 milliGRAM(s) Oral every 6 hours PRN Temp greater or equal to 38C (100.4F), Mild Pain (1 - 3)  HYDROmorphone  Injectable 0.5 milliGRAM(s) IV Push every 4 hours PRN Breakthrough pain  melatonin 6 milliGRAM(s) Oral at bedtime PRN Insomnia  ondansetron Injectable 4 milliGRAM(s) IV Push every 6 hours PRN Nausea  oxyCODONE    IR 5 milliGRAM(s) Oral every 4 hours PRN Severe Pain (7 - 10)  sodium chloride 0.65% Nasal 1 Spray(s) Both Nostrils daily PRN Nasal Congestion  traMADol 50 milliGRAM(s) Oral every 12 hours PRN Moderate Pain (4 - 6)      VITALS:  Vital Signs Last 24 Hrs  T(F): 98.3 (03 May 2020 05:00), Max: 98.3 (03 May 2020 05:00)  HR: 80 (03 May 2020 05:00) (73 - 94)  BP: 128/66 (03 May 2020 05:00) (119/52 - 139/60)  RR: 17 (03 May 2020 05:00) (16 - 18)  SpO2: 96% (03 May 2020 05:00) (96% - 98%)  I&O's Summary    02 May 2020 07:01  -  03 May 2020 07:00  --------------------------------------------------------  IN: 0 mL / OUT: 300 mL / NET: -300 mL      PHYSICAL EXAM:    Constitutional: WDWN resting comfortably in bed; NAD  Head: NC/AT  Eyes: PERRLA, EOMI, clear conjunctiva  ENT: no nasal discharge; no oropharyngeal erythema or exudates; moist oral mucosa  Neck: supple; no JVD or thyromegaly  Respiratory: CTA B/L; no W/R/R, no retractions  Cardiac: irregular rhythm, normal +S1/S2; 3/6 diastolic murmur best heard at RUSB and mitral area. PMI non-displaced  Gastrointestinal: soft, NT/ND; no rebound or guarding; +BS, no hepatosplenomegaly  Extremities: WWP, no clubbing or cyanosis; RUE in long arm splint, trace LE edema b/l  Vascular: 2+ radial, DP/PT pulses B/L  Neurologic: AAOx3; hard of hearing, answers questions appropriately, follows commands, moves all extremities            LABS:                        10.7   8.65  )-----------( 231      ( 02 May 2020 07:00 )             33.7     05-03    140  |  102  |  56  ----------------------------<  133  3.6   |  27  |  1.99    Ca    9.0      03 May 2020 06:15  Mg     1.9     -    TPro  7.1  /  Alb  3.1  /  TBili  0.6  /  DBili  x   /  AST  23  /  ALT  12  /  AlkPhos  61  -    eGFR if Non African American: 27 mL/min/1.73M2 (20 @ 06:15)  eGFR if African American: 32 mL/min/1.73M2 (20 @ 06:15)    PT/INR - ( 02 May 2020 07:00 )   PT: 17.0 sec;   INR: 1.50 ratio         PTT - ( 01 May 2020 23:00 )  PTT:39.0 sec    CARDIAC MARKERS ( 01 May 2020 22:45 )  <.017 ng/mL / x     / x     / x     / x            Urinalysis Basic - ( 02 May 2020 00:45 )    Color: Yellow / Appearance: Clear / S.010 / pH: x  Gluc: x / Ketone: Negative  / Bili: Negative / Urobili: Negative   Blood: x / Protein: Negative / Nitrite: Negative   Leuk Esterase: Negative / RBC: x / WBC x   Sq Epi: x / Non Sq Epi: x / Bacteria: x    COVID-19 PCR: NotDetec (20 @ 00:05)    RADIOLOGY & ADDITIONAL TESTS:    < from: Xray Elbow AP + Lateral + Oblique, Right (20 @ 15:41) >  INTERPRETATION:  Radiographs of the right elbow        CLINICAL INFORMATION:  Fracture post placement of splint    TECHNIQUE: 3 views of the elbow were obtained.    FINDINGS:   Thestudy of 2020 is available for review.    A splint is seen. Supra condylar fracture is again identified      IMPRESSION: Status post placement of splint for supracondylar fracture.    < end of copied text >      Care Discussed with Attending.

## 2020-05-03 NOTE — DISCHARGE NOTE PROVIDER - CARE PROVIDER_API CALL
Olvin Hernandez ()  Internal Medicine  [address] [place] [address]  Phone: [phone]  Fax: [phone]  Follow Up Time:     Paul Geiger)  Urology  [address], [address]  [place], [place] [address]  Phone: [phone]  Fax: [phone]  Follow Up Time:     Mario Cat)  Orthopaedic Sports Medicine; Orthopaedic Surgery  [address], [address]  [place] [address]  Phone: [phone]  Fax: [phone]  Follow Up Time:

## 2020-05-03 NOTE — OCCUPATIONAL THERAPY INITIAL EVALUATION ADULT - RANGE OF MOTION EXAMINATION, UPPER EXTREMITY
Left UE Passive ROM was WNL (within normal limits)/Left UE Passive ROM was WFL  (within functional limits)/R UE immobilized/Left UE Active ROM was WNL (within normal limits)/Left UE Active Assistive ROM was WNL (within normal limits)/Left UE Active ROM was WFL (within functional limits)

## 2020-05-03 NOTE — PHYSICAL THERAPY INITIAL EVALUATION ADULT - ADDITIONAL COMMENTS
Pt live in the [facility]. Pt stated no using assistive device for ambulation prior to admission.

## 2020-05-03 NOTE — PHYSICAL THERAPY INITIAL EVALUATION ADULT - PERTINENT HX OF CURRENT PROBLEM, REHAB EVAL
89y/o M w/ pmh including afib, PE on eliquis, bph, constipation, LE edema, gerd, gout, hypothyroidism,  LE cellulitis sent in from the [facility] s/p fall, admitted for intractable pain, evalute for REHAB when stable, DNR/DNI

## 2020-05-03 NOTE — DISCHARGE NOTE PROVIDER - HOSPITAL COURSE
89 year old male with Hx of afib, PE on eliquis, BPH, constipation, LE edema, GERD, gout, hypothyroidism, LE cellulitis sent from the [facility] 05/02 on s/p fall on while  transferring from his walker to the chair; no head injury or LOC but intractable pain to right side.     Xray in ED showed an acute closed supracondylar fracture of right humerus.      He was evaluated by Ortho Dr Hernandez -no surgical intervention indicated.  A long arm splint was applied and he is NWB of right upper extremity.  He received Dilaudid for pain is keeping arm elevated.  PT/OT consulted.  PT recommended rehab with mobility assistance.       Patient found to have ANTONINA (Cr 2.02) likely due to multiple diuretics, his baseline Cr 1.2-1.3 in January 2020.  His diuretics have been held.  He also had urinary retention as well likely 2/2 to BPH and a shah catheter was placed 05/03 which drained 1000 ml.  and he continued on Flomax and finasteride.  His urologist Dr. Geiger agreed to discharge with shah and follow up outpatient. 89 year old male with Hx of afib, PE on eliquis, BPH, constipation, LE edema, GERD, gout, hypothyroidism, LE cellulitis sent from the [facility] 05/02 on s/p fall on while  transferring from his walker to the chair; no head injury or LOC but intractable pain to right side.     Xray in ED showed an acute closed supracondylar fracture of right humerus.      He was evaluated by Ortho Dr Hernandez -no surgical intervention indicated.  A long arm splint was applied and he is NWB of right upper extremity.  He received Dilaudid for pain is keeping arm elevated.  PT/OT consulted.  PT recommended rehab with mobility assistance.       Patient found to have ANTONINA (Cr 2.02) likely due to multiple diuretics, his baseline Cr 1.2-1.3 in January 2020.  His diuretics have been held.  He also had urinary retention likely 2/2 to BPH and a shah catheter was placed 05/03 which drained 1000 ml. He was continued on Flomax and finasteride and his urologist Dr. Geiger agreed for him to be discharged with shah and follow up outpatient. 89 year old male with Hx of afib, PE on eliquis, BPH, constipation, LE edema, GERD, gout, hypothyroidism, LE cellulitis sent from the [facility] 05/02 on s/p fall on while  transferring from his walker to the chair; no head injury or LOC but intractable pain to right side.     Xray in ED showed an acute closed supracondylar fracture of right humerus.      He was evaluated by Ortho Dr Hernandez -no surgical intervention indicated.  A long arm splint was applied and he is NWB of right upper extremity.  He received Dilaudid for pain is keeping arm elevated.  PT/OT consulted.  PT recommended rehab with mobility assistance.       Patient found to have ANTONINA (Cr 2.02) likely due to multiple diuretics, his baseline Cr 1.2-1.3 in January 2020.  His diuretics have been held.  He also had urinary retention likely 2/2 to BPH and a shah catheter was placed 05/03 which drained 1000 ml. He was continued on Flomax and finasteride and his urologist Dr. Geiger agreed for him to be discharged with shah and follow up outpatient. On 05/04, pt spiked fever to 100.2 with mild leukocytosis 11K, but remained afebrile since. UA suggests early UTI so he was empirically started on Levaquin, to complete 5 day course at [facility]

## 2020-05-03 NOTE — PROGRESS NOTE ADULT - ASSESSMENT
89y/o M w/ pmh including afib, PE on eliquis, bph, constipation, LE edema, gerd, gout, hypothyroidism,  LE cellulitis sent in from the [facility] s/p fall, admitted for intractable pain, evalute for REHAB when stable, DNR/DNI    #Acute closed supracondylar fracture of right humerus  - pain control  -Ortho consult Dr. Cat - long arm splint applied.  No surgical intervention.   -Ice, elevation   -Repeat x-rays in splint  -NWB right upper extremity.      #ANTONINA: Baseline Cr 1.2-1.3 as of 2020  - suspects prerenal due to multiple diuretics  - COVID PCR pending  - hold dyazide  - ok to continue Lasix for now    #Paroxysmal Afib:  - rate currently controlled without meds  - Eliquis on hold for now, Ortho eval pending for R humerus fracture    #BPH:  - continue Flomax and finasteride    #HTN:  - hold dyazide  - ok to continue Lasix 20mg daily for now    #Gout:  - continue allopurinol    #DVT ppx:  - will use HSQ since Eliquis on hold right now    Son Salbador updated on status       CAPRINI SCORE [CLOT]    AGE RELATED RISK FACTORS                                                       MOBILITY RELATED FACTORS  [ ] Age 41-60 years                                            (1 Point)                  [X ] Bed rest                                                        (1 Point)  [ ] Age: 61-74 years                                           (2 Points)                 [ ] Plaster cast                                                   (2 Points)  [X ] Age= 75 years                                              (3 Points)                 [ ] Bed bound for more than 72 hours                 (2 Points)    DISEASE RELATED RISK FACTORS                                               GENDER SPECIFIC FACTORS  [X ] Edema in the lower extremities                       (1 Point)                  [ ] Pregnancy                                                     (1 Point)  [ ] Varicose veins                                               (1 Point)                  [ ] Post-partum < 6 weeks                                   (1 Point)             [ ] BMI > 25 Kg/m2                                            (1 Point)                  [ ] Hormonal therapy  or oral contraception          (1 Point)                 [ ] Sepsis (in the previous month)                        (1 Point)                  [ ] History of pregnancy complications                 (1 point)  [ ] Pneumonia or serious lung disease                                               [ ] Unexplained or recurrent                     (1 Point)           (in the previous month)                               (1 Point)  [ ] Abnormal pulmonary function test                     (1 Point)                 SURGERY RELATED RISK FACTORS  [ ] Acute myocardial infarction                              (1 Point)                 [ ]  Section                                             (1 Point)  [ ] Congestive heart failure (in the previous month)  (1 Point)               [ ] Minor surgery                                                  (1 Point)   [ ] Inflammatory bowel disease                             (1 Point)                 [ ] Arthroscopic surgery                                        (2 Points)  [ ] Central venous access                                      (2 Points)                [ ] General surgery lasting more than 45 minutes   (2 Points)       [ ] Stroke (in the previous month)                          (5 Points)               [ ] Elective arthroplasty                                         (5 Points)                                                                                                                                               HEMATOLOGY RELATED FACTORS                                                 TRAUMA RELATED RISK FACTORS  [ ] Prior episodes of VTE                                     (3 Points)                 [ ] Fracture of the hip, pelvis, or leg                       (5 Points)  [ ] Positive family history for VTE                         (3 Points)                 [ ] Acute spinal cord injury (in the previous month)  (5 Points)  [ ] Prothrombin 05607 A                                     (3 Points)                 [ ] Paralysis  (less than 1 month)                             (5 Points)  [ ] Factor V Leiden                                             (3 Points)                  [ ] Multiple Trauma within 1 month                        (5 Points)  [ ] Lupus anticoagulants                                     (3 Points)                                                           [ ] Anticardiolipin antibodies                               (3 Points)                                                       [ ] High homocysteine in the blood                      (3 Points)                                             [ ] Other congenital or acquired thrombophilia      (3 Points)                                                [ ] Heparin induced thrombocytopenia                  (3 Points)                                          Total Score [     5     ] 89y/o M w/ pmh including afib, PE on eliquis, bph, constipation, LE edema, gerd, gout, hypothyroidism,  LE cellulitis sent in from the [facility] s/p fall, admitted for intractable pain, evalute for REHAB when stable, DNR/DNI    #Acute closed supracondylar fracture of right humerus  - pain control  - Ortho consult Dr. Cat - long arm splint applied.  No surgical intervention.  - Ice, elevation  - NWB right upper extremity  - OT eval    #Fall, subsequent encounter:  - PT eval    #ANTONINA: Baseline Cr 1.2-1.3 as of 2020, COVID negative  - multifactorial: prerenal due to multiple diuretics, now found to have urinary retention as well  - unsuccessful straight cath, Demarco placed , drained 1000mL  - continue Demarco  - hold dyazide  - hold Lasix    #Paroxysmal Afib:  - rate currently controlled without meds  - continue Eliquis    #BPH with urinary retention:  - retained 1L of urine today, Demarco placed, will need to be discharged with Demarco. I discussed with pt's urologist, Dr. Geiger, OK with discharge with Demarco and follow up outpt.  - continue Flomax and finasteride    #HTN:  - hold dyazide  - hold Lasix today    #Gout:  - continue allopurinol    #DVT ppx:  - on Eliquis    #Dispo:  - Pending PT/OT evaluation  - Pt is also getting discharge with new Demarco. CM to confirmed whether [facility] would accept pt with Demarco. If not, he would be discharged to [facility].    Yogi Siu [phone] updated on status       CAPRINI SCORE [CLOT]    AGE RELATED RISK FACTORS                                                       MOBILITY RELATED FACTORS  [ ] Age 41-60 years                                            (1 Point)                  [X ] Bed rest                                                        (1 Point)  [ ] Age: 61-74 years                                           (2 Points)                 [ ] Plaster cast                                                   (2 Points)  [X ] Age= 75 years                                              (3 Points)                 [ ] Bed bound for more than 72 hours                 (2 Points)    DISEASE RELATED RISK FACTORS                                               GENDER SPECIFIC FACTORS  [X ] Edema in the lower extremities                       (1 Point)                  [ ] Pregnancy                                                     (1 Point)  [ ] Varicose veins                                               (1 Point)                  [ ] Post-partum < 6 weeks                                   (1 Point)             [ ] BMI > 25 Kg/m2                                            (1 Point)                  [ ] Hormonal therapy  or oral contraception          (1 Point)                 [ ] Sepsis (in the previous month)                        (1 Point)                  [ ] History of pregnancy complications                 (1 point)  [ ] Pneumonia or serious lung disease                                               [ ] Unexplained or recurrent                     (1 Point)           (in the previous month)                               (1 Point)  [ ] Abnormal pulmonary function test                     (1 Point)                 SURGERY RELATED RISK FACTORS  [ ] Acute myocardial infarction                              (1 Point)                 [ ]  Section                                             (1 Point)  [ ] Congestive heart failure (in the previous month)  (1 Point)               [ ] Minor surgery                                                  (1 Point)   [ ] Inflammatory bowel disease                             (1 Point)                 [ ] Arthroscopic surgery                                        (2 Points)  [ ] Central venous access                                      (2 Points)                [ ] General surgery lasting more than 45 minutes   (2 Points)       [ ] Stroke (in the previous month)                          (5 Points)               [ ] Elective arthroplasty                                         (5 Points)                                                                                                                                               HEMATOLOGY RELATED FACTORS                                                 TRAUMA RELATED RISK FACTORS  [ ] Prior episodes of VTE                                     (3 Points)                 [ ] Fracture of the hip, pelvis, or leg                       (5 Points)  [ ] Positive family history for VTE                         (3 Points)                 [ ] Acute spinal cord injury (in the previous month)  (5 Points)  [ ] Prothrombin 27875 A                                     (3 Points)                 [ ] Paralysis  (less than 1 month)                             (5 Points)  [ ] Factor V Leiden                                             (3 Points)                  [ ] Multiple Trauma within 1 month                        (5 Points)  [ ] Lupus anticoagulants                                     (3 Points)                                                           [ ] Anticardiolipin antibodies                               (3 Points)                                                       [ ] High homocysteine in the blood                      (3 Points)                                             [ ] Other congenital or acquired thrombophilia      (3 Points)                                                [ ] Heparin induced thrombocytopenia                  (3 Points)                                          Total Score [     5     ]

## 2020-05-03 NOTE — OCCUPATIONAL THERAPY INITIAL EVALUATION ADULT - RANGE OF MOTION EXAMINATION, LOWER EXTREMITY
bilateral LE Active ROM was WNL (within normal limits)/bilateral LE Active ROM was WFL  (within functional limits)/bilateral LE Passive ROM was WFL  (within functional limits)/bilateral LE Passive ROM was WNL (within normal limits)/bilateral LE Active Assistive ROM was WFL  (within functional limits)/bilateral LE Active Assistive ROM was WNL (within normal limits)

## 2020-05-03 NOTE — DISCHARGE NOTE PROVIDER - NSDCMRMEDTOKEN_GEN_ALL_CORE_FT
Acidophilus oral capsule: 1 cap(s) orally 2 times a day  allopurinol 300 mg oral tablet: 1 tab(s) orally once a day  azelastine 137 mcg/inh (0.1%) nasal spray: 1 spray(s) nasal 2 times a day  budesonide 0.5 mg/2 mL inhalation suspension: 1 unit(s) dose via nebulizer inhaled 2 times a day  Eliquis 5 mg oral tablet: 1 tab(s) orally every 12 hours. Please start the medication on 1/8  finasteride 5 mg oral tablet: 1 tab(s) orally once a day  furosemide 20 mg oral tablet: 3 tab(s) orally once a day (in the morning)  ipratropium: one unit dose (0.02%) via nebulizer inhaled 2 times a day  levocetirizine 5 mg oral tablet: 1 tab(s) orally once a day (in the evening)  Mapap Arthritis Pain 650 mg oral tablet, extended release: 2 tab(s) orally once a day (in the morning)  Melatonin 5 mg oral tablet: 2 tab(s) orally once a day (at bedtime)  Multiple Vitamins oral capsule: 1 cap(s) orally once a day  mupirocin 2% topical ointment: Apply topically to affected area every 12 hours  pantoprazole 40 mg oral delayed release tablet: 1 tab(s) orally once a day  potassium chloride 10 mEq oral capsule, extended release: 1 cap(s) orally once a day   tamsulosin 0.4 mg oral capsule: 2 cap(s) orally once a day (at bedtime)  triamcinolone 0.1% topical cream: Apply topically to affected area 2 times a day  triamterene-hydrochlorothiazide 37.5 mg-25 mg oral capsule: 1 cap(s) orally once a day  vancomycin 250 mg oral capsule: 1 cap(s) orally every 6 hours for 14 days (order written 2/26/20 per facility paperwork)  Vitamin D3 1000 intl units oral capsule: 1 cap(s) orally once a day Acidophilus oral capsule: 1 cap(s) orally 2 times a day  allopurinol 300 mg oral tablet: 1 tab(s) orally once a day  apixaban 2.5 mg oral tablet: 1 tab(s) orally every 12 hours for DVT diagnosed in January 2020  azelastine 137 mcg/inh (0.1%) nasal spray: 1 spray(s) nasal 2 times a day, As Needed  finasteride 5 mg oral tablet: 1 tab(s) orally once a day  levocetirizine 5 mg oral tablet: 1 tab(s) orally once a day (in the evening)  Mapap Arthritis Pain 650 mg oral tablet, extended release: 2 tab(s) orally once a day (in the morning)  Melatonin 5 mg oral tablet: 2 tab(s) orally once a day (at bedtime)  Multiple Vitamins oral capsule: 1 cap(s) orally once a day  mupirocin 2% topical ointment: Apply topically to affected area every 12 hours  pantoprazole 40 mg oral delayed release tablet: 1 tab(s) orally once a day  polyethylene glycol 3350 oral powder for reconstitution: 17 gram(s) orally once a day, As needed, Constipation  senna oral tablet: 2 tab(s) orally once a day (at bedtime)  tamsulosin 0.4 mg oral capsule: 2 cap(s) orally once a day (at bedtime)  traMADol 50 mg oral tablet: 1 tab(s) orally every 12 hours, As needed, for moderate -severe pain  Vitamin D3 1000 intl units oral capsule: 1 cap(s) orally once a day Acidophilus oral capsule: 1 cap(s) orally 2 times a day  allopurinol 300 mg oral tablet: 1 tab(s) orally once a day  apixaban 2.5 mg oral tablet: 1 tab(s) orally every 12 hours for DVT diagnosed in January 2020  azelastine 137 mcg/inh (0.1%) nasal spray: 1 spray(s) nasal 2 times a day, As Needed  finasteride 5 mg oral tablet: 1 tab(s) orally once a day  Levaquin 500 mg oral tablet: 1 tab(s) orally every other day to complete 5 day course. Levaquin to be given on May 6, and May 8, 2020  levocetirizine 5 mg oral tablet: 1 tab(s) orally once a day (in the evening)  Mapap Arthritis Pain 650 mg oral tablet, extended release: 2 tab(s) orally once a day (in the morning)  Melatonin 5 mg oral tablet: 2 tab(s) orally once a day (at bedtime)  Multiple Vitamins oral capsule: 1 cap(s) orally once a day  mupirocin 2% topical ointment: Apply topically to affected area every 12 hours  pantoprazole 40 mg oral delayed release tablet: 1 tab(s) orally once a day  polyethylene glycol 3350 oral powder for reconstitution: 17 gram(s) orally once a day, As needed, Constipation  senna oral tablet: 2 tab(s) orally once a day (at bedtime)  tamsulosin 0.4 mg oral capsule: 2 cap(s) orally once a day (at bedtime)  traMADol 50 mg oral tablet: 1 tab(s) orally every 12 hours, As needed, for moderate -severe pain  Vitamin D3 1000 intl units oral capsule: 1 cap(s) orally once a day

## 2020-05-04 LAB
ANION GAP SERPL CALC-SCNC: 9 MMOL/L — SIGNIFICANT CHANGE UP (ref 5–17)
APPEARANCE UR: CLEAR — SIGNIFICANT CHANGE UP
BACTERIA # UR AUTO: ABNORMAL /HPF
BASOPHILS # BLD AUTO: 0.04 K/UL — SIGNIFICANT CHANGE UP (ref 0–0.2)
BASOPHILS NFR BLD AUTO: 0.3 % — SIGNIFICANT CHANGE UP (ref 0–2)
BILIRUB UR-MCNC: NEGATIVE — SIGNIFICANT CHANGE UP
BUN SERPL-MCNC: 60 MG/DL — HIGH (ref 7–23)
CALCIUM SERPL-MCNC: 8.8 MG/DL — SIGNIFICANT CHANGE UP (ref 8.4–10.5)
CHLORIDE SERPL-SCNC: 102 MMOL/L — SIGNIFICANT CHANGE UP (ref 96–108)
CO2 SERPL-SCNC: 30 MMOL/L — SIGNIFICANT CHANGE UP (ref 22–31)
COLOR SPEC: YELLOW — SIGNIFICANT CHANGE UP
CREAT SERPL-MCNC: 1.81 MG/DL — HIGH (ref 0.5–1.3)
DIFF PNL FLD: ABNORMAL
EOSINOPHIL # BLD AUTO: 0.06 K/UL — SIGNIFICANT CHANGE UP (ref 0–0.5)
EOSINOPHIL NFR BLD AUTO: 0.5 % — SIGNIFICANT CHANGE UP (ref 0–6)
EPI CELLS # UR: SIGNIFICANT CHANGE UP
GLUCOSE SERPL-MCNC: 79 MG/DL — SIGNIFICANT CHANGE UP (ref 70–99)
GLUCOSE UR QL: NEGATIVE — SIGNIFICANT CHANGE UP
HCT VFR BLD CALC: 31.7 % — LOW (ref 39–50)
HGB BLD-MCNC: 10.1 G/DL — LOW (ref 13–17)
IMM GRANULOCYTES NFR BLD AUTO: 0.5 % — SIGNIFICANT CHANGE UP (ref 0–1.5)
KETONES UR-MCNC: ABNORMAL
LEUKOCYTE ESTERASE UR-ACNC: ABNORMAL
LYMPHOCYTES # BLD AUTO: 1.1 K/UL — SIGNIFICANT CHANGE UP (ref 1–3.3)
LYMPHOCYTES # BLD AUTO: 9.4 % — LOW (ref 13–44)
MAGNESIUM SERPL-MCNC: 2 MG/DL — SIGNIFICANT CHANGE UP (ref 1.6–2.6)
MCHC RBC-ENTMCNC: 29.9 PG — SIGNIFICANT CHANGE UP (ref 27–34)
MCHC RBC-ENTMCNC: 31.9 GM/DL — LOW (ref 32–36)
MCV RBC AUTO: 93.8 FL — SIGNIFICANT CHANGE UP (ref 80–100)
MONOCYTES # BLD AUTO: 1.05 K/UL — HIGH (ref 0–0.9)
MONOCYTES NFR BLD AUTO: 9 % — SIGNIFICANT CHANGE UP (ref 2–14)
NEUTROPHILS # BLD AUTO: 9.42 K/UL — HIGH (ref 1.8–7.4)
NEUTROPHILS NFR BLD AUTO: 80.3 % — HIGH (ref 43–77)
NITRITE UR-MCNC: NEGATIVE — SIGNIFICANT CHANGE UP
NRBC # BLD: 0 /100 WBCS — SIGNIFICANT CHANGE UP (ref 0–0)
PH UR: 9 — HIGH (ref 5–8)
PLATELET # BLD AUTO: 210 K/UL — SIGNIFICANT CHANGE UP (ref 150–400)
POTASSIUM SERPL-MCNC: 3.2 MMOL/L — LOW (ref 3.5–5.3)
POTASSIUM SERPL-SCNC: 3.2 MMOL/L — LOW (ref 3.5–5.3)
PROT UR-MCNC: 100
RBC # BLD: 3.38 M/UL — LOW (ref 4.2–5.8)
RBC # FLD: 15.8 % — HIGH (ref 10.3–14.5)
RBC CASTS # UR COMP ASSIST: ABNORMAL /HPF (ref 0–4)
SODIUM SERPL-SCNC: 141 MMOL/L — SIGNIFICANT CHANGE UP (ref 135–145)
SP GR SPEC: 1.01 — SIGNIFICANT CHANGE UP (ref 1.01–1.02)
URATE CRY FLD QL MICRO: ABNORMAL
UROBILINOGEN FLD QL: NEGATIVE — SIGNIFICANT CHANGE UP
WBC # BLD: 11.73 K/UL — HIGH (ref 3.8–10.5)
WBC # FLD AUTO: 11.73 K/UL — HIGH (ref 3.8–10.5)
WBC UR QL: ABNORMAL /HPF (ref 0–5)

## 2020-05-04 PROCEDURE — 99233 SBSQ HOSP IP/OBS HIGH 50: CPT

## 2020-05-04 PROCEDURE — 71045 X-RAY EXAM CHEST 1 VIEW: CPT | Mod: 26

## 2020-05-04 RX ORDER — SODIUM CHLORIDE 9 MG/ML
500 INJECTION INTRAMUSCULAR; INTRAVENOUS; SUBCUTANEOUS ONCE
Refills: 0 | Status: COMPLETED | OUTPATIENT
Start: 2020-05-04 | End: 2020-05-04

## 2020-05-04 RX ORDER — SIMETHICONE 80 MG/1
80 TABLET, CHEWABLE ORAL ONCE
Refills: 0 | Status: COMPLETED | OUTPATIENT
Start: 2020-05-04 | End: 2020-05-04

## 2020-05-04 RX ORDER — OXYCODONE HYDROCHLORIDE 5 MG/1
5 TABLET ORAL EVERY 6 HOURS
Refills: 0 | Status: DISCONTINUED | OUTPATIENT
Start: 2020-05-04 | End: 2020-05-05

## 2020-05-04 RX ORDER — POTASSIUM CHLORIDE 20 MEQ
20 PACKET (EA) ORAL ONCE
Refills: 0 | Status: COMPLETED | OUTPATIENT
Start: 2020-05-04 | End: 2020-05-04

## 2020-05-04 RX ADMIN — APIXABAN 2.5 MILLIGRAM(S): 2.5 TABLET, FILM COATED ORAL at 16:49

## 2020-05-04 RX ADMIN — FINASTERIDE 5 MILLIGRAM(S): 5 TABLET, FILM COATED ORAL at 11:21

## 2020-05-04 RX ADMIN — TRAMADOL HYDROCHLORIDE 50 MILLIGRAM(S): 50 TABLET ORAL at 11:21

## 2020-05-04 RX ADMIN — MUPIROCIN 1 APPLICATION(S): 20 OINTMENT TOPICAL at 07:04

## 2020-05-04 RX ADMIN — Medication 300 MILLIGRAM(S): at 11:21

## 2020-05-04 RX ADMIN — HYDROMORPHONE HYDROCHLORIDE 0.5 MILLIGRAM(S): 2 INJECTION INTRAMUSCULAR; INTRAVENOUS; SUBCUTANEOUS at 12:57

## 2020-05-04 RX ADMIN — SENNA PLUS 2 TABLET(S): 8.6 TABLET ORAL at 21:06

## 2020-05-04 RX ADMIN — MUPIROCIN 1 APPLICATION(S): 20 OINTMENT TOPICAL at 16:49

## 2020-05-04 RX ADMIN — SIMETHICONE 80 MILLIGRAM(S): 80 TABLET, CHEWABLE ORAL at 12:30

## 2020-05-04 RX ADMIN — SODIUM CHLORIDE 500 MILLILITER(S): 9 INJECTION INTRAMUSCULAR; INTRAVENOUS; SUBCUTANEOUS at 12:31

## 2020-05-04 RX ADMIN — Medication 5 MILLIGRAM(S): at 12:57

## 2020-05-04 RX ADMIN — TAMSULOSIN HYDROCHLORIDE 0.8 MILLIGRAM(S): 0.4 CAPSULE ORAL at 21:06

## 2020-05-04 RX ADMIN — APIXABAN 2.5 MILLIGRAM(S): 2.5 TABLET, FILM COATED ORAL at 05:28

## 2020-05-04 RX ADMIN — PANTOPRAZOLE SODIUM 40 MILLIGRAM(S): 20 TABLET, DELAYED RELEASE ORAL at 09:23

## 2020-05-04 RX ADMIN — HYDROMORPHONE HYDROCHLORIDE 0.5 MILLIGRAM(S): 2 INJECTION INTRAMUSCULAR; INTRAVENOUS; SUBCUTANEOUS at 05:28

## 2020-05-04 RX ADMIN — Medication 20 MILLIEQUIVALENT(S): at 11:21

## 2020-05-04 NOTE — CHART NOTE - NSCHARTNOTEFT_GEN_A_CORE
Called to evaluate pt for lethargy .    Pt is 89y/o M w/ pmh including afib, PE on eliquis, bph, constipation, LE edema, gerd, gout, hypothyroidism,  LE cellulitis sent in from the [facility] s/p fall, admitted for intractable pain found to have acute closed supracondylar fracture of right humerus admitted here for further management. was given pain medication dilaudid this after noon which he is noted to be very sensitive to.    VitalsVital Signs Last 24 Hrs    HR: 95   BP: 139/66   RR: 18   SpO2: 97%     arousable  to sound and tactile stimulus  pt without facial dropping or other signs of neuromuscular deficit  Heart s1-s2   Lungs bilat clear no Respiratory distress    Assessment  dilaudid / pain medication sensitivity   plan observe for now  spoke with Dr Mcfadden reviewed my findings and in agreement with plan

## 2020-05-04 NOTE — PROVIDER CONTACT NOTE (OTHER) - ACTION/TREATMENT ORDERED:
MD came into room with primary RN; pedal pulses palpable; dressing changings done; no other new orders at this time

## 2020-05-04 NOTE — PROGRESS NOTE ADULT - SUBJECTIVE AND OBJECTIVE BOX
Patient is a 89y old  Male who presents with a chief complaint of fall (03 May 2020 16:50)      SUBJECTIVE:  Patient seen and examined at bedside. This morning, patient reporting pain in BL LE.  Currently saturating 100% on RA.       See HPI for pertinent ROS.    All other ROS reviewed and negative except as otherwise stated above.      ALLERGIES:  penicillin (Unknown)      MEDICATIONS:  MEDICATIONS  (STANDING):  allopurinol 300 milliGRAM(s) Oral daily  apixaban 2.5 milliGRAM(s) Oral every 12 hours  finasteride 5 milliGRAM(s) Oral daily  mupirocin 2% Ointment 1 Application(s) Topical every 12 hours  pantoprazole    Tablet 40 milliGRAM(s) Oral before breakfast  senna 2 Tablet(s) Oral at bedtime  tamsulosin 0.8 milliGRAM(s) Oral at bedtime    MEDICATIONS  (PRN):  acetaminophen   Tablet .. 650 milliGRAM(s) Oral every 6 hours PRN Temp greater or equal to 38C (100.4F), Mild Pain (1 - 3)  HYDROmorphone  Injectable 0.5 milliGRAM(s) IV Push every 4 hours PRN Breakthrough pain  melatonin 6 milliGRAM(s) Oral at bedtime PRN Insomnia  ondansetron Injectable 4 milliGRAM(s) IV Push every 6 hours PRN Nausea  polyethylene glycol 3350 17 Gram(s) Oral daily PRN Constipation  sodium chloride 0.65% Nasal 1 Spray(s) Both Nostrils daily PRN Nasal Congestion  traMADol 50 milliGRAM(s) Oral every 12 hours PRN Moderate Pain (4 - 6)      VITALS:  Vital Signs Last 24 Hrs  T(F): 99.2 (04 May 2020 05:00), Max: 99.2 (03 May 2020 16:43)  HR: 73 (04 May 2020 05:00) (71 - 80)  BP: 107/44 (04 May 2020 05:00) (107/44 - 120/70)  RR: 18 (04 May 2020 05:00) (16 - 18)  SpO2: 100% (04 May 2020 05:00) (100% - 100%)  I&O's Summary    03 May 2020 07:01  -  04 May 2020 07:00  --------------------------------------------------------  IN: 480 mL / OUT: 2850 mL / NET: -2370 mL          PHYSICAL EXAM:  Constitutional: WDWN resting comfortably in bed; NAD  Head: NC/AT  Eyes: clear conjunctiva  Neck: supple; no JVD or thyromegaly  Respiratory: CTA B/L; currently saturating 100% on RA  Cardiac: irregular rhythm, normal +S1/S2; 3/6 diastolic murmur best heard at RUSB and mitral area. PMI non-displaced  Gastrointestinal: soft, NT/ND; no rebound or guarding; +BS  Extremities: WWP, no clubbing or cyanosis; RUE in long arm splint, trace LE edema b/l  Vascular: 2+ radial, DP/PT pulses B/L  Neurologic: AAOx3; hard of hearing, answers questions appropriately, follows commands, moves all extremities    LABS:                        10.1   11.73 )-----------( 210      ( 04 May 2020 06:50 )             31.7         140  |  102  |  56  ----------------------------<  133  3.6   |  27  |  1.99    Ca    9.0      03 May 2020 06:15  Mg     1.9         TPro  7.1  /  Alb  3.1  /  TBili  0.6  /  DBili  x   /  AST  23  /  ALT  12  /  AlkPhos  61      eGFR if Non African American: 27 mL/min/1.73M2 (20 @ 06:15)  eGFR if African American: 32 mL/min/1.73M2 (20 @ 06:15)    PT/INR - ( 02 May 2020 07:00 )   PT: 17.0 sec;   INR: 1.50 ratio         PTT - ( 01 May 2020 23:00 )  PTT:39.0 sec    CARDIAC MARKERS ( 01 May 2020 22:45 )  <.017 ng/mL / x     / x     / x     / x      Urinalysis Basic - ( 02 May 2020 00:45 )    Color: Yellow / Appearance: Clear / S.010 / pH: x  Gluc: x / Ketone: Negative  / Bili: Negative / Urobili: Negative   Blood: x / Protein: Negative / Nitrite: Negative   Leuk Esterase: Negative / RBC: x / WBC x   Sq Epi: x / Non Sq Epi: x / Bacteria: x    COVID-19 PCR: NotDetec (20 @ 00:05)      RADIOLOGY & ADDITIONAL TESTS: No new images Patient is a 89y old  Male who presents with a chief complaint of fall (03 May 2020 16:50)      SUBJECTIVE:  Patient seen and examined at bedside. This morning, patient reporting pain in BL LE.  Currently saturating 100% on RA.       See HPI for pertinent ROS.    All other ROS reviewed and negative except as otherwise stated above.      ALLERGIES:  penicillin (Unknown)      MEDICATIONS:  MEDICATIONS  (STANDING):  allopurinol 300 milliGRAM(s) Oral daily  apixaban 2.5 milliGRAM(s) Oral every 12 hours  finasteride 5 milliGRAM(s) Oral daily  mupirocin 2% Ointment 1 Application(s) Topical every 12 hours  pantoprazole    Tablet 40 milliGRAM(s) Oral before breakfast  senna 2 Tablet(s) Oral at bedtime  tamsulosin 0.8 milliGRAM(s) Oral at bedtime    MEDICATIONS  (PRN):  acetaminophen   Tablet .. 650 milliGRAM(s) Oral every 6 hours PRN Temp greater or equal to 38C (100.4F), Mild Pain (1 - 3)  HYDROmorphone  Injectable 0.5 milliGRAM(s) IV Push every 4 hours PRN Breakthrough pain  melatonin 6 milliGRAM(s) Oral at bedtime PRN Insomnia  ondansetron Injectable 4 milliGRAM(s) IV Push every 6 hours PRN Nausea  polyethylene glycol 3350 17 Gram(s) Oral daily PRN Constipation  sodium chloride 0.65% Nasal 1 Spray(s) Both Nostrils daily PRN Nasal Congestion  traMADol 50 milliGRAM(s) Oral every 12 hours PRN Moderate Pain (4 - 6)      VITALS:  Vital Signs Last 24 Hrs  T(F): 99.2 (04 May 2020 05:00), Max: 99.2 (03 May 2020 16:43)  HR: 73 (04 May 2020 05:00) (71 - 80)  BP: 107/44 (04 May 2020 05:00) (107/44 - 120/70)  RR: 18 (04 May 2020 05:00) (16 - 18)  SpO2: 100% (04 May 2020 05:00) (100% - 100%)  I&O's Summary    03 May 2020 07:01  -  04 May 2020 07:00  --------------------------------------------------------  IN: 480 mL / OUT: 2850 mL / NET: -2370 mL          PHYSICAL EXAM:  Constitutional: WDWN resting comfortably in bed; NAD  Head: NC/AT  Eyes: clear conjunctiva  Neck: supple; no JVD or thyromegaly  Respiratory: CTA B/L; currently saturating 100% on RA  Cardiac: irregular rhythm, normal +S1/S2; 3/6 diastolic murmur best heard at RUSB and mitral area. PMI non-displaced  Gastrointestinal: soft, NT/ND; no rebound or guarding; +BS  Extremities: WWP, no clubbing or cyanosis; RUE in long arm splint, trace LE edema b/l  Vascular: 2+ radial, DP/PT pulses B/L  Neurologic: AAOx3; hard of hearing, answers questions appropriately, follows commands, moves all extremities    LABS:                        10.1   11.73 )-----------( 210      ( 04 May 2020 06:50 )             31.7         141  |  102  |  60<H>  ----------------------------<  79  3.2<L>   |  30  |  1.81<H>    Ca    8.8      04 May 2020 06:50  Mg     2.0     -  PT/INR - ( 02 May 2020 07:00 )   PT: 17.0 sec;   INR: 1.50 ratio         PTT - ( 01 May 2020 23:00 )  PTT:39.0 sec    CARDIAC MARKERS ( 01 May 2020 22:45 )  <.017 ng/mL / x     / x     / x     / x      Urinalysis Basic - ( 02 May 2020 00:45 )    Color: Yellow / Appearance: Clear / S.010 / pH: x  Gluc: x / Ketone: Negative  / Bili: Negative / Urobili: Negative   Blood: x / Protein: Negative / Nitrite: Negative   Leuk Esterase: Negative / RBC: x / WBC x   Sq Epi: x / Non Sq Epi: x / Bacteria: x    COVID-19 PCR: NotDetec (20 @ 00:05)      RADIOLOGY & ADDITIONAL TESTS: No new images Patient is a 89y old  Male who presents with a chief complaint of fall (03 May 2020 16:50)      SUBJECTIVE:  Patient seen and examined at bedside, states that his right arm pain is controlled, denies headache, chest pain, palpitations, nausea, vomiting, abdominal pain.     See HPI for pertinent ROS.    All other ROS reviewed and negative except as otherwise stated above.      ALLERGIES:  penicillin (Unknown)      MEDICATIONS:  MEDICATIONS  (STANDING):  allopurinol 300 milliGRAM(s) Oral daily  apixaban 2.5 milliGRAM(s) Oral every 12 hours  finasteride 5 milliGRAM(s) Oral daily  mupirocin 2% Ointment 1 Application(s) Topical every 12 hours  pantoprazole    Tablet 40 milliGRAM(s) Oral before breakfast  senna 2 Tablet(s) Oral at bedtime  tamsulosin 0.8 milliGRAM(s) Oral at bedtime    MEDICATIONS  (PRN):  acetaminophen   Tablet .. 650 milliGRAM(s) Oral every 6 hours PRN Temp greater or equal to 38C (100.4F), Mild Pain (1 - 3)  HYDROmorphone  Injectable 0.5 milliGRAM(s) IV Push every 4 hours PRN Breakthrough pain  melatonin 6 milliGRAM(s) Oral at bedtime PRN Insomnia  ondansetron Injectable 4 milliGRAM(s) IV Push every 6 hours PRN Nausea  polyethylene glycol 3350 17 Gram(s) Oral daily PRN Constipation  sodium chloride 0.65% Nasal 1 Spray(s) Both Nostrils daily PRN Nasal Congestion  traMADol 50 milliGRAM(s) Oral every 12 hours PRN Moderate Pain (4 - 6)      VITALS:  Vital Signs Last 24 Hrs  T(F): 99.2 (04 May 2020 05:00), Max: 99.2 (03 May 2020 16:43)  HR: 73 (04 May 2020 05:00) (71 - 80)  BP: 107/44 (04 May 2020 05:00) (107/44 - 120/70)  RR: 18 (04 May 2020 05:00) (16 - 18)  SpO2: 100% (04 May 2020 05:00) (100% - 100%)  I&O's Summary    03 May 2020 07:01  -  04 May 2020 07:00  --------------------------------------------------------  IN: 480 mL / OUT: 2850 mL / NET: -2370 mL          PHYSICAL EXAM:  Constitutional: WDWN resting comfortably in bed; NAD  Head: NC/AT  Eyes: clear conjunctiva  Neck: supple; no JVD or thyromegaly  Respiratory: CTA B/L; currently saturating 100% on RA  Cardiac: irregular rhythm, normal +S1/S2; 3/6 diastolic murmur best heard at RUSB and mitral area. PMI non-displaced  Gastrointestinal: soft, NT/ND; no rebound or guarding; +BS  Extremities: WWP, no clubbing or cyanosis; RUE in long arm splint, no LE edema  Vascular: 2+ radial, DP/PT pulses B/L  Neurologic: AAOx3; hard of hearing, answers questions appropriately, follows commands, moves all extremities    LABS:                        10.1   11.73 )-----------( 210      ( 04 May 2020 06:50 )             31.7     05-    141  |  102  |  60<H>  ----------------------------<  79  3.2<L>   |  30  |  1.81<H>    Ca    8.8      04 May 2020 06:50  Mg     2.0     05-04  PT/INR - ( 02 May 2020 07:00 )   PT: 17.0 sec;   INR: 1.50 ratio         PTT - ( 01 May 2020 23:00 )  PTT:39.0 sec    CARDIAC MARKERS ( 01 May 2020 22:45 )  <.017 ng/mL / x     / x     / x     / x      Urinalysis Basic - ( 02 May 2020 00:45 )    Color: Yellow / Appearance: Clear / S.010 / pH: x  Gluc: x / Ketone: Negative  / Bili: Negative / Urobili: Negative   Blood: x / Protein: Negative / Nitrite: Negative   Leuk Esterase: Negative / RBC: x / WBC x   Sq Epi: x / Non Sq Epi: x / Bacteria: x    COVID-19 PCR: NotDetec (20 @ 00:05)      RADIOLOGY & ADDITIONAL TESTS: No new images

## 2020-05-04 NOTE — PROGRESS NOTE ADULT - ASSESSMENT
89y/o M w/ pmh including afib, PE on eliquis, bph, constipation, LE edema, gerd, gout, hypothyroidism,  LE cellulitis sent in from the [facility] s/p fall, admitted for intractable pain found to have acute closed supracondylar fracture of right humerus admitted here for further management.     #Acute closed supracondylar fracture of right humerus  - pain control: Tylenol PRN, Tramadol 50mg q12H PRN Dilaudid 0.5mg q4H PRN  - Ortho consult Dr. Cat: long arm splint applied .  No surgical intervention.  - Ice, elevation  - NWB right upper extremity  - OT and PT eval recs: rehab    #Fall, subsequent encounter:  - PT eval recs: rehab    #ANTONINA: Baseline Cr 1.2-1.3 as of 2020, COVID negative  - multifactorial: prerenal due to multiple diuretics, now found to have urinary retention as well  - unsuccessful straight cath, Demarco placed  (drained 1000mL)  - continue Demarco  - hold dyazide  - hold Lasix    #Paroxysmal Afib:  - rate currently controlled without meds  - continue Eliquis    #BPH with urinary retention:  - Demarco placed 5/3, will need to be discharged with Demarco.   - Discussed with pt's urologist, Dr. Geiger, OK with discharge with Demarco and follow up outpt.  - continue Flomax and finasteride    #HTN:  - hold dyazide  - hold Lasix today    #Gout:  - continue allopurinol    #DVT ppx:  - on Eliquis    #Dispo:  - PT/OT eval recs: rehab  - Pt is also getting discharge with new Demarco. CM to confirmed whether [facility] would accept pt with Demarco. If not, he would be discharged to [facility].    GOC: DNR/DNI    Son Salbador [phone] updated on status       CAPRINI SCORE [CLOT]    AGE RELATED RISK FACTORS                                                       MOBILITY RELATED FACTORS  [ ] Age 41-60 years                                            (1 Point)                  [X ] Bed rest                                                        (1 Point)  [ ] Age: 61-74 years                                           (2 Points)                 [ ] Plaster cast                                                   (2 Points)  [X ] Age= 75 years                                              (3 Points)                 [ ] Bed bound for more than 72 hours                 (2 Points)    DISEASE RELATED RISK FACTORS                                               GENDER SPECIFIC FACTORS  [X ] Edema in the lower extremities                       (1 Point)                  [ ] Pregnancy                                                     (1 Point)  [ ] Varicose veins                                               (1 Point)                  [ ] Post-partum < 6 weeks                                   (1 Point)             [ ] BMI > 25 Kg/m2                                            (1 Point)                  [ ] Hormonal therapy  or oral contraception          (1 Point)                 [ ] Sepsis (in the previous month)                        (1 Point)                  [ ] History of pregnancy complications                 (1 point)  [ ] Pneumonia or serious lung disease                                               [ ] Unexplained or recurrent                     (1 Point)           (in the previous month)                               (1 Point)  [ ] Abnormal pulmonary function test                     (1 Point)                 SURGERY RELATED RISK FACTORS  [ ] Acute myocardial infarction                              (1 Point)                 [ ]  Section                                             (1 Point)  [ ] Congestive heart failure (in the previous month)  (1 Point)               [ ] Minor surgery                                                  (1 Point)   [ ] Inflammatory bowel disease                             (1 Point)                 [ ] Arthroscopic surgery                                        (2 Points)  [ ] Central venous access                                      (2 Points)                [ ] General surgery lasting more than 45 minutes   (2 Points)       [ ] Stroke (in the previous month)                          (5 Points)               [ ] Elective arthroplasty                                         (5 Points)                                                                                                                                               HEMATOLOGY RELATED FACTORS                                                 TRAUMA RELATED RISK FACTORS  [ ] Prior episodes of VTE                                     (3 Points)                 [ ] Fracture of the hip, pelvis, or leg                       (5 Points)  [ ] Positive family history for VTE                         (3 Points)                 [ ] Acute spinal cord injury (in the previous month)  (5 Points)  [ ] Prothrombin 12364 A                                     (3 Points)                 [ ] Paralysis  (less than 1 month)                             (5 Points)  [ ] Factor V Leiden                                             (3 Points)                  [ ] Multiple Trauma within 1 month                        (5 Points)  [ ] Lupus anticoagulants                                     (3 Points)                                                           [ ] Anticardiolipin antibodies                               (3 Points)                                                       [ ] High homocysteine in the blood                      (3 Points)                                             [ ] Other congenital or acquired thrombophilia      (3 Points)                                                [ ] Heparin induced thrombocytopenia                  (3 Points)                                          Total Score [     5     ] 89y/o M w/ pmh including afib, PE on eliquis, bph, constipation, LE edema, gerd, gout, hypothyroidism,  LE cellulitis sent in from the [facility] s/p fall, admitted for intractable pain found to have acute closed supracondylar fracture of right humerus admitted here for further management.     #Acute closed supracondylar fracture of right humerus  - pain control: Tylenol PRN, Tramadol 50mg q12H PRN Dilaudid 0.5mg q4H PRN  - Ortho consult Dr. Cat: long arm splint applied .  No surgical intervention.  - Ice, elevation  - NWB right upper extremity  - OT and PT eval recs: rehab    #Fall, subsequent encounter:  - PT eval recs: rehab    #ANTONINA: Baseline Cr 1.2-1.3 as of 2020, COVID negative  - multifactorial: prerenal due to multiple diuretics, now found to have urinary retention as well  - unsuccessful straight cath, Demarco placed  (drained 1000mL)  - continue Demarco  - hold dyazide  - hold Lasix  - follow up am labs    #Paroxysmal Afib:  - rate currently controlled without meds  - continue Eliquis    #BPH with urinary retention:  - Demarco placed 5/3, will need to be discharged with Demarco.   - Discussed with pt's urologist, Dr. Geiger, OK with discharge with Demarco and follow up outpt.  - continue Flomax and finasteride    #HTN:  - hold dyazide  - hold Lasix today    #Gout:  - continue allopurinol    #Leukocytosis: May be stress related  - monitor labs    #Anemia: Hb and vitals stable. No signs of bleeding on exam  - monitor labs    #DVT ppx:  - on Eliquis    #Dispo:  - PT/OT eval recs: rehab  - Pt is also getting discharge with new Demarco. CM to confirmed whether [facility] would accept pt with Demarco. If not, he would be discharged to [facility].    GOC: DNR/DNI    Son Salbador [phone] updated on status       CAPRINI SCORE [CLOT]    AGE RELATED RISK FACTORS                                                       MOBILITY RELATED FACTORS  [ ] Age 41-60 years                                            (1 Point)                  [X ] Bed rest                                                        (1 Point)  [ ] Age: 61-74 years                                           (2 Points)                 [ ] Plaster cast                                                   (2 Points)  [X ] Age= 75 years                                              (3 Points)                 [ ] Bed bound for more than 72 hours                 (2 Points)    DISEASE RELATED RISK FACTORS                                               GENDER SPECIFIC FACTORS  [X ] Edema in the lower extremities                       (1 Point)                  [ ] Pregnancy                                                     (1 Point)  [ ] Varicose veins                                               (1 Point)                  [ ] Post-partum < 6 weeks                                   (1 Point)             [ ] BMI > 25 Kg/m2                                            (1 Point)                  [ ] Hormonal therapy  or oral contraception          (1 Point)                 [ ] Sepsis (in the previous month)                        (1 Point)                  [ ] History of pregnancy complications                 (1 point)  [ ] Pneumonia or serious lung disease                                               [ ] Unexplained or recurrent                     (1 Point)           (in the previous month)                               (1 Point)  [ ] Abnormal pulmonary function test                     (1 Point)                 SURGERY RELATED RISK FACTORS  [ ] Acute myocardial infarction                              (1 Point)                 [ ]  Section                                             (1 Point)  [ ] Congestive heart failure (in the previous month)  (1 Point)               [ ] Minor surgery                                                  (1 Point)   [ ] Inflammatory bowel disease                             (1 Point)                 [ ] Arthroscopic surgery                                        (2 Points)  [ ] Central venous access                                      (2 Points)                [ ] General surgery lasting more than 45 minutes   (2 Points)       [ ] Stroke (in the previous month)                          (5 Points)               [ ] Elective arthroplasty                                         (5 Points)                                                                                                                                               HEMATOLOGY RELATED FACTORS                                                 TRAUMA RELATED RISK FACTORS  [ ] Prior episodes of VTE                                     (3 Points)                 [ ] Fracture of the hip, pelvis, or leg                       (5 Points)  [ ] Positive family history for VTE                         (3 Points)                 [ ] Acute spinal cord injury (in the previous month)  (5 Points)  [ ] Prothrombin 48669 A                                     (3 Points)                 [ ] Paralysis  (less than 1 month)                             (5 Points)  [ ] Factor V Leiden                                             (3 Points)                  [ ] Multiple Trauma within 1 month                        (5 Points)  [ ] Lupus anticoagulants                                     (3 Points)                                                           [ ] Anticardiolipin antibodies                               (3 Points)                                                       [ ] High homocysteine in the blood                      (3 Points)                                             [ ] Other congenital or acquired thrombophilia      (3 Points)                                                [ ] Heparin induced thrombocytopenia                  (3 Points)                                          Total Score [     5     ] 89y/o M w/ pmh including afib, PE on eliquis, bph, constipation, LE edema, gerd, gout, hypothyroidism,  LE cellulitis sent in from the [facility] s/p fall, admitted for intractable pain found to have acute closed supracondylar fracture of right humerus admitted here for further management.     #Acute closed supracondylar fracture of right humerus  - pain control: Tylenol PRN, Tramadol 50mg q12H PRN Dilaudid 0.5mg q4H PRN  - Ortho consult Dr. Cat: long arm splint applied .  No surgical intervention.  - Ice, elevation  - NWB right upper extremity  - OT and PT eval recs: rehab    #Fall, subsequent encounter:  - PT eval recs: rehab    #ANTONINA: Baseline Cr 1.2-1.3 as of 2020, COVID negative  - Cr trending down 1.81 <-1.99   - multifactorial: prerenal due to multiple diuretics, now found to have urinary retention as well  - unsuccessful straight cath, Demarco placed  (drained 1000mL)  - continue Demarco  - hold dyazide  - hold Lasix    #Paroxysmal Afib:  - rate currently controlled without meds  - continue Eliquis    #BPH with urinary retention:  - Demarco placed 5/3, will need to be discharged with Demarco.   - Discussed with pt's urologist, Dr. Geiger, OK with discharge with Demarco and follow up outpt.  - continue Flomax and finasteride    #HTN:  - hold dyazide  - hold Lasix today    #Gout:  - continue allopurinol    #Leukocytosis: May be stress related  - monitor labs    #Anemia: Hb and vitals stable. No signs of bleeding on exam  - monitor labs    #Hypokalemia:  - replete and monitor labs    #DVT ppx:  - on Eliquis    #Dispo:  - PT/OT eval recs: rehab  - Pt is also getting discharge with new Demarco. CM to confirmed whether [facility] would accept pt with Demarco. If not, he would be discharged to [facility].    GOC: DNR/DNI    Son Salbador [phone] updated on status       CAPRINI SCORE [CLOT]    AGE RELATED RISK FACTORS                                                       MOBILITY RELATED FACTORS  [ ] Age 41-60 years                                            (1 Point)                  [X ] Bed rest                                                        (1 Point)  [ ] Age: 61-74 years                                           (2 Points)                 [ ] Plaster cast                                                   (2 Points)  [X ] Age= 75 years                                              (3 Points)                 [ ] Bed bound for more than 72 hours                 (2 Points)    DISEASE RELATED RISK FACTORS                                               GENDER SPECIFIC FACTORS  [X ] Edema in the lower extremities                       (1 Point)                  [ ] Pregnancy                                                     (1 Point)  [ ] Varicose veins                                               (1 Point)                  [ ] Post-partum < 6 weeks                                   (1 Point)             [ ] BMI > 25 Kg/m2                                            (1 Point)                  [ ] Hormonal therapy  or oral contraception          (1 Point)                 [ ] Sepsis (in the previous month)                        (1 Point)                  [ ] History of pregnancy complications                 (1 point)  [ ] Pneumonia or serious lung disease                                               [ ] Unexplained or recurrent                     (1 Point)           (in the previous month)                               (1 Point)  [ ] Abnormal pulmonary function test                     (1 Point)                 SURGERY RELATED RISK FACTORS  [ ] Acute myocardial infarction                              (1 Point)                 [ ]  Section                                             (1 Point)  [ ] Congestive heart failure (in the previous month)  (1 Point)               [ ] Minor surgery                                                  (1 Point)   [ ] Inflammatory bowel disease                             (1 Point)                 [ ] Arthroscopic surgery                                        (2 Points)  [ ] Central venous access                                      (2 Points)                [ ] General surgery lasting more than 45 minutes   (2 Points)       [ ] Stroke (in the previous month)                          (5 Points)               [ ] Elective arthroplasty                                         (5 Points)                                                                                                                                               HEMATOLOGY RELATED FACTORS                                                 TRAUMA RELATED RISK FACTORS  [ ] Prior episodes of VTE                                     (3 Points)                 [ ] Fracture of the hip, pelvis, or leg                       (5 Points)  [ ] Positive family history for VTE                         (3 Points)                 [ ] Acute spinal cord injury (in the previous month)  (5 Points)  [ ] Prothrombin 38492 A                                     (3 Points)                 [ ] Paralysis  (less than 1 month)                             (5 Points)  [ ] Factor V Leiden                                             (3 Points)                  [ ] Multiple Trauma within 1 month                        (5 Points)  [ ] Lupus anticoagulants                                     (3 Points)                                                           [ ] Anticardiolipin antibodies                               (3 Points)                                                       [ ] High homocysteine in the blood                      (3 Points)                                             [ ] Other congenital or acquired thrombophilia      (3 Points)                                                [ ] Heparin induced thrombocytopenia                  (3 Points)                                          Total Score [     5     ] 89y/o M w/ pmh including afib, PE on eliquis, bph, constipation, LE edema, gerd, gout, hypothyroidism,  LE cellulitis sent in from the [facility] s/p fall, admitted for intractable pain found to have acute closed supracondylar fracture of right humerus admitted here for further management.     #Acute closed supracondylar fracture of right humerus  - pain control: Tylenol PRN, Tramadol 50mg q12H PRN Dilaudid 0.5mg q4H PRN  - Ortho consult Dr. Cat: long arm splint applied .  No surgical intervention.  - Ice, elevation  - NWB right upper extremity  - OT and PT eval recs: rehab    #Fall, subsequent encounter:  - PT eval recs: rehab    #ANTONINA: Baseline Cr 1.2-1.3 as of 2020, COVID negative  - Cr trending down 1.81 <-1.99   - multifactorial: prerenal due to multiple diuretics, now found to have urinary retention as well  - unsuccessful straight cath, Demarco placed  (drained 1000mL)  - continue Demarco  - hold dyazide  - hold Lasix    #Paroxysmal Afib:  - rate currently controlled without meds  - continue Eliquis    #BPH with urinary retention:  - Demarco placed 5/3, will need to be discharged with Demarco.   - Discussed with pt's urologist, Dr. Geiger, OK with discharge with Demarco and follow up outpt.  - continue Flomax and finasteride    #HTN:  - hold dyazide  - hold Lasix     #Gout:  - continue allopurinol    #Leukocytosis: May be stress related  - monitor labs  - will check rectal temp, if no fever, stable for dc to [facility]    #Anemia: Hb and vitals stable. No signs of bleeding on exam  - monitor labs    #Hypokalemia:  - replete and monitor labs    #DVT ppx:  - on Eliquis    #Dispo:  - PT/OT eval recs: [facility] with new Demarco    GOC: DNR/DNI    Yogi Siu [phone] updated on status       CAPRINI SCORE [CLOT]    AGE RELATED RISK FACTORS                                                       MOBILITY RELATED FACTORS  [ ] Age 41-60 years                                            (1 Point)                  [X ] Bed rest                                                        (1 Point)  [ ] Age: 61-74 years                                           (2 Points)                 [ ] Plaster cast                                                   (2 Points)  [X ] Age= 75 years                                              (3 Points)                 [ ] Bed bound for more than 72 hours                 (2 Points)    DISEASE RELATED RISK FACTORS                                               GENDER SPECIFIC FACTORS  [X ] Edema in the lower extremities                       (1 Point)                  [ ] Pregnancy                                                     (1 Point)  [ ] Varicose veins                                               (1 Point)                  [ ] Post-partum < 6 weeks                                   (1 Point)             [ ] BMI > 25 Kg/m2                                            (1 Point)                  [ ] Hormonal therapy  or oral contraception          (1 Point)                 [ ] Sepsis (in the previous month)                        (1 Point)                  [ ] History of pregnancy complications                 (1 point)  [ ] Pneumonia or serious lung disease                                               [ ] Unexplained or recurrent                     (1 Point)           (in the previous month)                               (1 Point)  [ ] Abnormal pulmonary function test                     (1 Point)                 SURGERY RELATED RISK FACTORS  [ ] Acute myocardial infarction                              (1 Point)                 [ ]  Section                                             (1 Point)  [ ] Congestive heart failure (in the previous month)  (1 Point)               [ ] Minor surgery                                                  (1 Point)   [ ] Inflammatory bowel disease                             (1 Point)                 [ ] Arthroscopic surgery                                        (2 Points)  [ ] Central venous access                                      (2 Points)                [ ] General surgery lasting more than 45 minutes   (2 Points)       [ ] Stroke (in the previous month)                          (5 Points)               [ ] Elective arthroplasty                                         (5 Points)                                                                                                                                               HEMATOLOGY RELATED FACTORS                                                 TRAUMA RELATED RISK FACTORS  [ ] Prior episodes of VTE                                     (3 Points)                 [ ] Fracture of the hip, pelvis, or leg                       (5 Points)  [ ] Positive family history for VTE                         (3 Points)                 [ ] Acute spinal cord injury (in the previous month)  (5 Points)  [ ] Prothrombin 92545 A                                     (3 Points)                 [ ] Paralysis  (less than 1 month)                             (5 Points)  [ ] Factor V Leiden                                             (3 Points)                  [ ] Multiple Trauma within 1 month                        (5 Points)  [ ] Lupus anticoagulants                                     (3 Points)                                                           [ ] Anticardiolipin antibodies                               (3 Points)                                                       [ ] High homocysteine in the blood                      (3 Points)                                             [ ] Other congenital or acquired thrombophilia      (3 Points)                                                [ ] Heparin induced thrombocytopenia                  (3 Points)                                          Total Score [     5     ] 89y/o M w/ pmh including afib, PE on eliquis, bph, constipation, LE edema, gerd, gout, hypothyroidism,  LE cellulitis sent in from the [facility] s/p fall, admitted for intractable pain found to have acute closed supracondylar fracture of right humerus admitted here for further management.     #Acute closed supracondylar fracture of right humerus  - pain control: Tylenol PRN, Tramadol 50mg q12H PRN Dilaudid 0.5mg q4H PRN  - Ortho consult Dr. Cat: long arm splint applied .  No surgical intervention.  - Ice, elevation  - NWB right upper extremity  - OT and PT eval recs: rehab    #Fall, subsequent encounter:  - PT eval recs: rehab    #ANTONINA: Baseline Cr 1.2-1.3 as of 2020, COVID negative  - Cr trending down 1.81 <-1.99   - multifactorial: prerenal due to multiple diuretics, now found to have urinary retention as well  - unsuccessful straight cath, Demarco placed  (drained 1000mL)  - continue Demarco  - hold dyazide  - hold Lasix    #Paroxysmal Afib:  - rate currently controlled without meds  - continue Eliquis    #BPH with urinary retention:  - Demarco placed 5/3, will need to be discharged with Demarco.   - Discussed with pt's urologist, Dr. Geiger, OK with discharge with Demarco and follow up outpt.  - continue Flomax and finasteride    #HTN:  - hold dyazide  - hold Lasix     #Gout:  - continue allopurinol    #Leukocytosis: May be stress related  - monitor labs  - will check rectal temp, if no fever, stable for dc to [facility]    #Anemia: Hb and vitals stable. No signs of bleeding on exam  - monitor labs    #Hypokalemia:  - replete and monitor labs    #DVT ppx:  - on Eliquis    #Dispo:  - PT/OT eval recs: [facility] with new Demarco    GOC: DNR/DNI    Called Son Salbador [phone] today to update status but he did not  his phone, left        CAPRINI SCORE [CLOT]    AGE RELATED RISK FACTORS                                                       MOBILITY RELATED FACTORS  [ ] Age 41-60 years                                            (1 Point)                  [X ] Bed rest                                                        (1 Point)  [ ] Age: 61-74 years                                           (2 Points)                 [ ] Plaster cast                                                   (2 Points)  [X ] Age= 75 years                                              (3 Points)                 [ ] Bed bound for more than 72 hours                 (2 Points)    DISEASE RELATED RISK FACTORS                                               GENDER SPECIFIC FACTORS  [X ] Edema in the lower extremities                       (1 Point)                  [ ] Pregnancy                                                     (1 Point)  [ ] Varicose veins                                               (1 Point)                  [ ] Post-partum < 6 weeks                                   (1 Point)             [ ] BMI > 25 Kg/m2                                            (1 Point)                  [ ] Hormonal therapy  or oral contraception          (1 Point)                 [ ] Sepsis (in the previous month)                        (1 Point)                  [ ] History of pregnancy complications                 (1 point)  [ ] Pneumonia or serious lung disease                                               [ ] Unexplained or recurrent                     (1 Point)           (in the previous month)                               (1 Point)  [ ] Abnormal pulmonary function test                     (1 Point)                 SURGERY RELATED RISK FACTORS  [ ] Acute myocardial infarction                              (1 Point)                 [ ]  Section                                             (1 Point)  [ ] Congestive heart failure (in the previous month)  (1 Point)               [ ] Minor surgery                                                  (1 Point)   [ ] Inflammatory bowel disease                             (1 Point)                 [ ] Arthroscopic surgery                                        (2 Points)  [ ] Central venous access                                      (2 Points)                [ ] General surgery lasting more than 45 minutes   (2 Points)       [ ] Stroke (in the previous month)                          (5 Points)               [ ] Elective arthroplasty                                         (5 Points)                                                                                                                                               HEMATOLOGY RELATED FACTORS                                                 TRAUMA RELATED RISK FACTORS  [ ] Prior episodes of VTE                                     (3 Points)                 [ ] Fracture of the hip, pelvis, or leg                       (5 Points)  [ ] Positive family history for VTE                         (3 Points)                 [ ] Acute spinal cord injury (in the previous month)  (5 Points)  [ ] Prothrombin 62525 A                                     (3 Points)                 [ ] Paralysis  (less than 1 month)                             (5 Points)  [ ] Factor V Leiden                                             (3 Points)                  [ ] Multiple Trauma within 1 month                        (5 Points)  [ ] Lupus anticoagulants                                     (3 Points)                                                           [ ] Anticardiolipin antibodies                               (3 Points)                                                       [ ] High homocysteine in the blood                      (3 Points)                                             [ ] Other congenital or acquired thrombophilia      (3 Points)                                                [ ] Heparin induced thrombocytopenia                  (3 Points)                                          Total Score [     5     ] 89y/o M w/ pmh including afib, PE on eliquis, bph, constipation, LE edema, gerd, gout, hypothyroidism,  LE cellulitis sent in from the [facility] s/p fall, admitted for intractable pain found to have acute closed supracondylar fracture of right humerus admitted here for further management.     #Acute closed supracondylar fracture of right humerus  - pain control: Tylenol PRN, Tramadol 50mg q12H PRN Dilaudid 0.5mg q4H PRN  - Ortho consult Dr. Cat: long arm splint applied .  No surgical intervention.  - Ice, elevation  - NWB right upper extremity  - OT and PT eval recs: rehab    #Fall, subsequent encounter:  - PT eval recs: rehab    #ANTONINA: Baseline Cr 1.2-1.3 as of 2020, COVID negative  - Cr trending down 1.81 <-1.99   - multifactorial: prerenal due to multiple diuretics, now found to have urinary retention as well  - unsuccessful straight cath, Demarco placed  (drained 1000mL)  - continue Demarco  - hold dyazide  - hold Lasix    #Paroxysmal Afib:  - rate currently controlled without meds  - continue Eliquis    #BPH with urinary retention:  - Demarco placed 5/3, will need to be discharged with Demarco.   - Discussed with pt's urologist, Dr. Geiger, OK with discharge with Demarco and follow up outpt.  - continue Flomax and finasteride    #HTN:  - hold dyazide  - hold Lasix     #Gout:  - continue allopurinol    #Leukocytosis: May be stress related  - monitor labs  - will check rectal temp, if no fever, stable for dc to [facility]    #Anemia: Hb and vitals stable. No signs of bleeding on exam  - monitor labs    #Hypokalemia:  - replete and monitor labs    #DVT ppx:  - on Eliquis    #Dispo:  - PT/OT eval recs: [facility] with new Demarco    GOC: DNR/DNI    Called Son Salbador [phone] updated on status       CAPRINI SCORE [CLOT]    AGE RELATED RISK FACTORS                                                       MOBILITY RELATED FACTORS  [ ] Age 41-60 years                                            (1 Point)                  [X ] Bed rest                                                        (1 Point)  [ ] Age: 61-74 years                                           (2 Points)                 [ ] Plaster cast                                                   (2 Points)  [X ] Age= 75 years                                              (3 Points)                 [ ] Bed bound for more than 72 hours                 (2 Points)    DISEASE RELATED RISK FACTORS                                               GENDER SPECIFIC FACTORS  [X ] Edema in the lower extremities                       (1 Point)                  [ ] Pregnancy                                                     (1 Point)  [ ] Varicose veins                                               (1 Point)                  [ ] Post-partum < 6 weeks                                   (1 Point)             [ ] BMI > 25 Kg/m2                                            (1 Point)                  [ ] Hormonal therapy  or oral contraception          (1 Point)                 [ ] Sepsis (in the previous month)                        (1 Point)                  [ ] History of pregnancy complications                 (1 point)  [ ] Pneumonia or serious lung disease                                               [ ] Unexplained or recurrent                     (1 Point)           (in the previous month)                               (1 Point)  [ ] Abnormal pulmonary function test                     (1 Point)                 SURGERY RELATED RISK FACTORS  [ ] Acute myocardial infarction                              (1 Point)                 [ ]  Section                                             (1 Point)  [ ] Congestive heart failure (in the previous month)  (1 Point)               [ ] Minor surgery                                                  (1 Point)   [ ] Inflammatory bowel disease                             (1 Point)                 [ ] Arthroscopic surgery                                        (2 Points)  [ ] Central venous access                                      (2 Points)                [ ] General surgery lasting more than 45 minutes   (2 Points)       [ ] Stroke (in the previous month)                          (5 Points)               [ ] Elective arthroplasty                                         (5 Points)                                                                                                                                               HEMATOLOGY RELATED FACTORS                                                 TRAUMA RELATED RISK FACTORS  [ ] Prior episodes of VTE                                     (3 Points)                 [ ] Fracture of the hip, pelvis, or leg                       (5 Points)  [ ] Positive family history for VTE                         (3 Points)                 [ ] Acute spinal cord injury (in the previous month)  (5 Points)  [ ] Prothrombin 97531 A                                     (3 Points)                 [ ] Paralysis  (less than 1 month)                             (5 Points)  [ ] Factor V Leiden                                             (3 Points)                  [ ] Multiple Trauma within 1 month                        (5 Points)  [ ] Lupus anticoagulants                                     (3 Points)                                                           [ ] Anticardiolipin antibodies                               (3 Points)                                                       [ ] High homocysteine in the blood                      (3 Points)                                             [ ] Other congenital or acquired thrombophilia      (3 Points)                                                [ ] Heparin induced thrombocytopenia                  (3 Points)                                          Total Score [     5     ]

## 2020-05-05 ENCOUNTER — TRANSCRIPTION ENCOUNTER (OUTPATIENT)
Age: 85
End: 2020-05-05

## 2020-05-05 VITALS
TEMPERATURE: 99 F | RESPIRATION RATE: 17 BRPM | OXYGEN SATURATION: 100 % | SYSTOLIC BLOOD PRESSURE: 137 MMHG | HEART RATE: 81 BPM | DIASTOLIC BLOOD PRESSURE: 73 MMHG

## 2020-05-05 LAB
ANION GAP SERPL CALC-SCNC: 10 MMOL/L — SIGNIFICANT CHANGE UP (ref 5–17)
BASOPHILS # BLD AUTO: 0.02 K/UL — SIGNIFICANT CHANGE UP (ref 0–0.2)
BASOPHILS NFR BLD AUTO: 0.2 % — SIGNIFICANT CHANGE UP (ref 0–2)
BUN SERPL-MCNC: 69 MG/DL — HIGH (ref 7–23)
CALCIUM SERPL-MCNC: 8.7 MG/DL — SIGNIFICANT CHANGE UP (ref 8.4–10.5)
CHLORIDE SERPL-SCNC: 105 MMOL/L — SIGNIFICANT CHANGE UP (ref 96–108)
CO2 SERPL-SCNC: 28 MMOL/L — SIGNIFICANT CHANGE UP (ref 22–31)
CREAT SERPL-MCNC: 1.93 MG/DL — HIGH (ref 0.5–1.3)
EOSINOPHIL # BLD AUTO: 0.01 K/UL — SIGNIFICANT CHANGE UP (ref 0–0.5)
EOSINOPHIL NFR BLD AUTO: 0.1 % — SIGNIFICANT CHANGE UP (ref 0–6)
GLUCOSE SERPL-MCNC: 110 MG/DL — HIGH (ref 70–99)
HCT VFR BLD CALC: 35 % — LOW (ref 39–50)
HGB BLD-MCNC: 11.1 G/DL — LOW (ref 13–17)
IMM GRANULOCYTES NFR BLD AUTO: 0.6 % — SIGNIFICANT CHANGE UP (ref 0–1.5)
LYMPHOCYTES # BLD AUTO: 0.86 K/UL — LOW (ref 1–3.3)
LYMPHOCYTES # BLD AUTO: 8.5 % — LOW (ref 13–44)
MAGNESIUM SERPL-MCNC: 1.9 MG/DL — SIGNIFICANT CHANGE UP (ref 1.6–2.6)
MCHC RBC-ENTMCNC: 30.2 PG — SIGNIFICANT CHANGE UP (ref 27–34)
MCHC RBC-ENTMCNC: 31.7 GM/DL — LOW (ref 32–36)
MCV RBC AUTO: 95.4 FL — SIGNIFICANT CHANGE UP (ref 80–100)
MONOCYTES # BLD AUTO: 1.06 K/UL — HIGH (ref 0–0.9)
MONOCYTES NFR BLD AUTO: 10.5 % — SIGNIFICANT CHANGE UP (ref 2–14)
NEUTROPHILS # BLD AUTO: 8.06 K/UL — HIGH (ref 1.8–7.4)
NEUTROPHILS NFR BLD AUTO: 80.1 % — HIGH (ref 43–77)
NRBC # BLD: 0 /100 WBCS — SIGNIFICANT CHANGE UP (ref 0–0)
PHOSPHATE SERPL-MCNC: 3.5 MG/DL — SIGNIFICANT CHANGE UP (ref 2.5–4.5)
PLATELET # BLD AUTO: 231 K/UL — SIGNIFICANT CHANGE UP (ref 150–400)
POTASSIUM SERPL-MCNC: 4 MMOL/L — SIGNIFICANT CHANGE UP (ref 3.5–5.3)
POTASSIUM SERPL-SCNC: 4 MMOL/L — SIGNIFICANT CHANGE UP (ref 3.5–5.3)
PROCALCITONIN SERPL-MCNC: 0.51 NG/ML — HIGH
RBC # BLD: 3.67 M/UL — LOW (ref 4.2–5.8)
RBC # FLD: 15.8 % — HIGH (ref 10.3–14.5)
SODIUM SERPL-SCNC: 143 MMOL/L — SIGNIFICANT CHANGE UP (ref 135–145)
URATE SERPL-MCNC: 4.7 MG/DL — SIGNIFICANT CHANGE UP (ref 3.4–8.8)
WBC # BLD: 10.07 K/UL — SIGNIFICANT CHANGE UP (ref 3.8–10.5)
WBC # FLD AUTO: 10.07 K/UL — SIGNIFICANT CHANGE UP (ref 3.8–10.5)

## 2020-05-05 PROCEDURE — 99285 EMERGENCY DEPT VISIT HI MDM: CPT | Mod: 25

## 2020-05-05 PROCEDURE — 90471 IMMUNIZATION ADMIN: CPT

## 2020-05-05 PROCEDURE — 99239 HOSP IP/OBS DSCHRG MGMT >30: CPT

## 2020-05-05 PROCEDURE — 97535 SELF CARE MNGMENT TRAINING: CPT

## 2020-05-05 PROCEDURE — 85027 COMPLETE CBC AUTOMATED: CPT

## 2020-05-05 PROCEDURE — 97162 PT EVAL MOD COMPLEX 30 MIN: CPT

## 2020-05-05 PROCEDURE — 84550 ASSAY OF BLOOD/URIC ACID: CPT

## 2020-05-05 PROCEDURE — 73502 X-RAY EXAM HIP UNI 2-3 VIEWS: CPT

## 2020-05-05 PROCEDURE — 36415 COLL VENOUS BLD VENIPUNCTURE: CPT

## 2020-05-05 PROCEDURE — 93005 ELECTROCARDIOGRAM TRACING: CPT

## 2020-05-05 PROCEDURE — 71045 X-RAY EXAM CHEST 1 VIEW: CPT

## 2020-05-05 PROCEDURE — 84484 ASSAY OF TROPONIN QUANT: CPT

## 2020-05-05 PROCEDURE — 84100 ASSAY OF PHOSPHORUS: CPT

## 2020-05-05 PROCEDURE — 80053 COMPREHEN METABOLIC PANEL: CPT

## 2020-05-05 PROCEDURE — 84145 PROCALCITONIN (PCT): CPT

## 2020-05-05 PROCEDURE — 81001 URINALYSIS AUTO W/SCOPE: CPT

## 2020-05-05 PROCEDURE — 70450 CT HEAD/BRAIN W/O DYE: CPT

## 2020-05-05 PROCEDURE — 83735 ASSAY OF MAGNESIUM: CPT

## 2020-05-05 PROCEDURE — 73080 X-RAY EXAM OF ELBOW: CPT

## 2020-05-05 PROCEDURE — 87635 SARS-COV-2 COVID-19 AMP PRB: CPT

## 2020-05-05 PROCEDURE — 97166 OT EVAL MOD COMPLEX 45 MIN: CPT

## 2020-05-05 PROCEDURE — 73130 X-RAY EXAM OF HAND: CPT

## 2020-05-05 PROCEDURE — 36000 PLACE NEEDLE IN VEIN: CPT

## 2020-05-05 PROCEDURE — 90715 TDAP VACCINE 7 YRS/> IM: CPT

## 2020-05-05 PROCEDURE — 85730 THROMBOPLASTIN TIME PARTIAL: CPT

## 2020-05-05 PROCEDURE — 80048 BASIC METABOLIC PNL TOTAL CA: CPT

## 2020-05-05 PROCEDURE — 97110 THERAPEUTIC EXERCISES: CPT

## 2020-05-05 PROCEDURE — 85610 PROTHROMBIN TIME: CPT

## 2020-05-05 PROCEDURE — 97530 THERAPEUTIC ACTIVITIES: CPT

## 2020-05-05 PROCEDURE — 73090 X-RAY EXAM OF FOREARM: CPT

## 2020-05-05 PROCEDURE — 73060 X-RAY EXAM OF HUMERUS: CPT

## 2020-05-05 RX ORDER — SODIUM CHLORIDE 9 MG/ML
1000 INJECTION, SOLUTION INTRAVENOUS
Refills: 0 | Status: DISCONTINUED | OUTPATIENT
Start: 2020-05-05 | End: 2020-05-05

## 2020-05-05 RX ORDER — CIPROFLOXACIN LACTATE 400MG/40ML
1 VIAL (ML) INTRAVENOUS
Qty: 2 | Refills: 0
Start: 2020-05-05 | End: 2020-05-08

## 2020-05-05 RX ADMIN — TRAMADOL HYDROCHLORIDE 50 MILLIGRAM(S): 50 TABLET ORAL at 11:00

## 2020-05-05 RX ADMIN — APIXABAN 2.5 MILLIGRAM(S): 2.5 TABLET, FILM COATED ORAL at 05:14

## 2020-05-05 RX ADMIN — FINASTERIDE 5 MILLIGRAM(S): 5 TABLET, FILM COATED ORAL at 11:19

## 2020-05-05 RX ADMIN — PANTOPRAZOLE SODIUM 40 MILLIGRAM(S): 20 TABLET, DELAYED RELEASE ORAL at 05:14

## 2020-05-05 RX ADMIN — SODIUM CHLORIDE 100 MILLILITER(S): 9 INJECTION, SOLUTION INTRAVENOUS at 11:19

## 2020-05-05 RX ADMIN — MUPIROCIN 1 APPLICATION(S): 20 OINTMENT TOPICAL at 05:14

## 2020-05-05 RX ADMIN — Medication 300 MILLIGRAM(S): at 11:19

## 2020-05-05 NOTE — PROGRESS NOTE ADULT - ASSESSMENT
89y/o M w/ pmh including afib, PE on eliquis, bph, constipation, LE edema, gerd, gout, hypothyroidism,  LE cellulitis sent in from the [facility] s/p fall, admitted for intractable pain found to have acute closed supracondylar fracture of right humerus admitted here for further management.     #Fever: T100.2 , afebrile since  -No leukocytosis on labs   -CXR : negative for acute changes  -UA : mod LE, neg nitrite, large blood, many bacteria  -Follow up procal (collected on )  -Monitor vitals, trend labs    #Acute closed supracondylar fracture of right humerus  - pain control: Tylenol PRN, Tramadol 50mg q12H PRN, Oxycodone 5mg q6H PRN, Dilaudid 0.5mg q4H PRN  - Ortho consult Dr. Cat: long arm splint applied .  No surgical intervention.  - Ice, elevation  - NWB right upper extremity  - OT and PT eval recs: rehab    #Fall, subsequent encounter:  - PT eval recs: rehab    #ANTONINA: Baseline Cr 1.2-1.3 as of 2020, COVID negative  - Cr fluctuatin.93<-1.81 <-1.99   - multifactorial: prerenal due to multiple diuretics, now found to have urinary retention as well  - unsuccessful straight cath, Demarco placed    - continue Demarco  - hold dyazide  - hold Lasix    #Paroxysmal Afib:  - rate currently controlled without meds  - continue Eliquis    #BPH with urinary retention:  - Demarco placed 5/3, will need to be discharged with Demarco.   - Discussed with pt's urologist, Dr. Geiger, OK with discharge with Demarco and follow up outpt.  - continue Flomax and finasteride  - ensure regular BM     #HTN:  - hold dyazide  - hold Lasix     #Gout:  - continue allopurinol    #Leukocytosis: Resolved. May be stress related  - monitor labs  - Infectious workup as above    #Anemia: Hb and vitals stable. No signs of bleeding on exam  - monitor labs    #Hypokalemia:  - monitor labs and replete prn    #DVT ppx:  - on Eliquis    #Dispo:  - PT/OT eval recs: [facility]WRENCE with new Demarco    GOC: DNR/DNI    Called Son Salbador [phone] updated on status.      CAPRINI SCORE [CLOT]    AGE RELATED RISK FACTORS                                                       MOBILITY RELATED FACTORS  [ ] Age 41-60 years                                            (1 Point)                  [X ] Bed rest                                                        (1 Point)  [ ] Age: 61-74 years                                           (2 Points)                 [ ] Plaster cast                                                   (2 Points)  [X ] Age= 75 years                                              (3 Points)                 [ ] Bed bound for more than 72 hours                 (2 Points)    DISEASE RELATED RISK FACTORS                                               GENDER SPECIFIC FACTORS  [X ] Edema in the lower extremities                       (1 Point)                  [ ] Pregnancy                                                     (1 Point)  [ ] Varicose veins                                               (1 Point)                  [ ] Post-partum < 6 weeks                                   (1 Point)             [ ] BMI > 25 Kg/m2                                            (1 Point)                  [ ] Hormonal therapy  or oral contraception          (1 Point)                 [ ] Sepsis (in the previous month)                        (1 Point)                  [ ] History of pregnancy complications                 (1 point)  [ ] Pneumonia or serious lung disease                                               [ ] Unexplained or recurrent                     (1 Point)           (in the previous month)                               (1 Point)  [ ] Abnormal pulmonary function test                     (1 Point)                 SURGERY RELATED RISK FACTORS  [ ] Acute myocardial infarction                              (1 Point)                 [ ]  Section                                             (1 Point)  [ ] Congestive heart failure (in the previous month)  (1 Point)               [ ] Minor surgery                                                  (1 Point)   [ ] Inflammatory bowel disease                             (1 Point)                 [ ] Arthroscopic surgery                                        (2 Points)  [ ] Central venous access                                      (2 Points)                [ ] General surgery lasting more than 45 minutes   (2 Points)       [ ] Stroke (in the previous month)                          (5 Points)               [ ] Elective arthroplasty                                         (5 Points)                                                                                                                                               HEMATOLOGY RELATED FACTORS                                                 TRAUMA RELATED RISK FACTORS  [ ] Prior episodes of VTE                                     (3 Points)                 [ ] Fracture of the hip, pelvis, or leg                       (5 Points)  [ ] Positive family history for VTE                         (3 Points)                 [ ] Acute spinal cord injury (in the previous month)  (5 Points)  [ ] Prothrombin 56046 A                                     (3 Points)                 [ ] Paralysis  (less than 1 month)                             (5 Points)  [ ] Factor V Leiden                                             (3 Points)                  [ ] Multiple Trauma within 1 month                        (5 Points)  [ ] Lupus anticoagulants                                     (3 Points)                                                           [ ] Anticardiolipin antibodies                               (3 Points)                                                       [ ] High homocysteine in the blood                      (3 Points)                                             [ ] Other congenital or acquired thrombophilia      (3 Points)                                                [ ] Heparin induced thrombocytopenia                  (3 Points)                                          Total Score [     5     ] 89y/o M w/ pmh including afib, PE on eliquis, bph, constipation, LE edema, gerd, gout, hypothyroidism,  LE cellulitis sent in from the [facility] s/p fall, admitted for intractable pain found to have acute closed supracondylar fracture of right humerus admitted here for further management.     #Fever: T100.2 , afebrile since  -No leukocytosis on labs   -CXR : negative for acute changes  -UA : mod LE, neg nitrite, large blood, many bacteria  -Procal : 0.51  -Monitor vitals, trend labs    #Acute closed supracondylar fracture of right humerus  - pain control: Tylenol PRN, Tramadol 50mg q12H PRN, Oxycodone 5mg q6H PRN, Dilaudid 0.5mg q4H PRN  - Ortho consult Dr. Cat: long arm splint applied .  No surgical intervention.  - Ice, elevation  - NWB right upper extremity  - OT and PT eval recs: rehab    #Fall, subsequent encounter:  - PT eval recs: rehab    #ANTONINA: Baseline Cr 1.2-1.3 as of 2020, COVID negative  - Cr fluctuatin.93<-1.81 <-1.99   - multifactorial: prerenal due to multiple diuretics, now found to have urinary retention as well  - unsuccessful straight cath, Demarco placed    - continue Demarco  - hold dyazide  - hold Lasix    #Paroxysmal Afib:  - rate currently controlled without meds  - continue Eliquis    #BPH with urinary retention:  - Demarco placed 5/3, will need to be discharged with Demarco.   - Discussed with pt's urologist, Dr. Geiger, OK with discharge with Demarco and follow up outpt.  - continue Flomax and finasteride  - ensure regular BM     #HTN:  - hold dyazide  - hold Lasix     #Gout:  - continue allopurinol    #Leukocytosis: Resolved. May be stress related  - monitor labs  - Infectious workup as above    #Anemia: Hb and vitals stable. No signs of bleeding on exam  - monitor labs    #Hypokalemia:  - monitor labs and replete prn    #DVT ppx:  - on Eliquis    #Dispo:  - PT/OT eval recs: LAWRENCE with new Demarco    GO: DNR/DNI    Called Son Salbador [phone] updated on status.      CAPRINI SCORE [CLOT]    AGE RELATED RISK FACTORS                                                       MOBILITY RELATED FACTORS  [ ] Age 41-60 years                                            (1 Point)                  [X ] Bed rest                                                        (1 Point)  [ ] Age: 61-74 years                                           (2 Points)                 [ ] Plaster cast                                                   (2 Points)  [X ] Age= 75 years                                              (3 Points)                 [ ] Bed bound for more than 72 hours                 (2 Points)    DISEASE RELATED RISK FACTORS                                               GENDER SPECIFIC FACTORS  [X ] Edema in the lower extremities                       (1 Point)                  [ ] Pregnancy                                                     (1 Point)  [ ] Varicose veins                                               (1 Point)                  [ ] Post-partum < 6 weeks                                   (1 Point)             [ ] BMI > 25 Kg/m2                                            (1 Point)                  [ ] Hormonal therapy  or oral contraception          (1 Point)                 [ ] Sepsis (in the previous month)                        (1 Point)                  [ ] History of pregnancy complications                 (1 point)  [ ] Pneumonia or serious lung disease                                               [ ] Unexplained or recurrent                     (1 Point)           (in the previous month)                               (1 Point)  [ ] Abnormal pulmonary function test                     (1 Point)                 SURGERY RELATED RISK FACTORS  [ ] Acute myocardial infarction                              (1 Point)                 [ ]  Section                                             (1 Point)  [ ] Congestive heart failure (in the previous month)  (1 Point)               [ ] Minor surgery                                                  (1 Point)   [ ] Inflammatory bowel disease                             (1 Point)                 [ ] Arthroscopic surgery                                        (2 Points)  [ ] Central venous access                                      (2 Points)                [ ] General surgery lasting more than 45 minutes   (2 Points)       [ ] Stroke (in the previous month)                          (5 Points)               [ ] Elective arthroplasty                                         (5 Points)                                                                                                                                               HEMATOLOGY RELATED FACTORS                                                 TRAUMA RELATED RISK FACTORS  [ ] Prior episodes of VTE                                     (3 Points)                 [ ] Fracture of the hip, pelvis, or leg                       (5 Points)  [ ] Positive family history for VTE                         (3 Points)                 [ ] Acute spinal cord injury (in the previous month)  (5 Points)  [ ] Prothrombin 24429 A                                     (3 Points)                 [ ] Paralysis  (less than 1 month)                             (5 Points)  [ ] Factor V Leiden                                             (3 Points)                  [ ] Multiple Trauma within 1 month                        (5 Points)  [ ] Lupus anticoagulants                                     (3 Points)                                                           [ ] Anticardiolipin antibodies                               (3 Points)                                                       [ ] High homocysteine in the blood                      (3 Points)                                             [ ] Other congenital or acquired thrombophilia      (3 Points)                                                [ ] Heparin induced thrombocytopenia                  (3 Points)                                          Total Score [     5     ] 89y/o M w/ pmh including afib, PE on eliquis, bph, constipation, LE edema, gerd, gout, hypothyroidism,  LE cellulitis sent in from the [facility] s/p fall, admitted for intractable pain found to have acute closed supracondylar fracture of right humerus admitted here for further management.     #Fever: T100.2 , afebrile since  -No leukocytosis on labs   -CXR : negative for acute changes  -UA : mod LE, neg nitrite, large blood, many bacteria  -Procal : 0.51  -Holding antibiotics for now as patient is asymptomatic and afebrile  -Will check rectal temperature  -Monitor vitals, trend labs    #Acute closed supracondylar fracture of right humerus  - pain control: Tylenol PRN, Tramadol 50mg q12H PRN, Oxycodone 5mg q6H PRN, Dilaudid 0.5mg q4H PRN  - Ortho consult Dr. Cat: long arm splint applied .  No surgical intervention.  - Ice, elevation  - NWB right upper extremity  - OT and PT eval recs: rehab    #Fall, subsequent encounter:  - PT eval recs: rehab    #ANTONINA: Baseline Cr 1.2-1.3 as of 2020, COVID negative  - Cr fluctuatin.93<-1.81 <-1.99   - multifactorial: prerenal due to multiple diuretics, now found to have urinary retention as well  - unsuccessful straight cath, Demarco placed    - continue Demarco  - hold dyazide  - hold Lasix    #Paroxysmal Afib:  - rate currently controlled without meds  - continue Eliquis    #BPH with urinary retention:  - Demarco placed 5/3, will need to be discharged with Demarco.   - Discussed with pt's urologist, Dr. Geiger, OK with discharge with Demarco and follow up outpt.  - continue Flomax and finasteride  - ensure regular BM     #HTN:  - hold dyazide  - hold Lasix     #Gout:  - continue allopurinol    #Leukocytosis: Resolved. May be stress related  - monitor labs  - Infectious workup as above    #Anemia: Hb and vitals stable. No signs of bleeding on exam  - monitor labs    #Hypokalemia:  - monitor labs and replete prn    #DVT ppx:  - on Eliquis    #Dispo:  - PT/OT eval recs: [facility]WRENCE with new [facility]: DNR/DNI    Called Son Salbador [phone] updated on status.      CAPRINI SCORE [CLOT]    AGE RELATED RISK FACTORS                                                       MOBILITY RELATED FACTORS  [ ] Age 41-60 years                                            (1 Point)                  [X ] Bed rest                                                        (1 Point)  [ ] Age: 61-74 years                                           (2 Points)                 [ ] Plaster cast                                                   (2 Points)  [X ] Age= 75 years                                              (3 Points)                 [ ] Bed bound for more than 72 hours                 (2 Points)    DISEASE RELATED RISK FACTORS                                               GENDER SPECIFIC FACTORS  [X ] Edema in the lower extremities                       (1 Point)                  [ ] Pregnancy                                                     (1 Point)  [ ] Varicose veins                                               (1 Point)                  [ ] Post-partum < 6 weeks                                   (1 Point)             [ ] BMI > 25 Kg/m2                                            (1 Point)                  [ ] Hormonal therapy  or oral contraception          (1 Point)                 [ ] Sepsis (in the previous month)                        (1 Point)                  [ ] History of pregnancy complications                 (1 point)  [ ] Pneumonia or serious lung disease                                               [ ] Unexplained or recurrent                     (1 Point)           (in the previous month)                               (1 Point)  [ ] Abnormal pulmonary function test                     (1 Point)                 SURGERY RELATED RISK FACTORS  [ ] Acute myocardial infarction                              (1 Point)                 [ ]  Section                                             (1 Point)  [ ] Congestive heart failure (in the previous month)  (1 Point)               [ ] Minor surgery                                                  (1 Point)   [ ] Inflammatory bowel disease                             (1 Point)                 [ ] Arthroscopic surgery                                        (2 Points)  [ ] Central venous access                                      (2 Points)                [ ] General surgery lasting more than 45 minutes   (2 Points)       [ ] Stroke (in the previous month)                          (5 Points)               [ ] Elective arthroplasty                                         (5 Points)                                                                                                                                               HEMATOLOGY RELATED FACTORS                                                 TRAUMA RELATED RISK FACTORS  [ ] Prior episodes of VTE                                     (3 Points)                 [ ] Fracture of the hip, pelvis, or leg                       (5 Points)  [ ] Positive family history for VTE                         (3 Points)                 [ ] Acute spinal cord injury (in the previous month)  (5 Points)  [ ] Prothrombin 83365 A                                     (3 Points)                 [ ] Paralysis  (less than 1 month)                             (5 Points)  [ ] Factor V Leiden                                             (3 Points)                  [ ] Multiple Trauma within 1 month                        (5 Points)  [ ] Lupus anticoagulants                                     (3 Points)                                                           [ ] Anticardiolipin antibodies                               (3 Points)                                                       [ ] High homocysteine in the blood                      (3 Points)                                             [ ] Other congenital or acquired thrombophilia      (3 Points)                                                [ ] Heparin induced thrombocytopenia                  (3 Points)                                          Total Score [     5     ] 89y/o M w/ pmh including afib, PE on eliquis, bph, constipation, LE edema, gerd, gout, hypothyroidism,  LE cellulitis sent in from the [facility] s/p fall, admitted for intractable pain found to have acute closed supracondylar fracture of right humerus admitted here for further management.     #Fever: T100.2 , afebrile since  - Leukocytosis resolved  - CXR : negative for acute changes  - UA : mod LE, neg nitrite, large blood, many bacteria, pH 9 (urease producing bacteria?) - will treat empirically with Levaquin for 5 days,  likely due to traumatic shah placement  - Procalc : 0.51  - repeat rectal temperature today, if afebrile, can discharged to [facility]      #Acute closed supracondylar fracture of right humerus  - pain control: Tylenol PRN, Tramadol 50mg q12H PRN, Oxycodone 5mg q6H PRN  - Ortho consult Dr. Cat: long arm splint applied .  No surgical intervention.  - Ice, elevation  - NWB right upper extremity  - OT and PT eval recs: rehab    #Fall, subsequent encounter:  - PT eval recs: rehab    #ANTONINA: Baseline Cr 1.2-1.3 as of 2020, COVID negative  - Cr fluctuatin.93<-1.81 <-1.99   - multifactorial: prerenal due to multiple diuretics, now found to have urinary retention as well  - unsuccessful straight cath, Shah placed    - continue Shah  - uric acid crystals noted on UA, already on allopurinol, will start trial of IVF  - hold dyazide  - hold Lasix    #Paroxysmal Afib:  - rate currently controlled without meds  - continue Eliquis    #BPH with urinary retention:  - Shah placed 5/3, will need to be discharged with Shah.   - Discussed with pt's urologist, Dr. Geiger, OK with discharge with Shah and follow up outpt.  - continue Flomax and finasteride  - ensure regular BM     #HTN:  - hold dyazide  - hold Lasix     #Gout:  - continue allopurinol    #Leukocytosis: Resolved. May be stress related  - monitor labs  - Infectious workup as above    #Anemia: Hb and vitals stable. No signs of bleeding on exam  - monitor labs    #Hypokalemia:  - monitor labs and replete prn    #DVT ppx:  - on Eliquis    #Dispo:  - PT/OT eval recs: [facility]ENCE with new [facility]: DNR/DNI    Called Son Salbador [phone] updated on status.      CAPRINI SCORE [CLOT]    AGE RELATED RISK FACTORS                                                       MOBILITY RELATED FACTORS  [ ] Age 41-60 years                                            (1 Point)                  [X ] Bed rest                                                        (1 Point)  [ ] Age: 61-74 years                                           (2 Points)                 [ ] Plaster cast                                                   (2 Points)  [X ] Age= 75 years                                              (3 Points)                 [ ] Bed bound for more than 72 hours                 (2 Points)    DISEASE RELATED RISK FACTORS                                               GENDER SPECIFIC FACTORS  [X ] Edema in the lower extremities                       (1 Point)                  [ ] Pregnancy                                                     (1 Point)  [ ] Varicose veins                                               (1 Point)                  [ ] Post-partum < 6 weeks                                   (1 Point)             [ ] BMI > 25 Kg/m2                                            (1 Point)                  [ ] Hormonal therapy  or oral contraception          (1 Point)                 [ ] Sepsis (in the previous month)                        (1 Point)                  [ ] History of pregnancy complications                 (1 point)  [ ] Pneumonia or serious lung disease                                               [ ] Unexplained or recurrent                     (1 Point)           (in the previous month)                               (1 Point)  [ ] Abnormal pulmonary function test                     (1 Point)                 SURGERY RELATED RISK FACTORS  [ ] Acute myocardial infarction                              (1 Point)                 [ ]  Section                                             (1 Point)  [ ] Congestive heart failure (in the previous month)  (1 Point)               [ ] Minor surgery                                                  (1 Point)   [ ] Inflammatory bowel disease                             (1 Point)                 [ ] Arthroscopic surgery                                        (2 Points)  [ ] Central venous access                                      (2 Points)                [ ] General surgery lasting more than 45 minutes   (2 Points)       [ ] Stroke (in the previous month)                          (5 Points)               [ ] Elective arthroplasty                                         (5 Points)                                                                                                                                               HEMATOLOGY RELATED FACTORS                                                 TRAUMA RELATED RISK FACTORS  [ ] Prior episodes of VTE                                     (3 Points)                 [ ] Fracture of the hip, pelvis, or leg                       (5 Points)  [ ] Positive family history for VTE                         (3 Points)                 [ ] Acute spinal cord injury (in the previous month)  (5 Points)  [ ] Prothrombin 61791 A                                     (3 Points)                 [ ] Paralysis  (less than 1 month)                             (5 Points)  [ ] Factor V Leiden                                             (3 Points)                  [ ] Multiple Trauma within 1 month                        (5 Points)  [ ] Lupus anticoagulants                                     (3 Points)                                                           [ ] Anticardiolipin antibodies                               (3 Points)                                                       [ ] High homocysteine in the blood                      (3 Points)                                             [ ] Other congenital or acquired thrombophilia      (3 Points)                                                [ ] Heparin induced thrombocytopenia                  (3 Points)                                          Total Score [     5     ]

## 2020-05-05 NOTE — DISCHARGE NOTE NURSING/CASE MANAGEMENT/SOCIAL WORK - PATIENT PORTAL LINK FT
You can access the FollowMyHealth Patient Portal offered by [facility] by registering at the following website: [email or web address]. By joining [employer]’s FollowMyHealth portal, you will also be able to view your health information using other applications (apps) compatible with our system.

## 2020-05-05 NOTE — DISCHARGE NOTE NURSING/CASE MANAGEMENT/SOCIAL WORK - NSDCVIVACCINE_GEN_ALL_CORE_FT
Tdap , 2018/12/17 11:51 , Kia Wilkerson (RN)  Tdap , 2019/4/23 15:48 , Aisha Barr (RN)  Tdap , 2020/5/1 21:24 , Sonja Julian (RN)

## 2020-05-05 NOTE — DISCHARGE NOTE NURSING/CASE MANAGEMENT/SOCIAL WORK - NSDCPEELIQUIS_GEN_ALL_CORE
Apixaban/Eliquis - Compliance/Apixaban/Eliquis - Follow up monitoring/Apixaban/Eliquis - Dietary Advice/Apixaban/Eliquis - Potential for adverse drug reactions and interactions Apixaban/Eliquis - Dietary Advice/Apixaban/Eliquis - Compliance/Apixaban/Eliquis - Potential for adverse drug reactions and interactions

## 2020-05-05 NOTE — PROGRESS NOTE ADULT - SUBJECTIVE AND OBJECTIVE BOX
Patient is a 89y old  Male who presents with a chief complaint of fall (04 May 2020 08:56)      SUBJECTIVE:  Patient seen and examined at bedside. Overnight, patient noted to be more lethargic, arousable to sound and tactile stimuli. Denies headache, chest pain, palpitations, nausea, vomiting, abdominal pain.       See HPI for pertinent ROS.    All other ROS reviewed and negative except as otherwise stated above.      ALLERGIES:  penicillin (Unknown)      MEDICATIONS:  MEDICATIONS  (STANDING):  allopurinol 300 milliGRAM(s) Oral daily  apixaban 2.5 milliGRAM(s) Oral every 12 hours  finasteride 5 milliGRAM(s) Oral daily  mupirocin 2% Ointment 1 Application(s) Topical every 12 hours  pantoprazole    Tablet 40 milliGRAM(s) Oral before breakfast  senna 2 Tablet(s) Oral at bedtime  tamsulosin 0.8 milliGRAM(s) Oral at bedtime    MEDICATIONS  (PRN):  acetaminophen   Tablet .. 650 milliGRAM(s) Oral every 6 hours PRN Temp greater or equal to 38C (100.4F), Mild Pain (1 - 3)  HYDROmorphone  Injectable 0.5 milliGRAM(s) IV Push every 4 hours PRN Breakthrough pain  melatonin 6 milliGRAM(s) Oral at bedtime PRN Insomnia  oxyCODONE    IR 5 milliGRAM(s) Oral every 6 hours PRN Severe Pain (7 - 10)  polyethylene glycol 3350 17 Gram(s) Oral daily PRN Constipation  sodium chloride 0.65% Nasal 1 Spray(s) Both Nostrils daily PRN Nasal Congestion  traMADol 50 milliGRAM(s) Oral every 12 hours PRN Moderate Pain (4 - 6)      VITALS:  Vital Signs Last 24 Hrs  T(F): 98.4 (05 May 2020 05:15), Max: 100.2 (04 May 2020 11:39)  HR: 91 (05 May 2020 05:15) (81 - 95)  BP: 116/51 (05 May 2020 05:15) (116/51 - 141/50)  RR: 16 (05 May 2020 05:15) (16 - 18)  SpO2: 100% (05 May 2020 05:15) (97% - 100%)  I&O's Summary    04 May 2020 07:01  -  05 May 2020 07:00  --------------------------------------------------------  IN: 120 mL / OUT: 1400 mL / NET: -1280 mL          PHYSICAL EXAM:  Constitutional: WDWN resting comfortably in bed; NAD  Head: NC/AT  Eyes: clear conjunctiva  Neck: supple; no JVD   Respiratory: CTA B/L; currently saturating 100% on RA  Cardiac: irregular rhythm, normal +S1/S2; 3/6 diastolic murmur best heard at RUSB and mitral area. PMI non-displaced  Gastrointestinal: soft, NT/ND; no rebound or guarding; +BS  Extremities: WWP, no clubbing or cyanosis; RUE in long arm splint, no LE edema  Vascular: 2+ radial, DP/PT pulses B/L  Neurologic: AAOx3; hard of hearing, answers questions appropriately, follows commands, moves all extremities      LABS:                        11.1   10.07 )-----------( 231      ( 05 May 2020 06:33 )             35.0         143  |  105  |  69  ----------------------------<  110  4.0   |  28  |  1.93    Ca    8.7      05 May 2020 06:33  Phos  3.5       Mg     1.9           eGFR if Non African American: 28 mL/min/1.73M2 (20 @ 06:33)  eGFR if African American: 33 mL/min/1.73M2 (20 @ 06:33)  Urinalysis Basic - ( 04 May 2020 15:30 )    Color: Yellow / Appearance: Clear / S.015 / pH: x  Gluc: x / Ketone: Trace  / Bili: Negative / Urobili: Negative   Blood: x / Protein: 100 / Nitrite: Negative   Leuk Esterase: Moderate / RBC: 11-25 /HPF / WBC 11-25 /HPF   Sq Epi: x / Non Sq Epi: Neg.-Few / Bacteria: Many /HPF    COVID-19 PCR: NotDetec (20 @ 00:05)      RADIOLOGY & ADDITIONAL TESTS:  < from: Xray Chest 1 View- PORTABLE-Urgent (20 @ 14:50) >  IMPRESSION: No acute finding or change. Heart enlargement again noted.    < end of copied text > Patient is a 89y old  Male who presents with a chief complaint of fall (04 May 2020 08:56)      SUBJECTIVE:  Patient seen and examined at bedside. Pt was awake and alert this morning, frustrated with meals because he has difficulty opening containers with one hand. Otherwise, he denies chest pain, headache, nausea, vomiting, abdominal pain    See HPI for pertinent ROS.    All other ROS reviewed and negative except as otherwise stated above.      ALLERGIES:  penicillin (Unknown)      MEDICATIONS:  MEDICATIONS  (STANDING):  allopurinol 300 milliGRAM(s) Oral daily  apixaban 2.5 milliGRAM(s) Oral every 12 hours  finasteride 5 milliGRAM(s) Oral daily  mupirocin 2% Ointment 1 Application(s) Topical every 12 hours  pantoprazole    Tablet 40 milliGRAM(s) Oral before breakfast  senna 2 Tablet(s) Oral at bedtime  tamsulosin 0.8 milliGRAM(s) Oral at bedtime    MEDICATIONS  (PRN):  acetaminophen   Tablet .. 650 milliGRAM(s) Oral every 6 hours PRN Temp greater or equal to 38C (100.4F), Mild Pain (1 - 3)  HYDROmorphone  Injectable 0.5 milliGRAM(s) IV Push every 4 hours PRN Breakthrough pain  melatonin 6 milliGRAM(s) Oral at bedtime PRN Insomnia  oxyCODONE    IR 5 milliGRAM(s) Oral every 6 hours PRN Severe Pain (7 - 10)  polyethylene glycol 3350 17 Gram(s) Oral daily PRN Constipation  sodium chloride 0.65% Nasal 1 Spray(s) Both Nostrils daily PRN Nasal Congestion  traMADol 50 milliGRAM(s) Oral every 12 hours PRN Moderate Pain (4 - 6)      VITALS:  Vital Signs Last 24 Hrs  T(F): 98.4 (05 May 2020 05:15), Max: 100.2 (04 May 2020 11:39)  HR: 91 (05 May 2020 05:15) (81 - 95)  BP: 116/51 (05 May 2020 05:15) (116/51 - 141/50)  RR: 16 (05 May 2020 05:15) (16 - 18)  SpO2: 100% (05 May 2020 05:15) (97% - 100%)  I&O's Summary    04 May 2020 07:01  -  05 May 2020 07:00  --------------------------------------------------------  IN: 120 mL / OUT: 1400 mL / NET: -1280 mL          PHYSICAL EXAM:  Constitutional: WDWN resting comfortably in bed; NAD  Head: NC/AT  Eyes: clear conjunctiva  Neck: supple; no JVD   Respiratory: CTA B/L; currently saturating 100% on RA  Cardiac: irregular rhythm, normal +S1/S2; 3/6 diastolic murmur best heard at RUSB and mitral area. PMI non-displaced  Gastrointestinal: soft, NT/ND; no rebound or guarding; +BS  Extremities: WWP, no clubbing or cyanosis; RUE in long arm splint, no LE edema  Vascular: 2+ radial, DP/PT pulses B/L  Neurologic: AAOx3; hard of hearing, answers questions appropriately, follows commands, moves all extremities      LABS:                        11.1   10.07 )-----------( 231      ( 05 May 2020 06:33 )             35.0         143  |  105  |  69  ----------------------------<  110  4.0   |  28  |  1.93    Ca    8.7      05 May 2020 06:33  Phos  3.5       Mg     1.9           eGFR if Non African American: 28 mL/min/1.73M2 (20 @ 06:33)  eGFR if African American: 33 mL/min/1.73M2 (20 @ 06:33)  Urinalysis Basic - ( 04 May 2020 15:30 )    Color: Yellow / Appearance: Clear / S.015 / pH: x  Gluc: x / Ketone: Trace  / Bili: Negative / Urobili: Negative   Blood: x / Protein: 100 / Nitrite: Negative   Leuk Esterase: Moderate / RBC: 11-25 /HPF / WBC 11-25 /HPF   Sq Epi: x / Non Sq Epi: Neg.-Few / Bacteria: Many /HPF    COVID-19 PCR: NotDetec (20 @ 00:05)      RADIOLOGY & ADDITIONAL TESTS:  < from: Xray Chest 1 View- PORTABLE-Urgent (20 @ 14:50) >  IMPRESSION: No acute finding or change. Heart enlargement again noted.    < end of copied text >

## 2020-06-04 ENCOUNTER — APPOINTMENT (OUTPATIENT)
Dept: ORTHOPEDIC SURGERY | Facility: CLINIC | Age: 85
End: 2020-06-04
Payer: COMMERCIAL

## 2020-06-04 VITALS — HEIGHT: 69 IN | WEIGHT: 190 LBS | BODY MASS INDEX: 28.14 KG/M2

## 2020-06-04 PROCEDURE — 73080 X-RAY EXAM OF ELBOW: CPT | Mod: RT

## 2020-06-04 PROCEDURE — 29065 APPL CST SHO TO HAND LNG ARM: CPT | Mod: RT

## 2020-06-04 PROCEDURE — 99214 OFFICE O/P EST MOD 30 MIN: CPT | Mod: 25

## 2020-06-04 NOTE — HISTORY OF PRESENT ILLNESS
[de-identified] : AME PEREZ is a 89 year male presenting to the office complaining of right elbow pain . He  presents to the office immobilized in a  splint and a sling on 05/02/2020 patient went to [place] ED after he sustained a fall. Xrays of the right elbow revealed supracondylar fracture.  The patient describes the pain as a dull aching, and occasionally sharp pain localized to the anterior aspect of his that is intermittent in nature. His  symptoms are exacerbated with any movement of the arm. Patient reports the pain wakes him up at night.Of note patient is a resident at [facility]. Patient denies any other complaints at this time.\par \par

## 2020-06-04 NOTE — PHYSICAL EXAM
[de-identified] : Right Upper Extremity\par o Elbow :\par ¦ Inspection/Palpation : diffuse tenderness, moderate swelling and ecchymosis present around elbow, and forearm, no deformities, abrasion dorsal aspect of distal forearm, no signs of infection. \par ¦ Range of Motion :limited and painful in all planes, palpable crepitus throughout all planes\par ¦ Strength : not assessed due to pain. \par ¦ Stability : no joint instability on provocative testing\par o Muscle Bulk : no atrophy\par o Sensation : sensation intact to light touch\par o Skin : no skin lesions, no discoloration\par o Vascular Exam : no edema, no cyanosis, radial and ulnar pulses normal  [de-identified] : o Right Elbow : AP, lateral and oblique views were obtained, there are no soft tissue abnormalities,comminuted supracondylar humerus fracture with mild posterior displacement, alignment is normal, normal appearing joint spaces, normal bone density, no bony lesions.\par \par o Right elbow xrays from [facility] on 05/05/2020: Impression \par ¦ Pre- reduction: supracondylar fracture \par ¦  Status post placement of splint for supracondylar fracture.

## 2020-06-04 NOTE — PROCEDURE
[de-identified] : A long arm cylindrical cast was applied in clinic today. Neurovascular status was assessed pre and post placement. Patient tolerated cast placement well, with no complaints.

## 2020-06-04 NOTE — DISCUSSION/SUMMARY
[de-identified] : The underlying pathophysiology was reviewed in great detail with the patient as well as the various treatment options, including ice, analgesics, NSAIDs, Physical therapy, steroid injections.\par \par Long arm cylindrical cast applied today.\par \par FU 4 weeks for cast removal and repeat xrays. \par \par All questions were answered, all alternatives discussed and the patient is in complete agreement with that plan. Follow-up appointment as instructed. Any issues and the patient will call or come in sooner.\par

## 2020-06-08 ENCOUNTER — APPOINTMENT (OUTPATIENT)
Dept: CARDIOLOGY | Facility: CLINIC | Age: 85
End: 2020-06-08

## 2020-06-25 ENCOUNTER — APPOINTMENT (OUTPATIENT)
Dept: ORTHOPEDIC SURGERY | Facility: CLINIC | Age: 85
End: 2020-06-25
Payer: MEDICARE

## 2020-06-25 PROCEDURE — 73080 X-RAY EXAM OF ELBOW: CPT | Mod: RT

## 2020-06-25 PROCEDURE — 99214 OFFICE O/P EST MOD 30 MIN: CPT

## 2020-06-25 NOTE — HISTORY OF PRESENT ILLNESS
[de-identified] : AME PEREZ is a 89 year male presenting to the office complaining of right elbow pain . He  presents to the office immobilized in a  splint and a sling on 05/02/2020 patient went to [place] ED after he sustained a fall. Xrays of the right elbow revealed supracondylar fracture.  The patient describes the pain as a dull aching, and occasionally sharp pain localized elbow  that is intermittent in nature. His  symptoms are exacerbated with any movement of the arm. Patient denies any complaints associated with the cast. He reports mild pain in the right hand that is intermittent in nature. Patient reports the pain does not wakes him up at night.Of note patient is a resident at [facility]. Patient denies any other complaints at this time.\par \par

## 2020-06-25 NOTE — PHYSICAL EXAM
[de-identified] : Right Upper Extremity\par o Elbow :\par ¦ Inspection/Palpation : diffuse tenderness, moderate swelling and ecchymosis present around elbow, and forearm, no deformities, abrasion dorsal aspect of distal forearm, no signs of infection. \par ¦ Range of Motion  extension and flexion 0-90 degrees, palpable crepitus throughout all planes\par ¦ Strength : not assessed due to pain. \par ¦ Stability : no joint instability on provocative testing\par o Muscle Bulk : no atrophy\par o Sensation : sensation intact to light touch\par o Skin : no skin lesions, no discoloration\par o Vascular Exam : no edema, no cyanosis, radial and ulnar pulses normal  [de-identified] : o Right Elbow : AP, lateral and oblique views were obtained, there are no soft tissue abnormalities,comminuted supracondylar humerus fracture with mild posterior displacement with callus formation, alignment is normal, normal appearing joint spaces, normal bone density, no bony lesions.\par \par

## 2020-06-25 NOTE — DISCUSSION/SUMMARY
[de-identified] : The underlying pathophysiology was reviewed in great detail with the patient as well as the various treatment options, including ice, analgesics, NSAIDs, Physical therapy, casting, bracing, surgical intervention. \par \par Long arm cylindrical cast removed today. Discussed use of sling for immobilization of arm. \par \par May begin physical therapy, gentle ROM and weightbearing as tolerated on the right UE\par \par Wound care for dorsal forearm abrasion.\par \par FU 4 weeks for repeat xrays. \par \par All questions were answered, all alternatives discussed and the patient is in complete agreement with that plan. Follow-up appointment as instructed. Any issues and the patient will call or come in sooner.\par

## 2020-07-02 ENCOUNTER — APPOINTMENT (OUTPATIENT)
Dept: ORTHOPEDIC SURGERY | Facility: CLINIC | Age: 85
End: 2020-07-02

## 2020-08-06 ENCOUNTER — APPOINTMENT (OUTPATIENT)
Dept: ORTHOPEDIC SURGERY | Facility: CLINIC | Age: 85
End: 2020-08-06
Payer: MEDICARE

## 2020-08-06 DIAGNOSIS — S42.411G: ICD-10-CM

## 2020-08-06 PROCEDURE — 99213 OFFICE O/P EST LOW 20 MIN: CPT

## 2020-08-06 PROCEDURE — 73080 X-RAY EXAM OF ELBOW: CPT | Mod: 26,RT

## 2020-08-06 NOTE — HISTORY OF PRESENT ILLNESS
[de-identified] : AME PEREZ is a 89 year male presenting to the office complaining of right elbow pain . He  presents to the office immobilized in a  splint and a sling on 05/02/2020 patient went to [facility] ED after he sustained a fall. Xrays of the right elbow revealed supracondylar fracture.  The patient describes the pain as a dull aching, and occasionally sharp pain localized elbow  that is intermittent in nature. His  symptoms are exacerbated with any movement of the arm especially flexion of the arm. Patient denies any complaints associated with the cast. He reports mild pain in the right hand that is intermittent in nature. Patient reports the pain does not wakes him up at night.Of note patient is a resident at [facility]. Patient denies any other complaints at this time.\par \par

## 2020-08-06 NOTE — PHYSICAL EXAM
[de-identified] : Right Upper Extremity\par o Elbow :\par ¦ Inspection/Palpation : minimal tenderness, no swelling and no ecchymosis present around elbow, and forearm, no deformities, well healed abrasion dorsal aspect of distal forearm, no signs of infection. \par ¦ Range of Motion   mild crepitus throughout all planes  ACTIVE EXTENSION: Measured at 10 degrees, ACTIVE FLEXION: Measured at 90 degrees,  ACTIVE SUPINATION: Measured at 50 degrees, ACTIVE PRONATION Measured at 90 degrees, no crepitus \par ¦ Strength : not assessed due to pain. \par ¦ Stability : no joint instability on provocative testing\par o Muscle Bulk : no atrophy\par o Sensation : sensation intact to light touch\par o Skin : no skin lesions, no discoloration\par o Vascular Exam : no edema, no cyanosis, radial and ulnar pulses normal  [de-identified] : o Right Elbow : AP, lateral and oblique views were obtained, there are no soft tissue abnormalities,comminuted supracondylar humerus fracture with mild posterior displacement with marginal callus formation, alignment is normal, normal appearing joint spaces, normal bone density, no bony lesions.\par \par

## 2020-08-06 NOTE — DISCUSSION/SUMMARY
[de-identified] : The underlying pathophysiology was reviewed in great detail with the patient as well as the various treatment options, including ice, analgesics, NSAIDs, Physical therapy, casting, bracing, surgical intervention. \par \par Discussed nature of crepitus during ROM . \par \par May continue  physical therapy, gentle ROM and weightbearing as tolerated on the right UE\par \par FU 8 weeks or prn. \par \par All questions were answered, all alternatives discussed and the patient is in complete agreement with that plan. Follow-up appointment as instructed. Any issues and the patient will call or come in sooner.\par

## 2020-08-24 ENCOUNTER — RX RENEWAL (OUTPATIENT)
Age: 85
End: 2020-08-24

## 2020-08-24 RX ORDER — MULTIVITAMIN WITH FOLIC ACID 400 MCG
TABLET ORAL
Qty: 30 | Refills: 11 | Status: ACTIVE | COMMUNITY
Start: 2020-08-24 | End: 1900-01-01

## 2020-09-09 ENCOUNTER — INPATIENT (INPATIENT)
Facility: HOSPITAL | Age: 85
LOS: 3 days | Discharge: NURSING, REHAB OR LONG-TERM CARE | DRG: 603 | End: 2020-09-13
Attending: STUDENT IN AN ORGANIZED HEALTH CARE EDUCATION/TRAINING PROGRAM | Admitting: INTERNAL MEDICINE
Payer: MEDICARE

## 2020-09-09 VITALS
WEIGHT: 166.89 LBS | RESPIRATION RATE: 17 BRPM | SYSTOLIC BLOOD PRESSURE: 127 MMHG | OXYGEN SATURATION: 99 % | HEIGHT: 70 IN | TEMPERATURE: 98 F | DIASTOLIC BLOOD PRESSURE: 63 MMHG | HEART RATE: 62 BPM

## 2020-09-09 DIAGNOSIS — Z98.890 OTHER SPECIFIED POSTPROCEDURAL STATES: Chronic | ICD-10-CM

## 2020-09-09 DIAGNOSIS — L03.119 CELLULITIS OF UNSPECIFIED PART OF LIMB: ICD-10-CM

## 2020-09-09 LAB
ALBUMIN SERPL ELPH-MCNC: 2.9 G/DL — LOW (ref 3.3–5)
ALP SERPL-CCNC: 64 U/L — SIGNIFICANT CHANGE UP (ref 40–120)
ALT FLD-CCNC: 9 U/L — LOW (ref 10–45)
ANION GAP SERPL CALC-SCNC: 10 MMOL/L — SIGNIFICANT CHANGE UP (ref 5–17)
APTT BLD: 40 SEC — HIGH (ref 27.5–35.5)
AST SERPL-CCNC: 12 U/L — SIGNIFICANT CHANGE UP (ref 10–40)
BASOPHILS # BLD AUTO: 0.04 K/UL — SIGNIFICANT CHANGE UP (ref 0–0.2)
BASOPHILS NFR BLD AUTO: 0.6 % — SIGNIFICANT CHANGE UP (ref 0–2)
BILIRUB SERPL-MCNC: 0.2 MG/DL — SIGNIFICANT CHANGE UP (ref 0.2–1.2)
BUN SERPL-MCNC: 44 MG/DL — HIGH (ref 7–23)
CALCIUM SERPL-MCNC: 9.1 MG/DL — SIGNIFICANT CHANGE UP (ref 8.4–10.5)
CHLORIDE SERPL-SCNC: 105 MMOL/L — SIGNIFICANT CHANGE UP (ref 96–108)
CO2 SERPL-SCNC: 27 MMOL/L — SIGNIFICANT CHANGE UP (ref 22–31)
CREAT SERPL-MCNC: 1.73 MG/DL — HIGH (ref 0.5–1.3)
EOSINOPHIL # BLD AUTO: 0.34 K/UL — SIGNIFICANT CHANGE UP (ref 0–0.5)
EOSINOPHIL NFR BLD AUTO: 4.9 % — SIGNIFICANT CHANGE UP (ref 0–6)
GLUCOSE SERPL-MCNC: 111 MG/DL — HIGH (ref 70–99)
HCT VFR BLD CALC: 32.6 % — LOW (ref 39–50)
HGB BLD-MCNC: 10.1 G/DL — LOW (ref 13–17)
IMM GRANULOCYTES NFR BLD AUTO: 0.3 % — SIGNIFICANT CHANGE UP (ref 0–1.5)
INR BLD: 1.28 RATIO — HIGH (ref 0.88–1.16)
LACTATE SERPL-SCNC: 1.5 MMOL/L — SIGNIFICANT CHANGE UP (ref 0.7–2)
LACTATE SERPL-SCNC: 2.4 MMOL/L — HIGH (ref 0.7–2)
LYMPHOCYTES # BLD AUTO: 2.04 K/UL — SIGNIFICANT CHANGE UP (ref 1–3.3)
LYMPHOCYTES # BLD AUTO: 29.4 % — SIGNIFICANT CHANGE UP (ref 13–44)
MCHC RBC-ENTMCNC: 28.9 PG — SIGNIFICANT CHANGE UP (ref 27–34)
MCHC RBC-ENTMCNC: 31 GM/DL — LOW (ref 32–36)
MCV RBC AUTO: 93.1 FL — SIGNIFICANT CHANGE UP (ref 80–100)
MONOCYTES # BLD AUTO: 0.64 K/UL — SIGNIFICANT CHANGE UP (ref 0–0.9)
MONOCYTES NFR BLD AUTO: 9.2 % — SIGNIFICANT CHANGE UP (ref 2–14)
NEUTROPHILS # BLD AUTO: 3.86 K/UL — SIGNIFICANT CHANGE UP (ref 1.8–7.4)
NEUTROPHILS NFR BLD AUTO: 55.6 % — SIGNIFICANT CHANGE UP (ref 43–77)
NRBC # BLD: 0 /100 WBCS — SIGNIFICANT CHANGE UP (ref 0–0)
PLATELET # BLD AUTO: 322 K/UL — SIGNIFICANT CHANGE UP (ref 150–400)
POTASSIUM SERPL-MCNC: 3.1 MMOL/L — LOW (ref 3.5–5.3)
POTASSIUM SERPL-SCNC: 3.1 MMOL/L — LOW (ref 3.5–5.3)
PROT SERPL-MCNC: 7.2 G/DL — SIGNIFICANT CHANGE UP (ref 6–8.3)
PROTHROM AB SERPL-ACNC: 15.3 SEC — HIGH (ref 10.6–13.6)
RBC # BLD: 3.5 M/UL — LOW (ref 4.2–5.8)
RBC # FLD: 16.4 % — HIGH (ref 10.3–14.5)
SODIUM SERPL-SCNC: 142 MMOL/L — SIGNIFICANT CHANGE UP (ref 135–145)
WBC # BLD: 6.94 K/UL — SIGNIFICANT CHANGE UP (ref 3.8–10.5)
WBC # FLD AUTO: 6.94 K/UL — SIGNIFICANT CHANGE UP (ref 3.8–10.5)

## 2020-09-09 PROCEDURE — 93970 EXTREMITY STUDY: CPT | Mod: 26

## 2020-09-09 PROCEDURE — 73590 X-RAY EXAM OF LOWER LEG: CPT | Mod: 26,50

## 2020-09-09 PROCEDURE — 93010 ELECTROCARDIOGRAM REPORT: CPT

## 2020-09-09 PROCEDURE — 71045 X-RAY EXAM CHEST 1 VIEW: CPT | Mod: 26,CS

## 2020-09-09 PROCEDURE — 99223 1ST HOSP IP/OBS HIGH 75: CPT

## 2020-09-09 PROCEDURE — 99285 EMERGENCY DEPT VISIT HI MDM: CPT | Mod: CS

## 2020-09-09 RX ORDER — INFLUENZA VIRUS VACCINE 15; 15; 15; 15 UG/.5ML; UG/.5ML; UG/.5ML; UG/.5ML
0.5 SUSPENSION INTRAMUSCULAR ONCE
Refills: 0 | Status: DISCONTINUED | OUTPATIENT
Start: 2020-09-09 | End: 2020-09-13

## 2020-09-09 RX ORDER — LANOLIN ALCOHOL/MO/W.PET/CERES
2 CREAM (GRAM) TOPICAL
Qty: 0 | Refills: 0 | DISCHARGE

## 2020-09-09 RX ORDER — ALLOPURINOL 300 MG
100 TABLET ORAL DAILY
Refills: 0 | Status: DISCONTINUED | OUTPATIENT
Start: 2020-09-10 | End: 2020-09-13

## 2020-09-09 RX ORDER — FAMOTIDINE 10 MG/ML
20 INJECTION INTRAVENOUS DAILY
Refills: 0 | Status: DISCONTINUED | OUTPATIENT
Start: 2020-09-09 | End: 2020-09-13

## 2020-09-09 RX ORDER — APIXABAN 2.5 MG/1
2.5 TABLET, FILM COATED ORAL EVERY 12 HOURS
Refills: 0 | Status: DISCONTINUED | OUTPATIENT
Start: 2020-09-09 | End: 2020-09-13

## 2020-09-09 RX ORDER — ACETAMINOPHEN 500 MG
650 TABLET ORAL EVERY 6 HOURS
Refills: 0 | Status: DISCONTINUED | OUTPATIENT
Start: 2020-09-09 | End: 2020-09-13

## 2020-09-09 RX ORDER — LACTOBACILLUS ACIDOPHILUS 100MM CELL
1 CAPSULE ORAL
Refills: 0 | Status: DISCONTINUED | OUTPATIENT
Start: 2020-09-09 | End: 2020-09-13

## 2020-09-09 RX ORDER — ALLOPURINOL 300 MG
300 TABLET ORAL DAILY
Refills: 0 | Status: DISCONTINUED | OUTPATIENT
Start: 2020-09-09 | End: 2020-09-09

## 2020-09-09 RX ORDER — TAMSULOSIN HYDROCHLORIDE 0.4 MG/1
0.8 CAPSULE ORAL AT BEDTIME
Refills: 0 | Status: DISCONTINUED | OUTPATIENT
Start: 2020-09-09 | End: 2020-09-13

## 2020-09-09 RX ORDER — FINASTERIDE 5 MG/1
5 TABLET, FILM COATED ORAL DAILY
Refills: 0 | Status: DISCONTINUED | OUTPATIENT
Start: 2020-09-09 | End: 2020-09-13

## 2020-09-09 RX ORDER — PANTOPRAZOLE SODIUM 20 MG/1
1 TABLET, DELAYED RELEASE ORAL
Qty: 0 | Refills: 0 | DISCHARGE

## 2020-09-09 RX ORDER — POTASSIUM CHLORIDE 20 MEQ
20 PACKET (EA) ORAL DAILY
Refills: 0 | Status: DISCONTINUED | OUTPATIENT
Start: 2020-09-09 | End: 2020-09-13

## 2020-09-09 RX ORDER — VANCOMYCIN HCL 1 G
1000 VIAL (EA) INTRAVENOUS ONCE
Refills: 0 | Status: COMPLETED | OUTPATIENT
Start: 2020-09-10 | End: 2020-09-10

## 2020-09-09 RX ORDER — POTASSIUM CHLORIDE 20 MEQ
40 PACKET (EA) ORAL ONCE
Refills: 0 | Status: COMPLETED | OUTPATIENT
Start: 2020-09-09 | End: 2020-09-09

## 2020-09-09 RX ORDER — LANOLIN ALCOHOL/MO/W.PET/CERES
3 CREAM (GRAM) TOPICAL AT BEDTIME
Refills: 0 | Status: DISCONTINUED | OUTPATIENT
Start: 2020-09-09 | End: 2020-09-13

## 2020-09-09 RX ORDER — ACETAMINOPHEN 500 MG
2 TABLET ORAL
Qty: 0 | Refills: 0 | DISCHARGE

## 2020-09-09 RX ORDER — POLYETHYLENE GLYCOL 3350 17 G/17G
17 POWDER, FOR SOLUTION ORAL DAILY
Refills: 0 | Status: DISCONTINUED | OUTPATIENT
Start: 2020-09-09 | End: 2020-09-13

## 2020-09-09 RX ORDER — TRAMADOL HYDROCHLORIDE 50 MG/1
25 TABLET ORAL
Refills: 0 | Status: DISCONTINUED | OUTPATIENT
Start: 2020-09-09 | End: 2020-09-10

## 2020-09-09 RX ORDER — VANCOMYCIN HCL 1 G
1000 VIAL (EA) INTRAVENOUS ONCE
Refills: 0 | Status: COMPLETED | OUTPATIENT
Start: 2020-09-09 | End: 2020-09-09

## 2020-09-09 RX ORDER — CHOLECALCIFEROL (VITAMIN D3) 125 MCG
1 CAPSULE ORAL
Qty: 0 | Refills: 0 | DISCHARGE

## 2020-09-09 RX ORDER — SENNA PLUS 8.6 MG/1
2 TABLET ORAL AT BEDTIME
Refills: 0 | Status: DISCONTINUED | OUTPATIENT
Start: 2020-09-09 | End: 2020-09-13

## 2020-09-09 RX ADMIN — Medication 250 MILLIGRAM(S): at 21:15

## 2020-09-09 RX ADMIN — Medication 200 MILLIGRAM(S): at 22:43

## 2020-09-09 RX ADMIN — Medication 20 MILLIEQUIVALENT(S): at 22:42

## 2020-09-09 NOTE — ED ADULT NURSE NOTE - NSIMPLEMENTINTERV_GEN_ALL_ED
Implemented All Fall with Harm Risk Interventions:  [place] to call system. Call bell, personal items and telephone within reach. Instruct patient to call for assistance. Room bathroom lighting operational. Non-slip footwear when patient is off stretcher. Physically safe environment: no spills, clutter or unnecessary equipment. Stretcher in lowest position, wheels locked, appropriate side rails in place. Provide visual cue, wrist band, yellow gown, etc. Monitor gait and stability. Monitor for mental status changes and reorient to person, place, and time. Review medications for side effects contributing to fall risk. Reinforce activity limits and safety measures with patient and family. Provide visual clues: red socks.

## 2020-09-09 NOTE — ED PROVIDER NOTE - SKIN, MLM
bilat lower leg moderate pitting edema with diffuse erythema, increased warmth, mild ttp. R lower leg with superficial skin breaks, weeping edema.  +gross distal sensation, able to wiggle toes

## 2020-09-09 NOTE — H&P ADULT - NSHPSOCIALHISTORY_GEN_ALL_CORE
resides at the [facility] resides at the [facility]  Nonsmoker, non ETOH

## 2020-09-09 NOTE — H&P ADULT - ASSESSMENT
89y/o M w/ HTN, Afib, DVT on eliquis, PVD, BPH, GERD, Gout, Hypothyroidism, sent from assisted living, because of worsening bilateral leg pain, edema, redness, for past few days, left >right.   Bilateral leg cellulitis  Acute on chronic renal insuff    Admit  IV Vanco - will give another dose in AM  Doxycycline 100 mg twice daily- prob sufficient - will assess response  Pending cultures  Elevate legs  Cont other meds; Apixaban, levothyroxine,, finasteride, Tamsulosin  Will cont Torsemide at 40 mg/day with close ffup of BUN/crea  Cont Allopurinol - dec dose to 100mg/day based on renal fn  Pt has MOLST- DNR    CAPRINI SCORE [CLOT]  [x ] Age= 75 years                                              (3 Points)                  [x ] Edema in the lower extremities                       (1 Point)                                                 [ x] Prior episodes of VTE                                     (3 Points)              Total Score [ 7  ]  Caprini Score 0 - 2:  Low Risk, No VTE Prophylaxis required for most patients, encourage ambulation  Caprini Score 3 - 6:  At Risk, pharmacologic VTE prophylaxis is indicated for most patients (in the absence of a contraindication)  Caprini Score Greater than or = 7:  High Risk, pharmacologic VTE prophylaxis is indicated for most patients (in the absence of a contraindication)    *Pt is already on Apixaban 89y/o M w/ HTN, Afib, DVT on eliquis, PVD, BPH, GERD, Gout, Hypothyroidism, sent from assisted living, because of worsening bilateral leg pain, edema, redness, for past few days, left >right.   Bilateral leg cellulitis  Acute on chronic renal insuff  Hypokalemia    Admit  IV Vanco - will give another dose in AM  Doxycycline 100 mg twice daily- prob sufficient - will assess response  Pending cultures  Elevate legs  Cont other meds; Apixaban, levothyroxine,, finasteride, Tamsulosin  Will cont Torsemide at 40 mg/day with close ffup of BUN/crea  Cont Allopurinol - dec dose to 100mg/day based on renal fn  replete K  ffup labs, lactate  Pt has MOLST- DNR    CAPRINI SCORE [CLOT]  [x ] Age= 75 years                                              (3 Points)                  [x ] Edema in the lower extremities                       (1 Point)                                                 [ x] Prior episodes of VTE                                     (3 Points)              Total Score [ 7  ]  Caprini Score 0 - 2:  Low Risk, No VTE Prophylaxis required for most patients, encourage ambulation  Caprini Score 3 - 6:  At Risk, pharmacologic VTE prophylaxis is indicated for most patients (in the absence of a contraindication)  Caprini Score Greater than or = 7:  High Risk, pharmacologic VTE prophylaxis is indicated for most patients (in the absence of a contraindication)    *Pt is already on Apixaban

## 2020-09-09 NOTE — ED ADULT TRIAGE NOTE - CHIEF COMPLAINT QUOTE
Pt BIBA from the [facility] for bilateral leg swelling, possible cellulitis

## 2020-09-09 NOTE — ED ADULT TRIAGE NOTE - PAIN: PRESENCE, MLM
NEEDS THE NAME OF A DERMATOLOGIST AND UROLOGIST THAT DR LACY REFERS OUT TO.    denies pain/discomfort

## 2020-09-09 NOTE — ED ADULT TRIAGE NOTE - BP NONINVASIVE SYSTOLIC (MM HG)
127 Thin, acyanotic, in NAD. Follows some simple instructions. Fearful of medical exams, slightly limited exam.

## 2020-09-09 NOTE — H&P ADULT - NSHPPHYSICALEXAM_GEN_ALL_CORE
Vital Signs (24 Hrs):  T(C): 37.3 (09-09-20 @ 20:07), Max: 37.3 (09-09-20 @ 20:07)  HR: 67 (09-09-20 @ 21:00) (62 - 67)  BP: 128/60 (09-09-20 @ 21:00) (125/69 - 132/65)  RR: 16 (09-09-20 @ 21:00) (15 - 17)  SpO2: 98% (09-09-20 @ 21:00) (98% - 99%)  Daily Height in cm: 177.8 (09 Sep 2020 19:47)

## 2020-09-09 NOTE — H&P ADULT - NEUROLOGICAL DETAILS
alert and oriented x 3/responds to pain/sensation intact/responds to verbal commands/deep reflexes intact/cranial nerves intact

## 2020-09-09 NOTE — ED ADULT NURSE NOTE - OBJECTIVE STATEMENT
Pt is alert, brought to the ER from the [facility] due to bilateral leg swelling. Pt states that the swelling has been there for months and it always has pain. Denies fever, cough or SOB. Left leg with superficial ulceration noted, faint distal pulses noted.

## 2020-09-09 NOTE — H&P ADULT - SKIN COMMENTS
+erythema, warmth, superficial abrasion/wound left ankle with weeping, scant serous discharge
English

## 2020-09-09 NOTE — H&P ADULT - HISTORY OF PRESENT ILLNESS
89y/o M w/HTN, afib, PE on eliquis, bph, constipation, LE edema, gerd, gout, hypothyroidism, 89y/o M w/ HTN, Afib, DVT on eliquis, PVD, BPH, GERD, Gout, Hypothyroidism, sent from assisted living, because of worsening bilateral leg pain, edema, redness, for past few days, left >right.  +blister that popped on left ankle, weeping.  Pt denies fever, chest pain, dyspnea, abd pain, diarrhea.

## 2020-09-09 NOTE — H&P ADULT - RS GEN PE MLT RESP DETAILS PC
respirations non-labored/decreased breath sounds at bases/airway patent/good air movement/breath sounds equal

## 2020-09-09 NOTE — ED PROVIDER NOTE - PROGRESS NOTE DETAILS
DARIEL Rodarte: Seen pt with Dr. Gordon. Agree with above HPI, PE and A/P. Will continue to monitor. DARIEL Rodarte: pt will be admitted to medicine for iv abx for b/l leg cellulitis. VSS. d/w hospitalist.

## 2020-09-09 NOTE — ED PROVIDER NOTE - OBJECTIVE STATEMENT
pt sent from assisted living , c/o bilat leg pains, sharp, moderate for last few days with increased swelling, redness and discharge.  no trauma. no fever/chills. no chest pain/sob. PMD Mary

## 2020-09-09 NOTE — ED PROVIDER NOTE - CLINICAL SUMMARY MEDICAL DECISION MAKING FREE TEXT BOX
bilat lower leg swelling, pain, redness. most likely cellulitis. no signs /sx of sepsis.  will check labs, start iv abx. check dopplers r/o dvt.

## 2020-09-10 LAB
ANION GAP SERPL CALC-SCNC: 12 MMOL/L — SIGNIFICANT CHANGE UP (ref 5–17)
BUN SERPL-MCNC: 42 MG/DL — HIGH (ref 7–23)
CALCIUM SERPL-MCNC: 9.1 MG/DL — SIGNIFICANT CHANGE UP (ref 8.4–10.5)
CHLORIDE SERPL-SCNC: 106 MMOL/L — SIGNIFICANT CHANGE UP (ref 96–108)
CO2 SERPL-SCNC: 24 MMOL/L — SIGNIFICANT CHANGE UP (ref 22–31)
CREAT SERPL-MCNC: 1.73 MG/DL — HIGH (ref 0.5–1.3)
GLUCOSE SERPL-MCNC: 87 MG/DL — SIGNIFICANT CHANGE UP (ref 70–99)
LACTATE SERPL-SCNC: 1.6 MMOL/L — SIGNIFICANT CHANGE UP (ref 0.7–2)
LACTATE SERPL-SCNC: 2.5 MMOL/L — HIGH (ref 0.7–2)
MAGNESIUM SERPL-MCNC: 1.6 MG/DL — SIGNIFICANT CHANGE UP (ref 1.6–2.6)
POTASSIUM SERPL-MCNC: 3.6 MMOL/L — SIGNIFICANT CHANGE UP (ref 3.5–5.3)
POTASSIUM SERPL-SCNC: 3.6 MMOL/L — SIGNIFICANT CHANGE UP (ref 3.5–5.3)
SARS-COV-2 RNA SPEC QL NAA+PROBE: SIGNIFICANT CHANGE UP
SODIUM SERPL-SCNC: 142 MMOL/L — SIGNIFICANT CHANGE UP (ref 135–145)
TSH SERPL-MCNC: 4.52 UIU/ML — HIGH (ref 0.27–4.2)

## 2020-09-10 PROCEDURE — 99233 SBSQ HOSP IP/OBS HIGH 50: CPT

## 2020-09-10 PROCEDURE — 76775 US EXAM ABDO BACK WALL LIM: CPT | Mod: 26

## 2020-09-10 RX ORDER — OXYCODONE AND ACETAMINOPHEN 5; 325 MG/1; MG/1
1 TABLET ORAL EVERY 4 HOURS
Refills: 0 | Status: DISCONTINUED | OUTPATIENT
Start: 2020-09-10 | End: 2020-09-13

## 2020-09-10 RX ORDER — MORPHINE SULFATE 50 MG/1
2 CAPSULE, EXTENDED RELEASE ORAL EVERY 6 HOURS
Refills: 0 | Status: DISCONTINUED | OUTPATIENT
Start: 2020-09-10 | End: 2020-09-13

## 2020-09-10 RX ORDER — LEVOTHYROXINE SODIUM 125 MCG
188 TABLET ORAL DAILY
Refills: 0 | Status: DISCONTINUED | OUTPATIENT
Start: 2020-09-10 | End: 2020-09-13

## 2020-09-10 RX ORDER — SODIUM CHLORIDE 9 MG/ML
1000 INJECTION, SOLUTION INTRAVENOUS
Refills: 0 | Status: DISCONTINUED | OUTPATIENT
Start: 2020-09-10 | End: 2020-09-10

## 2020-09-10 RX ORDER — CEFAZOLIN SODIUM 1 G
1000 VIAL (EA) INJECTION EVERY 12 HOURS
Refills: 0 | Status: DISCONTINUED | OUTPATIENT
Start: 2020-09-10 | End: 2020-09-11

## 2020-09-10 RX ADMIN — APIXABAN 2.5 MILLIGRAM(S): 2.5 TABLET, FILM COATED ORAL at 17:38

## 2020-09-10 RX ADMIN — APIXABAN 2.5 MILLIGRAM(S): 2.5 TABLET, FILM COATED ORAL at 05:48

## 2020-09-10 RX ADMIN — Medication 1 TABLET(S): at 17:38

## 2020-09-10 RX ADMIN — OXYCODONE AND ACETAMINOPHEN 1 TABLET(S): 5; 325 TABLET ORAL at 23:41

## 2020-09-10 RX ADMIN — Medication 250 MILLIGRAM(S): at 11:54

## 2020-09-10 RX ADMIN — Medication 188 MICROGRAM(S): at 06:41

## 2020-09-10 RX ADMIN — TRAMADOL HYDROCHLORIDE 25 MILLIGRAM(S): 50 TABLET ORAL at 02:21

## 2020-09-10 RX ADMIN — MORPHINE SULFATE 2 MILLIGRAM(S): 50 CAPSULE, EXTENDED RELEASE ORAL at 17:38

## 2020-09-10 RX ADMIN — FINASTERIDE 5 MILLIGRAM(S): 5 TABLET, FILM COATED ORAL at 11:54

## 2020-09-10 RX ADMIN — TRAMADOL HYDROCHLORIDE 25 MILLIGRAM(S): 50 TABLET ORAL at 06:35

## 2020-09-10 RX ADMIN — Medication 20 MILLIEQUIVALENT(S): at 11:54

## 2020-09-10 RX ADMIN — Medication 3 MILLIGRAM(S): at 22:23

## 2020-09-10 RX ADMIN — Medication 40 MILLIGRAM(S): at 05:48

## 2020-09-10 RX ADMIN — Medication 100 MILLIGRAM(S): at 05:48

## 2020-09-10 RX ADMIN — Medication 1 TABLET(S): at 05:48

## 2020-09-10 RX ADMIN — MORPHINE SULFATE 2 MILLIGRAM(S): 50 CAPSULE, EXTENDED RELEASE ORAL at 10:00

## 2020-09-10 RX ADMIN — TAMSULOSIN HYDROCHLORIDE 0.8 MILLIGRAM(S): 0.4 CAPSULE ORAL at 22:17

## 2020-09-10 RX ADMIN — FAMOTIDINE 20 MILLIGRAM(S): 10 INJECTION INTRAVENOUS at 11:54

## 2020-09-10 RX ADMIN — TRAMADOL HYDROCHLORIDE 25 MILLIGRAM(S): 50 TABLET ORAL at 03:00

## 2020-09-10 RX ADMIN — SENNA PLUS 2 TABLET(S): 8.6 TABLET ORAL at 22:18

## 2020-09-10 RX ADMIN — APIXABAN 2.5 MILLIGRAM(S): 2.5 TABLET, FILM COATED ORAL at 00:01

## 2020-09-10 RX ADMIN — Medication 100 MILLIGRAM(S): at 17:38

## 2020-09-10 RX ADMIN — SODIUM CHLORIDE 100 MILLILITER(S): 9 INJECTION, SOLUTION INTRAVENOUS at 09:07

## 2020-09-10 RX ADMIN — TRAMADOL HYDROCHLORIDE 25 MILLIGRAM(S): 50 TABLET ORAL at 05:57

## 2020-09-10 RX ADMIN — Medication 100 MILLIGRAM(S): at 11:54

## 2020-09-10 RX ADMIN — MORPHINE SULFATE 2 MILLIGRAM(S): 50 CAPSULE, EXTENDED RELEASE ORAL at 18:00

## 2020-09-10 RX ADMIN — MORPHINE SULFATE 2 MILLIGRAM(S): 50 CAPSULE, EXTENDED RELEASE ORAL at 10:20

## 2020-09-10 NOTE — PHARMACOTHERAPY INTERVENTION NOTE - COMMENTS
Morphine and tramadol both ordered prn severe pain. Clarified therapeutic duplication with PA - tramadol d/c'd.
Met with patient and reviewed current medications. Pt reports no issues taking medications and had no concerns.

## 2020-09-10 NOTE — PROGRESS NOTE ADULT - ASSESSMENT
89y/o M w/ HTN, Afib, DVT on eliquis, PVD, BPH, GERD, Gout, Hypothyroidism, sent from assisted living, because of worsening bilateral leg pain, edema, redness, for past few days, left >right.     #Cellulitis at Left lower ext  #Venous Stasis at lower exts  -continue empiric antibx for now; ID consulted await recs  -pt afebrile, no wbc, blood cultures sent; pending results  -elevate legs  -pain mgmt  -continue Torsemide for chronic edema    #Lactic Acidosis  -2.4>1.5>2.5; IVF restarted this am, recheck lactate level in 6 hrs.    #ANTONINA/CKD3  -baseline Creat 1.3>1.4  -continue IVF  -follow bmp  -avoid nephrotoxins    #Chronic Atrial fib  -rate controlled  -continue Apixaban for AC    DVT ppx:  on Apixaban    Pt has MOLST- DNR

## 2020-09-10 NOTE — CONSULT NOTE ADULT - SUBJECTIVE AND OBJECTIVE BOX
NEPHROLOGY CONSULTATION    CHIEF COMPLAINT: edema    HPI:  Pt is 89 yo M w/HTN, Afib, DVT on eliquis, PVD, BPH, GERD, Gout, Hypothyroidism, sent from assisted living because of worsening bilateral leg pain, edema, redness for past few days, left >right. No fever, chest pain, dyspnea, abd pain, diarrhea. Noted to have abn renal fx.    ROS:  as above    Allergies:  penicillin (Unknown)    PAST MEDICAL & SURGICAL HISTORY:  Spinal stenosis  Atrial fibrillation, unspecified type  Arthritis  CN (constipation)  BPH (benign prostatic hyperplasia)  GERD (gastroesophageal reflux disease)  Hypothyroidism  Gout  Edema  Dementia  S/P hernia repair    SOCIAL HISTORY:  negative    FAMILY HISTORY:  No pertinent family history in first degree relatives    MEDICATIONS  (STANDING):  allopurinol 100 milliGRAM(s) Oral daily  apixaban 2.5 milliGRAM(s) Oral every 12 hours  doxycycline hyclate Capsule 100 milliGRAM(s) Oral every 12 hours  famotidine    Tablet 20 milliGRAM(s) Oral daily  finasteride 5 milliGRAM(s) Oral daily  influenza   Vaccine 0.5 milliLiter(s) IntraMuscular once  lactobacillus acidophilus 1 Tablet(s) Oral two times a day  levothyroxine 188 MICROGram(s) Oral daily  melatonin 3 milliGRAM(s) Oral at bedtime  potassium chloride    Tablet ER 20 milliEquivalent(s) Oral daily  senna 2 Tablet(s) Oral at bedtime  sodium chloride 0.45%. 1000 milliLiter(s) (100 mL/Hr) IV Continuous <Continuous>  tamsulosin 0.8 milliGRAM(s) Oral at bedtime  torsemide 40 milliGRAM(s) Oral daily    Vital Signs Last 24 Hrs  T(C): 36.4 (09-10-20 @ 05:47), Max: 37.3 (09-09-20 @ 20:07)  T(F): 97.5 (09-10-20 @ 05:47), Max: 99.1 (09-09-20 @ 20:07)  HR: 78 (09-10-20 @ 05:47) (62 - 78)  BP: 120/53 (09-10-20 @ 05:47) (111/49 - 135/68)  BP(mean): 61 (09-09-20 @ 22:30) (61 - 81)  RR: 16 (09-10-20 @ 05:47) (15 - 17)  SpO2: 99% (09-10-20 @ 05:47) (97% - 100%)    s1s2  b/l air entry  soft, ND  + edema, erythema    LABS:                        10.1   6.94  )-----------( 322      ( 09 Sep 2020 20:15 )             32.6     09-10    142  |  106  |  42<H>  ----------------------------<  87  3.6   |  24  |  1.73<H>    Ca    9.1      10 Sep 2020 06:05  Mg     1.6     09-10    TPro  7.2  /  Alb  2.9<L>  /  TBili  0.2  /  DBili  x   /  AST  12  /  ALT  9<L>  /  AlkPhos  64  09-09    LIVER FUNCTIONS - ( 09 Sep 2020 20:15 )  Alb: 2.9 g/dL / Pro: 7.2 g/dL / ALK PHOS: 64 U/L / ALT: 9 U/L / AST: 12 U/L / GGT: x           PT/INR - ( 09 Sep 2020 20:15 )   PT: 15.3 sec;   INR: 1.28 ratio       PTT - ( 09 Sep 2020 20:15 )  PTT:40.0 sec    A/P: NEPHROLOGY CONSULTATION    CHIEF COMPLAINT: edema    HPI:  Pt is 89 yo M w/HTN, Afib, DVT on eliquis, PVD, BPH, GERD, Gout, Hypothyroidism, sent from assisted living because of worsening bilateral leg pain, edema, redness. No fever, chest pain, dyspnea, abd pain, diarrhea. Noted to have abn renal fx. Awake, alert, poor historian.    ROS:  as above    Allergies:  penicillin (Unknown)    PAST MEDICAL & SURGICAL HISTORY:  Spinal stenosis  Atrial fibrillation, unspecified type  Arthritis  CN (constipation)  BPH (benign prostatic hyperplasia)  GERD (gastroesophageal reflux disease)  Hypothyroidism  Gout  Edema  Dementia  S/P hernia repair    SOCIAL HISTORY:  negative    FAMILY HISTORY:  No pertinent family history in first degree relatives    MEDICATIONS  (STANDING):  allopurinol 100 milliGRAM(s) Oral daily  apixaban 2.5 milliGRAM(s) Oral every 12 hours  doxycycline hyclate Capsule 100 milliGRAM(s) Oral every 12 hours  famotidine    Tablet 20 milliGRAM(s) Oral daily  finasteride 5 milliGRAM(s) Oral daily  influenza   Vaccine 0.5 milliLiter(s) IntraMuscular once  lactobacillus acidophilus 1 Tablet(s) Oral two times a day  levothyroxine 188 MICROGram(s) Oral daily  melatonin 3 milliGRAM(s) Oral at bedtime  potassium chloride    Tablet ER 20 milliEquivalent(s) Oral daily  senna 2 Tablet(s) Oral at bedtime  sodium chloride 0.45%. 1000 milliLiter(s) (100 mL/Hr) IV Continuous <Continuous>  tamsulosin 0.8 milliGRAM(s) Oral at bedtime  torsemide 40 milliGRAM(s) Oral daily    Vital Signs Last 24 Hrs  T(C): 36.4 (09-10-20 @ 05:47), Max: 37.3 (09-09-20 @ 20:07)  T(F): 97.5 (09-10-20 @ 05:47), Max: 99.1 (09-09-20 @ 20:07)  HR: 78 (09-10-20 @ 05:47) (62 - 78)  BP: 120/53 (09-10-20 @ 05:47) (111/49 - 135/68)  BP(mean): 61 (09-09-20 @ 22:30) (61 - 81)  RR: 16 (09-10-20 @ 05:47) (15 - 17)  SpO2: 99% (09-10-20 @ 05:47) (97% - 100%)    s1s2  b/l air entry  soft, ND  + edema, erythema b/l LE    LABS:                        10.1   6.94  )-----------( 322      ( 09 Sep 2020 20:15 )             32.6     09-10    142  |  106  |  42<H>  ----------------------------<  87  3.6   |  24  |  1.73<H>    Ca    9.1      10 Sep 2020 06:05  Mg     1.6     09-10    TPro  7.2  /  Alb  2.9<L>  /  TBili  0.2  /  DBili  x   /  AST  12  /  ALT  9<L>  /  AlkPhos  64  09-09    LIVER FUNCTIONS - ( 09 Sep 2020 20:15 )  Alb: 2.9 g/dL / Pro: 7.2 g/dL / ALK PHOS: 64 U/L / ALT: 9 U/L / AST: 12 U/L / GGT: x           PT/INR - ( 09 Sep 2020 20:15 )   PT: 15.3 sec;   INR: 1.28 ratio       PTT - ( 09 Sep 2020 20:15 )  PTT:40.0 sec    A/P:    CKD 3 not far from baseline  Hx mod AS, AR  CHF, cardio-renal  Agree w/Torsemide  Avoid IVF  Avoid nephrotoxins  Would check UA, renal SONO, echo  BMP in am  Will follow    [phone]

## 2020-09-10 NOTE — CONSULT NOTE ADULT - ASSESSMENT
89y male with hx Afib, HTN, BPH, hypothyroidism, he also has history of longstanding leg edema and pemphigoid, scale, blisters. He was previously at  for concerns of cellulitis R leg, but afebrile, normal WBC, all c/w stasis and prior L LE erythema treated with PO Keflex. He has allergies to PCN?, but tolerated Ceph in a past. He is now sent from assisted living, because of worsening bilateral leg pain, edema, redness, for past few days, left >right.  +blister that popped on left ankle, weeping.  Pt denies fever, chest pain, dyspnea, abd pain, diarrhea. He is currently on Vanc and PO doxy    PLAN:  Switch to IV Cefazolin renally dosed, if cont to improve may switch to oral Keflex  He is currently also on doxycycline, ? used for hx pemphigoid vs new med, if not one of his chronic meds, consider dc doxy  avoid IV Vanco due to increased renally toxicity  d/w medicine NP  local skin care and keep legs elevated, chronic edema management

## 2020-09-10 NOTE — PROGRESS NOTE ADULT - ATTENDING COMMENTS
I have personally seen and examined patient on the above date.  I discussed the case with DARIEL Blake and I agree with findings and plan as detailed per note above, which I have amended where appropriate. I have personally seen and examined patient on the above date.  I discussed the case with DARIEL Blake and I agree with findings and plan as detailed per note above, which I have amended where appropriate.  #Cellulitis : cont doxy, f/u blood culture  ID eval  #ANTONINA vs progression of  ckd  IVF hydration , on torsemide, would cont for now as unsure if this is progression of ckd . monitor cr, if continues to rise, hold.  renal eval.

## 2020-09-10 NOTE — PROGRESS NOTE ADULT - SUBJECTIVE AND OBJECTIVE BOX
Patient is a 89y old  Male who presents with a chief complaint of leg pain, L>R and increased swelling, redness (09 Sep 2020 21:47)    Patient seen and examined at bedside.  Pt. c/o pain at lower exts.    ALLERGIES:  penicillin (Unknown)    MEDICATIONS  (STANDING):  allopurinol 100 milliGRAM(s) Oral daily  apixaban 2.5 milliGRAM(s) Oral every 12 hours  doxycycline hyclate Capsule 100 milliGRAM(s) Oral every 12 hours  famotidine    Tablet 20 milliGRAM(s) Oral daily  finasteride 5 milliGRAM(s) Oral daily  influenza   Vaccine 0.5 milliLiter(s) IntraMuscular once  lactobacillus acidophilus 1 Tablet(s) Oral two times a day  levothyroxine 188 MICROGram(s) Oral daily  melatonin 3 milliGRAM(s) Oral at bedtime  potassium chloride    Tablet ER 20 milliEquivalent(s) Oral daily  senna 2 Tablet(s) Oral at bedtime  sodium chloride 0.45%. 1000 milliLiter(s) (100 mL/Hr) IV Continuous <Continuous>  tamsulosin 0.8 milliGRAM(s) Oral at bedtime  torsemide 40 milliGRAM(s) Oral daily  vancomycin  IVPB 1000 milliGRAM(s) IV Intermittent once    MEDICATIONS  (PRN):  acetaminophen   Tablet .. 650 milliGRAM(s) Oral every 6 hours PRN Temp greater or equal to 38C (100.4F), Mild Pain (1 - 3)  morphine  - Injectable 2 milliGRAM(s) IV Push every 6 hours PRN Severe Pain (7 - 10)  oxycodone    5 mG/acetaminophen 325 mG 1 Tablet(s) Oral every 4 hours PRN Moderate Pain (4 - 6)  polyethylene glycol 3350 17 Gram(s) Oral daily PRN Constipation  traMADol 25 milliGRAM(s) Oral four times a day PRN Severe Pain (7 - 10)    Vital Signs Last 24 Hrs  T(F): 97.5 (10 Sep 2020 05:47), Max: 99.1 (09 Sep 2020 20:07)  HR: 78 (10 Sep 2020 05:47) (62 - 78)  BP: 120/53 (10 Sep 2020 05:47) (111/49 - 135/68)  RR: 16 (10 Sep 2020 05:47) (15 - 17)  SpO2: 99% (10 Sep 2020 05:47) (97% - 100%)  I&O's Summary    10 Sep 2020 07:01  -  10 Sep 2020 10:50  --------------------------------------------------------  IN: 400 mL / OUT: 0 mL / NET: 400 mL      PHYSICAL EXAM:  General: NAD, A/O x 2-3  ENT: MMM, no thrush  Neck: Supple, No JVD  Lungs: good air entry, Clear to auscultation bilaterally, no w/r/r  Cardio: RRR, S1/S2, No murmurs  Abdomen: Soft, Nontender, Nondistended; Bowel sounds present  Extremities: b/l lower ext 3+ edema, erythema, L>R, tender to palp  Neuro:  speech fluent, no focal deficits, follows commands    LABS:                        10.1   6.94  )-----------( 322      ( 09 Sep 2020 20:15 )             32.6     09-10    142  |  106  |  42  ----------------------------<  87  3.6   |  24  |  1.73    Ca    9.1      10 Sep 2020 06:05  Mg     1.6     09-10    TPro  7.2  /  Alb  2.9  /  TBili  0.2  /  DBili  x   /  AST  12  /  ALT  9   /  AlkPhos  64  09-09    eGFR if Non African American: 32 mL/min/1.73M2 (09-10-20 @ 06:05)  eGFR if African American: 37 mL/min/1.73M2 (09-10-20 @ 06:05)    PT/INR - ( 09 Sep 2020 20:15 )   PT: 15.3 sec;   INR: 1.28 ratio         PTT - ( 09 Sep 2020 20:15 )  PTT:40.0 sec  Lactate, Blood: 2.5 mmol/L (09-10 @ 06:05)  Lactate, Blood: 1.5 mmol/L (09-09 @ 22:35)  Lactate, Blood: 2.4 mmol/L (09-09 @ 20:15)        RADIOLOGY & ADDITIONAL TESTS:  < from: US Duplex Venous Lower Ext Complete, Bilateral (09.09.20 @ 22:23) >    IMPRESSION:  No evidence of deep venous thrombosis in either lower extremity.    < end of copied text >    Care Discussed with Consultants/Other Providers:

## 2020-09-10 NOTE — CONSULT NOTE ADULT - SUBJECTIVE AND OBJECTIVE BOX
HPI:   Patient is a 89y male with hx Afib, HTN, BPH, hypothyroidism, he also has history of longstanding leg edema and pemphigoid, scale, blisters. He was previously at  for concerns of cellulitis R leg, but afebrile, normal WBC, all c/w stasis and prior L LE erythema treated with PO Keflex. He has allergies to PCN?, but tolerated Ceph in a past. He is now sent from assisted living, because of worsening bilateral leg pain, edema, redness, for past few days, left >right.  +blister that popped on left ankle, weeping.  Pt denies fever, chest pain, dyspnea, abd pain, diarrhea. He is currently on Vanc and PO doxy  REVIEW OF SYSTEMS:  All other review of systems negative (Comprehensive ROS)    PAST MEDICAL & SURGICAL HISTORY:  Spinal stenosis  Atrial fibrillation, unspecified type  Arthritis  CN (constipation)  BPH (benign prostatic hyperplasia)  GERD (gastroesophageal reflux disease)  Hypothyroidism  Gout  Edema  Dementia  S/P hernia repair      Allergies    penicillin (Unknown)    Intolerances        Antimicrobials Day #    doxycycline hyclate Capsule 100 milliGRAM(s) Oral every 12 hours    Other Medications:  acetaminophen   Tablet .. 650 milliGRAM(s) Oral every 6 hours PRN  allopurinol 100 milliGRAM(s) Oral daily  apixaban 2.5 milliGRAM(s) Oral every 12 hours  famotidine    Tablet 20 milliGRAM(s) Oral daily  finasteride 5 milliGRAM(s) Oral daily  influenza   Vaccine 0.5 milliLiter(s) IntraMuscular once  lactobacillus acidophilus 1 Tablet(s) Oral two times a day  levothyroxine 188 MICROGram(s) Oral daily  melatonin 3 milliGRAM(s) Oral at bedtime  morphine  - Injectable 2 milliGRAM(s) IV Push every 6 hours PRN  oxycodone    5 mG/acetaminophen 325 mG 1 Tablet(s) Oral every 4 hours PRN  polyethylene glycol 3350 17 Gram(s) Oral daily PRN  potassium chloride    Tablet ER 20 milliEquivalent(s) Oral daily  senna 2 Tablet(s) Oral at bedtime  sodium chloride 0.45%. 1000 milliLiter(s) IV Continuous <Continuous>  tamsulosin 0.8 milliGRAM(s) Oral at bedtime  torsemide 40 milliGRAM(s) Oral daily      FAMILY HISTORY:  No pertinent family history in first degree relatives      SOCIAL HISTORY:  Smoking:     ETOH:     Drug Use:     Single     T(F): 98.4 (09-10-20 @ 15:58), Max: 99.1 (09-09-20 @ 20:07)  HR: 77 (09-10-20 @ 15:58)  BP: 102/58 (09-10-20 @ 15:58)  RR: 16 (09-10-20 @ 15:58)  SpO2: 100% (09-10-20 @ 15:58)  Wt(kg): --    PHYSICAL EXAM:  General: alert, no acute distress  Eyes:  anicteric, no conjunctival injection, no discharge  Oropharynx: no lesions or injection 	  Neck: supple, without adenopathy  Lungs: clear to auscultation  Heart: regular rate and rhythm; no murmur, rubs or gallops  Abdomen: soft, nondistended, nontender, without mass or organomegaly  Skin: no lesions  Extremities: LE edema L>R with increased warmth and erythema and tenderness  Neurologic: alert, oriented, moves all extremities    LAB RESULTS:                        10.1   6.94  )-----------( 322      ( 09 Sep 2020 20:15 )             32.6     09-10    142  |  106  |  42<H>  ----------------------------<  87  3.6   |  24  |  1.73<H>    Ca    9.1      10 Sep 2020 06:05  Mg     1.6     09-10    TPro  7.2  /  Alb  2.9<L>  /  TBili  0.2  /  DBili  x   /  AST  12  /  ALT  9<L>  /  AlkPhos  64  09-09    LIVER FUNCTIONS - ( 09 Sep 2020 20:15 )  Alb: 2.9 g/dL / Pro: 7.2 g/dL / ALK PHOS: 64 U/L / ALT: 9 U/L / AST: 12 U/L / GGT: x               MICROBIOLOGY REVIEWED:    RADIOLOGY REVIEWED:  < from: Xray Tibia + Fibula 2 Views, Bilateral (09.09.20 @ 22:31) >    IMPRESSION: No evidence of bony destruction.    Thank you for this referral.    < end of copied text >  < from: Xray Chest 1 View-PORTABLE IMMEDIATE (09.09.20 @ 22:30) >  INTERPRETATION:  AP erect chest on September 9, 2020 at 9:53 PM. Patient has sepsis. Covid virus test was negative on May 2. Patient has cellulitis of the left leg. Patient has bronchitis and abnormal EKG. Patient has renal failure and aortic stenosis. Patient has atrial fibrillation. There is history of bullous for goiter and coronary disease. Patient has stage III chronic renal disease.    Heart is likely enlarged.    There is a minimal left base process that shows improvement from May 4 of this year.    Somewhat prominent interstitial pattern on May 4 also shows improvement.    IMPRESSION: There is an improved lung appearance.    < end of copied text >  < from: US Duplex Venous Lower Ext Complete, Bilateral (09.09.20 @ 22:23) >  IMPRESSION:  No evidence of deep venous thrombosis in either lower extremity.    < end of copied text >

## 2020-09-11 ENCOUNTER — TRANSCRIPTION ENCOUNTER (OUTPATIENT)
Age: 85
End: 2020-09-11

## 2020-09-11 LAB
ANION GAP SERPL CALC-SCNC: 9 MMOL/L — SIGNIFICANT CHANGE UP (ref 5–17)
APPEARANCE UR: CLEAR — SIGNIFICANT CHANGE UP
BILIRUB UR-MCNC: NEGATIVE — SIGNIFICANT CHANGE UP
BUN SERPL-MCNC: 42 MG/DL — HIGH (ref 7–23)
CALCIUM SERPL-MCNC: 8.8 MG/DL — SIGNIFICANT CHANGE UP (ref 8.4–10.5)
CHLORIDE SERPL-SCNC: 108 MMOL/L — SIGNIFICANT CHANGE UP (ref 96–108)
CO2 SERPL-SCNC: 27 MMOL/L — SIGNIFICANT CHANGE UP (ref 22–31)
COLOR SPEC: YELLOW — SIGNIFICANT CHANGE UP
CREAT SERPL-MCNC: 1.5 MG/DL — HIGH (ref 0.5–1.3)
DIFF PNL FLD: NEGATIVE — SIGNIFICANT CHANGE UP
GLUCOSE SERPL-MCNC: 83 MG/DL — SIGNIFICANT CHANGE UP (ref 70–99)
GLUCOSE UR QL: NEGATIVE — SIGNIFICANT CHANGE UP
HCT VFR BLD CALC: 30.8 % — LOW (ref 39–50)
HGB BLD-MCNC: 9.3 G/DL — LOW (ref 13–17)
KETONES UR-MCNC: NEGATIVE — SIGNIFICANT CHANGE UP
LEUKOCYTE ESTERASE UR-ACNC: NEGATIVE — SIGNIFICANT CHANGE UP
MCHC RBC-ENTMCNC: 28.4 PG — SIGNIFICANT CHANGE UP (ref 27–34)
MCHC RBC-ENTMCNC: 30.2 GM/DL — LOW (ref 32–36)
MCV RBC AUTO: 93.9 FL — SIGNIFICANT CHANGE UP (ref 80–100)
NITRITE UR-MCNC: NEGATIVE — SIGNIFICANT CHANGE UP
NRBC # BLD: 0 /100 WBCS — SIGNIFICANT CHANGE UP (ref 0–0)
PH UR: 5 — SIGNIFICANT CHANGE UP (ref 5–8)
PLATELET # BLD AUTO: 266 K/UL — SIGNIFICANT CHANGE UP (ref 150–400)
POTASSIUM SERPL-MCNC: 3.6 MMOL/L — SIGNIFICANT CHANGE UP (ref 3.5–5.3)
POTASSIUM SERPL-SCNC: 3.6 MMOL/L — SIGNIFICANT CHANGE UP (ref 3.5–5.3)
PROT UR-MCNC: NEGATIVE — SIGNIFICANT CHANGE UP
RBC # BLD: 3.28 M/UL — LOW (ref 4.2–5.8)
RBC # FLD: 16.8 % — HIGH (ref 10.3–14.5)
SARS-COV-2 IGG SERPL QL IA: NEGATIVE — SIGNIFICANT CHANGE UP
SARS-COV-2 IGM SERPL IA-ACNC: <0.1 INDEX — SIGNIFICANT CHANGE UP
SODIUM SERPL-SCNC: 144 MMOL/L — SIGNIFICANT CHANGE UP (ref 135–145)
SP GR SPEC: 1.01 — SIGNIFICANT CHANGE UP (ref 1.01–1.02)
TSH SERPL-MCNC: 6.65 UIU/ML — HIGH (ref 0.27–4.2)
URATE SERPL-MCNC: 4.1 MG/DL — SIGNIFICANT CHANGE UP (ref 3.4–8.8)
UROBILINOGEN FLD QL: NEGATIVE — SIGNIFICANT CHANGE UP
WBC # BLD: 6.18 K/UL — SIGNIFICANT CHANGE UP (ref 3.8–10.5)
WBC # FLD AUTO: 6.18 K/UL — SIGNIFICANT CHANGE UP (ref 3.8–10.5)

## 2020-09-11 PROCEDURE — 93306 TTE W/DOPPLER COMPLETE: CPT | Mod: 26

## 2020-09-11 PROCEDURE — 99232 SBSQ HOSP IP/OBS MODERATE 35: CPT

## 2020-09-11 RX ORDER — CEPHALEXIN 500 MG
500 CAPSULE ORAL EVERY 12 HOURS
Refills: 0 | Status: DISCONTINUED | OUTPATIENT
Start: 2020-09-11 | End: 2020-09-13

## 2020-09-11 RX ADMIN — Medication 100 MILLIGRAM(S): at 05:34

## 2020-09-11 RX ADMIN — APIXABAN 2.5 MILLIGRAM(S): 2.5 TABLET, FILM COATED ORAL at 05:34

## 2020-09-11 RX ADMIN — OXYCODONE AND ACETAMINOPHEN 1 TABLET(S): 5; 325 TABLET ORAL at 00:30

## 2020-09-11 RX ADMIN — Medication 3 MILLIGRAM(S): at 21:52

## 2020-09-11 RX ADMIN — Medication 500 MILLIGRAM(S): at 18:36

## 2020-09-11 RX ADMIN — FINASTERIDE 5 MILLIGRAM(S): 5 TABLET, FILM COATED ORAL at 12:33

## 2020-09-11 RX ADMIN — Medication 188 MICROGRAM(S): at 05:34

## 2020-09-11 RX ADMIN — Medication 1 TABLET(S): at 05:34

## 2020-09-11 RX ADMIN — TAMSULOSIN HYDROCHLORIDE 0.8 MILLIGRAM(S): 0.4 CAPSULE ORAL at 21:52

## 2020-09-11 RX ADMIN — APIXABAN 2.5 MILLIGRAM(S): 2.5 TABLET, FILM COATED ORAL at 17:24

## 2020-09-11 RX ADMIN — OXYCODONE AND ACETAMINOPHEN 1 TABLET(S): 5; 325 TABLET ORAL at 21:54

## 2020-09-11 RX ADMIN — Medication 1 TABLET(S): at 17:24

## 2020-09-11 RX ADMIN — Medication 100 MILLIGRAM(S): at 12:33

## 2020-09-11 RX ADMIN — SENNA PLUS 2 TABLET(S): 8.6 TABLET ORAL at 21:52

## 2020-09-11 RX ADMIN — FAMOTIDINE 20 MILLIGRAM(S): 10 INJECTION INTRAVENOUS at 12:33

## 2020-09-11 RX ADMIN — Medication 40 MILLIGRAM(S): at 05:34

## 2020-09-11 RX ADMIN — Medication 20 MILLIEQUIVALENT(S): at 12:33

## 2020-09-11 NOTE — PROGRESS NOTE ADULT - ASSESSMENT
89y/o M w/ HTN, Afib, DVT on eliquis, PVD, BPH, GERD, Gout, Hypothyroidism, sent from assisted living, because of worsening bilateral leg pain, edema, redness, for past few days, left >right.     #Cellulitis at Left lower ext  #Venous Stasis at lower exts  -Abx ID recs: Vanc DC'd. remains on PO doxy, added Cefazolin IV. Plan to switch to PO keflex if improving   -pt afebrile, no wbc, blood cultures sent; pending results  -elevate legs  -pain mgmt  -continue Torsemide for chronic edema    #Lactic Acidosis  -Improved       #ANTONINA/CKD3  -Improving currently 1.5  -baseline Creat 1.3>1.4  -IVF DC'd  -follow bmp  -avoid nephrotoxins (Vanc)    #Chronic Atrial fib  -rate controlled  -continue Apixaban for AC    DVT ppx:  on Apixaban    Pt has MOLST- DNR 89y/o M w/ HTN, Afib, DVT on eliquis, PVD, BPH, GERD, Gout, Hypothyroidism, sent from assisted living, because of worsening bilateral leg pain, edema, redness, for past few days, left >right.     Plan to DC back to [facility] tomorrow am     #Cellulitis at Left lower ext  #Venous Stasis at lower exts  -Abx ID recs: Transitioned to PO Keflex 500mg bid for 5 more days. Abx day 3 of 8  -pt afebrile, no wbc, blood cultures sent; pending results  -elevate legs  -pain mgmt  -continue Torsemide for chronic edema    #Lactic Acidosis  -Improved       #ANTONINA/CKD3  -Improving currently 1.5  -baseline Creat 1.3>1.4  -IVF DC'd  -follow bmp  -avoid nephrotoxins (Vanc)    #Chronic Atrial fib  -rate controlled  -continue Apixaban for AC    DVT ppx:  on Apixaban    Pt has MOLST- DNR 89y/o M w/ HTN, Afib, DVT on eliquis, PVD, BPH, GERD, Gout, Hypothyroidism, sent from assisted living, because of worsening bilateral leg pain, edema, redness, for past few days, left >right.     Plan to DC back to [facility] tomorrow am     #Cellulitis at Left lower ext  #Venous Stasis at lower exts  -Abx ID recs: Transitioned to PO Keflex 500mg bid for 5 more days. Abx day 3 of 8  -pt afebrile, no wbc, blood cultures sent; pending results  -elevate legs  -pain mgmt  -continue Torsemide for chronic edema    #Lactic Acidosis  -Improved       #ANTONINA/CKD3  -Improving currently 1.5  -baseline Creat 1.3>1.4  -IVF DC'd  -follow bmp  -avoid nephrotoxins (Vanc)  -FU renal US    #Chronic Atrial fib  -rate controlled  -continue Apixaban for AC  TTE: EF 50-55%, Grade I diastolic dysfunction, Severe aortic stenosis    DVT ppx:  on Apixaban    Pt has MOLST- DNR

## 2020-09-11 NOTE — DISCHARGE NOTE PROVIDER - NSDCMRMEDTOKEN_GEN_ALL_CORE_FT
Acidophilus oral capsule: 1 cap(s) orally 2 times a day  allopurinol 300 mg oral tablet: 1 tab(s) orally once a day  apixaban 2.5 mg oral tablet: 1 tab(s) orally every 12 hours for DVT diagnosed in January 2020  azelastine 137 mcg/inh (0.1%) nasal spray: 1 spray(s) nasal 2 times a day, As Needed  famotidine 20 mg oral tablet: 1 tab(s) orally 2 times a day  finasteride 5 mg oral tablet: 1 tab(s) orally once a day  levocetirizine 5 mg oral tablet: 1 tab(s) orally once a day (in the evening)  levothyroxine 100 mcg (0.1 mg) oral tablet: 1 tab(s) orally once a day  levothyroxine 88 mcg (0.088 mg) oral tablet: 1 tab(s) orally once a day  Melatonin 5 mg oral tablet: 1 tab(s) orally once a day (at bedtime)  Multiple Vitamins oral capsule: 1 cap(s) orally once a day  mupirocin 2% topical ointment: Apply topically to affected area every 12 hours, As Needed  polyethylene glycol 3350 oral powder for reconstitution: orally once a day, As Needed  potassium chloride 20 mEq oral tablet, extended release: 1 tab(s) orally 2 times a day  senna oral tablet: 2 tab(s) orally once a day (at bedtime)  silver sulfADIAZINE 1% topical cream: Apply topically to affected area once a day  Slow Magnesium Chloride with Calcium 70 mg-117 mg oral delayed release tablet: 2 tab(s) orally once a day  tamsulosin 0.4 mg oral capsule: 2 cap(s) orally once a day (at bedtime)  torsemide 20 mg oral tablet: 2 tab(s) orally once a day  traMADol 50 mg oral tablet: 1 tab(s) orally 2 times a day -severe pain  Vitamin D3 1000 intl units (25 mcg) oral capsule: orally once a day Acidophilus oral capsule: 1 cap(s) orally 2 times a day  allopurinol 300 mg oral tablet: 1 tab(s) orally once a day  apixaban 2.5 mg oral tablet: 1 tab(s) orally every 12 hours for DVT diagnosed in January 2020  azelastine 137 mcg/inh (0.1%) nasal spray: 1 spray(s) nasal 2 times a day, As Needed  cephalexin 500 mg oral capsule: 1 cap(s) orally every 12 hours  famotidine 20 mg oral tablet: 1 tab(s) orally 2 times a day  finasteride 5 mg oral tablet: 1 tab(s) orally once a day  levocetirizine 5 mg oral tablet: 1 tab(s) orally once a day (in the evening)  levothyroxine 100 mcg (0.1 mg) oral tablet: 1 tab(s) orally once a day  levothyroxine 88 mcg (0.088 mg) oral tablet: 1 tab(s) orally once a day  Melatonin 5 mg oral tablet: 1 tab(s) orally once a day (at bedtime)  Multiple Vitamins oral capsule: 1 cap(s) orally once a day  mupirocin 2% topical ointment: Apply topically to affected area every 12 hours, As Needed  polyethylene glycol 3350 oral powder for reconstitution: orally once a day, As Needed  potassium chloride 20 mEq oral tablet, extended release: 1 tab(s) orally 2 times a day  senna oral tablet: 2 tab(s) orally once a day (at bedtime)  silver sulfADIAZINE 1% topical cream: Apply topically to affected area once a day  Slow Magnesium Chloride with Calcium 70 mg-117 mg oral delayed release tablet: 2 tab(s) orally once a day  tamsulosin 0.4 mg oral capsule: 2 cap(s) orally once a day (at bedtime)  torsemide 20 mg oral tablet: 2 tab(s) orally once a day  traMADol 50 mg oral tablet: 1 tab(s) orally 2 times a day -severe pain  Vitamin D3 1000 intl units (25 mcg) oral capsule: orally once a day

## 2020-09-11 NOTE — DISCHARGE NOTE PROVIDER - HOSPITAL COURSE
Hospital Course    HPI:    89y/o M w/ HTN, Afib, DVT on eliquis, PVD, BPH, GERD, Gout, Hypothyroidism, sent from assisted living, because of worsening bilateral leg pain, edema, redness, for past few days, left >right.  +blister that popped on left ankle, weeping.         Patient started on Vancomycin and Cefazolin, transitioned to oral Keflex 500mg bid. Abx day 5 of 8. Last day of abx 9/16/20. Hospital Course  HPI:  89y/o M w/ HTN, Afib, DVT on eliquis, PVD, BPH, GERD, Gout, Hypothyroidism, sent from assisted living, because of worsening bilateral leg pain, edema, redness, for past few days, left >right.  +blister that popped on left ankle, weeping.     Patient started on Vancomycin and Cefazolin, transitioned to oral Keflex 500mg bid. Abx day 4 of 8. Last day of abx 9/16/20.        Hospital Course  HPI:  89y/o M w/ HTN, Afib, DVT on eliquis, PVD, BPH, GERD, Gout, Hypothyroidism, sent from assisted living, because of worsening bilateral leg pain, edema, redness, for past few days, left >right.  +blister that popped on left ankle, weeping. Pt admitted for lower extremity cellulitis worse on LLE. No elevated WBC, no fever. Blood cultures negative. No evidence of bony destruction on xray. Patient started on Vancomycin and Cefazolin, transitioned to oral Keflex 500mg bid. Abx day 4 of 8. Last day of abx 9/16/20. Continue Torsemide for edema, TTE: EF 50-55%, grade I diastolic dysfunction, moderate to severe aortic stenosis. Renal US: Renal cysts seen. Potassium 3.0 today which was replaced orally. Hgb is also low 9.3. Recommend close monitoring and follow up labs. Pt states he feels like he is at his baseline and is safe to return to [facility]. Hospital Course  HPI:  89y/o M w/ HTN, Afib, DVT on eliquis, PVD, BPH, GERD, Gout, Hypothyroidism, sent from assisted living, because of worsening bilateral leg pain, edema, redness, for past few days, left >right.  +blister that popped on left ankle, weeping. Pt admitted for lower extremity cellulitis worse on LLE. No elevated WBC, no fever. Blood cultures negative. No evidence of bony destruction on xray. Patient started on Vancomycin and Cefazolin, transitioned to oral Keflex 500mg bid. Abx day 5 of 8. Last day of abx 9/16/20. Continue Torsemide for edema, TTE: EF 50-55%, grade I diastolic dysfunction, moderate to severe aortic stenosis. Renal US: Renal cysts seen. Prior hypokalemia corrected by oral replacement, Persistent anemia, Hgb today 9.6. Baseline 10-11.  LE cellulitis improving. Discharge yesterday held up due to [facility] requesting repeat COVID test which was negative again 9/12/20. Pt states he feels like he is at his baseline and is safe to return to [facility].

## 2020-09-11 NOTE — PROGRESS NOTE ADULT - SUBJECTIVE AND OBJECTIVE BOX
CC: f/u for LLE cellulitis     Patient reports that he feels OK he wants to know when he can go     REVIEW OF SYSTEMS:  All other review of systems negative (Comprehensive ROS)      Vital Signs Last 24 Hrs  T(C): 36.4 (11 Sep 2020 05:44), Max: 36.9 (10 Sep 2020 15:58)  T(F): 97.6 (11 Sep 2020 05:44), Max: 98.4 (10 Sep 2020 15:58)  HR: 67 (11 Sep 2020 05:44) (67 - 77)  BP: 121/73 (11 Sep 2020 05:44) (102/58 - 121/73)  BP(mean): --  RR: 16 (11 Sep 2020 05:44) (15 - 16)  SpO2: 98% (11 Sep 2020 05:44) (98% - 100%)    PHYSICAL EXAM:  General: alert, no acute distress  Eyes:  anicteric, no conjunctival injection, no discharge  Oropharynx: no lesions or injection 	  Neck: supple, without adenopathy  Lungs: clear to auscultation  Heart: regular rate and rhythm; no murmur, rubs or gallops  Abdomen: soft, nondistended, nontender, without mass or organomegaly  Skin: no lesions  Extremities: bilateral trace edema, LLE improved erythema   Neurologic: alert, oriented, moves all extremities    LAB RESULTS:                        9.3    6.18  )-----------( 266      ( 11 Sep 2020 06:52 )             30.8     09-11    144  |  108  |  42<H>  ----------------------------<  83  3.6   |  27  |  1.50<H>    Ca    8.8      11 Sep 2020 06:52  Mg     1.6     09-10    TPro  7.2  /  Alb  2.9<L>  /  TBili  0.2  /  DBili  x   /  AST  12  /  ALT  9<L>  /  AlkPhos  64  09-09    LIVER FUNCTIONS - ( 09 Sep 2020 20:15 )  Alb: 2.9 g/dL / Pro: 7.2 g/dL / ALK PHOS: 64 U/L / ALT: 9 U/L / AST: 12 U/L / GGT: x             MICROBIOLOGY:  RECENT CULTURES:  09-09 @ 20:20 .Blood Blood-Peripheral     No growth to date.      09-09 @ 20:15 .Blood Blood-Peripheral     No growth to date.          RADIOLOGY REVIEWED:        Antimicrobials Day #    cephalexin 500 milliGRAM(s) Oral every 12 hours    Other Medications Reviewed

## 2020-09-11 NOTE — PROGRESS NOTE ADULT - SUBJECTIVE AND OBJECTIVE BOX
Resting    Vital Signs Last 24 Hrs  T(C): 37.2 (20 @ 15:58), Max: 37.2 (20 @ 15:58)  T(F): 98.9 (20 @ 15:58), Max: 98.9 (20 @ 15:58)  HR: 78 (20 @ 15:58) (67 - 78)  BP: 124/55 (20 @ 15:58) (112/53 - 124/55)  RR: 16 (20 @ 15:58) (15 - 16)  SpO2: 96% (20 @ 15:58) (96% - 98%)    s1s2  b/l air entry  soft, ND  + edema, erythema b/l LE                        9.3    6.18  )-----------( 266      ( 11 Sep 2020 06:52 )             30.8     11 Sep 2020 06:52    144    |  108    |  42     ----------------------------<  83     3.6     |  27     |  1.50     Ca    8.8        11 Sep 2020 06:52  Mg     1.6       10 Sep 2020 06:05    TPro  7.2    /  Alb  2.9    /  TBili  0.2    /  DBili  x      /  AST  12     /  ALT  9      /  AlkPhos  64     09 Sep 2020 20:15    LIVER FUNCTIONS - ( 09 Sep 2020 20:15 )  Alb: 2.9 g/dL / Pro: 7.2 g/dL / ALK PHOS: 64 U/L / ALT: 9 U/L / AST: 12 U/L / GGT: x           PT/INR - ( 09 Sep 2020 20:15 )   PT: 15.3 sec;   INR: 1.28 ratio      Urinalysis Basic - ( 11 Sep 2020 16:52 )    Color: Yellow / Appearance: Clear / S.010 / pH: x  Gluc: x / Ketone: Negative  / Bili: Negative / Urobili: Negative   Blood: x / Protein: Negative / Nitrite: Negative   Leuk Esterase: Negative / RBC: x / WBC x   Sq Epi: x / Non Sq Epi: x / Bacteria: x    acetaminophen   Tablet .. 650 milliGRAM(s) Oral every 6 hours PRN  allopurinol 100 milliGRAM(s) Oral daily  apixaban 2.5 milliGRAM(s) Oral every 12 hours  cephalexin 500 milliGRAM(s) Oral every 12 hours  famotidine    Tablet 20 milliGRAM(s) Oral daily  finasteride 5 milliGRAM(s) Oral daily  influenza   Vaccine 0.5 milliLiter(s) IntraMuscular once  lactobacillus acidophilus 1 Tablet(s) Oral two times a day  levothyroxine 188 MICROGram(s) Oral daily  melatonin 3 milliGRAM(s) Oral at bedtime  morphine  - Injectable 2 milliGRAM(s) IV Push every 6 hours PRN  oxycodone    5 mG/acetaminophen 325 mG 1 Tablet(s) Oral every 4 hours PRN  polyethylene glycol 3350 17 Gram(s) Oral daily PRN  potassium chloride    Tablet ER 20 milliEquivalent(s) Oral daily  senna 2 Tablet(s) Oral at bedtime  tamsulosin 0.8 milliGRAM(s) Oral at bedtime  torsemide 40 milliGRAM(s) Oral daily    A/P:    CKD 3, stable  CHF, severe AS, cardio-renal  Continue Torsemide  Avoid nephrotoxins  UA negaive  Renal SONO w/o hydro  F/u BMP  Consider Cardiology eval    [phone]

## 2020-09-11 NOTE — PROGRESS NOTE ADULT - SUBJECTIVE AND OBJECTIVE BOX
Patient is a 89y old  Male who presents with a chief complaint of leg pain, L>R and increased swelling, redness (11 Sep 2020 10:52)      Patient seen and examined at bedside. Pt states they feel well, denies overnight events or current complaints including chest pain, shortness of breath, dizziness, nausea, vomiting, diarrhea, fever or chills.      ALLERGIES:  penicillin (Unknown)    MEDICATIONS  (STANDING):  allopurinol 100 milliGRAM(s) Oral daily  apixaban 2.5 milliGRAM(s) Oral every 12 hours  cephalexin 500 milliGRAM(s) Oral every 12 hours  famotidine    Tablet 20 milliGRAM(s) Oral daily  finasteride 5 milliGRAM(s) Oral daily  influenza   Vaccine 0.5 milliLiter(s) IntraMuscular once  lactobacillus acidophilus 1 Tablet(s) Oral two times a day  levothyroxine 188 MICROGram(s) Oral daily  melatonin 3 milliGRAM(s) Oral at bedtime  potassium chloride    Tablet ER 20 milliEquivalent(s) Oral daily  senna 2 Tablet(s) Oral at bedtime  tamsulosin 0.8 milliGRAM(s) Oral at bedtime  torsemide 40 milliGRAM(s) Oral daily    MEDICATIONS  (PRN):  acetaminophen   Tablet .. 650 milliGRAM(s) Oral every 6 hours PRN Temp greater or equal to 38C (100.4F), Mild Pain (1 - 3)  morphine  - Injectable 2 milliGRAM(s) IV Push every 6 hours PRN Severe Pain (7 - 10)  oxycodone    5 mG/acetaminophen 325 mG 1 Tablet(s) Oral every 4 hours PRN Moderate Pain (4 - 6)  polyethylene glycol 3350 17 Gram(s) Oral daily PRN Constipation    Vital Signs Last 24 Hrs  T(F): 97.6 (11 Sep 2020 05:44), Max: 98.4 (10 Sep 2020 15:58)  HR: 67 (11 Sep 2020 05:44) (67 - 77)  BP: 121/73 (11 Sep 2020 05:44) (102/58 - 121/73)  RR: 16 (11 Sep 2020 05:44) (15 - 16)  SpO2: 98% (11 Sep 2020 05:44) (98% - 100%)  I&O's Summary    10 Sep 2020 07:01  -  11 Sep 2020 07:00  --------------------------------------------------------  IN: 1700 mL / OUT: 200 mL / NET: 1500 mL    11 Sep 2020 07:01  -  11 Sep 2020 15:39  --------------------------------------------------------  IN: 1000 mL / OUT: 300 mL / NET: 700 mL      PHYSICAL EXAM:  General: NAD, A/O x 2-3  ENT: MMM  Neck: Supple, No JVD  Lungs: Clear to auscultation bilaterally  Cardio: RRR, S1/S2, + murmurs  Abdomen: Soft, Nontender, Nondistended; Bowel sounds present  Extremities: No calf tenderness, +2 edema, erythema L>R bilaterally to lower extremities, mild weeping to LLE    LABS:                        9.3    6.18  )-----------( 266      ( 11 Sep 2020 06:52 )             30.8     09-11    144  |  108  |  42  ----------------------------<  83  3.6   |  27  |  1.50    Ca    8.8      11 Sep 2020 06:52  Mg     1.6     09-10    TPro  7.2  /  Alb  2.9  /  TBili  0.2  /  DBili  x   /  AST  12  /  ALT  9   /  AlkPhos  64  09-09    eGFR if : 44 mL/min/1.73M2 (09-11-20 @ 06:52)  eGFR if Non African American: 38 mL/min/1.73M2 (09-11-20 @ 06:52)    PT/INR - ( 09 Sep 2020 20:15 )   PT: 15.3 sec;   INR: 1.28 ratio         PTT - ( 09 Sep 2020 20:15 )  PTT:40.0 sec  Lactate, Blood: 1.6 mmol/L (09-10 @ 15:27)  Lactate, Blood: 2.5 mmol/L (09-10 @ 06:05)  Lactate, Blood: 1.5 mmol/L (09-09 @ 22:35)  Lactate, Blood: 2.4 mmol/L (09-09 @ 20:15)          TSH 6.65   TSH with FT4 reflex --  Total T3 --          Culture - Blood (collected 09 Sep 2020 20:20)  Source: .Blood Blood-Peripheral  Preliminary Report (11 Sep 2020 03:02):    No growth to date.    Culture - Blood (collected 09 Sep 2020 20:15)  Source: .Blood Blood-Peripheral  Preliminary Report (11 Sep 2020 03:02):    No growth to date.        RADIOLOGY & ADDITIONAL TESTS:    Care Discussed with Consultants/Other Providers: Patient is a 89y old  Male who presents with a chief complaint of leg pain, L>R and increased swelling, redness (11 Sep 2020 10:52)    Patient seen and examined at bedside. Pt states they feel well, denies overnight events or current complaints including chest pain, shortness of breath, dizziness, nausea, vomiting, diarrhea, fever or chills.    ALLERGIES:  penicillin (Unknown)    MEDICATIONS  (STANDING):  allopurinol 100 milliGRAM(s) Oral daily  apixaban 2.5 milliGRAM(s) Oral every 12 hours  cephalexin 500 milliGRAM(s) Oral every 12 hours  famotidine    Tablet 20 milliGRAM(s) Oral daily  finasteride 5 milliGRAM(s) Oral daily  influenza   Vaccine 0.5 milliLiter(s) IntraMuscular once  lactobacillus acidophilus 1 Tablet(s) Oral two times a day  levothyroxine 188 MICROGram(s) Oral daily  melatonin 3 milliGRAM(s) Oral at bedtime  potassium chloride    Tablet ER 20 milliEquivalent(s) Oral daily  senna 2 Tablet(s) Oral at bedtime  tamsulosin 0.8 milliGRAM(s) Oral at bedtime  torsemide 40 milliGRAM(s) Oral daily    MEDICATIONS  (PRN):  acetaminophen   Tablet .. 650 milliGRAM(s) Oral every 6 hours PRN Temp greater or equal to 38C (100.4F), Mild Pain (1 - 3)  morphine  - Injectable 2 milliGRAM(s) IV Push every 6 hours PRN Severe Pain (7 - 10)  oxycodone    5 mG/acetaminophen 325 mG 1 Tablet(s) Oral every 4 hours PRN Moderate Pain (4 - 6)  polyethylene glycol 3350 17 Gram(s) Oral daily PRN Constipation    Vital Signs Last 24 Hrs  T(F): 97.6 (11 Sep 2020 05:44), Max: 98.4 (10 Sep 2020 15:58)  HR: 67 (11 Sep 2020 05:44) (67 - 77)  BP: 121/73 (11 Sep 2020 05:44) (102/58 - 121/73)  RR: 16 (11 Sep 2020 05:44) (15 - 16)  SpO2: 98% (11 Sep 2020 05:44) (98% - 100%)  I&O's Summary    10 Sep 2020 07:01  -  11 Sep 2020 07:00  --------------------------------------------------------  IN: 1700 mL / OUT: 200 mL / NET: 1500 mL    11 Sep 2020 07:01  -  11 Sep 2020 15:39  --------------------------------------------------------  IN: 1000 mL / OUT: 300 mL / NET: 700 mL      PHYSICAL EXAM:  General: NAD, A/O x 2-3  ENT: MMM  Neck: Supple, No JVD  Lungs: Clear to auscultation bilaterally  Cardio: RRR, S1/S2, + murmurs  Abdomen: Soft, Nontender, Nondistended; Bowel sounds present  Extremities: No calf tenderness, +2 edema, erythema L>R bilaterally to lower extremities, mild weeping to LLE    LABS:                        9.3    6.18  )-----------( 266      ( 11 Sep 2020 06:52 )             30.8     09-11    144  |  108  |  42  ----------------------------<  83  3.6   |  27  |  1.50    Ca    8.8      11 Sep 2020 06:52  Mg     1.6     09-10    TPro  7.2  /  Alb  2.9  /  TBili  0.2  /  DBili  x   /  AST  12  /  ALT  9   /  AlkPhos  64  09-09    eGFR if : 44 mL/min/1.73M2 (09-11-20 @ 06:52)  eGFR if Non African American: 38 mL/min/1.73M2 (09-11-20 @ 06:52)    PT/INR - ( 09 Sep 2020 20:15 )   PT: 15.3 sec;   INR: 1.28 ratio      PTT - ( 09 Sep 2020 20:15 )  PTT:40.0 sec  Lactate, Blood: 1.6 mmol/L (09-10 @ 15:27)  Lactate, Blood: 2.5 mmol/L (09-10 @ 06:05)  Lactate, Blood: 1.5 mmol/L (09-09 @ 22:35)  Lactate, Blood: 2.4 mmol/L (09-09 @ 20:15)    TSH 6.65   TSH with FT4 reflex --  Total T3 --    Culture - Blood (collected 09 Sep 2020 20:20)  Source: .Blood Blood-Peripheral  Preliminary Report (11 Sep 2020 03:02):    No growth to date.    Culture - Blood (collected 09 Sep 2020 20:15)  Source: .Blood Blood-Peripheral  Preliminary Report (11 Sep 2020 03:02):    No growth to date.    RADIOLOGY & ADDITIONAL TESTS:    Care Discussed with Consultants/Other Providers: Patient is a 89y old  Male who presents with a chief complaint of leg pain, L>R and increased swelling, redness (11 Sep 2020 10:52)    Patient seen and examined at bedside. Pt states they feel well, denies overnight events or current complaints including chest pain, shortness of breath, dizziness, nausea, vomiting, diarrhea, fever or chills.    ALLERGIES:  penicillin (Unknown)    MEDICATIONS  (STANDING):  allopurinol 100 milliGRAM(s) Oral daily  apixaban 2.5 milliGRAM(s) Oral every 12 hours  cephalexin 500 milliGRAM(s) Oral every 12 hours  famotidine    Tablet 20 milliGRAM(s) Oral daily  finasteride 5 milliGRAM(s) Oral daily  influenza   Vaccine 0.5 milliLiter(s) IntraMuscular once  lactobacillus acidophilus 1 Tablet(s) Oral two times a day  levothyroxine 188 MICROGram(s) Oral daily  melatonin 3 milliGRAM(s) Oral at bedtime  potassium chloride    Tablet ER 20 milliEquivalent(s) Oral daily  senna 2 Tablet(s) Oral at bedtime  tamsulosin 0.8 milliGRAM(s) Oral at bedtime  torsemide 40 milliGRAM(s) Oral daily    MEDICATIONS  (PRN):  acetaminophen   Tablet .. 650 milliGRAM(s) Oral every 6 hours PRN Temp greater or equal to 38C (100.4F), Mild Pain (1 - 3)  morphine  - Injectable 2 milliGRAM(s) IV Push every 6 hours PRN Severe Pain (7 - 10)  oxycodone    5 mG/acetaminophen 325 mG 1 Tablet(s) Oral every 4 hours PRN Moderate Pain (4 - 6)  polyethylene glycol 3350 17 Gram(s) Oral daily PRN Constipation    Vital Signs Last 24 Hrs  T(F): 97.6 (11 Sep 2020 05:44), Max: 98.4 (10 Sep 2020 15:58)  HR: 67 (11 Sep 2020 05:44) (67 - 77)  BP: 121/73 (11 Sep 2020 05:44) (102/58 - 121/73)  RR: 16 (11 Sep 2020 05:44) (15 - 16)  SpO2: 98% (11 Sep 2020 05:44) (98% - 100%)  I&O's Summary    10 Sep 2020 07:01  -  11 Sep 2020 07:00  --------------------------------------------------------  IN: 1700 mL / OUT: 200 mL / NET: 1500 mL    11 Sep 2020 07:01  -  11 Sep 2020 15:39  --------------------------------------------------------  IN: 1000 mL / OUT: 300 mL / NET: 700 mL      PHYSICAL EXAM:  General: NAD, A/O x 2-3  ENT: MMM  Neck: Supple, No JVD  Lungs: Clear to auscultation bilaterally  Cardio: RRR, S1/S2, + murmurs  Abdomen: Soft, Nontender, Nondistended; Bowel sounds present  Extremities: No calf tenderness, +2 edema, erythema L>R bilaterally to lower extremities, mild weeping to LLE    LABS:                        9.3    6.18  )-----------( 266      ( 11 Sep 2020 06:52 )             30.8     09-11    144  |  108  |  42  ----------------------------<  83  3.6   |  27  |  1.50    Ca    8.8      11 Sep 2020 06:52  Mg     1.6     09-10    TPro  7.2  /  Alb  2.9  /  TBili  0.2  /  DBili  x   /  AST  12  /  ALT  9   /  AlkPhos  64  09-09    eGFR if : 44 mL/min/1.73M2 (09-11-20 @ 06:52)  eGFR if Non African American: 38 mL/min/1.73M2 (09-11-20 @ 06:52)    PT/INR - ( 09 Sep 2020 20:15 )   PT: 15.3 sec;   INR: 1.28 ratio      PTT - ( 09 Sep 2020 20:15 )  PTT:40.0 sec  Lactate, Blood: 1.6 mmol/L (09-10 @ 15:27)  Lactate, Blood: 2.5 mmol/L (09-10 @ 06:05)  Lactate, Blood: 1.5 mmol/L (09-09 @ 22:35)  Lactate, Blood: 2.4 mmol/L (09-09 @ 20:15)    TSH 6.65   TSH with FT4 reflex --  Total T3 --    Culture - Blood (collected 09 Sep 2020 20:20)  Source: .Blood Blood-Peripheral  Preliminary Report (11 Sep 2020 03:02):    No growth to date.    Culture - Blood (collected 09 Sep 2020 20:15)  Source: .Blood Blood-Peripheral  Preliminary Report (11 Sep 2020 03:02):    No growth to date.    RADIOLOGY & ADDITIONAL TESTS:    Care Discussed with Consultants/Other Providers:       < from: TTE Echo Complete w/o Contrast w/ Doppler (09.11.20 @ 10:37) >    Summary:   1. Left ventricular ejection fraction, by visual estimation, is 50 to 55%.   2. Normal global left ventricular systolic function.   3. Spectral Doppler showsimpaired relaxation pattern of left ventricular myocardial filling (Grade I diastolic dysfunction).   4. Normal left atrial size.   5. Normal right atrial size.   6. Mild mitral valve regurgitation.   7. Mild thickening and calcification of the anterior and posterior mitral valve leaflets.   8. Moderate mitral annular calcification.   9. Mild tricuspid regurgitation.  10. Mild aortic regurgitation.  11. Moderate to severe aortic valve stenosis.  12. Estimated pulmonary artery systolic pressure is42.2 mmHg assuming a right atrial pressure of 10 mmHg, which is consistent with mild pulmonary hypertension.  13. Pulmonary hypertension is present.  14. LA volume Index is 39.9 ml/m² ml/m2.  15. Peak transaortic gradient equals 63.5 mmHg, mean transaortic gradient equals 27.4 mmHg, the calculated aortic valve area equals 0.93 cm² by the continuity equation consistent with severe aortic stenosis.    [id number] Pan Patel MD,[facility] , Electronically signed on 9/11/2020 at 1:13:21 PM            *** Final ***    < end of copied text >    < from: US Renal (09.10.20 @ 20:47) >  INTERPRETATION:  Renal failure.    Bilateral renal ultrasound.  Right kidney 11.5 the left 11.6 cm long dimension. No hydronephrosis shadowing calculus or solid renal mass bilaterally. Multiple bilateral cortical cysts are noted the largest on the left measuring up to 3.8 cm.    Impression:    No post renal obstruction. Bilateral renal cysts.                GERSON GARCIA M.D., ATTENDING RADIOLOGIST  This document has been electronically signed. Sep 10 2020  8:52PM    < end of copied text >

## 2020-09-11 NOTE — PROGRESS NOTE ADULT - ASSESSMENT
89y male with hx Afib, HTN, BPH, hypothyroidism, he also has history of longstanding leg edema and pemphigoid, scale, blisters. He was previously at  for concerns of cellulitis R leg, but afebrile, normal WBC, all c/w stasis and prior L LE erythema treated with PO Keflex. He has allergies to PCN?, but tolerated Ceph in a past. He is now sent from assisted living, because of worsening bilateral leg pain, edema, redness, for past few days, left >right.  +blister that popped on left ankle, weeping.  Pt denies fever, chest pain, dyspnea, abd pain, diarrhea.   flow sheets reviewed he is afebrile, his wbc is wnl   micro reviewed and his blood cultures are no growth to date     PLAN:  day 3 of antibiotics   LLE improved erythema, can continue antibiotic for 5 more days, he has been changed to Keflex 500 mg po bid   reviewed treatment plan with hospitalist taking care of the patient   local skin care and keep legs elevated, chronic edema management

## 2020-09-11 NOTE — DISCHARGE NOTE PROVIDER - NSDCFUSCHEDAPPT_GEN_ALL_CORE_FT
AME PEREZ ; 10/01/2020 ; [facility]   AME PEREZ ; 10/01/2020 ; [facility]

## 2020-09-11 NOTE — PROGRESS NOTE ADULT - ATTENDING COMMENTS
I have personally seen and examined patient on the above date. I discussed the case with KATHIE Diamond and I agree with the findings and plan as detailed per note above, which I have amended where appropriate.

## 2020-09-11 NOTE — DISCHARGE NOTE PROVIDER - NSDCHHBASESERVICE_GEN_ALL_CORE
Render Number Of Lesions Treated: no Detail Level: Zone Acne Type: Comedonal Lesions Prep Text (Optional): Prior to removal the treatment areas were prepped in the usual fashion.\\nAcne Surgery with Shahid Acleara\\nPE Indication: improvement of open and closed comedones, papules, pustules, acne cysts\\nTreatment #\\nSite(s):\\nenergy:\\nvac level:\\npulse mode:\\npulse width/ms:\\nTotal Passes:\\n\\nAcne surgery with Lancet and Comedone Extractor:   Yes\\nIntralesional Injections with Kenalog preparation:      No                                          \\nConcentration: N/A\\nTotal Volume: N/A\\nProducts Applied: Pore Therapy and Cetaphil Oil Control Moisturizer w/SPF30\\n\\n\\Kim indications, treatment expectations (including management of any possible irritations), protocols, risks and benefits, pre/post care are reviewed. Details of these can be found on the appropriate attached informed consent documentation. Patient understands that multiple treatments may be necessary for optimum results and that ongoing maintenance with at-home products and additional office visits or treatments may be needed to enhance and extend the desired results.\\nStandard protocol was done. Reviewed with patient that ILS injections as well as manual extractions may be recommended for optimum results. These will be considered prn.  Following treatment, the expected mild erythema was observed. Patient tolerated the procedure well without immediate bruising or complication. Post care was reviewed.\\nRTC:\\n Post-Care Instructions: I reviewed with the patient in detail post-care instructions. Patient is to keep the treatment areaas dry overnight, and then apply bacitracin twice daily until healed. Patient may apply hydrogen peroxide soaks to remove any crusting. Consent was obtained and risks were reviewed including but not limited to scarring, infection, bleeding, scabbing, incomplete removal, and allergy to anesthesia. Extraction Method: lancet and extractor Physical therapy

## 2020-09-11 NOTE — DISCHARGE NOTE PROVIDER - NSDCCPCAREPLAN_GEN_ALL_CORE_FT
PRINCIPAL DISCHARGE DIAGNOSIS  Diagnosis: Cellulitis of anterior lower leg  Assessment and Plan of Treatment:

## 2020-09-11 NOTE — DISCHARGE NOTE PROVIDER - CARE PROVIDER_API CALL
Olvin Hernandez  INTERNAL MEDICINE  [address] [place], [place] [address]  Phone: [phone]  Fax: [phone]  Follow Up Time:

## 2020-09-11 NOTE — PHYSICAL THERAPY INITIAL EVALUATION ADULT - ADDITIONAL COMMENTS
Pt live in the [facility], uses a rolling walker to ambulate, also has a transport chair. pt has a hha to assist w/dressing and other ADL's

## 2020-09-12 LAB
ANION GAP SERPL CALC-SCNC: 7 MMOL/L — SIGNIFICANT CHANGE UP (ref 5–17)
BUN SERPL-MCNC: 44 MG/DL — HIGH (ref 7–23)
CALCIUM SERPL-MCNC: 8.6 MG/DL — SIGNIFICANT CHANGE UP (ref 8.4–10.5)
CHLORIDE SERPL-SCNC: 105 MMOL/L — SIGNIFICANT CHANGE UP (ref 96–108)
CO2 SERPL-SCNC: 30 MMOL/L — SIGNIFICANT CHANGE UP (ref 22–31)
CREAT SERPL-MCNC: 1.51 MG/DL — HIGH (ref 0.5–1.3)
CULTURE RESULTS: SIGNIFICANT CHANGE UP
GLUCOSE SERPL-MCNC: 117 MG/DL — HIGH (ref 70–99)
POTASSIUM SERPL-MCNC: 3 MMOL/L — LOW (ref 3.5–5.3)
POTASSIUM SERPL-SCNC: 3 MMOL/L — LOW (ref 3.5–5.3)
SODIUM SERPL-SCNC: 142 MMOL/L — SIGNIFICANT CHANGE UP (ref 135–145)
SPECIMEN SOURCE: SIGNIFICANT CHANGE UP

## 2020-09-12 PROCEDURE — 99233 SBSQ HOSP IP/OBS HIGH 50: CPT

## 2020-09-12 RX ORDER — CEPHALEXIN 500 MG
1 CAPSULE ORAL
Qty: 8 | Refills: 0
Start: 2020-09-12 | End: 2020-09-15

## 2020-09-12 RX ORDER — POTASSIUM CHLORIDE 20 MEQ
20 PACKET (EA) ORAL ONCE
Refills: 0 | Status: COMPLETED | OUTPATIENT
Start: 2020-09-12 | End: 2020-09-12

## 2020-09-12 RX ADMIN — Medication 650 MILLIGRAM(S): at 05:20

## 2020-09-12 RX ADMIN — APIXABAN 2.5 MILLIGRAM(S): 2.5 TABLET, FILM COATED ORAL at 06:00

## 2020-09-12 RX ADMIN — Medication 650 MILLIGRAM(S): at 06:20

## 2020-09-12 RX ADMIN — Medication 1 TABLET(S): at 06:00

## 2020-09-12 RX ADMIN — OXYCODONE AND ACETAMINOPHEN 1 TABLET(S): 5; 325 TABLET ORAL at 09:20

## 2020-09-12 RX ADMIN — FAMOTIDINE 20 MILLIGRAM(S): 10 INJECTION INTRAVENOUS at 12:40

## 2020-09-12 RX ADMIN — POLYETHYLENE GLYCOL 3350 17 GRAM(S): 17 POWDER, FOR SOLUTION ORAL at 09:20

## 2020-09-12 RX ADMIN — TAMSULOSIN HYDROCHLORIDE 0.8 MILLIGRAM(S): 0.4 CAPSULE ORAL at 21:00

## 2020-09-12 RX ADMIN — Medication 3 MILLIGRAM(S): at 20:59

## 2020-09-12 RX ADMIN — SENNA PLUS 2 TABLET(S): 8.6 TABLET ORAL at 21:00

## 2020-09-12 RX ADMIN — Medication 1 TABLET(S): at 18:50

## 2020-09-12 RX ADMIN — OXYCODONE AND ACETAMINOPHEN 1 TABLET(S): 5; 325 TABLET ORAL at 10:20

## 2020-09-12 RX ADMIN — Medication 650 MILLIGRAM(S): at 21:00

## 2020-09-12 RX ADMIN — APIXABAN 2.5 MILLIGRAM(S): 2.5 TABLET, FILM COATED ORAL at 18:50

## 2020-09-12 RX ADMIN — Medication 500 MILLIGRAM(S): at 18:50

## 2020-09-12 RX ADMIN — Medication 20 MILLIEQUIVALENT(S): at 13:55

## 2020-09-12 RX ADMIN — Medication 650 MILLIGRAM(S): at 22:00

## 2020-09-12 RX ADMIN — FINASTERIDE 5 MILLIGRAM(S): 5 TABLET, FILM COATED ORAL at 12:40

## 2020-09-12 RX ADMIN — Medication 188 MICROGRAM(S): at 06:00

## 2020-09-12 RX ADMIN — Medication 40 MILLIGRAM(S): at 06:00

## 2020-09-12 RX ADMIN — Medication 20 MILLIEQUIVALENT(S): at 12:40

## 2020-09-12 RX ADMIN — Medication 100 MILLIGRAM(S): at 12:40

## 2020-09-12 RX ADMIN — Medication 500 MILLIGRAM(S): at 06:00

## 2020-09-12 RX ADMIN — OXYCODONE AND ACETAMINOPHEN 1 TABLET(S): 5; 325 TABLET ORAL at 07:30

## 2020-09-12 NOTE — PROGRESS NOTE ADULT - ATTENDING COMMENTS
I have personally seen and examined patient on the above date.  I discussed the case with KATHIE Diamond and I agree with findings and plan as detailed per note above, which I have amended where appropriate.  Hypokalemia noted, add Mg to labs. Agree with remainder of treatment plan as outlined above.

## 2020-09-12 NOTE — PROGRESS NOTE ADULT - ASSESSMENT
89y/o M w/ HTN, Afib, DVT on eliquis, PVD, BPH, GERD, Gout, Hypothyroidism, sent from assisted living, because of worsening bilateral leg pain, edema, redness, for past few days, left >right.     Plan to DC back to [facility] tomorrow am     #Cellulitis at Left lower ext  #Venous Stasis at lower exts  -Abx ID recs: Transitioned to PO Keflex 500mg bid for 5 more days. Abx day 4 of 8  -pt afebrile, no wbc, blood cultures negative  -elevate legs  -pain mgmt  -continue Torsemide for chronic edema    #Lactic Acidosis  -Improved     #Hypokalemia  -replete K+    #ANTONINA/CKD3  -Improving currently 1.5  -baseline Creat 1.3>1.4  -IVF DC'd  -follow bmp  -avoid nephrotoxins (Vanc)  -FU renal US    #Chronic Atrial fib  -rate controlled  -continue Apixaban for AC  TTE: EF 50-55%, Grade I diastolic dysfunction, Severe aortic stenosis    DVT ppx:  on Apixaban    Pt has MOLST- DNR   89y/o M w/ HTN, Afib, DVT on eliquis, PVD, BPH, GERD, Gout, Hypothyroidism, sent from assisted living, because of worsening bilateral leg pain, edema, redness, for past few days, left >right.     Plan to DC back to [facility] today    #Cellulitis at Left lower ext  #Venous Stasis at lower exts  -Abx ID recs: Transitioned to PO Keflex 500mg bid for 5 more days. Abx day 4 of 8  -pt afebrile, no wbc, blood cultures negative  -elevate legs  -pain mgmt  -continue Torsemide for chronic edema    #Lactic Acidosis  -Improved     #Hypokalemia  -possibly secondary to Torsemide use  -replete K+    #ANTONINA/CKD3  -Improving currently 1.5  -baseline Creat 1.3>1.4  -IVF DC'd  -follow bmp  -avoid nephrotoxins (Vanc)  -FU renal US    #Chronic Atrial fib  -rate controlled  -continue Apixaban for AC  TTE: EF 50-55%, Grade I diastolic dysfunction, Severe aortic stenosis    DVT ppx:  on Apixaban    Pt has MOLST- DNR

## 2020-09-12 NOTE — PROGRESS NOTE ADULT - SUBJECTIVE AND OBJECTIVE BOX
Patient is a 89y old  Male who presents with a chief complaint of leg pain, L>R and increased swelling, redness (11 Sep 2020 18:37)      Patient seen and examined at bedside. Pt states they feel well, denies overnight events or current complaints including chest pain, shortness of breath, dizziness, nausea, vomiting, diarrhea, fever or chills.      ALLERGIES:  penicillin (Unknown)    MEDICATIONS  (STANDING):  allopurinol 100 milliGRAM(s) Oral daily  apixaban 2.5 milliGRAM(s) Oral every 12 hours  cephalexin 500 milliGRAM(s) Oral every 12 hours  famotidine    Tablet 20 milliGRAM(s) Oral daily  finasteride 5 milliGRAM(s) Oral daily  influenza   Vaccine 0.5 milliLiter(s) IntraMuscular once  lactobacillus acidophilus 1 Tablet(s) Oral two times a day  levothyroxine 188 MICROGram(s) Oral daily  melatonin 3 milliGRAM(s) Oral at bedtime  potassium chloride    Tablet ER 20 milliEquivalent(s) Oral daily  senna 2 Tablet(s) Oral at bedtime  tamsulosin 0.8 milliGRAM(s) Oral at bedtime  torsemide 40 milliGRAM(s) Oral daily    MEDICATIONS  (PRN):  acetaminophen   Tablet .. 650 milliGRAM(s) Oral every 6 hours PRN Temp greater or equal to 38C (100.4F), Mild Pain (1 - 3)  morphine  - Injectable 2 milliGRAM(s) IV Push every 6 hours PRN Severe Pain (7 - 10)  oxycodone    5 mG/acetaminophen 325 mG 1 Tablet(s) Oral every 4 hours PRN Moderate Pain (4 - 6)  polyethylene glycol 3350 17 Gram(s) Oral daily PRN Constipation    Vital Signs Last 24 Hrs  T(F): 97.7 (12 Sep 2020 04:55), Max: 98.9 (11 Sep 2020 15:58)  HR: 68 (12 Sep 2020 04:55) (68 - 78)  BP: 98/46 (12 Sep 2020 04:55) (98/46 - 125/57)  RR: 16 (12 Sep 2020 04:55) (16 - 19)  SpO2: 99% (12 Sep 2020 04:55) (96% - 100%)  I&O's Summary    11 Sep 2020 07:01  -  12 Sep 2020 07:00  --------------------------------------------------------  IN: 1000 mL / OUT: 300 mL / NET: 700 mL    12 Sep 2020 07:01  -  12 Sep 2020 11:49  --------------------------------------------------------  IN: 240 mL / OUT: 0 mL / NET: 240 mL    PHYSICAL EXAM:  General: NAD, A/O x 2-3  ENT: MMM  Neck: Supple, No JVD  Lungs: Clear to auscultation bilaterally  Cardio: RRR, S1/S2, + murmur  Abdomen: Soft, Nontender, Nondistended; Bowel sounds present  Extremities: No calf tenderness, +2 edema, Cellulitis improving, erythema L>R bilaterally to lower extremities, mild weeping to LLE    LABS:                        9.3    6.18  )-----------( 266      ( 11 Sep 2020 06:52 )             30.8         142  |  105  |  44  ----------------------------<  117  3.0   |  30  |  1.51    Ca    8.6      12 Sep 2020 07:52  Mg     1.6     09-10    TPro  7.2  /  Alb  2.9  /  TBili  0.2  /  DBili  x   /  AST  12  /  ALT  9   /  AlkPhos  64      eGFR if Non African American: 38 mL/min/1.73M2 (20 @ 07:52)  eGFR if : 44 mL/min/1.73M2 (20 @ 07:52)    PT/INR - ( 09 Sep 2020 20:15 )   PT: 15.3 sec;   INR: 1.28 ratio         PTT - ( 09 Sep 2020 20:15 )  PTT:40.0 sec  Lactate, Blood: 1.6 mmol/L (09-10 @ 15:27)  Lactate, Blood: 2.5 mmol/L (09-10 @ 06:05)  Lactate, Blood: 1.5 mmol/L ( @ 22:35)  Lactate, Blood: 2.4 mmol/L ( @ 20:15)          TSH 6.65   TSH with FT4 reflex --  Total T3 --    Urinalysis Basic - ( 11 Sep 2020 16:52 )    Color: Yellow / Appearance: Clear / S.010 / pH: x  Gluc: x / Ketone: Negative  / Bili: Negative / Urobili: Negative   Blood: x / Protein: Negative / Nitrite: Negative   Leuk Esterase: Negative / RBC: x / WBC x   Sq Epi: x / Non Sq Epi: x / Bacteria: x        Culture - Blood (collected 09 Sep 2020 20:20)  Source: .Blood Blood-Peripheral  Preliminary Report (11 Sep 2020 03:02):    No growth to date.    Culture - Blood (collected 09 Sep 2020 20:15)  Source: .Blood Blood-Peripheral  Preliminary Report (11 Sep 2020 03:02):    No growth to date.        RADIOLOGY & ADDITIONAL TESTS:    Care Discussed with Consultants/Other Providers:

## 2020-09-13 ENCOUNTER — TRANSCRIPTION ENCOUNTER (OUTPATIENT)
Age: 85
End: 2020-09-13

## 2020-09-13 VITALS
TEMPERATURE: 99 F | OXYGEN SATURATION: 100 % | SYSTOLIC BLOOD PRESSURE: 119 MMHG | RESPIRATION RATE: 16 BRPM | HEART RATE: 65 BPM | DIASTOLIC BLOOD PRESSURE: 48 MMHG

## 2020-09-13 LAB
ANION GAP SERPL CALC-SCNC: 7 MMOL/L — SIGNIFICANT CHANGE UP (ref 5–17)
BUN SERPL-MCNC: 42 MG/DL — HIGH (ref 7–23)
CALCIUM SERPL-MCNC: 9.2 MG/DL — SIGNIFICANT CHANGE UP (ref 8.4–10.5)
CHLORIDE SERPL-SCNC: 106 MMOL/L — SIGNIFICANT CHANGE UP (ref 96–108)
CO2 SERPL-SCNC: 30 MMOL/L — SIGNIFICANT CHANGE UP (ref 22–31)
CREAT SERPL-MCNC: 1.3 MG/DL — SIGNIFICANT CHANGE UP (ref 0.5–1.3)
GLUCOSE SERPL-MCNC: 93 MG/DL — SIGNIFICANT CHANGE UP (ref 70–99)
HCT VFR BLD CALC: 31.6 % — LOW (ref 39–50)
HGB BLD-MCNC: 9.6 G/DL — LOW (ref 13–17)
MAGNESIUM SERPL-MCNC: 1.7 MG/DL — SIGNIFICANT CHANGE UP (ref 1.6–2.6)
MCHC RBC-ENTMCNC: 28.2 PG — SIGNIFICANT CHANGE UP (ref 27–34)
MCHC RBC-ENTMCNC: 30.4 GM/DL — LOW (ref 32–36)
MCV RBC AUTO: 92.9 FL — SIGNIFICANT CHANGE UP (ref 80–100)
NRBC # BLD: 0 /100 WBCS — SIGNIFICANT CHANGE UP (ref 0–0)
PLATELET # BLD AUTO: 265 K/UL — SIGNIFICANT CHANGE UP (ref 150–400)
POTASSIUM SERPL-MCNC: 3.6 MMOL/L — SIGNIFICANT CHANGE UP (ref 3.5–5.3)
POTASSIUM SERPL-SCNC: 3.6 MMOL/L — SIGNIFICANT CHANGE UP (ref 3.5–5.3)
RBC # BLD: 3.4 M/UL — LOW (ref 4.2–5.8)
RBC # FLD: 16.1 % — HIGH (ref 10.3–14.5)
SARS-COV-2 RNA SPEC QL NAA+PROBE: SIGNIFICANT CHANGE UP
SODIUM SERPL-SCNC: 143 MMOL/L — SIGNIFICANT CHANGE UP (ref 135–145)
WBC # BLD: 6.4 K/UL — SIGNIFICANT CHANGE UP (ref 3.8–10.5)
WBC # FLD AUTO: 6.4 K/UL — SIGNIFICANT CHANGE UP (ref 3.8–10.5)

## 2020-09-13 PROCEDURE — 97162 PT EVAL MOD COMPLEX 30 MIN: CPT

## 2020-09-13 PROCEDURE — 93970 EXTREMITY STUDY: CPT

## 2020-09-13 PROCEDURE — 85730 THROMBOPLASTIN TIME PARTIAL: CPT

## 2020-09-13 PROCEDURE — 86769 SARS-COV-2 COVID-19 ANTIBODY: CPT

## 2020-09-13 PROCEDURE — 83735 ASSAY OF MAGNESIUM: CPT

## 2020-09-13 PROCEDURE — 84550 ASSAY OF BLOOD/URIC ACID: CPT

## 2020-09-13 PROCEDURE — 73590 X-RAY EXAM OF LOWER LEG: CPT

## 2020-09-13 PROCEDURE — 85610 PROTHROMBIN TIME: CPT

## 2020-09-13 PROCEDURE — 99285 EMERGENCY DEPT VISIT HI MDM: CPT | Mod: 25

## 2020-09-13 PROCEDURE — 36000 PLACE NEEDLE IN VEIN: CPT

## 2020-09-13 PROCEDURE — 87040 BLOOD CULTURE FOR BACTERIA: CPT

## 2020-09-13 PROCEDURE — 87086 URINE CULTURE/COLONY COUNT: CPT

## 2020-09-13 PROCEDURE — 85027 COMPLETE CBC AUTOMATED: CPT

## 2020-09-13 PROCEDURE — 93005 ELECTROCARDIOGRAM TRACING: CPT

## 2020-09-13 PROCEDURE — U0003: CPT

## 2020-09-13 PROCEDURE — 36415 COLL VENOUS BLD VENIPUNCTURE: CPT

## 2020-09-13 PROCEDURE — 76775 US EXAM ABDO BACK WALL LIM: CPT

## 2020-09-13 PROCEDURE — 83605 ASSAY OF LACTIC ACID: CPT

## 2020-09-13 PROCEDURE — 71045 X-RAY EXAM CHEST 1 VIEW: CPT

## 2020-09-13 PROCEDURE — 80048 BASIC METABOLIC PNL TOTAL CA: CPT

## 2020-09-13 PROCEDURE — 99239 HOSP IP/OBS DSCHRG MGMT >30: CPT

## 2020-09-13 PROCEDURE — 80053 COMPREHEN METABOLIC PANEL: CPT

## 2020-09-13 PROCEDURE — 93306 TTE W/DOPPLER COMPLETE: CPT

## 2020-09-13 PROCEDURE — 85025 COMPLETE CBC W/AUTO DIFF WBC: CPT

## 2020-09-13 PROCEDURE — 84443 ASSAY THYROID STIM HORMONE: CPT

## 2020-09-13 RX ORDER — PETROLATUM,WHITE
1 JELLY (GRAM) TOPICAL
Refills: 0 | Status: DISCONTINUED | OUTPATIENT
Start: 2020-09-13 | End: 2020-09-13

## 2020-09-13 RX ADMIN — Medication 100 MILLIGRAM(S): at 12:04

## 2020-09-13 RX ADMIN — Medication 500 MILLIGRAM(S): at 05:13

## 2020-09-13 RX ADMIN — Medication 40 MILLIGRAM(S): at 05:13

## 2020-09-13 RX ADMIN — FAMOTIDINE 20 MILLIGRAM(S): 10 INJECTION INTRAVENOUS at 12:05

## 2020-09-13 RX ADMIN — APIXABAN 2.5 MILLIGRAM(S): 2.5 TABLET, FILM COATED ORAL at 05:12

## 2020-09-13 RX ADMIN — Medication 1 TABLET(S): at 05:13

## 2020-09-13 RX ADMIN — Medication 650 MILLIGRAM(S): at 10:50

## 2020-09-13 RX ADMIN — Medication 650 MILLIGRAM(S): at 11:30

## 2020-09-13 RX ADMIN — Medication 20 MILLIEQUIVALENT(S): at 12:05

## 2020-09-13 RX ADMIN — Medication 188 MICROGRAM(S): at 05:12

## 2020-09-13 RX ADMIN — FINASTERIDE 5 MILLIGRAM(S): 5 TABLET, FILM COATED ORAL at 12:05

## 2020-09-13 NOTE — PROGRESS NOTE ADULT - ASSESSMENT
89y/o M w/ HTN, Afib, DVT on eliquis, PVD, BPH, GERD, Gout, Hypothyroidism, sent from assisted living, because of worsening bilateral leg pain, edema, redness, for past few days, left >right.     Sylvia refused admission yesterday due to their request for an additional COVID test. COVID negative 9/12. Will DC today     #Cellulitis at Left lower ext  #Venous Stasis at lower exts  -Abx ID recs:  PO Keflex 500mg bid for 5 more days. Abx day 5 of 8  -Cellulitis improving  -pt afebrile, no wbc, blood cultures negative  -elevate legs  -pain mgmt  -continue Torsemide for chronic edema    #Lactic Acidosis  -Improved     #Hypokalemia  -improved  -possibly secondary to Torsemide use  -replete K+    #ANTONINA/CKD3  -Improving currently 1.3  -baseline Creat 1.3>1.4  -IVF DC'd  -follow bmp  -avoid nephrotoxins (Vanc)  -FU renal US    #Chronic Atrial fib  -rate controlled  -continue Apixaban for AC  TTE: EF 50-55%, Grade I diastolic dysfunction, Severe aortic stenosis    DVT ppx:  on Apixaban    Pt has MOLST- DNR

## 2020-09-13 NOTE — PROGRESS NOTE ADULT - SUBJECTIVE AND OBJECTIVE BOX
Patient is a 89y old  Male who presents with a chief complaint of leg pain, L>R and increased swelling, redness       Patient seen and examined at bedside. Pt states they feel well, denies overnight events or current complaints including chest pain, shortness of breath, dizziness, nausea, vomiting, diarrhea, fever or chills.      ALLERGIES:  penicillin (Unknown)    MEDICATIONS  (STANDING):  allopurinol 100 milliGRAM(s) Oral daily  apixaban 2.5 milliGRAM(s) Oral every 12 hours  cephalexin 500 milliGRAM(s) Oral every 12 hours  famotidine    Tablet 20 milliGRAM(s) Oral daily  finasteride 5 milliGRAM(s) Oral daily  influenza   Vaccine 0.5 milliLiter(s) IntraMuscular once  lactobacillus acidophilus 1 Tablet(s) Oral two times a day  levothyroxine 188 MICROGram(s) Oral daily  melatonin 3 milliGRAM(s) Oral at bedtime  petrolatum white Ointment 1 Application(s) Topical two times a day  potassium chloride    Tablet ER 20 milliEquivalent(s) Oral daily  senna 2 Tablet(s) Oral at bedtime  tamsulosin 0.8 milliGRAM(s) Oral at bedtime  torsemide 40 milliGRAM(s) Oral daily    MEDICATIONS  (PRN):  acetaminophen   Tablet .. 650 milliGRAM(s) Oral every 6 hours PRN Temp greater or equal to 38C (100.4F), Mild Pain (1 - 3)  morphine  - Injectable 2 milliGRAM(s) IV Push every 6 hours PRN Severe Pain (7 - 10)  oxycodone    5 mG/acetaminophen 325 mG 1 Tablet(s) Oral every 4 hours PRN Moderate Pain (4 - 6)  polyethylene glycol 3350 17 Gram(s) Oral daily PRN Constipation    Vital Signs Last 24 Hrs  T(F): 97.7 (13 Sep 2020 05:25), Max: 98.2 (12 Sep 2020 13:30)  HR: 74 (13 Sep 2020 05:25) (73 - 81)  BP: 123/60 (13 Sep 2020 05:25) (92/45 - 125/61)  RR: 16 (13 Sep 2020 05:25) (16 - 16)  SpO2: 99% (13 Sep 2020 05:25) (99% - 100%)  I&O's Summary    12 Sep 2020 07:01  -  13 Sep 2020 07:00  --------------------------------------------------------  IN: 240 mL / OUT: 500 mL / NET: -260 mL      PHYSICAL EXAM:  General: NAD, A/O x 2-3  ENT: MMM  Neck: Supple, No JVD  Lungs: Clear to auscultation bilaterally  Cardio: RRR, S1/S2, + murmurs  Abdomen: Soft, Nontender, Nondistended; Bowel sounds present  Extremities: Improving cellulitis to lower extremities, mild erythema bilaterally, No calf tenderness, improving +2 pitting LE edema     LABS:                        9.6    6.40  )-----------( 265      ( 13 Sep 2020 05:40 )             31.6         142  |  105  |  44  ----------------------------<  117  3.0   |  30  |  1.51    Ca    8.6      12 Sep 2020 07:52      eGFR if Non African American: 38 mL/min/1.73M2 (20 @ 07:52)  eGFR if : 44 mL/min/1.73M2 (20 @ 07:52)      Lactate, Blood: 1.6 mmol/L (09-10 @ 15:27)      TSH 6.65   TSH with FT4 reflex --  Total T3 --          Urinalysis Basic - ( 11 Sep 2020 16:52 )    Color: Yellow / Appearance: Clear / S.010 / pH: x  Gluc: x / Ketone: Negative  / Bili: Negative / Urobili: Negative   Blood: x / Protein: Negative / Nitrite: Negative   Leuk Esterase: Negative / RBC: x / WBC x   Sq Epi: x / Non Sq Epi: x / Bacteria: x        Culture - Urine (collected 11 Sep 2020 16:00)  Source: .Urine Clean Catch (Midstream)  Final Report (12 Sep 2020 18:28):    <10,000 CFU/mL Normal Urogenital Loraine    Culture - Blood (collected 09 Sep 2020 20:20)  Source: .Blood Blood-Peripheral  Preliminary Report (11 Sep 2020 03:02):    No growth to date.    Culture - Blood (collected 09 Sep 2020 20:15)  Source: .Blood Blood-Peripheral  Preliminary Report (11 Sep 2020 03:02):    No growth to date.        RADIOLOGY & ADDITIONAL TESTS:    Care Discussed with Consultants/Other Providers:

## 2020-09-13 NOTE — PROGRESS NOTE ADULT - SUBJECTIVE AND OBJECTIVE BOX
CC: f/u for LLE cellulitis     Patient without complaints, he want to know when he is leaving     REVIEW OF SYSTEMS:  All other review of systems negative (Comprehensive ROS)      Vital Signs Last 24 Hrs  T(C): 36.5 (13 Sep 2020 05:25), Max: 36.8 (12 Sep 2020 13:30)  T(F): 97.7 (13 Sep 2020 05:25), Max: 98.2 (12 Sep 2020 13:30)  HR: 74 (13 Sep 2020 05:25) (73 - 81)  BP: 123/60 (13 Sep 2020 05:25) (92/45 - 125/61)  BP(mean): --  RR: 16 (13 Sep 2020 05:25) (16 - 16)  SpO2: 99% (13 Sep 2020 05:25) (99% - 100%)    PHYSICAL EXAM:  General: alert, no acute distress  Eyes:  anicteric, no conjunctival injection, no discharge  Oropharynx: no lesions or injection 	  Neck: supple, without adenopathy  Lungs: clear to auscultation  Heart: regular rate and rhythm; no murmur, rubs or gallops  Abdomen: soft, nondistended, nontender, without mass or organomegaly  Skin: no lesions  Extremities: bilateral trace edema, LLE erythema improved and has venous stasis changes, dry skin  Neurologic: alert, oriented, moves all extremities    LAB RESULTS:                        9.6    6.40  )-----------( 265      ( 13 Sep 2020 05:40 )             31.6                09-13    143  |  106  |  42<H>  ----------------------------<  93  3.6   |  30  |  1.30    Ca    9.2      13 Sep 2020 05:40  Mg     1.7     09-13        MICROBIOLOGY:  RECENT CULTURES:  09-09 @ 20:20 .Blood Blood-Peripheral     No growth to date.      09-09 @ 20:15 .Blood Blood-Peripheral     No growth to date.          RADIOLOGY REVIEWED:        Antimicrobials Day #    cephalexin 500 milliGRAM(s) Oral every 12 hours    Other Medications Reviewed

## 2020-09-13 NOTE — PROGRESS NOTE ADULT - ASSESSMENT
89y male with hx Afib, HTN, BPH, hypothyroidism, he also has history of longstanding leg edema and pemphigoid, scale, blisters. He was previously at  for concerns of cellulitis R leg, but afebrile, normal WBC, all c/w stasis and prior L LE erythema treated with PO Keflex. He has allergies to PCN?, but tolerated Ceph in a past. He is now sent from assisted living, because of worsening bilateral leg pain, edema, redness, for past few days, left >right.  +blister that popped on left ankle, weeping.  Pt denies fever, chest pain, dyspnea, abd pain, diarrhea.   flow sheets reviewed he is afebrile, his wbc is wnl   micro reviewed and his blood cultures are no growth to date     PLAN:  day 5 of antibiotics   continue Keflex for three more days as planned for cellulitis which has much improved   continue supportive care as per Hospitalist

## 2020-09-13 NOTE — PROGRESS NOTE ADULT - ATTENDING COMMENTS
I have personally seen and examined patient on the above date.  I discussed the case with KATHIE Diamond and I agree with findings and plan as detailed per note above, which I have amended where appropriate.

## 2020-09-13 NOTE — DISCHARGE NOTE NURSING/CASE MANAGEMENT/SOCIAL WORK - PATIENT PORTAL LINK FT
You can access the FollowMyHealth Patient Portal offered by [facility] by registering at the following website: [email or web address]. By joining [employer]’s FollowMyHealth portal, you will also be able to view your health information using other applications (apps) compatible with our system.

## 2020-10-01 ENCOUNTER — APPOINTMENT (OUTPATIENT)
Dept: ORTHOPEDIC SURGERY | Facility: CLINIC | Age: 85
End: 2020-10-01

## 2020-10-31 NOTE — ED ADULT TRIAGE NOTE - TEMPERATURE IN FAHRENHEIT (DEGREES F)
Pt came to the ER for abd pain RLQ and right shoulder pain. Pt has been asssessed by Dr. Austyn Mills      6297: I have reviewed discharge instructions with the patient. The patient verbalized understanding.
Radiology called at this time about US ABD
98.8

## 2020-12-15 PROBLEM — J06.9 ACUTE URI: Status: RESOLVED | Noted: 2017-04-14 | Resolved: 2020-12-15

## 2021-01-28 NOTE — PHYSICAL THERAPY INITIAL EVALUATION ADULT - SITTING BALANCE: DYNAMIC
Pt pw cr 1.5   unknown bs but pt doesn't recall problems with kidneys in the past   will send urine lytes  will start on gentle hydration fu am bmp
good balance

## 2021-02-17 ENCOUNTER — EMERGENCY (EMERGENCY)
Facility: HOSPITAL | Age: 86
LOS: 1 days | Discharge: HOME | End: 2021-02-17
Attending: EMERGENCY MEDICINE | Admitting: EMERGENCY MEDICINE
Payer: MEDICARE

## 2021-02-17 VITALS
HEIGHT: 70 IN | RESPIRATION RATE: 19 BRPM | TEMPERATURE: 99 F | DIASTOLIC BLOOD PRESSURE: 69 MMHG | SYSTOLIC BLOOD PRESSURE: 148 MMHG | WEIGHT: 154.98 LBS | OXYGEN SATURATION: 98 % | HEART RATE: 88 BPM

## 2021-02-17 VITALS
RESPIRATION RATE: 17 BRPM | OXYGEN SATURATION: 97 % | DIASTOLIC BLOOD PRESSURE: 71 MMHG | HEART RATE: 83 BPM | SYSTOLIC BLOOD PRESSURE: 111 MMHG

## 2021-02-17 DIAGNOSIS — Z98.890 OTHER SPECIFIED POSTPROCEDURAL STATES: Chronic | ICD-10-CM

## 2021-02-17 LAB
ALBUMIN SERPL ELPH-MCNC: 3.4 G/DL — SIGNIFICANT CHANGE UP (ref 3.3–5)
ALP SERPL-CCNC: 82 U/L — SIGNIFICANT CHANGE UP (ref 40–120)
ALT FLD-CCNC: 26 U/L — SIGNIFICANT CHANGE UP (ref 10–45)
ANION GAP SERPL CALC-SCNC: 8 MMOL/L — SIGNIFICANT CHANGE UP (ref 5–17)
APPEARANCE UR: CLEAR — SIGNIFICANT CHANGE UP
AST SERPL-CCNC: 20 U/L — SIGNIFICANT CHANGE UP (ref 10–40)
BACTERIA # UR AUTO: ABNORMAL /HPF
BASOPHILS # BLD AUTO: 0.03 K/UL — SIGNIFICANT CHANGE UP (ref 0–0.2)
BASOPHILS NFR BLD AUTO: 0.5 % — SIGNIFICANT CHANGE UP (ref 0–2)
BILIRUB SERPL-MCNC: 0.7 MG/DL — SIGNIFICANT CHANGE UP (ref 0.2–1.2)
BILIRUB UR-MCNC: NEGATIVE — SIGNIFICANT CHANGE UP
BUN SERPL-MCNC: 57 MG/DL — HIGH (ref 7–23)
CALCIUM SERPL-MCNC: 9.8 MG/DL — SIGNIFICANT CHANGE UP (ref 8.4–10.5)
CHLORIDE SERPL-SCNC: 98 MMOL/L — SIGNIFICANT CHANGE UP (ref 96–108)
CO2 SERPL-SCNC: 35 MMOL/L — HIGH (ref 22–31)
COLOR SPEC: YELLOW — SIGNIFICANT CHANGE UP
CREAT SERPL-MCNC: 1.69 MG/DL — HIGH (ref 0.5–1.3)
DIFF PNL FLD: NEGATIVE — SIGNIFICANT CHANGE UP
EOSINOPHIL # BLD AUTO: 0.02 K/UL — SIGNIFICANT CHANGE UP (ref 0–0.5)
EOSINOPHIL NFR BLD AUTO: 0.3 % — SIGNIFICANT CHANGE UP (ref 0–6)
EPI CELLS # UR: SIGNIFICANT CHANGE UP
GLUCOSE SERPL-MCNC: 112 MG/DL — HIGH (ref 70–99)
GLUCOSE UR QL: NEGATIVE — SIGNIFICANT CHANGE UP
HCT VFR BLD CALC: 41.3 % — SIGNIFICANT CHANGE UP (ref 39–50)
HGB BLD-MCNC: 13 G/DL — SIGNIFICANT CHANGE UP (ref 13–17)
IMM GRANULOCYTES NFR BLD AUTO: 0.3 % — SIGNIFICANT CHANGE UP (ref 0–1.5)
KETONES UR-MCNC: NEGATIVE — SIGNIFICANT CHANGE UP
LACTATE SERPL-SCNC: 2 MMOL/L — SIGNIFICANT CHANGE UP (ref 0.7–2)
LEUKOCYTE ESTERASE UR-ACNC: ABNORMAL
LYMPHOCYTES # BLD AUTO: 0.73 K/UL — LOW (ref 1–3.3)
LYMPHOCYTES # BLD AUTO: 11.5 % — LOW (ref 13–44)
MAGNESIUM SERPL-MCNC: 2.2 MG/DL — SIGNIFICANT CHANGE UP (ref 1.6–2.6)
MCHC RBC-ENTMCNC: 29.5 PG — SIGNIFICANT CHANGE UP (ref 27–34)
MCHC RBC-ENTMCNC: 31.5 GM/DL — LOW (ref 32–36)
MCV RBC AUTO: 93.7 FL — SIGNIFICANT CHANGE UP (ref 80–100)
MONOCYTES # BLD AUTO: 0.61 K/UL — SIGNIFICANT CHANGE UP (ref 0–0.9)
MONOCYTES NFR BLD AUTO: 9.6 % — SIGNIFICANT CHANGE UP (ref 2–14)
NEUTROPHILS # BLD AUTO: 4.92 K/UL — SIGNIFICANT CHANGE UP (ref 1.8–7.4)
NEUTROPHILS NFR BLD AUTO: 77.8 % — HIGH (ref 43–77)
NITRITE UR-MCNC: NEGATIVE — SIGNIFICANT CHANGE UP
NRBC # BLD: 0 /100 WBCS — SIGNIFICANT CHANGE UP (ref 0–0)
PH UR: 7 — SIGNIFICANT CHANGE UP (ref 5–8)
PLATELET # BLD AUTO: 290 K/UL — SIGNIFICANT CHANGE UP (ref 150–400)
POTASSIUM SERPL-MCNC: 3.7 MMOL/L — SIGNIFICANT CHANGE UP (ref 3.5–5.3)
POTASSIUM SERPL-SCNC: 3.7 MMOL/L — SIGNIFICANT CHANGE UP (ref 3.5–5.3)
PROT SERPL-MCNC: 8.2 G/DL — SIGNIFICANT CHANGE UP (ref 6–8.3)
PROT UR-MCNC: NEGATIVE — SIGNIFICANT CHANGE UP
RBC # BLD: 4.41 M/UL — SIGNIFICANT CHANGE UP (ref 4.2–5.8)
RBC # FLD: 17.3 % — HIGH (ref 10.3–14.5)
RBC CASTS # UR COMP ASSIST: SIGNIFICANT CHANGE UP /HPF (ref 0–4)
SARS-COV-2 RNA SPEC QL NAA+PROBE: SIGNIFICANT CHANGE UP
SODIUM SERPL-SCNC: 141 MMOL/L — SIGNIFICANT CHANGE UP (ref 135–145)
SP GR SPEC: 1.01 — SIGNIFICANT CHANGE UP (ref 1.01–1.02)
UROBILINOGEN FLD QL: NEGATIVE — SIGNIFICANT CHANGE UP
WBC # BLD: 6.33 K/UL — SIGNIFICANT CHANGE UP (ref 3.8–10.5)
WBC # FLD AUTO: 6.33 K/UL — SIGNIFICANT CHANGE UP (ref 3.8–10.5)
WBC UR QL: ABNORMAL /HPF (ref 0–5)

## 2021-02-17 PROCEDURE — 83735 ASSAY OF MAGNESIUM: CPT

## 2021-02-17 PROCEDURE — 87040 BLOOD CULTURE FOR BACTERIA: CPT

## 2021-02-17 PROCEDURE — 81001 URINALYSIS AUTO W/SCOPE: CPT

## 2021-02-17 PROCEDURE — 93005 ELECTROCARDIOGRAM TRACING: CPT

## 2021-02-17 PROCEDURE — 36415 COLL VENOUS BLD VENIPUNCTURE: CPT

## 2021-02-17 PROCEDURE — 71045 X-RAY EXAM CHEST 1 VIEW: CPT

## 2021-02-17 PROCEDURE — 83605 ASSAY OF LACTIC ACID: CPT

## 2021-02-17 PROCEDURE — 80053 COMPREHEN METABOLIC PANEL: CPT

## 2021-02-17 PROCEDURE — U0005: CPT

## 2021-02-17 PROCEDURE — U0003: CPT

## 2021-02-17 PROCEDURE — 93010 ELECTROCARDIOGRAM REPORT: CPT

## 2021-02-17 PROCEDURE — G1004: CPT

## 2021-02-17 PROCEDURE — 71045 X-RAY EXAM CHEST 1 VIEW: CPT | Mod: 26

## 2021-02-17 PROCEDURE — 70450 CT HEAD/BRAIN W/O DYE: CPT

## 2021-02-17 PROCEDURE — 85025 COMPLETE CBC W/AUTO DIFF WBC: CPT

## 2021-02-17 PROCEDURE — 99285 EMERGENCY DEPT VISIT HI MDM: CPT

## 2021-02-17 PROCEDURE — 87086 URINE CULTURE/COLONY COUNT: CPT

## 2021-02-17 PROCEDURE — 70450 CT HEAD/BRAIN W/O DYE: CPT | Mod: 26,ME

## 2021-02-17 PROCEDURE — 99284 EMERGENCY DEPT VISIT MOD MDM: CPT | Mod: 25

## 2021-02-17 RX ORDER — ACETAMINOPHEN 500 MG
650 TABLET ORAL ONCE
Refills: 0 | Status: COMPLETED | OUTPATIENT
Start: 2021-02-17 | End: 2021-02-17

## 2021-02-17 RX ADMIN — Medication 650 MILLIGRAM(S): at 19:25

## 2021-02-17 NOTE — ED PROVIDER NOTE - CLINICAL SUMMARY MEDICAL DECISION MAKING FREE TEXT BOX
89 yo male with h/o A fib on eliquis, gout, hypothryoidism, chronic bilateral leg wounds sent to ED from [facility] [place] assisted liiving for not acting himself  and unsteady gait. Patient received second dose of covid vaccine yesterday. Patient limited historian due to dementia. PE: as above. A/P: ekg, labs, cxr, ua, covid swab, tylenol for fever.

## 2021-02-17 NOTE — ED PROVIDER NOTE - ATTENDING CONTRIBUTION TO CARE
I personally evaluated the patient. I reviewed the Resident’s or Physician Assistant’s note (as assigned above), and agree with the findings and plan except as documented in my note.  89 yo male with h/o A fib on eliquis, gout, hypothryoidism, chronic bilateral leg wounds sent to ED from [facility] assisted liiving for not acting himself  and unsteady gait. Patient received second dose of covid vaccine yesterday. Patient limited historian due to dementia. PE: as above. A/P: ekg, labs, cxr, ua, covid swab, tylenol for fever. Disposition by Dr Noriega

## 2021-02-17 NOTE — ED PROVIDER NOTE - PATIENT PORTAL LINK FT
You can access the FollowMyHealth Patient Portal offered by [facility] by registering at the following website: [email or web address]. By joining [employer]’s FollowMyHealth portal, you will also be able to view your health information using other applications (apps) compatible with our system.

## 2021-02-17 NOTE — ED PROVIDER NOTE - OBJECTIVE STATEMENT
89 yo male with h/o A fib on eliquis, gout, hypothryoidism, chronic bilateral leg wounds sent to ED from [facility] [place] assisted liiving for not acting himself  and unsteady gait. Patient received second dose of covid vaccine yesterday. Patient limited historian due to dementia.

## 2021-02-17 NOTE — ED ADULT NURSE REASSESSMENT NOTE - COMFORT CARE
darkened lights/po fluids offered/repositioned/side rails up/wait time explained/warm blanket provided

## 2021-02-17 NOTE — ED PROVIDER NOTE - PROGRESS NOTE DETAILS
Fever most likely related to receiving covid vaccine yesterday, will not order fluids or abx at this time. Low concern for sepsis given fever more likely vaccine related. Called Sylvia at [facility], spoke with , will have nurse call back when available. DARIEL Yeager: pt s/o to me by DARIEL Luu. I spoke with RN supervisor Rosibel from the [facility], she confirmed pt received 2nd covid vaccine yesterday, he was receiving Tylenol Q8h today, however he was sleepy most of the day and had no appetite so she sent him in for eval. Pts labs/imaging reviewed. UA reviewed. Urine culture sent.  I explained that pts sympts are likely from his COVID vaccine. His covid swab today is neg. pt stable for dc back to [facility]

## 2021-02-17 NOTE — ED ADULT NURSE NOTE - OBJECTIVE STATEMENT
89M from [facility] sent according to EMS by nurse there due to the fact that pt has 2nd covid vac yesterday and is now more lethargic with fever; pt noted to be edematous lower bilat with chronic bilat wounds that are wrapped and do not appear infectious

## 2021-02-18 NOTE — PATIENT PROFILE ADULT - DISASTER - NSASFALLATTEMPTOOB_GEN_A_NUR
Name from pharmacy: ATORVASTATIN 40MG TABLETS          Will file in chart as: ATORVASTATIN 40 MG Oral Tab    Sig: TAKE 1 TABLET(40 MG) BY MOUTH EVERY NIGHT    Disp:  90 tablet    Refills:  0 (Pharmacy requested: Not specified)    Start: 2/18/2021    Class: no

## 2021-02-19 LAB
CULTURE RESULTS: SIGNIFICANT CHANGE UP
SPECIMEN SOURCE: SIGNIFICANT CHANGE UP

## 2021-03-26 NOTE — ED ADULT TRIAGE NOTE - ARRIVAL FROM
Patient Specific Counseling (Will Not Stick From Patient To Patient): Explained to patient that hairloss of lateral 1/3 of eyebrows can be linked to thyroid disorder. Pt states that her thyroid function was checked and she has autoimmune thyroiditis. Explained hair loss at eyebrows may improve with thyroid treatment. \\n\\nAnother reason for hair loss could be early frontal fibrosing alopecia. This was also explained to the patient. See below.
Detail Level: Simple
Patient Specific Counseling (Will Not Stick From Patient To Patient): Explained this is very early. goal is to stabilize alopecia, not bring hair back
Assisted living facility/[facility]

## 2021-07-23 NOTE — PHYSICAL THERAPY INITIAL EVALUATION ADULT - SITTING BALANCE: STATIC
Call to patient to clarify blood or skin test for TB and we will place order at that time.   
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  She needs an order put in for this.    Please advise patient when this has been done.  
Please see below. Is it okay to order?  
Yes verify with patient if she can do blood test or skin test, than okay to place order for this.  
good balance

## 2021-10-24 ENCOUNTER — EMERGENCY (EMERGENCY)
Facility: HOSPITAL | Age: 86
LOS: 1 days | Discharge: ANOTHER HOSPITAL | End: 2021-10-24
Attending: EMERGENCY MEDICINE | Admitting: EMERGENCY MEDICINE
Payer: MEDICARE

## 2021-10-24 VITALS
HEIGHT: 70 IN | WEIGHT: 199.96 LBS | DIASTOLIC BLOOD PRESSURE: 58 MMHG | OXYGEN SATURATION: 96 % | SYSTOLIC BLOOD PRESSURE: 121 MMHG | HEART RATE: 87 BPM | RESPIRATION RATE: 18 BRPM | TEMPERATURE: 98 F

## 2021-10-24 VITALS
HEART RATE: 70 BPM | RESPIRATION RATE: 18 BRPM | OXYGEN SATURATION: 99 % | SYSTOLIC BLOOD PRESSURE: 124 MMHG | DIASTOLIC BLOOD PRESSURE: 68 MMHG

## 2021-10-24 DIAGNOSIS — Z88.0 ALLERGY STATUS TO PENICILLIN: ICD-10-CM

## 2021-10-24 DIAGNOSIS — R53.1 WEAKNESS: ICD-10-CM

## 2021-10-24 DIAGNOSIS — R50.9 FEVER, UNSPECIFIED: ICD-10-CM

## 2021-10-24 DIAGNOSIS — Z20.822 CONTACT WITH AND (SUSPECTED) EXPOSURE TO COVID-19: ICD-10-CM

## 2021-10-24 DIAGNOSIS — Z98.890 OTHER SPECIFIED POSTPROCEDURAL STATES: Chronic | ICD-10-CM

## 2021-10-24 DIAGNOSIS — Z79.2 LONG TERM (CURRENT) USE OF ANTIBIOTICS: ICD-10-CM

## 2021-10-24 DIAGNOSIS — R53.81 OTHER MALAISE: ICD-10-CM

## 2021-10-24 DIAGNOSIS — Z79.899 OTHER LONG TERM (CURRENT) DRUG THERAPY: ICD-10-CM

## 2021-10-24 DIAGNOSIS — Z79.890 HORMONE REPLACEMENT THERAPY: ICD-10-CM

## 2021-10-24 DIAGNOSIS — E03.9 HYPOTHYROIDISM, UNSPECIFIED: ICD-10-CM

## 2021-10-24 DIAGNOSIS — F03.90 UNSPECIFIED DEMENTIA WITHOUT BEHAVIORAL DISTURBANCE: ICD-10-CM

## 2021-10-24 DIAGNOSIS — I48.91 UNSPECIFIED ATRIAL FIBRILLATION: ICD-10-CM

## 2021-10-24 LAB
ANION GAP SERPL CALC-SCNC: 11 MMOL/L — SIGNIFICANT CHANGE UP (ref 5–17)
APPEARANCE UR: CLEAR — SIGNIFICANT CHANGE UP
BACTERIA # UR AUTO: NEGATIVE /HPF — SIGNIFICANT CHANGE UP
BASOPHILS # BLD AUTO: 0.03 K/UL — SIGNIFICANT CHANGE UP (ref 0–0.2)
BASOPHILS NFR BLD AUTO: 0.3 % — SIGNIFICANT CHANGE UP (ref 0–2)
BILIRUB UR-MCNC: NEGATIVE — SIGNIFICANT CHANGE UP
BUN SERPL-MCNC: 47 MG/DL — HIGH (ref 7–23)
CALCIUM SERPL-MCNC: 9.3 MG/DL — SIGNIFICANT CHANGE UP (ref 8.4–10.5)
CHLORIDE SERPL-SCNC: 102 MMOL/L — SIGNIFICANT CHANGE UP (ref 96–108)
CK SERPL-CCNC: 266 U/L — HIGH (ref 30–200)
CO2 SERPL-SCNC: 30 MMOL/L — SIGNIFICANT CHANGE UP (ref 22–31)
COLOR SPEC: YELLOW — SIGNIFICANT CHANGE UP
CREAT SERPL-MCNC: 1.63 MG/DL — HIGH (ref 0.5–1.3)
DIFF PNL FLD: ABNORMAL
EOSINOPHIL # BLD AUTO: 0.05 K/UL — SIGNIFICANT CHANGE UP (ref 0–0.5)
EOSINOPHIL NFR BLD AUTO: 0.6 % — SIGNIFICANT CHANGE UP (ref 0–6)
EPI CELLS # UR: SIGNIFICANT CHANGE UP
GLUCOSE SERPL-MCNC: 141 MG/DL — HIGH (ref 70–99)
GLUCOSE UR QL: NEGATIVE — SIGNIFICANT CHANGE UP
HCT VFR BLD CALC: 40.7 % — SIGNIFICANT CHANGE UP (ref 39–50)
HGB BLD-MCNC: 12.8 G/DL — LOW (ref 13–17)
IMM GRANULOCYTES NFR BLD AUTO: 0.5 % — SIGNIFICANT CHANGE UP (ref 0–1.5)
KETONES UR-MCNC: NEGATIVE — SIGNIFICANT CHANGE UP
LEUKOCYTE ESTERASE UR-ACNC: ABNORMAL
LYMPHOCYTES # BLD AUTO: 0.61 K/UL — LOW (ref 1–3.3)
LYMPHOCYTES # BLD AUTO: 6.9 % — LOW (ref 13–44)
MCHC RBC-ENTMCNC: 30.5 PG — SIGNIFICANT CHANGE UP (ref 27–34)
MCHC RBC-ENTMCNC: 31.4 GM/DL — LOW (ref 32–36)
MCV RBC AUTO: 97.1 FL — SIGNIFICANT CHANGE UP (ref 80–100)
MONOCYTES # BLD AUTO: 0.75 K/UL — SIGNIFICANT CHANGE UP (ref 0–0.9)
MONOCYTES NFR BLD AUTO: 8.5 % — SIGNIFICANT CHANGE UP (ref 2–14)
NEUTROPHILS # BLD AUTO: 7.31 K/UL — SIGNIFICANT CHANGE UP (ref 1.8–7.4)
NEUTROPHILS NFR BLD AUTO: 83.2 % — HIGH (ref 43–77)
NITRITE UR-MCNC: NEGATIVE — SIGNIFICANT CHANGE UP
NRBC # BLD: 0 /100 WBCS — SIGNIFICANT CHANGE UP (ref 0–0)
PH UR: 6 — SIGNIFICANT CHANGE UP (ref 5–8)
PLATELET # BLD AUTO: 229 K/UL — SIGNIFICANT CHANGE UP (ref 150–400)
POTASSIUM SERPL-MCNC: 3.6 MMOL/L — SIGNIFICANT CHANGE UP (ref 3.5–5.3)
POTASSIUM SERPL-SCNC: 3.6 MMOL/L — SIGNIFICANT CHANGE UP (ref 3.5–5.3)
PROT UR-MCNC: 15
RBC # BLD: 4.19 M/UL — LOW (ref 4.2–5.8)
RBC # FLD: 16.3 % — HIGH (ref 10.3–14.5)
RBC CASTS # UR COMP ASSIST: SIGNIFICANT CHANGE UP /HPF (ref 0–4)
SODIUM SERPL-SCNC: 143 MMOL/L — SIGNIFICANT CHANGE UP (ref 135–145)
SP GR SPEC: 1.01 — SIGNIFICANT CHANGE UP (ref 1.01–1.02)
UROBILINOGEN FLD QL: NEGATIVE — SIGNIFICANT CHANGE UP
WBC # BLD: 8.79 K/UL — SIGNIFICANT CHANGE UP (ref 3.8–10.5)
WBC # FLD AUTO: 8.79 K/UL — SIGNIFICANT CHANGE UP (ref 3.8–10.5)
WBC UR QL: SIGNIFICANT CHANGE UP /HPF (ref 0–5)

## 2021-10-24 PROCEDURE — 99284 EMERGENCY DEPT VISIT MOD MDM: CPT | Mod: 25

## 2021-10-24 PROCEDURE — 73030 X-RAY EXAM OF SHOULDER: CPT

## 2021-10-24 PROCEDURE — 73521 X-RAY EXAM HIPS BI 2 VIEWS: CPT | Mod: 26

## 2021-10-24 PROCEDURE — 85025 COMPLETE CBC W/AUTO DIFF WBC: CPT

## 2021-10-24 PROCEDURE — 73200 CT UPPER EXTREMITY W/O DYE: CPT | Mod: MA

## 2021-10-24 PROCEDURE — 73562 X-RAY EXAM OF KNEE 3: CPT

## 2021-10-24 PROCEDURE — 73200 CT UPPER EXTREMITY W/O DYE: CPT | Mod: 26,RT,MA

## 2021-10-24 PROCEDURE — 70450 CT HEAD/BRAIN W/O DYE: CPT | Mod: MA

## 2021-10-24 PROCEDURE — 80048 BASIC METABOLIC PNL TOTAL CA: CPT

## 2021-10-24 PROCEDURE — 36415 COLL VENOUS BLD VENIPUNCTURE: CPT

## 2021-10-24 PROCEDURE — 81001 URINALYSIS AUTO W/SCOPE: CPT

## 2021-10-24 PROCEDURE — 73521 X-RAY EXAM HIPS BI 2 VIEWS: CPT

## 2021-10-24 PROCEDURE — 73060 X-RAY EXAM OF HUMERUS: CPT

## 2021-10-24 PROCEDURE — 82550 ASSAY OF CK (CPK): CPT

## 2021-10-24 PROCEDURE — 99284 EMERGENCY DEPT VISIT MOD MDM: CPT

## 2021-10-24 PROCEDURE — 72125 CT NECK SPINE W/O DYE: CPT | Mod: 26,MA

## 2021-10-24 PROCEDURE — 73060 X-RAY EXAM OF HUMERUS: CPT | Mod: 26,RT

## 2021-10-24 PROCEDURE — 73562 X-RAY EXAM OF KNEE 3: CPT | Mod: 26,RT

## 2021-10-24 PROCEDURE — 96361 HYDRATE IV INFUSION ADD-ON: CPT

## 2021-10-24 PROCEDURE — 70450 CT HEAD/BRAIN W/O DYE: CPT | Mod: 26,MA

## 2021-10-24 PROCEDURE — 72125 CT NECK SPINE W/O DYE: CPT | Mod: MA

## 2021-10-24 PROCEDURE — 96360 HYDRATION IV INFUSION INIT: CPT

## 2021-10-24 PROCEDURE — 73030 X-RAY EXAM OF SHOULDER: CPT | Mod: 26,RT,76

## 2021-10-24 RX ORDER — ACETAMINOPHEN 500 MG
975 TABLET ORAL ONCE
Refills: 0 | Status: COMPLETED | OUTPATIENT
Start: 2021-10-24 | End: 2021-10-24

## 2021-10-24 RX ORDER — SODIUM CHLORIDE 9 MG/ML
500 INJECTION INTRAMUSCULAR; INTRAVENOUS; SUBCUTANEOUS ONCE
Refills: 0 | Status: COMPLETED | OUTPATIENT
Start: 2021-10-24 | End: 2021-10-24

## 2021-10-24 RX ORDER — SODIUM CHLORIDE 9 MG/ML
1000 INJECTION INTRAMUSCULAR; INTRAVENOUS; SUBCUTANEOUS ONCE
Refills: 0 | Status: COMPLETED | OUTPATIENT
Start: 2021-10-24 | End: 2021-10-24

## 2021-10-24 RX ADMIN — Medication 975 MILLIGRAM(S): at 18:23

## 2021-10-24 RX ADMIN — Medication 975 MILLIGRAM(S): at 19:00

## 2021-10-24 RX ADMIN — SODIUM CHLORIDE 500 MILLILITER(S): 9 INJECTION INTRAMUSCULAR; INTRAVENOUS; SUBCUTANEOUS at 18:15

## 2021-10-24 RX ADMIN — SODIUM CHLORIDE 2000 MILLILITER(S): 9 INJECTION INTRAMUSCULAR; INTRAVENOUS; SUBCUTANEOUS at 16:15

## 2021-10-24 RX ADMIN — SODIUM CHLORIDE 1000 MILLILITER(S): 9 INJECTION INTRAMUSCULAR; INTRAVENOUS; SUBCUTANEOUS at 18:15

## 2021-10-24 RX ADMIN — SODIUM CHLORIDE 500 MILLILITER(S): 9 INJECTION INTRAMUSCULAR; INTRAVENOUS; SUBCUTANEOUS at 17:00

## 2021-10-24 NOTE — ED ADULT NURSE REASSESSMENT NOTE - NS ED NURSE REASSESS COMMENT FT1
Pt. has been resting in stretcher comfortably. Sleeping without complaints. Pt. cleared for d/c per MD order. Awaiting transport back to [facility].

## 2021-10-24 NOTE — ED PROVIDER NOTE - NSFOLLOWUPINSTRUCTIONS_ED_ALL_ED_FT
Follow up with your PMD within 24-48 hrs.   Rest, no heavy lifting.   Apply ice for 20 minutes 2-3 times/day as needed for pain and swelling.  Take Tylenol 650mg every 4-6 hours as needed for pain   Worsening, continued or ANY new concerning symptoms return to the emergency department.    Follow up with your PMD within 1-2 days.  Take Tylenol 650mg 1 tab every 4 hrs as needed for pain.  Take all of your other medications as previously prescribed.  Worsening, continued or ANY new concerning symptoms return to the emergency department.     Fall Prevention    WHAT YOU NEED TO KNOW:    Fall prevention includes ways to make your home and other areas safer. It also includes ways you can move more carefully to prevent a fall. Health conditions that cause changes in your blood pressure, vision, or muscle strength and coordination may increase your risk for falls. Medicines may also increase your risk for falls if they make you dizzy, weak, or sleepy.    DISCHARGE INSTRUCTIONS:    Call 911 or have someone else call if:   •You have fallen and are unconscious.      •You have fallen and cannot move part of your body.      Contact your healthcare provider if:   •You have fallen and have pain or a headache.      •You have questions or concerns about your condition or care.      Fall prevention tips:   •Stand or sit up slowly. This may help you keep your balance and prevent falls.      •Use assistive devices as directed. Your healthcare provider may suggest that you use a cane or walker to help you keep your balance. You may need to have grab bars put in your bathroom near the toilet or in the shower.      •Wear shoes that fit well and have soles that . Wear shoes both inside and outside. Use slippers with good . Do not wear shoes with high heels.      •Wear a personal alarm. This is a device that allows you to call 911 if you fall and need help. Ask your healthcare provider for more information.      •Stay active. Exercise can help strengthen your muscles and improve your balance. Your healthcare provider may recommend water aerobics or walking. He or she may also recommend physical therapy to improve your coordination. Never start an exercise program without talking to your healthcare provider first.  Walking for Exercise           •Manage your medical conditions.  Keep all appointments with your healthcare providers. Visit your eye doctor as directed.      Home safety tips:     Fall Prevention for Adults     •Add items to prevent falls in the bathroom. Put nonslip strips on your bath or shower floor to prevent you from slipping. Use a bath mat if you do not have carpet in the bathroom. This will prevent you from falling when you step out of the bath or shower. Use a shower seat so you do not need to stand while you shower. Sit on the toilet or a chair in your bathroom to dry yourself and put on clothing. This will prevent you from losing your balance from drying or dressing yourself while you are standing.      •Keep paths clear. Remove books, shoes, and other objects from walkways and stairs. Place cords for telephones and lamps out of the way so that you do not need to walk over them. Tape them down if you cannot move them. Remove small rugs. If you cannot remove a rug, secure it with double-sided tape. This will prevent you from tripping.      •Install bright lights in your home. Use night lights to help light paths to the bathroom or kitchen. Always turn on the light before you start walking.      •Keep items you use often on shelves within reach. Do not use a step stool to help you reach an item.      •Paint or place reflective tape on the edges of your stairs. This will help you see the stairs better.      Follow up with your doctor as directed: Write down your questions so you remember to ask them during your visits.

## 2021-10-24 NOTE — ED PROVIDER NOTE - PATIENT PORTAL LINK FT
You can access the FollowMyHealth Patient Portal offered by [facility] by registering at the following website: [email or web address]. By joining [employer]’s FollowMyHealth portal, you will also be able to view your health information using other applications (apps) compatible with our system.

## 2021-10-24 NOTE — ED PROVIDER NOTE - PROGRESS NOTE DETAILS
PA Tiberio- questionable dislocation on xray. CT right shoulder ordered- official read not in. Prelim read via Dr. Gordon no acute dislocations. Will DC back to facility. Spoke with Son.

## 2021-10-24 NOTE — ED ADULT NURSE NOTE - CHIEF COMPLAINT QUOTE
Patient BIBA from [facility] s/p unwitnessed mechanical fall. Patient is A&O, states he was attempting to transfer into his chair and fell, denies head strike or LOC, but said he could not get up on his own. Staff found him approximately 2 hours later. Patient states his legs feel painful from laying on the ground in an uncomfortable position. Patient not on anticoagulants. ISAR POSITIVE.

## 2021-10-24 NOTE — ED PROVIDER NOTE - ATTENDING CONTRIBUTION TO CARE
Dr. Daniel: I performed a face to face bedside interview with patient regarding history of present illness, review of symptoms and past medical history. I completed an independent physical exam.  I have discussed patient's plan of care with PA.   I agree with note as stated above, having amended the EMR as needed to reflect my findings.   This includes HISTORY OF PRESENT ILLNESS, HIV, PAST MEDICAL/SURGICAL/FAMILY/SOCIAL HISTORY, ALLERGIES AND HOME MEDICATIONS, REVIEW OF SYSTEMS, PHYSICAL EXAM, and any PROGRESS NOTES during the time I functioned as the attending physician for this patient.    dr daniel: 89 y/o M h/o mild Dementia, GERD, Afib (no blood thinners), A+O x 2 BIBA from the [facility] s/p unwitnessed mechanical trip and fall while walking with walker transferring to wheelchair pw right arm pain and right knee pain. States he was on the floor for 2 hours screaming for help until someone came to him.  Denies head trauma, LOC, neck/back pain, chest pain, shortness of breath, cough, ab pain, n/v/d, weakness, numbness, tingling. No blood thinners. Received COVID booster 2 days ago.  States has been feeling slightly weak for few days since booster. Rectal temp 98.6    Plan: Will check basic labs, iv fluids, xrays hip and pelvis, xray RUE/shoulder, xray right knee, reassess

## 2021-10-24 NOTE — ED PROVIDER NOTE - PHYSICAL EXAMINATION
Statement Selected
Gen:  alert, awake, no acute distress  Head:  atraumatic, normocephalic  HEENT: PERRLA, EOMI, normal nose, normal oropharynx, no tonsillar edema, erythema, or exudate  CV:  rrr, nl S1, S2, no m/r/g  Pulm:  lungs CTA b/l  Abd: s/nt/nd, +BS  MSK:  moving all extremities, decreased rom right elbow and shoulder (chronic as per pt and son, has hx right elbow fx) no back midline ttp, no stepoffs, no cva TTP; no groin ttp  Neuro:  grossly intact, no focal deficits  Skin:  clear, dry, abrasion right knee, no bleeding; b/l lower extremity edema pitting symmetric  Psych: AOx2, normal affect, no apparent risk to self or others

## 2021-10-24 NOTE — ED PROVIDER NOTE - OBJECTIVE STATEMENT
89 y/o M h/o Dementia, GERD, Afib (no blood thinners) BIBA from the [facility] s/p unwitnessed  mechanical trip and fall moving from   Patient BIBA from [facility] s/p unwitnessed mechanical fall. Patient is A&O, states he was attempting to transfer into his chair and fell, denies head strike or LOC, but said he could not get up on his own. Staff found him approximately 2 hours later. Patient states his legs feel painful from laying on the ground in an uncomfortable position. Patient not on anticoagulants. 89 y/o M h/o Dementia, GERD, Afib (no blood thinners), A+O x 2 BIBA from the [facility] s/p unwitnessed  mechanical trip and fall while walking with walker transferring to wheelchair pw right arm pain and right knee pain. States he was on the floor for 2 hours screaming for help until someone came to him.  Denies head trauma, LOC, neck/back pain, chest pain, shortness of breath, cough, ab pain, n/v/d, weakness, numbness, tingling. No blood thinners. Received COVID booster 2 days ago.  Rectal temp 98.6 89 y/o M h/o mild Dementia, GERD, Afib (no blood thinners), A+O x 2 BIBA from the [facility] s/p unwitnessed mechanical trip and fall while walking with walker transferring to wheelchair pw right arm pain and right knee pain. States he was on the floor for 2 hours screaming for help until someone came to him.  Denies head trauma, LOC, neck/back pain, chest pain, shortness of breath, cough, ab pain, n/v/d, weakness, numbness, tingling. No blood thinners. Received COVID booster 2 days ago.  States has been feeling slightly weak for few days since booster. Rectal temp 98.6

## 2021-10-24 NOTE — ED PROVIDER NOTE - CLINICAL SUMMARY MEDICAL DECISION MAKING FREE TEXT BOX
89 y/o M h/o Dementia, GERD, Afib (no blood thinners), A+O x 2 BIBA from the [facility] s/p unwitnessed  mechanical trip and fall while walking with walker transferring to wheelchair pw right arm pain and right knee pain. States he was on the floor for 2 hours screaming for help until someone came to him.  Denies head trauma, LOC, neck/back pain, chest pain, shortness of breath, cough, ab pain, n/v/d, weakness, numbness, tingling. No blood thinners. Received COVID booster 2 days ago.  Rectal temp 98.6  Plan: Will check basic labs, xrays hip and pelvis, xray RUE/shoulder, xray right knee, reassess 89 y/o M h/o mild Dementia, GERD, Afib (no blood thinners), A+O x 2 BIBA from the [facility] s/p unwitnessed mechanical trip and fall while walking with walker transferring to wheelchair pw right arm pain and right knee pain. States he was on the floor for 2 hours screaming for help until someone came to him.  Denies head trauma, LOC, neck/back pain, chest pain, shortness of breath, cough, ab pain, n/v/d, weakness, numbness, tingling. No blood thinners. Received COVID booster 2 days ago.  States has been feeling slightly weak for few days since booster. Rectal temp 98.6    Plan: Will check basic labs, iv fluids, xrays hip and pelvis, xray RUE/shoulder, xray right knee, reassess

## 2021-10-24 NOTE — ED ADULT TRIAGE NOTE - CHIEF COMPLAINT QUOTE
Patient BIBA from [facility] s/p unwitnessed mechanical fall. Patient is A&O, states he was attempting to transfer into his chair and fell, denies head strike or LOC, but said he could not get up on his own. Staff found him approximately 2 hours later. Patient states his legs feel painful from laying on the ground in an uncomfortable position. Patient not on anticoagulants. Patient BIBA from [facility] s/p unwitnessed mechanical fall. Patient is A&O, states he was attempting to transfer into his chair and fell, denies head strike or LOC, but said he could not get up on his own. Staff found him approximately 2 hours later. Patient states his legs feel painful from laying on the ground in an uncomfortable position. Patient not on anticoagulants. ISAR POSITIVE.

## 2021-10-24 NOTE — ED PROVIDER NOTE - CARE PLAN
1 Principal Discharge DX:	Fall   Principal Discharge DX:	Fall  Secondary Diagnosis:	Pain and swelling of right upper extremity

## 2021-10-30 ENCOUNTER — INPATIENT (INPATIENT)
Facility: HOSPITAL | Age: 86
LOS: 4 days | Discharge: NURSING, REHAB OR LONG-TERM CARE | DRG: 699 | End: 2021-11-04
Attending: HOSPITALIST | Admitting: STUDENT IN AN ORGANIZED HEALTH CARE EDUCATION/TRAINING PROGRAM
Payer: MEDICARE

## 2021-10-30 VITALS
SYSTOLIC BLOOD PRESSURE: 131 MMHG | TEMPERATURE: 98 F | DIASTOLIC BLOOD PRESSURE: 78 MMHG | HEART RATE: 81 BPM | HEIGHT: 70 IN | WEIGHT: 184.09 LBS | RESPIRATION RATE: 16 BRPM | OXYGEN SATURATION: 97 %

## 2021-10-30 DIAGNOSIS — N18.30 CHRONIC KIDNEY DISEASE, STAGE 3 UNSPECIFIED: ICD-10-CM

## 2021-10-30 DIAGNOSIS — I50.32 CHRONIC DIASTOLIC (CONGESTIVE) HEART FAILURE: ICD-10-CM

## 2021-10-30 DIAGNOSIS — Z98.890 OTHER SPECIFIED POSTPROCEDURAL STATES: Chronic | ICD-10-CM

## 2021-10-30 DIAGNOSIS — R31.0 GROSS HEMATURIA: ICD-10-CM

## 2021-10-30 DIAGNOSIS — R33.9 RETENTION OF URINE, UNSPECIFIED: ICD-10-CM

## 2021-10-30 DIAGNOSIS — M10.9 GOUT, UNSPECIFIED: ICD-10-CM

## 2021-10-30 DIAGNOSIS — I48.11 LONGSTANDING PERSISTENT ATRIAL FIBRILLATION: ICD-10-CM

## 2021-10-30 DIAGNOSIS — I10 ESSENTIAL (PRIMARY) HYPERTENSION: ICD-10-CM

## 2021-10-30 DIAGNOSIS — R31.9 HEMATURIA, UNSPECIFIED: ICD-10-CM

## 2021-10-30 DIAGNOSIS — N40.0 BENIGN PROSTATIC HYPERPLASIA WITHOUT LOWER URINARY TRACT SYMPTOMS: ICD-10-CM

## 2021-10-30 LAB
ALBUMIN SERPL ELPH-MCNC: 3.2 G/DL — LOW (ref 3.3–5)
ALP SERPL-CCNC: 60 U/L — SIGNIFICANT CHANGE UP (ref 40–120)
ALT FLD-CCNC: 22 U/L — SIGNIFICANT CHANGE UP (ref 10–45)
ANION GAP SERPL CALC-SCNC: 9 MMOL/L — SIGNIFICANT CHANGE UP (ref 5–17)
APPEARANCE UR: ABNORMAL
AST SERPL-CCNC: 30 U/L — SIGNIFICANT CHANGE UP (ref 10–40)
BACTERIA # UR AUTO: ABNORMAL /HPF
BASOPHILS # BLD AUTO: 0.04 K/UL — SIGNIFICANT CHANGE UP (ref 0–0.2)
BASOPHILS NFR BLD AUTO: 0.5 % — SIGNIFICANT CHANGE UP (ref 0–2)
BILIRUB SERPL-MCNC: 0.7 MG/DL — SIGNIFICANT CHANGE UP (ref 0.2–1.2)
BILIRUB UR-MCNC: NEGATIVE — SIGNIFICANT CHANGE UP
BUN SERPL-MCNC: 44 MG/DL — HIGH (ref 7–23)
CALCIUM SERPL-MCNC: 9.3 MG/DL — SIGNIFICANT CHANGE UP (ref 8.4–10.5)
CHLORIDE SERPL-SCNC: 103 MMOL/L — SIGNIFICANT CHANGE UP (ref 96–108)
CO2 SERPL-SCNC: 29 MMOL/L — SIGNIFICANT CHANGE UP (ref 22–31)
COLOR SPEC: ABNORMAL
CREAT SERPL-MCNC: 1.57 MG/DL — HIGH (ref 0.5–1.3)
DIFF PNL FLD: ABNORMAL
EOSINOPHIL # BLD AUTO: 0.05 K/UL — SIGNIFICANT CHANGE UP (ref 0–0.5)
EOSINOPHIL NFR BLD AUTO: 0.6 % — SIGNIFICANT CHANGE UP (ref 0–6)
EPI CELLS # UR: SIGNIFICANT CHANGE UP
GLUCOSE SERPL-MCNC: 106 MG/DL — HIGH (ref 70–99)
GLUCOSE UR QL: NEGATIVE — SIGNIFICANT CHANGE UP
HCT VFR BLD CALC: 38.5 % — LOW (ref 39–50)
HGB BLD-MCNC: 12.2 G/DL — LOW (ref 13–17)
IMM GRANULOCYTES NFR BLD AUTO: 0.9 % — SIGNIFICANT CHANGE UP (ref 0–1.5)
KETONES UR-MCNC: NEGATIVE — SIGNIFICANT CHANGE UP
LEUKOCYTE ESTERASE UR-ACNC: NEGATIVE — SIGNIFICANT CHANGE UP
LYMPHOCYTES # BLD AUTO: 0.91 K/UL — LOW (ref 1–3.3)
LYMPHOCYTES # BLD AUTO: 11.1 % — LOW (ref 13–44)
MCHC RBC-ENTMCNC: 30.6 PG — SIGNIFICANT CHANGE UP (ref 27–34)
MCHC RBC-ENTMCNC: 31.7 GM/DL — LOW (ref 32–36)
MCV RBC AUTO: 96.5 FL — SIGNIFICANT CHANGE UP (ref 80–100)
MONOCYTES # BLD AUTO: 0.71 K/UL — SIGNIFICANT CHANGE UP (ref 0–0.9)
MONOCYTES NFR BLD AUTO: 8.7 % — SIGNIFICANT CHANGE UP (ref 2–14)
NEUTROPHILS # BLD AUTO: 6.4 K/UL — SIGNIFICANT CHANGE UP (ref 1.8–7.4)
NEUTROPHILS NFR BLD AUTO: 78.2 % — HIGH (ref 43–77)
NITRITE UR-MCNC: NEGATIVE — SIGNIFICANT CHANGE UP
NRBC # BLD: 0 /100 WBCS — SIGNIFICANT CHANGE UP (ref 0–0)
PH UR: 6 — SIGNIFICANT CHANGE UP (ref 5–8)
PLATELET # BLD AUTO: 273 K/UL — SIGNIFICANT CHANGE UP (ref 150–400)
POTASSIUM SERPL-MCNC: 3.9 MMOL/L — SIGNIFICANT CHANGE UP (ref 3.5–5.3)
POTASSIUM SERPL-SCNC: 3.9 MMOL/L — SIGNIFICANT CHANGE UP (ref 3.5–5.3)
PROT SERPL-MCNC: 7.5 G/DL — SIGNIFICANT CHANGE UP (ref 6–8.3)
PROT UR-MCNC: 100
RBC # BLD: 3.99 M/UL — LOW (ref 4.2–5.8)
RBC # FLD: 15.9 % — HIGH (ref 10.3–14.5)
RBC CASTS # UR COMP ASSIST: >50 /HPF (ref 0–4)
SODIUM SERPL-SCNC: 141 MMOL/L — SIGNIFICANT CHANGE UP (ref 135–145)
SP GR SPEC: 1.01 — SIGNIFICANT CHANGE UP (ref 1.01–1.02)
UROBILINOGEN FLD QL: NEGATIVE — SIGNIFICANT CHANGE UP
WBC # BLD: 8.18 K/UL — SIGNIFICANT CHANGE UP (ref 3.8–10.5)
WBC # FLD AUTO: 8.18 K/UL — SIGNIFICANT CHANGE UP (ref 3.8–10.5)
WBC UR QL: SIGNIFICANT CHANGE UP /HPF (ref 0–5)

## 2021-10-30 PROCEDURE — 74176 CT ABD & PELVIS W/O CONTRAST: CPT | Mod: 26

## 2021-10-30 PROCEDURE — 93010 ELECTROCARDIOGRAM REPORT: CPT

## 2021-10-30 PROCEDURE — 99223 1ST HOSP IP/OBS HIGH 75: CPT

## 2021-10-30 PROCEDURE — 99285 EMERGENCY DEPT VISIT HI MDM: CPT

## 2021-10-30 PROCEDURE — 71045 X-RAY EXAM CHEST 1 VIEW: CPT | Mod: 26

## 2021-10-30 RX ORDER — POTASSIUM CHLORIDE 20 MEQ
1 PACKET (EA) ORAL
Qty: 0 | Refills: 0 | DISCHARGE

## 2021-10-30 RX ORDER — LANOLIN ALCOHOL/MO/W.PET/CERES
2 CREAM (GRAM) TOPICAL
Qty: 0 | Refills: 0 | DISCHARGE

## 2021-10-30 RX ORDER — FINASTERIDE 5 MG/1
5 TABLET, FILM COATED ORAL DAILY
Refills: 0 | Status: DISCONTINUED | OUTPATIENT
Start: 2021-10-30 | End: 2021-11-04

## 2021-10-30 RX ORDER — GABAPENTIN 400 MG/1
300 CAPSULE ORAL
Refills: 0 | Status: DISCONTINUED | OUTPATIENT
Start: 2021-10-30 | End: 2021-10-30

## 2021-10-30 RX ORDER — ALLOPURINOL 300 MG
300 TABLET ORAL DAILY
Refills: 0 | Status: DISCONTINUED | OUTPATIENT
Start: 2021-10-30 | End: 2021-11-04

## 2021-10-30 RX ORDER — POLYETHYLENE GLYCOL 3350 17 G/17G
0 POWDER, FOR SOLUTION ORAL
Qty: 0 | Refills: 0 | DISCHARGE

## 2021-10-30 RX ORDER — FERROUS SULFATE 325(65) MG
1 TABLET ORAL
Qty: 0 | Refills: 0 | DISCHARGE

## 2021-10-30 RX ORDER — LANOLIN ALCOHOL/MO/W.PET/CERES
6 CREAM (GRAM) TOPICAL AT BEDTIME
Refills: 0 | Status: DISCONTINUED | OUTPATIENT
Start: 2021-10-30 | End: 2021-11-04

## 2021-10-30 RX ORDER — TAMSULOSIN HYDROCHLORIDE 0.4 MG/1
2 CAPSULE ORAL
Qty: 0 | Refills: 0 | DISCHARGE

## 2021-10-30 RX ORDER — LEVOTHYROXINE SODIUM 125 MCG
1 TABLET ORAL
Qty: 0 | Refills: 0 | DISCHARGE

## 2021-10-30 RX ORDER — ACETAMINOPHEN 500 MG
650 TABLET ORAL EVERY 8 HOURS
Refills: 0 | Status: DISCONTINUED | OUTPATIENT
Start: 2021-10-30 | End: 2021-11-04

## 2021-10-30 RX ORDER — GABAPENTIN 400 MG/1
300 CAPSULE ORAL
Refills: 0 | Status: DISCONTINUED | OUTPATIENT
Start: 2021-10-30 | End: 2021-11-04

## 2021-10-30 RX ORDER — FERROUS SULFATE 325(65) MG
325 TABLET ORAL
Refills: 0 | Status: DISCONTINUED | OUTPATIENT
Start: 2021-10-30 | End: 2021-11-04

## 2021-10-30 RX ORDER — ACETAMINOPHEN 500 MG
2 TABLET ORAL
Qty: 0 | Refills: 0 | DISCHARGE

## 2021-10-30 RX ORDER — TAMSULOSIN HYDROCHLORIDE 0.4 MG/1
0.8 CAPSULE ORAL AT BEDTIME
Refills: 0 | Status: DISCONTINUED | OUTPATIENT
Start: 2021-10-30 | End: 2021-11-04

## 2021-10-30 RX ORDER — MONTELUKAST 4 MG/1
10 TABLET, CHEWABLE ORAL AT BEDTIME
Refills: 0 | Status: DISCONTINUED | OUTPATIENT
Start: 2021-10-30 | End: 2021-11-04

## 2021-10-30 RX ORDER — ASCORBIC ACID 60 MG
500 TABLET,CHEWABLE ORAL DAILY
Refills: 0 | Status: DISCONTINUED | OUTPATIENT
Start: 2021-10-30 | End: 2021-11-04

## 2021-10-30 RX ORDER — CHOLECALCIFEROL (VITAMIN D3) 125 MCG
0 CAPSULE ORAL
Qty: 0 | Refills: 0 | DISCHARGE

## 2021-10-30 RX ORDER — INFLUENZA VIRUS VACCINE 15; 15; 15; 15 UG/.5ML; UG/.5ML; UG/.5ML; UG/.5ML
0.7 SUSPENSION INTRAMUSCULAR ONCE
Refills: 0 | Status: COMPLETED | OUTPATIENT
Start: 2021-10-30 | End: 2021-11-04

## 2021-10-30 RX ORDER — LEVOTHYROXINE SODIUM 125 MCG
88 TABLET ORAL DAILY
Refills: 0 | Status: DISCONTINUED | OUTPATIENT
Start: 2021-10-30 | End: 2021-11-04

## 2021-10-30 RX ORDER — LEVOCETIRIZINE DIHYDROCHLORIDE 0.5 MG/ML
1 SOLUTION ORAL
Qty: 0 | Refills: 0 | DISCHARGE

## 2021-10-30 RX ORDER — LEVOTHYROXINE SODIUM 125 MCG
100 TABLET ORAL DAILY
Refills: 0 | Status: DISCONTINUED | OUTPATIENT
Start: 2021-10-30 | End: 2021-11-04

## 2021-10-30 RX ORDER — HYDROCHLOROTHIAZIDE 25 MG
25 TABLET ORAL DAILY
Refills: 0 | Status: DISCONTINUED | OUTPATIENT
Start: 2021-10-30 | End: 2021-11-04

## 2021-10-30 RX ORDER — SODIUM CHLORIDE 9 MG/ML
500 INJECTION INTRAMUSCULAR; INTRAVENOUS; SUBCUTANEOUS ONCE
Refills: 0 | Status: COMPLETED | OUTPATIENT
Start: 2021-10-30 | End: 2021-10-30

## 2021-10-30 RX ORDER — MONTELUKAST 4 MG/1
1 TABLET, CHEWABLE ORAL
Qty: 0 | Refills: 0 | DISCHARGE

## 2021-10-30 RX ORDER — GABAPENTIN 400 MG/1
400 CAPSULE ORAL
Refills: 0 | Status: DISCONTINUED | OUTPATIENT
Start: 2021-10-30 | End: 2021-10-30

## 2021-10-30 RX ORDER — FAMOTIDINE 10 MG/ML
1 INJECTION INTRAVENOUS
Qty: 0 | Refills: 0 | DISCHARGE

## 2021-10-30 RX ORDER — CIPROFLOXACIN LACTATE 400MG/40ML
500 VIAL (ML) INTRAVENOUS ONCE
Refills: 0 | Status: COMPLETED | OUTPATIENT
Start: 2021-10-30 | End: 2021-10-30

## 2021-10-30 RX ORDER — KETOTIFEN FUMARATE 0.34 MG/ML
1 SOLUTION OPHTHALMIC DAILY
Refills: 0 | Status: DISCONTINUED | OUTPATIENT
Start: 2021-10-30 | End: 2021-11-04

## 2021-10-30 RX ORDER — ASCORBIC ACID 60 MG
1 TABLET,CHEWABLE ORAL
Qty: 0 | Refills: 0 | DISCHARGE

## 2021-10-30 RX ORDER — LEVOTHYROXINE SODIUM 125 MCG
188 TABLET ORAL DAILY
Refills: 0 | Status: DISCONTINUED | OUTPATIENT
Start: 2021-10-30 | End: 2021-10-30

## 2021-10-30 RX ORDER — CHOLECALCIFEROL (VITAMIN D3) 125 MCG
1 CAPSULE ORAL
Qty: 0 | Refills: 0 | DISCHARGE

## 2021-10-30 RX ORDER — OLOPATADINE HYDROCHLORIDE 1 MG/ML
1 SOLUTION/ DROPS OPHTHALMIC
Qty: 0 | Refills: 0 | DISCHARGE

## 2021-10-30 RX ADMIN — Medication 500 MILLIGRAM(S): at 18:53

## 2021-10-30 RX ADMIN — Medication 650 MILLIGRAM(S): at 02:11

## 2021-10-30 RX ADMIN — KETOTIFEN FUMARATE 1 DROP(S): 0.34 SOLUTION OPHTHALMIC at 19:08

## 2021-10-30 RX ADMIN — Medication 25 MILLIGRAM(S): at 18:53

## 2021-10-30 RX ADMIN — Medication 1 TABLET(S): at 18:53

## 2021-10-30 RX ADMIN — TAMSULOSIN HYDROCHLORIDE 0.8 MILLIGRAM(S): 0.4 CAPSULE ORAL at 20:45

## 2021-10-30 RX ADMIN — SODIUM CHLORIDE 500 MILLILITER(S): 9 INJECTION INTRAMUSCULAR; INTRAVENOUS; SUBCUTANEOUS at 09:53

## 2021-10-30 RX ADMIN — Medication 325 MILLIGRAM(S): at 19:03

## 2021-10-30 RX ADMIN — Medication 500 MILLIGRAM(S): at 09:53

## 2021-10-30 RX ADMIN — MONTELUKAST 10 MILLIGRAM(S): 4 TABLET, CHEWABLE ORAL at 20:46

## 2021-10-30 RX ADMIN — Medication 650 MILLIGRAM(S): at 19:03

## 2021-10-30 RX ADMIN — FINASTERIDE 5 MILLIGRAM(S): 5 TABLET, FILM COATED ORAL at 18:53

## 2021-10-30 RX ADMIN — Medication 1 APPLICATION(S): at 19:08

## 2021-10-30 RX ADMIN — Medication 650 MILLIGRAM(S): at 20:46

## 2021-10-30 RX ADMIN — Medication 300 MILLIGRAM(S): at 19:03

## 2021-10-30 RX ADMIN — GABAPENTIN 300 MILLIGRAM(S): 400 CAPSULE ORAL at 19:06

## 2021-10-30 NOTE — ED PROVIDER NOTE - OBJECTIVE STATEMENT
89 y m on eliquis pt stated was here few days ago had a straight cath came in today bib ems with home attendant for blood in penis 89 y m on eliquis pt stated was here few days ago had a straight cath came in today bib ems with home attendant for blood in penis  onset gradual   locations penis   duration 1 day   characteristics blood from penis   context pt on eliquis , had a straight cath few days ago , pt bladder is also distended   aggravating factors none   relieving factors none   timming intermittent   severity 89 y m on eliquis pt stated was here few days ago had a straight cath came in today bib ems with home attendant for blood in penis  onset gradual   locations penis   duration 1 day   characteristics blood from penis   context pt on eliquis , had a straight cath few days ago , pt bladder is also distended   aggravating factors none   relieving factors none   timming intermittent   severity moderate

## 2021-10-30 NOTE — H&P ADULT - CONVERSATION DETAILS
Patient with previous MOLST form, copy in chart. Patient's wishes were discussed again and at this time continues to remain DNR/ DNI Patient with previous MOLST form, copy in chart. Patient's wishes were discussed again and at this time continues to remain DNR/ DNI. Confirmed this with son, Salbador Chapman, who is HCP- advance directives also in chart.

## 2021-10-30 NOTE — H&P ADULT - NSHPPHYSICALEXAM_GEN_ALL_CORE
T(C): 36.4 (10-30-21 @ 08:54), Max: 36.4 (10-30-21 @ 08:54)  HR: 81 (10-30-21 @ 08:54) (81 - 81)  BP: 131/78 (10-30-21 @ 08:54) (131/78 - 131/78)  RR: 16 (10-30-21 @ 08:54) (16 - 16)  SpO2: 97% (10-30-21 @ 08:54) (97% - 97%)    GENERAL: patient appears well, no acute distress, appropriate, pleasant, [place]  EYES: sclera clear, no exudates  ENMT: oropharynx clear without erythema, no exudates, moist mucous membranes  NECK: supple, soft, no thyromegaly noted  LUNGS: good air entry bilaterally, clear to auscultation, symmetric breath sounds, no wheezing or rhonchi appreciated  HEART: soft S1/S2, irregular rate and rhythm, systolic murmurs noted, 2+ pitting edema b/l LE, both LE wrapped in ace wrap  GASTROINTESTINAL: abdomen is soft, nontender, nondistended, normoactive bowel sounds, no palpable masses  GENITOURINARY: shah in place with elena hematuria  INTEGUMENT: warm and perfused, ecchymosis noted on extremities  NEUROLOGIC: awake, alert, oriented x3, good muscle tone in 4 extremities  PSYCHIATRIC: mood is good, affect is congruent, linear and logical thought process

## 2021-10-30 NOTE — H&P ADULT - NSHPSOCIALHISTORY_GEN_ALL_CORE
Lives at [facility]  Ambulates with walker and 1 assist, sometimes wheelchair  Denies smoking (very distant cigar smoking in 1940s), illicit drug use  Social EtOH

## 2021-10-30 NOTE — PATIENT PROFILE ADULT - NS PRO AD PATIENT TYPE ON CHART
DNI/Health Care Proxy (HCP)/Do Not Resuscitate (DNR)/Medical Orders for Life-Sustaining Treatment (MOLST)/Living Will

## 2021-10-30 NOTE — H&P ADULT - HISTORY OF PRESENT ILLNESS
89 year old M PMH afib on Eliquis, DVT, PVD, BPH, GERD, gout, hypothyroidism, HFpEF (echo 9/2020 EF 55-60%, Grade I DD) coming from [facility] for hematuria. Patient was in ED on 10/24 after mechanical fall and straight cath was performed to obtain urine sample. Since then patient was feeling okay but about 2 days ago started having difficulty urinating and as per aide at bedside, had not urinated all day yesterday. Last night noted to have blood around penis and patient was brought in today for further evaluation. Patient also complains of R flank pain that started yesterday and lidocaine patch was applied which helped with pain. Other than that, patient denies any acute complaints. Had some abdominal pain/pressure yesterday however feels more comfortable now.     In the ED, T 97.5F, HR 81, /78, RR 16, SpO2 97% on RA. Labs showed H/H 12.2/38.5 (around baseline), BUN/Cr 44/1.57 (around baseline), UA positive for large blood, RBC and few bacteria, as well as protein. Received NS bolus x 500cc and cipro 500mg PO x1 in the ED.    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

## 2021-10-30 NOTE — ED PROVIDER NOTE - CLINICAL SUMMARY MEDICAL DECISION MAKING FREE TEXT BOX
89 y m on eliquis pt stated was here few days ago had a straight cath came in today bib ems with home attendant for blood in penis  onset gradual   locations penis   duration 1 day   characteristics blood from penis   context pt on eliquis , had a straight cath few days ago , pt bladder is also distended   aggravating factors none   relieving factors none   timming intermittent   severity moderate  hb wbc nl ua ++++ blood cxr gabe ekg a fib rate controlled , ct renal advised by hospitalist

## 2021-10-30 NOTE — ED ADULT NURSE NOTE - OBJECTIVE STATEMENT
Pt presents to ED for "bleeding from penis". Recently seen in ED where he was straight catheterized for urine; pt on Eliquis.

## 2021-10-30 NOTE — ED ADULT NURSE NOTE - INTERVENTIONS DEFINITIONS
[place] to call system/Call bell, personal items and telephone within reach/Physically safe environment: no spills, clutter or unnecessary equipment

## 2021-10-30 NOTE — H&P ADULT - ASSESSMENT
89 year old M PMH afib on Eliquis, DVT, PVD, BPH, GERD, gout, hypothyroidism, HFpEF (echo 9/2020 EF 55-60%, Grade I DD) coming from [facility] for difficulty urinating admitted for hematuria. 89 year old M PMH afib on Eliquis, DVT, PVD, BPH, GERD, gout, hypothyroidism, HFpEF (echo 9/2020 EF 55-60%, Grade I DD) coming from [facility] for difficulty urinating admitted for hematuria.    #Hematuria  - admit to [facility]  - patient was here 10/24 s/p fall and had straight cath performed, possible that hematuria is from traumatic shah  - patient also with R flank pain- will get CT abd/pelvis to further evaluate for possible stone vs malignancy vs other etiology for hematuria  - H/H stable at this time, hemodynamically stable  - hold home Eliquis for now in setting of hematuria  - shah placed in ED, about 1400cc removed with catheter placement  - continue with bladder irrigation for now, clots present  - s/p cipro x1 in the ED, will hold off on further abx for now as UA not suggestive of UTI, patient without leukocytosis and afebrile    #Afib  - patient on Eliquis at home, will hold for now in setting of hematuria  - rate controlled at this time off meds    #BPH  - continue home meds tamsulosin and finasteride    #HFpEF  - echo from 9/2020 showed EF 55-60% and Grade I DD, patient with ace wrappings to b/l LE  - continue home torsemide     #gout  - continue home allopurinol    #HTN  - continue home HCTZ    #CKD  - patient with likely stage III CKD, currently at baseline renal function    #DVT ppx  - patient on Eliquis at home, holding in setting of hematuria  - restart pending hematuria resolution/ stablization 89 year old M PMH afib on Eliquis, DVT, PVD, BPH, GERD, gout, hypothyroidism, HFpEF (echo 9/2020 EF 55-60%, Grade I DD) coming from [facility] for difficulty urinating admitted for hematuria.    #Hematuria  - admit to [facility]  - patient was here 10/24 s/p fall and had straight cath performed, possible that hematuria is from traumatic shah  - patient also with R flank pain- will get CT abd/pelvis to further evaluate for possible stone vs malignancy vs other etiology for hematuria  - H/H stable at this time, hemodynamically stable  - hold home Eliquis for now in setting of hematuria  - shah placed in ED, about 1400cc removed with catheter placement  - continue with bladder irrigation for now, clots present  - s/p cipro x1 in the ED, will hold off on further abx for now as UA not suggestive of UTI, patient without leukocytosis and afebrile. Follow up UCx    #Afib  - patient on Eliquis at home, will hold for now in setting of hematuria  - rate controlled at this time off meds    #BPH  - continue home meds tamsulosin and finasteride    #HFpEF  - echo from 9/2020 showed EF 55-60% and Grade I DD, patient with ace wrappings to b/l LE  - continue home torsemide     #gout  - continue home allopurinol    #HTN  - continue home HCTZ    #CKD  - patient with likely stage III CKD, currently at baseline renal function    #DVT ppx  - patient on Eliquis at home, holding in setting of hematuria  - restart pending hematuria resolution/ stablization 89 year old M PMH afib on Eliquis, DVT, PVD, BPH, GERD, gout, hypothyroidism, HFpEF (echo 9/2020 EF 55-60%, Grade I DD) coming from [facility] for difficulty urinating admitted for hematuria.    #Hematuria  - admit to [facility]  - patient was here 10/24 s/p fall and had straight cath performed, possible that hematuria is from traumatic shah  - patient also with R flank pain- will get CT abd/pelvis to further evaluate for possible stone vs malignancy vs other etiology for hematuria  - H/H stable at this time, hemodynamically stable  - type and screen ordered for the morning  - hold home Eliquis for now in setting of hematuria  - shah placed in ED, about 1400cc removed with catheter placement  - continue with bladder irrigation for now, clots present  - s/p cipro x1 in the ED, will hold off on further abx for now as UA not suggestive of UTI, patient without leukocytosis and afebrile. Follow up UCx    #Afib  - patient on Eliquis at home, will hold for now in setting of hematuria  - rate controlled at this time off meds    #BPH  - continue home meds tamsulosin and finasteride    #HFpEF  - echo from 9/2020 showed EF 55-60% and Grade I DD, patient with ace wrappings to b/l LE  - continue home torsemide     #gout  - continue home allopurinol    #HTN  - continue home HCTZ    #CKD  - patient with likely stage III CKD, currently at baseline renal function    #DVT ppx  - patient on Eliquis at home, holding in setting of hematuria  - restart pending hematuria resolution/ stablization 89 year old M PMH afib on Eliquis, DVT, PVD, BPH, GERD, gout, hypothyroidism, HFpEF (echo 9/2020 EF 55-60%, Grade I DD) coming from [facility] for difficulty urinating admitted for hematuria.    #Hematuria  - admit to [facility]  - patient was here 10/24 s/p fall and had straight cath performed, possible that hematuria is from traumatic shah  - patient also with R flank pain- will get CT abd/pelvis to further evaluate for possible stone vs malignancy vs other etiology for hematuria  - H/H stable at this time, hemodynamically stable  - type and screen ordered for the morning  - hold home Eliquis for now in setting of hematuria  - shah placed in ED, about 1400cc removed with catheter placement  - continue with bladder irrigation for now, clots present  - s/p cipro x1 in the ED, will hold off on further abx for now as UA not suggestive of UTI, patient without leukocytosis and afebrile. Follow up UCx    #urinary retention  #BPH  - patient with difficulty urinating for past 2 days, now with shah- noted to remove 1400cc when inserted  - continue with shah for now and attempt TOV when hematuria resolved  - patient cannot go back to [facility] with shah as per family  - continue home meds tamsulosin and finasteride    #Afib  - patient on Eliquis at home, will hold for now in setting of hematuria  - rate controlled at this time off meds      #HFpEF  - echo from 9/2020 showed EF 55-60% and Grade I DD, patient with ace wrappings to b/l LE  - continue home torsemide     #gout  - continue home allopurinol    #HTN  - continue home HCTZ    #CKD  - patient with likely stage III CKD, currently at baseline renal function    #DVT ppx  - patient on Eliquis at home, holding in setting of hematuria  - restart pending hematuria resolution/ stablization

## 2021-10-30 NOTE — H&P ADULT - NSHPREVIEWOFSYSTEMS_GEN_ALL_CORE
CONSTITUTIONAL: denies fever, chills, fatigue, weakness  HEENT: denies blurred vision, sore throat  SKIN: denies new lesions, rash  CARDIOVASCULAR: denies chest pain, chest pressure, palpitations  RESPIRATORY: denies shortness of breath, sputum production  GASTROINTESTINAL: denies nausea, vomiting, diarrhea, abdominal pain  GENITOURINARY: denies dysuria, admits to blood in urine, admits to R flank pain  NEUROLOGICAL: denies numbness, headache, focal weakness  MUSCULOSKELETAL: denies new joint pain, admits to R flank muscle ache  HEMATOLOGIC: admits to blood in urine, admits to ecchymosis on extremities   LYMPHATICS: denies enlarged lymph nodes, admits to b/l LE swelling  PSYCHIATRIC: denies recent changes in anxiety, depression  ENDOCRINOLOGIC: denies sweating, cold or heat intolerance

## 2021-10-30 NOTE — ED PROVIDER NOTE - CARE PLAN
Principal Discharge DX:	Clot hematuria   1 Principal Discharge DX:	Clot hematuria  Secondary Diagnosis:	Acute urinary retention

## 2021-10-31 LAB
ANION GAP SERPL CALC-SCNC: 7 MMOL/L — SIGNIFICANT CHANGE UP (ref 5–17)
BASOPHILS # BLD AUTO: 0.04 K/UL — SIGNIFICANT CHANGE UP (ref 0–0.2)
BASOPHILS NFR BLD AUTO: 0.6 % — SIGNIFICANT CHANGE UP (ref 0–2)
BLD GP AB SCN SERPL QL: SIGNIFICANT CHANGE UP
BUN SERPL-MCNC: 40 MG/DL — HIGH (ref 7–23)
CALCIUM SERPL-MCNC: 9.2 MG/DL — SIGNIFICANT CHANGE UP (ref 8.4–10.5)
CHLORIDE SERPL-SCNC: 107 MMOL/L — SIGNIFICANT CHANGE UP (ref 96–108)
CO2 SERPL-SCNC: 31 MMOL/L — SIGNIFICANT CHANGE UP (ref 22–31)
COVID-19 SPIKE DOMAIN AB INTERP: POSITIVE
COVID-19 SPIKE DOMAIN ANTIBODY RESULT: >250 U/ML — HIGH
CREAT SERPL-MCNC: 1.56 MG/DL — HIGH (ref 0.5–1.3)
CULTURE RESULTS: NO GROWTH — SIGNIFICANT CHANGE UP
EOSINOPHIL # BLD AUTO: 0.17 K/UL — SIGNIFICANT CHANGE UP (ref 0–0.5)
EOSINOPHIL NFR BLD AUTO: 2.7 % — SIGNIFICANT CHANGE UP (ref 0–6)
GLUCOSE SERPL-MCNC: 105 MG/DL — HIGH (ref 70–99)
HCT VFR BLD CALC: 36.1 % — LOW (ref 39–50)
HGB BLD-MCNC: 11.4 G/DL — LOW (ref 13–17)
IMM GRANULOCYTES NFR BLD AUTO: 0.5 % — SIGNIFICANT CHANGE UP (ref 0–1.5)
LYMPHOCYTES # BLD AUTO: 1.47 K/UL — SIGNIFICANT CHANGE UP (ref 1–3.3)
LYMPHOCYTES # BLD AUTO: 23.1 % — SIGNIFICANT CHANGE UP (ref 13–44)
MCHC RBC-ENTMCNC: 30.2 PG — SIGNIFICANT CHANGE UP (ref 27–34)
MCHC RBC-ENTMCNC: 31.6 GM/DL — LOW (ref 32–36)
MCV RBC AUTO: 95.5 FL — SIGNIFICANT CHANGE UP (ref 80–100)
MONOCYTES # BLD AUTO: 0.61 K/UL — SIGNIFICANT CHANGE UP (ref 0–0.9)
MONOCYTES NFR BLD AUTO: 9.6 % — SIGNIFICANT CHANGE UP (ref 2–14)
NEUTROPHILS # BLD AUTO: 4.03 K/UL — SIGNIFICANT CHANGE UP (ref 1.8–7.4)
NEUTROPHILS NFR BLD AUTO: 63.5 % — SIGNIFICANT CHANGE UP (ref 43–77)
NRBC # BLD: 0 /100 WBCS — SIGNIFICANT CHANGE UP (ref 0–0)
PLATELET # BLD AUTO: 256 K/UL — SIGNIFICANT CHANGE UP (ref 150–400)
POTASSIUM SERPL-MCNC: 3.3 MMOL/L — LOW (ref 3.5–5.3)
POTASSIUM SERPL-SCNC: 3.3 MMOL/L — LOW (ref 3.5–5.3)
RBC # BLD: 3.78 M/UL — LOW (ref 4.2–5.8)
RBC # FLD: 16 % — HIGH (ref 10.3–14.5)
SARS-COV-2 IGG+IGM SERPL QL IA: >250 U/ML — HIGH
SARS-COV-2 IGG+IGM SERPL QL IA: POSITIVE
SARS-COV-2 RNA SPEC QL NAA+PROBE: SIGNIFICANT CHANGE UP
SODIUM SERPL-SCNC: 145 MMOL/L — SIGNIFICANT CHANGE UP (ref 135–145)
SPECIMEN SOURCE: SIGNIFICANT CHANGE UP
WBC # BLD: 6.35 K/UL — SIGNIFICANT CHANGE UP (ref 3.8–10.5)
WBC # FLD AUTO: 6.35 K/UL — SIGNIFICANT CHANGE UP (ref 3.8–10.5)

## 2021-10-31 PROCEDURE — 99233 SBSQ HOSP IP/OBS HIGH 50: CPT

## 2021-10-31 RX ORDER — POTASSIUM CHLORIDE 20 MEQ
40 PACKET (EA) ORAL ONCE
Refills: 0 | Status: COMPLETED | OUTPATIENT
Start: 2021-10-31 | End: 2021-10-31

## 2021-10-31 RX ADMIN — Medication 650 MILLIGRAM(S): at 22:18

## 2021-10-31 RX ADMIN — Medication 650 MILLIGRAM(S): at 06:45

## 2021-10-31 RX ADMIN — FINASTERIDE 5 MILLIGRAM(S): 5 TABLET, FILM COATED ORAL at 11:34

## 2021-10-31 RX ADMIN — KETOTIFEN FUMARATE 1 DROP(S): 0.34 SOLUTION OPHTHALMIC at 11:35

## 2021-10-31 RX ADMIN — Medication 1 TABLET(S): at 11:34

## 2021-10-31 RX ADMIN — MONTELUKAST 10 MILLIGRAM(S): 4 TABLET, CHEWABLE ORAL at 22:18

## 2021-10-31 RX ADMIN — Medication 1 APPLICATION(S): at 11:33

## 2021-10-31 RX ADMIN — Medication 40 MILLIGRAM(S): at 06:17

## 2021-10-31 RX ADMIN — Medication 650 MILLIGRAM(S): at 23:15

## 2021-10-31 RX ADMIN — Medication 650 MILLIGRAM(S): at 06:17

## 2021-10-31 RX ADMIN — Medication 650 MILLIGRAM(S): at 14:37

## 2021-10-31 RX ADMIN — Medication 325 MILLIGRAM(S): at 06:17

## 2021-10-31 RX ADMIN — Medication 25 MILLIGRAM(S): at 06:17

## 2021-10-31 RX ADMIN — Medication 100 MICROGRAM(S): at 06:17

## 2021-10-31 RX ADMIN — Medication 500 MILLIGRAM(S): at 11:34

## 2021-10-31 RX ADMIN — Medication 40 MILLIEQUIVALENT(S): at 11:30

## 2021-10-31 RX ADMIN — Medication 325 MILLIGRAM(S): at 18:22

## 2021-10-31 RX ADMIN — GABAPENTIN 300 MILLIGRAM(S): 400 CAPSULE ORAL at 09:22

## 2021-10-31 RX ADMIN — GABAPENTIN 300 MILLIGRAM(S): 400 CAPSULE ORAL at 18:26

## 2021-10-31 RX ADMIN — Medication 88 MICROGRAM(S): at 06:17

## 2021-10-31 RX ADMIN — TAMSULOSIN HYDROCHLORIDE 0.8 MILLIGRAM(S): 0.4 CAPSULE ORAL at 22:18

## 2021-10-31 RX ADMIN — Medication 650 MILLIGRAM(S): at 14:35

## 2021-10-31 RX ADMIN — Medication 300 MILLIGRAM(S): at 11:39

## 2021-10-31 NOTE — PROGRESS NOTE ADULT - SUBJECTIVE AND OBJECTIVE BOX
Patient is a 89y old  Male who presents with a chief complaint of hematuria (30 Oct 2021 11:14)    Patient seen and examined at bedside.  Pt. states "I am ok".  No events overnight.    ALLERGIES:  penicillin (Unknown)    MEDICATIONS  (STANDING):  acetaminophen     Tablet .. 650 milliGRAM(s) Oral every 8 hours  allopurinol 300 milliGRAM(s) Oral daily  ascorbic acid 500 milliGRAM(s) Oral daily  ferrous    sulfate 325 milliGRAM(s) Oral two times a day  finasteride 5 milliGRAM(s) Oral daily  gabapentin 300 milliGRAM(s) Oral <User Schedule>  hydrochlorothiazide 25 milliGRAM(s) Oral daily  influenza  Vaccine (HIGH DOSE) 0.7 milliLiter(s) IntraMuscular once  ketotifen 0.025% Ophthalmic Solution 1 Drop(s) Both EYES daily  levothyroxine 100 MICROGram(s) Oral daily  levothyroxine 88 MICROGram(s) Oral daily  montelukast 10 milliGRAM(s) Oral at bedtime  multivitamin 1 Tablet(s) Oral daily  potassium chloride    Tablet ER 40 milliEquivalent(s) Oral once  silver sulfADIAZINE 1% Cream 1 Application(s) Topical daily  tamsulosin 0.8 milliGRAM(s) Oral at bedtime  torsemide 40 milliGRAM(s) Oral daily    MEDICATIONS  (PRN):  melatonin 6 milliGRAM(s) Oral at bedtime PRN Insomnia    Vital Signs Last 24 Hrs  T(F): 97.5 (31 Oct 2021 06:00), Max: 98.3 (30 Oct 2021 20:43)  HR: 63 (31 Oct 2021 06:00) (63 - 85)  BP: 131/71 (31 Oct 2021 06:00) (105/58 - 131/71)  RR: 15 (31 Oct 2021 06:00) (15 - 15)  SpO2: 98% (31 Oct 2021 06:00) (96% - 99%)  I&O's Summary    30 Oct 2021 07:01  -  31 Oct 2021 07:00  --------------------------------------------------------  IN: 83420 mL / OUT: 26371 mL / NET: -64140 mL      PHYSICAL EXAM:  General: NAD, sleepy, arousable.  ENT: No gross hearing impairment, Moist mucous membranes, no thrush  Neck: Supple, No JVD  Lungs: Clear to auscultation bilaterally, good air entry, non-labored breathing  Cardio: RRR,  +systolic murmur  Abdomen: Soft, Nontender, Nondistended; Bowel sounds present  G/U:  +CAZARES, running clear in bag  Extremities: No calf tenderness, No cyanosis, No pitting edema  Psych: Appropriate mood and affect    LABS:                        11.4   6.35  )-----------( 256      ( 31 Oct 2021 06:00 )             36.1     10-31    145  |  107  |  40  ----------------------------<  105  3.3   |  31  |  1.56    Ca    9.2      31 Oct 2021 06:00    TPro  7.5  /  Alb  3.2  /  TBili  0.7  /  DBili  x   /  AST  30  /  ALT  22  /  AlkPhos  60  10-30        eGFR if Non African American: 36 mL/min/1.73M2 (10-31-21 @ 06:00)    Urinalysis Basic - ( 30 Oct 2021 09:45 )    Color: Red / Appearance: Slightly Turbid / S.010 / pH: x  Gluc: x / Ketone: Negative  / Bili: Negative / Urobili: Negative   Blood: x / Protein: 100 / Nitrite: Negative   Leuk Esterase: Negative / RBC: >50 /HPF / WBC 0-2 /HPF   Sq Epi: x / Non Sq Epi: Neg.-Few / Bacteria: Few /HPF          RADIOLOGY & ADDITIONAL TESTS:  < from: CT Renal Stone Hunt (10.30.21 @ 12:20) >    IMPRESSION:  Bladder distended with Foleycatheter and layering high density suggestive of hemorrhage.    No renal stones or hydronephrosis.    An indeterminant 1.9 cm left renal lesion is increased in size from prior exam. Recommend renal protocol MRI or CT for further evaluation.      < end of copied text >  < from: Xray Chest 1 View- PORTABLE-Urgent (10.30.21 @ 10:19) >    Impression: No active pulmonary disease.      < end of copied text >    Care Discussed with Consultants/Other Providers:

## 2021-10-31 NOTE — PROGRESS NOTE ADULT - ASSESSMENT
89 year old M PMH afib on Eliquis, DVT, PVD, BPH, GERD, gout, hypothyroidism, HFpEF (echo 9/2020 EF 55-60%, Grade I DD) coming from [facility] facility for difficulty urinating admitted for hematuria.    #Hematuria  - patient here on 10/24 s/p fall and had straight cath performed, possible hematuria is from traumatic shah placement  - CT abd/pelvis:  no stones or hydro. indeterminant 1.9 cm left renal lesion increased in size from prior exams  - H/H stable at this time, 11/36; hemodynamically stable  - hold home Eliquis for now in setting of hematuria  - continue with CBI  - s/p cipro x1 in the ED, will hold off on abx for now as UA not suggestive of UTI, urine culture pending    #Urinary retention  #BPH  - cont shah- noted to remove 1400cc when inserted on admission  - TOV when hematuria resolved  - patient cannot go back to [facility] with shah as per family  - continue home meds tamsulosin and finasteride    #Afib  - patient on Eliquis at home, will hold for now in setting of hematuria  - rate controlled at this time off meds    #Hypokalemia  -secondary to diuretic  -replete KCl  -cont to monitor    #HFpEF  - echo from 9/2020 showed EF 55-60% and Grade I DD, continue home torsemide     #gout  - continue home allopurinol    #HTN  - continue home HCTZ    #CKD  - patient with likely stage III CKD, currently at baseline renal function  - Creat 1.56, cont to monitor    #DVT ppx  - patient on Eliquis at home, holding in setting of hematuria    Dispo:  pt is DNR/DNI.  Anticipate D/C in next 48 hrs.  Left telephone message with Son, Nash Chapman at [phone].   89 year old M PMH afib on Eliquis, DVT, PVD, BPH, GERD, gout, hypothyroidism, HFpEF (echo 9/2020 EF 55-60%, Grade I DD) coming from [facility] facility for difficulty urinating admitted for hematuria.    #Hematuria  - patient here on 10/24 s/p fall and had straight cath performed, possible hematuria is from traumatic shah placement  - CT abd/pelvis:  no stones or hydro. indeterminant 1.9 cm left renal lesion increased in size from prior exams  - H/H stable at this time, 11/36; hemodynamically stable  - hold home Eliquis for now in setting of hematuria  - continue with CBI  - s/p cipro x1 in the ED, will hold off on abx for now as UA not suggestive of UTI, urine culture pending    #Urinary retention  #BPH  - cont shah- noted to remove 1400cc when inserted on admission  - TOV when hematuria resolved  - patient cannot go back to [facility] with shah as per family  - continue home meds tamsulosin and finasteride    #Afib  - patient on Eliquis at home, will hold for now in setting of hematuria  - rate controlled at this time off meds    #Hypokalemia  -secondary to diuretic  -replete KCl  -cont to monitor    #HFpEF  - echo from 9/2020 showed EF 55-60% and Grade I DD, continue home torsemide     #gout  - continue home allopurinol    #HTN  - continue home HCTZ    #CKD  - patient with likely stage III CKD, currently at baseline renal function  - Creat 1.56, cont to monitor    #DVT ppx  - patient on Eliquis at home, holding in setting of hematuria    Dispo:  pt is DNR/DNI.  Anticipate D/C in next 48 hrs. Spoke with Son, Nash Chapman at [phone], updated on plan of care.  89 year old M PMH afib on Eliquis, DVT, PVD, BPH, GERD, gout, hypothyroidism, HFpEF (echo 9/2020 EF 55-60%, Grade I DD) coming from [facility] facility for difficulty urinating admitted for hematuria.    #Hematuria  - patient here on 10/24 s/p fall and had straight cath performed, possible hematuria is from traumatic shah placement  - CT abd/pelvis:  no stones or hydro. indeterminant 1.9 cm left renal lesion increased in size from prior exams  - H/H stable at this time, 11/36; hemodynamically stable  - hold home Eliquis for now in setting of hematuria  - continue with CBI  - s/p cipro x1 in the ED, will hold off on abx for now as UA not suggestive of UTI, urine culture pending    #Urinary retention  #BPH  - cont shah- noted to remove 1400cc when inserted on admission  - TOV when hematuria resolved  - patient cannot go back to [facility] with shah as per family  - continue home meds tamsulosin and finasteride    #Afib  - patient on Eliquis at home, will hold for now in setting of hematuria  - rate controlled at this time off meds    #Hypokalemia  -secondary to diuretic  -replete KCl  -cont to monitor    #HFpEF  - echo from 9/2020 showed EF 55-60% and Grade I DD, continue home torsemide     #gout  - continue home allopurinol    #HTN  - continue home HCTZ    #CKD3  - baseline Cr 1.3 in 09/2020  - Creat 1.56, cont to monitor    #DVT ppx  - patient on Eliquis at home, holding in setting of hematuria    Dispo:  pt is DNR/DNI.  Anticipate D/C in next 48 hrs. Spoke with Son, Nash Chapman at [phone], updated on plan of care.

## 2021-11-01 LAB
ANION GAP SERPL CALC-SCNC: 7 MMOL/L — SIGNIFICANT CHANGE UP (ref 5–17)
BUN SERPL-MCNC: 48 MG/DL — HIGH (ref 7–23)
CALCIUM SERPL-MCNC: 9.4 MG/DL — SIGNIFICANT CHANGE UP (ref 8.4–10.5)
CHLORIDE SERPL-SCNC: 105 MMOL/L — SIGNIFICANT CHANGE UP (ref 96–108)
CO2 SERPL-SCNC: 33 MMOL/L — HIGH (ref 22–31)
CREAT SERPL-MCNC: 1.5 MG/DL — HIGH (ref 0.5–1.3)
GLUCOSE SERPL-MCNC: 98 MG/DL — SIGNIFICANT CHANGE UP (ref 70–99)
HCT VFR BLD CALC: 35.3 % — LOW (ref 39–50)
HGB BLD-MCNC: 11.1 G/DL — LOW (ref 13–17)
MCHC RBC-ENTMCNC: 30.7 PG — SIGNIFICANT CHANGE UP (ref 27–34)
MCHC RBC-ENTMCNC: 31.4 GM/DL — LOW (ref 32–36)
MCV RBC AUTO: 97.8 FL — SIGNIFICANT CHANGE UP (ref 80–100)
NRBC # BLD: 0 /100 WBCS — SIGNIFICANT CHANGE UP (ref 0–0)
PLATELET # BLD AUTO: 261 K/UL — SIGNIFICANT CHANGE UP (ref 150–400)
POTASSIUM SERPL-MCNC: 3.2 MMOL/L — LOW (ref 3.5–5.3)
POTASSIUM SERPL-SCNC: 3.2 MMOL/L — LOW (ref 3.5–5.3)
RBC # BLD: 3.61 M/UL — LOW (ref 4.2–5.8)
RBC # FLD: 16.2 % — HIGH (ref 10.3–14.5)
SODIUM SERPL-SCNC: 145 MMOL/L — SIGNIFICANT CHANGE UP (ref 135–145)
WBC # BLD: 8.63 K/UL — SIGNIFICANT CHANGE UP (ref 3.8–10.5)
WBC # FLD AUTO: 8.63 K/UL — SIGNIFICANT CHANGE UP (ref 3.8–10.5)

## 2021-11-01 PROCEDURE — 99232 SBSQ HOSP IP/OBS MODERATE 35: CPT

## 2021-11-01 RX ORDER — POLYETHYLENE GLYCOL 3350 17 G/17G
17 POWDER, FOR SOLUTION ORAL DAILY
Refills: 0 | Status: DISCONTINUED | OUTPATIENT
Start: 2021-11-01 | End: 2021-11-02

## 2021-11-01 RX ORDER — POTASSIUM CHLORIDE 20 MEQ
40 PACKET (EA) ORAL EVERY 4 HOURS
Refills: 0 | Status: COMPLETED | OUTPATIENT
Start: 2021-11-01 | End: 2021-11-01

## 2021-11-01 RX ORDER — SENNA PLUS 8.6 MG/1
2 TABLET ORAL AT BEDTIME
Refills: 0 | Status: DISCONTINUED | OUTPATIENT
Start: 2021-11-01 | End: 2021-11-04

## 2021-11-01 RX ADMIN — Medication 650 MILLIGRAM(S): at 13:27

## 2021-11-01 RX ADMIN — TAMSULOSIN HYDROCHLORIDE 0.8 MILLIGRAM(S): 0.4 CAPSULE ORAL at 22:05

## 2021-11-01 RX ADMIN — Medication 325 MILLIGRAM(S): at 06:58

## 2021-11-01 RX ADMIN — Medication 40 MILLIGRAM(S): at 06:59

## 2021-11-01 RX ADMIN — GABAPENTIN 300 MILLIGRAM(S): 400 CAPSULE ORAL at 13:23

## 2021-11-01 RX ADMIN — SENNA PLUS 2 TABLET(S): 8.6 TABLET ORAL at 22:05

## 2021-11-01 RX ADMIN — Medication 650 MILLIGRAM(S): at 22:04

## 2021-11-01 RX ADMIN — Medication 1 APPLICATION(S): at 13:26

## 2021-11-01 RX ADMIN — Medication 325 MILLIGRAM(S): at 17:10

## 2021-11-01 RX ADMIN — Medication 88 MICROGRAM(S): at 06:57

## 2021-11-01 RX ADMIN — Medication 5 MILLIGRAM(S): at 22:04

## 2021-11-01 RX ADMIN — GABAPENTIN 300 MILLIGRAM(S): 400 CAPSULE ORAL at 22:04

## 2021-11-01 RX ADMIN — Medication 650 MILLIGRAM(S): at 06:56

## 2021-11-01 RX ADMIN — Medication 500 MILLIGRAM(S): at 13:24

## 2021-11-01 RX ADMIN — Medication 25 MILLIGRAM(S): at 06:56

## 2021-11-01 RX ADMIN — Medication 650 MILLIGRAM(S): at 22:34

## 2021-11-01 RX ADMIN — Medication 1 TABLET(S): at 13:26

## 2021-11-01 RX ADMIN — KETOTIFEN FUMARATE 1 DROP(S): 0.34 SOLUTION OPHTHALMIC at 13:25

## 2021-11-01 RX ADMIN — Medication 100 MICROGRAM(S): at 06:56

## 2021-11-01 RX ADMIN — Medication 300 MILLIGRAM(S): at 13:25

## 2021-11-01 RX ADMIN — Medication 40 MILLIEQUIVALENT(S): at 17:10

## 2021-11-01 RX ADMIN — Medication 40 MILLIEQUIVALENT(S): at 18:45

## 2021-11-01 RX ADMIN — MONTELUKAST 10 MILLIGRAM(S): 4 TABLET, CHEWABLE ORAL at 22:05

## 2021-11-01 RX ADMIN — FINASTERIDE 5 MILLIGRAM(S): 5 TABLET, FILM COATED ORAL at 13:25

## 2021-11-01 NOTE — PHYSICAL THERAPY INITIAL EVALUATION ADULT - ADDITIONAL COMMENTS
pt lives at [facility], reports he uses a RW and assist of 1, with occasional use of WC

## 2021-11-01 NOTE — PROGRESS NOTE ADULT - SUBJECTIVE AND OBJECTIVE BOX
Patient is a 89y old  Male who presents with a chief complaint of hematuria (2021 08:53)      Patient seen and examined at bedside. Pt states he is feeling better, reports some constipation, denies overnight events or further current complaints including chest pain, shortness of breath, dizziness, nausea, vomiting, diarrhea, fever or chills.      ALLERGIES:  penicillin (Unknown)    MEDICATIONS  (STANDING):  acetaminophen     Tablet .. 650 milliGRAM(s) Oral every 8 hours  allopurinol 300 milliGRAM(s) Oral daily  ascorbic acid 500 milliGRAM(s) Oral daily  bisacodyl 5 milliGRAM(s) Oral at bedtime  ferrous    sulfate 325 milliGRAM(s) Oral two times a day  finasteride 5 milliGRAM(s) Oral daily  gabapentin 300 milliGRAM(s) Oral <User Schedule>  hydrochlorothiazide 25 milliGRAM(s) Oral daily  influenza  Vaccine (HIGH DOSE) 0.7 milliLiter(s) IntraMuscular once  ketotifen 0.025% Ophthalmic Solution 1 Drop(s) Both EYES daily  levothyroxine 100 MICROGram(s) Oral daily  levothyroxine 88 MICROGram(s) Oral daily  montelukast 10 milliGRAM(s) Oral at bedtime  multivitamin 1 Tablet(s) Oral daily  senna 2 Tablet(s) Oral at bedtime  silver sulfADIAZINE 1% Cream 1 Application(s) Topical daily  tamsulosin 0.8 milliGRAM(s) Oral at bedtime  torsemide 40 milliGRAM(s) Oral daily    MEDICATIONS  (PRN):  melatonin 6 milliGRAM(s) Oral at bedtime PRN Insomnia  polyethylene glycol 3350 17 Gram(s) Oral daily PRN Constipation    Vital Signs Last 24 Hrs  T(F): 98.3 (2021 05:43), Max: 98.4 (31 Oct 2021 17:00)  HR: 60 (2021 05:43) (60 - 80)  BP: 132/69 (2021 05:43) (132/69 - 133/61)  RR: 16 (2021 05:43) (16 - 16)  SpO2: 98% (2021 05:43) (98% - 99%)  I&O's Summary    31 Oct 2021 07:01  -  2021 07:00  --------------------------------------------------------  IN: 18175 mL / OUT: 22462 mL / NET: -4720 mL    2021 07:01  -  2021 16:03  --------------------------------------------------------  IN: 0 mL / OUT: 4100 mL / NET: -4100 mL      PHYSICAL EXAM:  General: NAD, A/O x 3  ENT: MMM, no oral thrush   Neck: Supple, No JVD  Lungs: Clear to auscultation bilaterally, non labored breathing  Cardio: RRR, S1/S2, No murmurs  Abdomen: Soft, Nontender, Nondistended; Bowel sounds present  Extremities: No calf tenderness, No pitting edema  : + Demarco/CBI Clear urine     LABS:                        11.1   8.63  )-----------( 261      ( 2021 07:38 )             35.3     -    145  |  105  |  48  ----------------------------<  98  3.2   |  33  |  1.50    Ca    9.4      2021 07:38    TPro  7.5  /  Alb  3.2  /  TBili  0.7  /  DBili  x   /  AST  30  /  ALT  22  /  AlkPhos  60  10-30    eGFR if Non African American: 38 mL/min/1.73M2 (21 @ 07:38)  eGFR if : 44 mL/min/1.73M2 (21 @ 07:38)                      Urinalysis Basic - ( 30 Oct 2021 09:45 )    Color: Red / Appearance: Slightly Turbid / S.010 / pH: x  Gluc: x / Ketone: Negative  / Bili: Negative / Urobili: Negative   Blood: x / Protein: 100 / Nitrite: Negative   Leuk Esterase: Negative / RBC: >50 /HPF / WBC 0-2 /HPF   Sq Epi: x / Non Sq Epi: Neg.-Few / Bacteria: Few /HPF        Culture - Urine (collected 30 Oct 2021 09:45)  Source: Clean Catch Clean Catch (Midstream)  Final Report (31 Oct 2021 13:59):    No growth      COVID-19 PCR: NotDetec (10-31-21 @ 06:00)      RADIOLOGY & ADDITIONAL TESTS:    Care Discussed with Consultants/Other Providers:   Urology

## 2021-11-01 NOTE — PROGRESS NOTE ADULT - ASSESSMENT
89 year old M PMH afib on Eliquis, DVT, PVD, BPH, GERD, gout, hypothyroidism, HFpEF (echo 9/2020 EF 55-60%, Grade I DD) coming from [facility] facility for difficulty urinating admitted for hematuria.    #Hematuria  -Resolving. Patient here on 10/24 s/p fall and had straight cath performed, possible hematuria is from traumatic shah placement  - CT abd/pelvis:  no stones or hydro. indeterminant 1.9 cm left renal lesion increased in size from prior exams  - H/H stable at this time; hemodynamically stable  -continue holding home Eliquis for now in setting of hematuria  -Spoke with urology whom will see the patient tomorrow morning. Continue with slow CBI  - s/p cipro x1 in the ED, will hold off on abx for now as UA not suggestive of UTI, urine culture negative    #Urinary retention  #BPH  - cont Shah noted to remove 1400cc when inserted on admission  - TOV possibly tomorrow   - patient cannot go back to [facility] with shah as per family  - continue home meds tamsulosin and finasteride    #Weakness  -PT consult. Patient is 2 person assist.     #Afib  - patient on Eliquis at home, will continue holding now in setting of hematuria  - rate controlled at this time off meds    #Hypokalemia  -secondary to diuretic  -replete KCl  -cont to monitor    #HFpEF  - echo from 9/2020 showed EF 55-60% and Grade I DD, continue home torsemide     #gout  - continue home allopurinol    #HTN  - continue home HCTZ    #CKD3  - baseline Cr 1.3 in 09/2020  - Creat 1.5, cont to monitor    #DVT ppx  - patient on Eliquis at home, holding in setting of hematuria    Dispo:  pt is DNR/DNI.  Anticipate D/C in next 24-48 hrs. Spoke with Son, Nash Chapman at [phone], updated on plan of care.  89 year old M PMH afib on Eliquis, DVT, PVD, BPH, GERD, gout, hypothyroidism, HFpEF (echo 9/2020 EF 55-60%, Grade I DD) coming from [facility] facility for difficulty urinating admitted for hematuria.    #Hematuria  -Resolving. Patient here on 10/24 s/p fall and had straight cath performed, possible hematuria is from traumatic shah placement  - CT abd/pelvis:  no stones or hydro. indeterminant 1.9 cm left renal lesion increased in size from prior exams  - H/H stable at this time; hemodynamically stable  -continue holding home Eliquis for now in setting of hematuria  -Spoke with urology whom will see the patient tomorrow morning. Continue with slow CBI  - s/p cipro x1 in the ED, will hold off on abx for now as UA not suggestive of UTI, urine culture negative    #Urinary retention  #BPH  - cont Shah noted to remove 1400cc when inserted on admission  - TOV possibly tomorrow   - patient cannot go back to [facility] with shah as per family  - continue home meds tamsulosin and finasteride    #Weakness  -PT consult. Patient is 2 person assist.     #Afib  - patient on Eliquis at home, will continue holding now in setting of hematuria  - rate controlled at this time off meds    #Hypokalemia  -secondary to diuretic  -replete KCl  -cont to monitor    #HFpEF  - echo from 9/2020 showed EF 55-60% and Grade I DD, continue home torsemide     #gout  - continue home allopurinol    #HTN  - continue home HCTZ, Torsemide    #CKD3  - baseline Cr 1.3 in 09/2020  - Creat 1.5, cont to monitor    #DVT ppx  - patient on Eliquis at home, holding in setting of hematuria    Dispo:  pt is DNR/DNI.  Anticipate D/C in next 24-48 hrs. Spoke with Son, Nsah Chapman at [phone], updated on plan of care.

## 2021-11-01 NOTE — DIETITIAN INITIAL EVALUATION ADULT. - PERTINENT MEDS FT
MEDICATIONS  (STANDING):  acetaminophen     Tablet .. 650 milliGRAM(s) Oral every 8 hours  allopurinol 300 milliGRAM(s) Oral daily  ascorbic acid 500 milliGRAM(s) Oral daily  ferrous    sulfate 325 milliGRAM(s) Oral two times a day  finasteride 5 milliGRAM(s) Oral daily  gabapentin 300 milliGRAM(s) Oral <User Schedule>  hydrochlorothiazide 25 milliGRAM(s) Oral daily  influenza  Vaccine (HIGH DOSE) 0.7 milliLiter(s) IntraMuscular once  ketotifen 0.025% Ophthalmic Solution 1 Drop(s) Both EYES daily  levothyroxine 100 MICROGram(s) Oral daily  levothyroxine 88 MICROGram(s) Oral daily  montelukast 10 milliGRAM(s) Oral at bedtime  multivitamin 1 Tablet(s) Oral daily  silver sulfADIAZINE 1% Cream 1 Application(s) Topical daily  tamsulosin 0.8 milliGRAM(s) Oral at bedtime  torsemide 40 milliGRAM(s) Oral daily    MEDICATIONS  (PRN):  melatonin 6 milliGRAM(s) Oral at bedtime PRN Insomnia

## 2021-11-01 NOTE — PHYSICAL THERAPY INITIAL EVALUATION ADULT - PERTINENT HX OF CURRENT PROBLEM, REHAB EVAL
pt is a 89 year old male sent from [facility] living with hematuria, was in ED 10/24 s/p fall, straight cath performed and then pt began to have difficulty urinating +blood +right flank pain

## 2021-11-01 NOTE — DIETITIAN INITIAL EVALUATION ADULT. - OTHER INFO
89 year old M PMH afib on Eliquis, DVT, PVD, BPH, GERD, gout, hypothyroidism, HFpEF (echo 9/2020 EF 55-60%, Grade I DD) coming from [facility] facility for difficulty urinating admitted for hematuria.    Pt is tolerating diet with good PO intake consuming % of meals per nursing and observation. No chewing/ swallowing difficulties as per patient. Pt complains of constipation offered prune juice to help move bowels. Last BM: Prior to admission. Food preferences obtained to optimize PO intake. No edema as per nursing flowsheets. Skin intact as per nursing flowsheets.

## 2021-11-02 LAB
ANION GAP SERPL CALC-SCNC: 8 MMOL/L — SIGNIFICANT CHANGE UP (ref 5–17)
BUN SERPL-MCNC: 50 MG/DL — HIGH (ref 7–23)
CALCIUM SERPL-MCNC: 9.3 MG/DL — SIGNIFICANT CHANGE UP (ref 8.4–10.5)
CHLORIDE SERPL-SCNC: 104 MMOL/L — SIGNIFICANT CHANGE UP (ref 96–108)
CO2 SERPL-SCNC: 33 MMOL/L — HIGH (ref 22–31)
CREAT SERPL-MCNC: 1.48 MG/DL — HIGH (ref 0.5–1.3)
GLUCOSE SERPL-MCNC: 105 MG/DL — HIGH (ref 70–99)
HCT VFR BLD CALC: 37.8 % — LOW (ref 39–50)
HGB BLD-MCNC: 11.9 G/DL — LOW (ref 13–17)
MCHC RBC-ENTMCNC: 30.5 PG — SIGNIFICANT CHANGE UP (ref 27–34)
MCHC RBC-ENTMCNC: 31.5 GM/DL — LOW (ref 32–36)
MCV RBC AUTO: 96.9 FL — SIGNIFICANT CHANGE UP (ref 80–100)
NRBC # BLD: 0 /100 WBCS — SIGNIFICANT CHANGE UP (ref 0–0)
PLATELET # BLD AUTO: 296 K/UL — SIGNIFICANT CHANGE UP (ref 150–400)
POTASSIUM SERPL-MCNC: 3.5 MMOL/L — SIGNIFICANT CHANGE UP (ref 3.5–5.3)
POTASSIUM SERPL-SCNC: 3.5 MMOL/L — SIGNIFICANT CHANGE UP (ref 3.5–5.3)
RBC # BLD: 3.9 M/UL — LOW (ref 4.2–5.8)
RBC # FLD: 16.2 % — HIGH (ref 10.3–14.5)
SODIUM SERPL-SCNC: 145 MMOL/L — SIGNIFICANT CHANGE UP (ref 135–145)
WBC # BLD: 7.6 K/UL — SIGNIFICANT CHANGE UP (ref 3.8–10.5)
WBC # FLD AUTO: 7.6 K/UL — SIGNIFICANT CHANGE UP (ref 3.8–10.5)

## 2021-11-02 PROCEDURE — 99232 SBSQ HOSP IP/OBS MODERATE 35: CPT

## 2021-11-02 RX ORDER — FLUTICASONE PROPIONATE 50 MCG
1 SPRAY, SUSPENSION NASAL
Refills: 0 | Status: DISCONTINUED | OUTPATIENT
Start: 2021-11-02 | End: 2021-11-04

## 2021-11-02 RX ORDER — POLYETHYLENE GLYCOL 3350 17 G/17G
17 POWDER, FOR SOLUTION ORAL ONCE
Refills: 0 | Status: COMPLETED | OUTPATIENT
Start: 2021-11-02 | End: 2021-11-02

## 2021-11-02 RX ORDER — POLYETHYLENE GLYCOL 3350 17 G/17G
17 POWDER, FOR SOLUTION ORAL DAILY
Refills: 0 | Status: DISCONTINUED | OUTPATIENT
Start: 2021-11-03 | End: 2021-11-04

## 2021-11-02 RX ORDER — POTASSIUM CHLORIDE 20 MEQ
40 PACKET (EA) ORAL ONCE
Refills: 0 | Status: COMPLETED | OUTPATIENT
Start: 2021-11-02 | End: 2021-11-02

## 2021-11-02 RX ADMIN — Medication 40 MILLIGRAM(S): at 06:26

## 2021-11-02 RX ADMIN — Medication 300 MILLIGRAM(S): at 13:40

## 2021-11-02 RX ADMIN — Medication 325 MILLIGRAM(S): at 17:36

## 2021-11-02 RX ADMIN — SENNA PLUS 2 TABLET(S): 8.6 TABLET ORAL at 21:19

## 2021-11-02 RX ADMIN — Medication 88 MICROGRAM(S): at 06:26

## 2021-11-02 RX ADMIN — Medication 650 MILLIGRAM(S): at 06:25

## 2021-11-02 RX ADMIN — KETOTIFEN FUMARATE 1 DROP(S): 0.34 SOLUTION OPHTHALMIC at 13:41

## 2021-11-02 RX ADMIN — FINASTERIDE 5 MILLIGRAM(S): 5 TABLET, FILM COATED ORAL at 13:39

## 2021-11-02 RX ADMIN — Medication 650 MILLIGRAM(S): at 13:40

## 2021-11-02 RX ADMIN — Medication 40 MILLIEQUIVALENT(S): at 08:49

## 2021-11-02 RX ADMIN — Medication 100 MICROGRAM(S): at 06:26

## 2021-11-02 RX ADMIN — GABAPENTIN 300 MILLIGRAM(S): 400 CAPSULE ORAL at 08:49

## 2021-11-02 RX ADMIN — Medication 25 MILLIGRAM(S): at 06:26

## 2021-11-02 RX ADMIN — Medication 1 APPLICATION(S): at 13:39

## 2021-11-02 RX ADMIN — Medication 650 MILLIGRAM(S): at 21:20

## 2021-11-02 RX ADMIN — Medication 500 MILLIGRAM(S): at 13:40

## 2021-11-02 RX ADMIN — MONTELUKAST 10 MILLIGRAM(S): 4 TABLET, CHEWABLE ORAL at 21:20

## 2021-11-02 RX ADMIN — Medication 1 TABLET(S): at 13:40

## 2021-11-02 RX ADMIN — Medication 650 MILLIGRAM(S): at 13:41

## 2021-11-02 RX ADMIN — Medication 5 MILLIGRAM(S): at 21:19

## 2021-11-02 RX ADMIN — TAMSULOSIN HYDROCHLORIDE 0.8 MILLIGRAM(S): 0.4 CAPSULE ORAL at 21:19

## 2021-11-02 RX ADMIN — Medication 325 MILLIGRAM(S): at 06:25

## 2021-11-02 RX ADMIN — GABAPENTIN 300 MILLIGRAM(S): 400 CAPSULE ORAL at 17:36

## 2021-11-02 NOTE — CDI QUERY NOTE - NSCDIOTHERTXTBX_GEN_ALL_CORE_HH
Presents with hematuria    Notes - #Afib  - patient on Eliquis at home, will continue holding now in setting of hematuria  - rate controlled at this time off meds      Please clarify the type of Afib    •	Chronic  •	Paroxysmal  •	Permanent  •	Persistent  •	Unknown      Thank you

## 2021-11-02 NOTE — PROGRESS NOTE ADULT - ASSESSMENT
89 year old M PMH afib on Eliquis, DVT, PVD, BPH, GERD, gout, hypothyroidism, HFpEF (echo 9/2020 EF 55-60%, Grade I DD) coming from [facility] facility for difficulty urinating admitted for hematuria.    #Hematuria  -Resolving. Patient here on 10/24 s/p fall and had straight cath performed, possible hematuria is from traumatic shah placement  - CT abd/pelvis:  no stones or hydro. indeterminant 1.9 cm left renal lesion increased in size from prior exams  - H/H remains stable  -Will restart Eliquis within 24 hours if no continued hematuria   -Appreciate Urology recs   - s/p cipro x1 in the ED, urine culture negative, no further abx    #Urinary retention  #BPH  - Long history of BPH and impaired bladder emptying. Pt never fully empties bladder as per Urology. Dr. A   - TOV tonight   - patient cannot go back to [facility] with Shah as per family  - continue home meds tamsulosin and finasteride    #Weakness  -PT recs. Patient is 2 person assist.     #Afib  - patient on Eliquis at home, will continue holding now in setting of hematuria  - rate controlled at this time off meds    #Hypokalemia  -resolved  -cont to monitor    #HFpEF  - echo from 9/2020 showed EF 55-60% and Grade I DD, continue home torsemide     #gout  - continue home allopurinol    #HTN  - continue home HCTZ, Torsemide    #CKD3  - baseline Cr 1.3 in 09/2020  - Creat improving 1.48  -monitor     #DVT ppx  - patient on Eliquis at home, holding in setting of hematuria. Plan to restart tomorrow if no hematuria    Dispo:  pt is DNR/DNI.  Anticipate D/C in next 24 hrs. Spoke with Son, Nash Chapman at [phone], updated on plan of care.  89 year old M PMH afib on Eliquis, DVT, PVD, BPH, GERD, gout, hypothyroidism, HFpEF (echo 9/2020 EF 55-60%, Grade I DD) coming from [facility] facility for difficulty urinating admitted for hematuria.    #Hematuria  -Resolving. Patient here on 10/24 s/p fall and had straight cath performed, possible hematuria is from traumatic shah placement  - CT abd/pelvis:  no stones or hydro. indeterminant 1.9 cm left renal lesion increased in size from prior exams  - H/H remains stable  -Will restart Eliquis within 24 hours if no continued hematuria   -Appreciate Urology recs   - s/p cipro x1 in the ED, urine culture negative, no further abx    #Urinary retention  #BPH  - Long history of BPH and impaired bladder emptying. Pt never fully empties bladder as per Urology. Dr. A   - TOV tonight   - patient cannot go back to [facility] with Shah as per family  - continue home meds tamsulosin and finasteride    #Weakness  -PT recs. Patient is 2 person assist.     #Chronic Afib  - patient on Eliquis at home, will continue holding now in setting of hematuria  - rate controlled at this time off meds    #Hypokalemia  -resolved  -cont to monitor    #HFpEF  - echo from 9/2020 showed EF 55-60% and Grade I DD, continue home torsemide     #gout  - continue home allopurinol    #HTN  - continue home HCTZ, Torsemide    #CKD3  - baseline Cr 1.3 in 09/2020  - Creat improving 1.48  -monitor     #DVT ppx  - patient on Eliquis at home, holding in setting of hematuria. Plan to restart tomorrow if no hematuria    Dispo:  pt is DNR/DNI.  Anticipate D/C in next 24 hrs. Spoke with Son, Nash Chapman at [phone], updated on plan of care.

## 2021-11-02 NOTE — PROGRESS NOTE ADULT - SUBJECTIVE AND OBJECTIVE BOX
Patient is a 89y old  Male who presents with a chief complaint of hematuria (02 Nov 2021 10:36)      Patient seen and examined at bedside. Pt states he is feeling better today, reports multiple soft BM's yesterday and one this am denies overnight events or current complaints including chest pain, shortness of breath, dizziness, nausea, vomiting, diarrhea, fever or chills.      ALLERGIES:  penicillin (Unknown)    MEDICATIONS  (STANDING):  acetaminophen     Tablet .. 650 milliGRAM(s) Oral every 8 hours  allopurinol 300 milliGRAM(s) Oral daily  ascorbic acid 500 milliGRAM(s) Oral daily  bisacodyl 5 milliGRAM(s) Oral at bedtime  ferrous    sulfate 325 milliGRAM(s) Oral two times a day  finasteride 5 milliGRAM(s) Oral daily  gabapentin 300 milliGRAM(s) Oral <User Schedule>  hydrochlorothiazide 25 milliGRAM(s) Oral daily  influenza  Vaccine (HIGH DOSE) 0.7 milliLiter(s) IntraMuscular once  ketotifen 0.025% Ophthalmic Solution 1 Drop(s) Both EYES daily  levothyroxine 100 MICROGram(s) Oral daily  levothyroxine 88 MICROGram(s) Oral daily  montelukast 10 milliGRAM(s) Oral at bedtime  multivitamin 1 Tablet(s) Oral daily  senna 2 Tablet(s) Oral at bedtime  silver sulfADIAZINE 1% Cream 1 Application(s) Topical daily  tamsulosin 0.8 milliGRAM(s) Oral at bedtime  torsemide 40 milliGRAM(s) Oral daily    MEDICATIONS  (PRN):  melatonin 6 milliGRAM(s) Oral at bedtime PRN Insomnia  polyethylene glycol 3350 17 Gram(s) Oral daily PRN Constipation    Vital Signs Last 24 Hrs  T(F): 98 (02 Nov 2021 04:40), Max: 98.6 (01 Nov 2021 16:02)  HR: 75 (02 Nov 2021 04:40) (71 - 75)  BP: 110/58 (02 Nov 2021 04:40) (102/62 - 110/58)  RR: 16 (02 Nov 2021 04:40) (16 - 16)  SpO2: 94% (02 Nov 2021 04:40) (94% - 97%)  I&O's Summary    01 Nov 2021 07:01  -  02 Nov 2021 07:00  --------------------------------------------------------  IN: 780 mL / OUT: 6550 mL / NET: -5770 mL    02 Nov 2021 07:01  -  02 Nov 2021 12:37  --------------------------------------------------------  IN: 1090 mL / OUT: 650 mL / NET: 440 mL      PHYSICAL EXAM:  General: NAD, elderly, A/O x 3  ENT: MMM, no oral thrush   Neck: Supple, No JVD  Lungs: Clear to auscultation bilaterally, non labored breathing  Cardio: RRR, S1/S2, + murmur  Abdomen: Soft, Nontender, Nondistended; Bowel sounds present  Extremities: LE wrapped with ACE, no calf tenderness, No pitting edema  : + Demarco with clear/yellow urine, + CBI    LABS:                        11.9   7.60  )-----------( 296      ( 02 Nov 2021 08:32 )             37.8     11-02    145  |  104  |  50  ----------------------------<  105  3.5   |  33  |  1.48    Ca    9.3      02 Nov 2021 08:32      eGFR if Non African American: 38 mL/min/1.73M2 (11-02-21 @ 08:32)  eGFR if : 44 mL/min/1.73M2 (11-02-21 @ 08:32)                  Culture - Urine (collected 30 Oct 2021 09:45)  Source: Clean Catch Clean Catch (Midstream)  Final Report (31 Oct 2021 13:59):    No growth      COVID-19 PCR: NotDetec (10-31-21 @ 06:00)      RADIOLOGY & ADDITIONAL TESTS:    Care Discussed with Consultants/Other Providers:   Urology

## 2021-11-02 NOTE — CONSULT NOTE ADULT - SUBJECTIVE AND OBJECTIVE BOX
HPI:  89 year old M PMH afib on Eliquis, DVT, PVD, BPH, GERD, gout, hypothyroidism, HFpEF (echo 9/2020 EF 55-60%, Grade I DD) coming from [facility] for hematuria. Patient was in ED on 10/24 after mechanical fall and straight cath was performed to obtain urine sample. Since then patient was feeling okay but about 2 days ago started having difficulty urinating and as per aide at bedside, had not urinated all day yesterday. Last night noted to have blood around penis and patient was brought in today for further evaluation. Patient also complains of R flank pain that started yesterday and lidocaine patch was applied which helped with pain. Other than that, patient denies any acute complaints. Had some abdominal pain/pressure yesterday however feels more comfortable now.     In the ED, T 97.5F, HR 81, /78, RR 16, SpO2 97% on RA. Labs showed H/H 12.2/38.5 (around baseline), BUN/Cr 44/1.57 (around baseline), UA positive for large blood, RBC and few bacteria, as well as protein. Received NS bolus x 500cc and cipro 500mg PO x1 in the ED.    Demarco placed by ER and CBI started. Urine has since cleared. CT scan with BPH and likely clots in bladder. Also with indeterminate small renal lesion    Denies abdominal pain. Recent constipation resolved.    Long history of BPH with chronic impaired bladder emptying and chronic high PVR's. On BPH meds.      PAST MEDICAL & SURGICAL HISTORY:    Edema    Gout    Hypothyroidism    GERD (gastroesophageal reflux disease)    BPH (benign prostatic hyperplasia)    CN (constipation)    Arthritis    Atrial fibrillation, unspecified type    Spinal stenosis    S/P hernia repair        REVIEW OF SYSTEMS:    CONSTITUTIONAL:  no fevers or chills  RESPIRATORY: No shortness of breath  CARDIOVASCULAR: No chest pain  GASTROINTESTINAL: No abdominal or epigastric pain.  NEUROLOGICAL: No mental status changes  PSYCH: No depression, no mood changes        MEDICATIONS  (STANDING):  acetaminophen     Tablet .. 650 milliGRAM(s) Oral every 8 hours  allopurinol 300 milliGRAM(s) Oral daily  ascorbic acid 500 milliGRAM(s) Oral daily  bisacodyl 5 milliGRAM(s) Oral at bedtime  ferrous    sulfate 325 milliGRAM(s) Oral two times a day  finasteride 5 milliGRAM(s) Oral daily  gabapentin 300 milliGRAM(s) Oral <User Schedule>  hydrochlorothiazide 25 milliGRAM(s) Oral daily  influenza  Vaccine (HIGH DOSE) 0.7 milliLiter(s) IntraMuscular once  ketotifen 0.025% Ophthalmic Solution 1 Drop(s) Both EYES daily  levothyroxine 100 MICROGram(s) Oral daily  levothyroxine 88 MICROGram(s) Oral daily  montelukast 10 milliGRAM(s) Oral at bedtime  multivitamin 1 Tablet(s) Oral daily  senna 2 Tablet(s) Oral at bedtime  silver sulfADIAZINE 1% Cream 1 Application(s) Topical daily  tamsulosin 0.8 milliGRAM(s) Oral at bedtime  torsemide 40 milliGRAM(s) Oral daily    MEDICATIONS  (PRN):  melatonin 6 milliGRAM(s) Oral at bedtime PRN Insomnia  polyethylene glycol 3350 17 Gram(s) Oral daily PRN Constipation      Allergies    penicillin (Unknown)      SOCIAL HISTORY: No illicit drug use    FAMILY HISTORY:  No pertinent family history in first degree relatives        Vital Signs Last 24 Hrs  T(C): 36.7 (02 Nov 2021 04:40), Max: 37 (01 Nov 2021 16:02)  T(F): 98 (02 Nov 2021 04:40), Max: 98.6 (01 Nov 2021 16:02)  HR: 75 (02 Nov 2021 04:40) (71 - 75)  BP: 110/58        PHYSICAL EXAM:    Constitutional: NAD  Respiratory: No accessory respiratory muscle use  Abd: Soft, NT/ND  : Demarco clear  Neurological: A/O x 3  Psychiatric: Normal mood, normal affect        LABS:                        11.9   7.60  )-----------( 296      ( 02 Nov 2021 08:32 )             37.8     11-02    145  |  104  |  50<H>  ----------------------------<  105<H>  3.5   |  33<H>  |  1.48<H>    Ca    9.3      02 Nov 2021 08:32            Culture - Urine (collected 10-30-21 @ 09:45)  Source: Clean Catch Clean Catch (Midstream)  Final Report (10-31-21 @ 13:59):    No growth            RADIOLOGY & ADDITIONAL STUDIES: CT scan

## 2021-11-02 NOTE — CONSULT NOTE ADULT - ASSESSMENT
BPH with chronic incomplete bladder emptying. Recent hematuria cleared. On BPH meds.    - continue Flomax / Proscar  - treat constipation  - void trial when stable - expect high PVR's >500 ml. Would replace shah if unable to void.

## 2021-11-03 PROCEDURE — 99232 SBSQ HOSP IP/OBS MODERATE 35: CPT

## 2021-11-03 RX ORDER — APIXABAN 2.5 MG/1
2.5 TABLET, FILM COATED ORAL
Refills: 0 | Status: DISCONTINUED | OUTPATIENT
Start: 2021-11-03 | End: 2021-11-04

## 2021-11-03 RX ADMIN — Medication 650 MILLIGRAM(S): at 14:17

## 2021-11-03 RX ADMIN — APIXABAN 2.5 MILLIGRAM(S): 2.5 TABLET, FILM COATED ORAL at 18:04

## 2021-11-03 RX ADMIN — Medication 25 MILLIGRAM(S): at 05:47

## 2021-11-03 RX ADMIN — Medication 1 TABLET(S): at 12:14

## 2021-11-03 RX ADMIN — Medication 650 MILLIGRAM(S): at 15:00

## 2021-11-03 RX ADMIN — Medication 325 MILLIGRAM(S): at 05:47

## 2021-11-03 RX ADMIN — Medication 40 MILLIGRAM(S): at 05:47

## 2021-11-03 RX ADMIN — Medication 100 MICROGRAM(S): at 05:47

## 2021-11-03 RX ADMIN — Medication 650 MILLIGRAM(S): at 22:35

## 2021-11-03 RX ADMIN — KETOTIFEN FUMARATE 1 DROP(S): 0.34 SOLUTION OPHTHALMIC at 12:16

## 2021-11-03 RX ADMIN — MONTELUKAST 10 MILLIGRAM(S): 4 TABLET, CHEWABLE ORAL at 21:36

## 2021-11-03 RX ADMIN — Medication 5 MILLIGRAM(S): at 21:35

## 2021-11-03 RX ADMIN — Medication 1 APPLICATION(S): at 14:03

## 2021-11-03 RX ADMIN — Medication 500 MILLIGRAM(S): at 12:14

## 2021-11-03 RX ADMIN — Medication 650 MILLIGRAM(S): at 21:35

## 2021-11-03 RX ADMIN — Medication 650 MILLIGRAM(S): at 05:47

## 2021-11-03 RX ADMIN — Medication 300 MILLIGRAM(S): at 12:14

## 2021-11-03 RX ADMIN — GABAPENTIN 300 MILLIGRAM(S): 400 CAPSULE ORAL at 18:04

## 2021-11-03 RX ADMIN — Medication 325 MILLIGRAM(S): at 18:04

## 2021-11-03 RX ADMIN — SENNA PLUS 2 TABLET(S): 8.6 TABLET ORAL at 21:35

## 2021-11-03 RX ADMIN — FINASTERIDE 5 MILLIGRAM(S): 5 TABLET, FILM COATED ORAL at 12:14

## 2021-11-03 RX ADMIN — Medication 88 MICROGRAM(S): at 05:47

## 2021-11-03 RX ADMIN — GABAPENTIN 300 MILLIGRAM(S): 400 CAPSULE ORAL at 09:18

## 2021-11-03 RX ADMIN — TAMSULOSIN HYDROCHLORIDE 0.8 MILLIGRAM(S): 0.4 CAPSULE ORAL at 21:35

## 2021-11-03 NOTE — PROGRESS NOTE ADULT - SUBJECTIVE AND OBJECTIVE BOX
Demarco removed overnight. Voided small amounts. Void trial in progress.    Denies abdominal or suprapubic pain.      ROS  General: no fever or chills    VITAL SIGNS  Vital Signs Last 24 Hrs  T(C): 36.7 (03 Nov 2021 05:45), Max: 37.1 (02 Nov 2021 16:26)  T(F): 98 (03 Nov 2021 05:45), Max: 98.7 (02 Nov 2021 16:26)  HR: 71 (03 Nov 2021 05:45) (67 - 79)  BP: 119/60       PHYSICAL EXAM:  General: awake and alert  Abdomen: soft, ND  : bladder NT      LABS:                        11.9   7.60  )-----------( 296      ( 02 Nov 2021 08:32 )             37.8     11-02    145  |  104  |  50<H>  ----------------------------<  105<H>  3.5   |  33<H>  |  1.48<H>        Prior notes/chart reviewed.

## 2021-11-03 NOTE — PROGRESS NOTE ADULT - ASSESSMENT
89 year old M PMH afib on Eliquis, DVT, PVD, BPH, GERD, gout, hypothyroidism, HFpEF (echo 9/2020 EF 55-60%, Grade I DD) coming from [facility] facility for difficulty urinating admitted for hematuria.    #Hematuria  -Resolving. Patient here on 10/24 s/p fall and had straight cath performed, possible hematuria is from traumatic shah placement  - CT abd/pelvis:  no stones or hydro. indeterminant 1.9 cm left renal lesion increased in size from prior exams  - H/H remains stable  -Restarted Eliquis. Monitor for bleeding  -Appreciate Urology recs   - s/p cipro x1 in the ED, urine culture negative, no further abx    #Urinary retention  #BPH  - Long history of BPH and impaired bladder emptying. Pt never fully empties bladder as per Urology. Dr. BILL TOBIAS last night, has only had dribbles of urine since and now with . Will replace Shah as per Dr. KHAN  - patient cannot go back to [facility] with Shah as per family  - continue home meds tamsulosin and finasteride    #Weakness  -PT recs. Patient is 2 person assist.     #Chronic Afib  - patient on Eliquis at home, will continue holding now in setting of hematuria  - rate controlled at this time off meds    #Hypokalemia  -resolved  -cont to monitor    #HFpEF  - echo from 9/2020 showed EF 55-60% and Grade I DD, continue home torsemide     #gout  - continue home allopurinol    #HTN  - continue home HCTZ, Torsemide    #CKD3  - baseline Cr 1.3 in 09/2020  - Creat improving 1.48  -monitor     #DVT ppx  - patient on Eliquis at home    Dispo:  pt is DNR/DNI.  Anticipate D/C in next 24 hrs. Spoke with Son, Nash Chapman at [phone], updated on plan of care.

## 2021-11-03 NOTE — PROGRESS NOTE ADULT - SUBJECTIVE AND OBJECTIVE BOX
Patient is a 89y old  Male who presents with a chief complaint of hematuria / BPH / retention (03 Nov 2021 08:22)      Patient seen and examined at bedside. States he is feeling down due to difficulty urinating. A few drips of urine reported overnight. Denies chest pain, SOB, n/v/d, dizziness fever or chills.     ALLERGIES:  penicillin (Unknown)    MEDICATIONS  (STANDING):  acetaminophen     Tablet .. 650 milliGRAM(s) Oral every 8 hours  allopurinol 300 milliGRAM(s) Oral daily  apixaban 2.5 milliGRAM(s) Oral two times a day  ascorbic acid 500 milliGRAM(s) Oral daily  bisacodyl 5 milliGRAM(s) Oral at bedtime  ferrous    sulfate 325 milliGRAM(s) Oral two times a day  finasteride 5 milliGRAM(s) Oral daily  gabapentin 300 milliGRAM(s) Oral <User Schedule>  hydrochlorothiazide 25 milliGRAM(s) Oral daily  influenza  Vaccine (HIGH DOSE) 0.7 milliLiter(s) IntraMuscular once  ketotifen 0.025% Ophthalmic Solution 1 Drop(s) Both EYES daily  levothyroxine 100 MICROGram(s) Oral daily  levothyroxine 88 MICROGram(s) Oral daily  montelukast 10 milliGRAM(s) Oral at bedtime  multivitamin 1 Tablet(s) Oral daily  senna 2 Tablet(s) Oral at bedtime  silver sulfADIAZINE 1% Cream 1 Application(s) Topical daily  tamsulosin 0.8 milliGRAM(s) Oral at bedtime  torsemide 40 milliGRAM(s) Oral daily    MEDICATIONS  (PRN):  fluticasone propionate 50 MICROgram(s)/spray Nasal Spray 1 Spray(s) Both Nostrils two times a day PRN post nasal drip  melatonin 6 milliGRAM(s) Oral at bedtime PRN Insomnia  polyethylene glycol 3350 17 Gram(s) Oral daily PRN Constipation    Vital Signs Last 24 Hrs  T(F): 98 (03 Nov 2021 05:45), Max: 98.7 (02 Nov 2021 16:26)  HR: 71 (03 Nov 2021 05:45) (67 - 79)  BP: 119/60 (03 Nov 2021 05:45) (112/54 - 119/60)  RR: 16 (03 Nov 2021 05:45) (16 - 16)  SpO2: 95% (03 Nov 2021 05:45) (95% - 97%)  I&O's Summary    02 Nov 2021 07:01  -  03 Nov 2021 07:00  --------------------------------------------------------  IN: 1990 mL / OUT: 2850 mL / NET: -860 mL    03 Nov 2021 07:01  -  03 Nov 2021 15:07  --------------------------------------------------------  IN: 460 mL / OUT: 0 mL / NET: 460 mL      PHYSICAL EXAM:  General: NAD, elderly, A/O x 3  ENT: MMM, no oral thrush   Neck: Supple, No JVD  Lungs: Clear to auscultation bilaterally, non labored breathing  Cardio: IRIR, S1/S2, + sys murmur   Abdomen: Soft, Nontender, Nondistended; Bowel sounds present  Extremities: No calf tenderness, No pitting edema    LABS:                        11.9   7.60  )-----------( 296      ( 02 Nov 2021 08:32 )             37.8     11-02    145  |  104  |  50  ----------------------------<  105  3.5   |  33  |  1.48    Ca    9.3      02 Nov 2021 08:32      eGFR if Non African American: 38 mL/min/1.73M2 (11-02-21 @ 08:32)  eGFR if : 44 mL/min/1.73M2 (11-02-21 @ 08:32)                  Culture - Urine (collected 30 Oct 2021 09:45)  Source: Clean Catch Clean Catch (Midstream)  Final Report (31 Oct 2021 13:59):    No growth      COVID-19 PCR: NotDetec (10-31-21 @ 06:00)      RADIOLOGY & ADDITIONAL TESTS:    Care Discussed with Consultants/Other Providers:   Urology

## 2021-11-03 NOTE — PROGRESS NOTE ADULT - ASSESSMENT
BPH and chronic incomplete bladder emptying. Recent retention / hematuria - cleared.    - continue void trial this AM  - replace shah if unable to void  - continue BPH meds  - ok to restart Eliquis

## 2021-11-04 ENCOUNTER — TRANSCRIPTION ENCOUNTER (OUTPATIENT)
Age: 86
End: 2021-11-04

## 2021-11-04 VITALS
SYSTOLIC BLOOD PRESSURE: 106 MMHG | OXYGEN SATURATION: 97 % | HEART RATE: 78 BPM | TEMPERATURE: 99 F | DIASTOLIC BLOOD PRESSURE: 57 MMHG | RESPIRATION RATE: 17 BRPM

## 2021-11-04 PROCEDURE — 80053 COMPREHEN METABOLIC PANEL: CPT

## 2021-11-04 PROCEDURE — 93005 ELECTROCARDIOGRAM TRACING: CPT

## 2021-11-04 PROCEDURE — 86900 BLOOD TYPING SEROLOGIC ABO: CPT

## 2021-11-04 PROCEDURE — 36415 COLL VENOUS BLD VENIPUNCTURE: CPT

## 2021-11-04 PROCEDURE — 87086 URINE CULTURE/COLONY COUNT: CPT

## 2021-11-04 PROCEDURE — 90662 IIV NO PRSV INCREASED AG IM: CPT

## 2021-11-04 PROCEDURE — 85027 COMPLETE CBC AUTOMATED: CPT

## 2021-11-04 PROCEDURE — 97163 PT EVAL HIGH COMPLEX 45 MIN: CPT

## 2021-11-04 PROCEDURE — 80048 BASIC METABOLIC PNL TOTAL CA: CPT

## 2021-11-04 PROCEDURE — 99239 HOSP IP/OBS DSCHRG MGMT >30: CPT

## 2021-11-04 PROCEDURE — 87635 SARS-COV-2 COVID-19 AMP PRB: CPT

## 2021-11-04 PROCEDURE — 74176 CT ABD & PELVIS W/O CONTRAST: CPT | Mod: MA

## 2021-11-04 PROCEDURE — 85025 COMPLETE CBC W/AUTO DIFF WBC: CPT

## 2021-11-04 PROCEDURE — 71045 X-RAY EXAM CHEST 1 VIEW: CPT

## 2021-11-04 PROCEDURE — 86850 RBC ANTIBODY SCREEN: CPT

## 2021-11-04 PROCEDURE — 86769 SARS-COV-2 COVID-19 ANTIBODY: CPT

## 2021-11-04 PROCEDURE — 99285 EMERGENCY DEPT VISIT HI MDM: CPT

## 2021-11-04 PROCEDURE — 97110 THERAPEUTIC EXERCISES: CPT

## 2021-11-04 PROCEDURE — 86901 BLOOD TYPING SEROLOGIC RH(D): CPT

## 2021-11-04 PROCEDURE — 81001 URINALYSIS AUTO W/SCOPE: CPT

## 2021-11-04 RX ORDER — GABAPENTIN 400 MG/1
1 CAPSULE ORAL
Qty: 0 | Refills: 0 | DISCHARGE

## 2021-11-04 RX ORDER — LACTOBACILLUS ACIDOPHILUS 100MM CELL
1 CAPSULE ORAL
Qty: 0 | Refills: 0 | DISCHARGE

## 2021-11-04 RX ORDER — POLYETHYLENE GLYCOL 3350 17 G/17G
17 POWDER, FOR SOLUTION ORAL
Qty: 0 | Refills: 0 | DISCHARGE
Start: 2021-11-04

## 2021-11-04 RX ORDER — SENNA PLUS 8.6 MG/1
2 TABLET ORAL
Qty: 0 | Refills: 0 | DISCHARGE
Start: 2021-11-04

## 2021-11-04 RX ORDER — GABAPENTIN 400 MG/1
1 CAPSULE ORAL
Qty: 0 | Refills: 0 | DISCHARGE
Start: 2021-11-04

## 2021-11-04 RX ORDER — MENTHOL AND METHYL SALICYLATE 10; 30 G/100G; G/100G
1 STICK TOPICAL
Qty: 0 | Refills: 0 | DISCHARGE

## 2021-11-04 RX ORDER — AZELASTINE 137 UG/1
1 SPRAY, METERED NASAL
Qty: 0 | Refills: 0 | DISCHARGE

## 2021-11-04 RX ORDER — POTASSIUM CHLORIDE 20 MEQ
1 PACKET (EA) ORAL
Qty: 0 | Refills: 0 | DISCHARGE

## 2021-11-04 RX ORDER — LOPERAMIDE HCL 2 MG
15 TABLET ORAL
Qty: 0 | Refills: 0 | DISCHARGE

## 2021-11-04 RX ORDER — LANOLIN ALCOHOL/MO/W.PET/CERES
2 CREAM (GRAM) TOPICAL
Qty: 0 | Refills: 0 | DISCHARGE
Start: 2021-11-04

## 2021-11-04 RX ORDER — FLUTICASONE PROPIONATE 50 MCG
1 SPRAY, SUSPENSION NASAL
Qty: 0 | Refills: 0 | DISCHARGE
Start: 2021-11-04

## 2021-11-04 RX ORDER — LANOLIN ALCOHOL/MO/W.PET/CERES
1 CREAM (GRAM) TOPICAL
Qty: 0 | Refills: 0 | DISCHARGE

## 2021-11-04 RX ADMIN — APIXABAN 2.5 MILLIGRAM(S): 2.5 TABLET, FILM COATED ORAL at 17:07

## 2021-11-04 RX ADMIN — APIXABAN 2.5 MILLIGRAM(S): 2.5 TABLET, FILM COATED ORAL at 05:39

## 2021-11-04 RX ADMIN — Medication 500 MILLIGRAM(S): at 11:28

## 2021-11-04 RX ADMIN — Medication 40 MILLIGRAM(S): at 05:39

## 2021-11-04 RX ADMIN — Medication 650 MILLIGRAM(S): at 05:39

## 2021-11-04 RX ADMIN — Medication 88 MICROGRAM(S): at 06:22

## 2021-11-04 RX ADMIN — Medication 1 APPLICATION(S): at 11:29

## 2021-11-04 RX ADMIN — Medication 100 MICROGRAM(S): at 05:39

## 2021-11-04 RX ADMIN — Medication 325 MILLIGRAM(S): at 17:07

## 2021-11-04 RX ADMIN — Medication 25 MILLIGRAM(S): at 05:39

## 2021-11-04 RX ADMIN — GABAPENTIN 300 MILLIGRAM(S): 400 CAPSULE ORAL at 09:11

## 2021-11-04 RX ADMIN — Medication 650 MILLIGRAM(S): at 06:36

## 2021-11-04 RX ADMIN — Medication 325 MILLIGRAM(S): at 05:39

## 2021-11-04 RX ADMIN — INFLUENZA VIRUS VACCINE 0.7 MILLILITER(S): 15; 15; 15; 15 SUSPENSION INTRAMUSCULAR at 15:25

## 2021-11-04 RX ADMIN — KETOTIFEN FUMARATE 1 DROP(S): 0.34 SOLUTION OPHTHALMIC at 11:28

## 2021-11-04 RX ADMIN — Medication 1 TABLET(S): at 11:29

## 2021-11-04 RX ADMIN — Medication 300 MILLIGRAM(S): at 11:28

## 2021-11-04 RX ADMIN — FINASTERIDE 5 MILLIGRAM(S): 5 TABLET, FILM COATED ORAL at 11:28

## 2021-11-04 RX ADMIN — GABAPENTIN 300 MILLIGRAM(S): 400 CAPSULE ORAL at 17:07

## 2021-11-04 NOTE — PROGRESS NOTE ADULT - ASSESSMENT
89 year old M PMH afib on Eliquis, DVT, PVD, BPH, GERD, gout, hypothyroidism, HFpEF (echo 9/2020 EF 55-60%, Grade I DD) coming from [facility] facility for difficulty urinating admitted for hematuria.    #Hematuria - resolved  -Resolving. Patient here on 10/24 s/p fall and had straight cath performed, possible hematuria is from traumatic shah placement  - CT abd/pelvis:  no stones or hydro. indeterminant 1.9 cm left renal lesion increased in size from prior exams  - H/H remains stable  -Restarted Eliquis. Monitor for bleeding  -Appreciate Urology recs   - s/p cipro x1 in the ED, urine culture negative, no further abx    #Urinary retention  #BPH  - Long history of BPH and impaired bladder emptying. Pt never fully empties bladder as per Urology. Dr. HKAN   - PT failed TOV, shah re inserted  - patient cannot go back to [facility] with Shah as per family  - continue home meds tamsulosin and finasteride    #Weakness  -PT recs. Patient is 2 person assist.     #Chronic Afib  - patient on Eliquis at home, will continue holding now in setting of hematuria  - rate controlled at this time off meds    #Hypokalemia  -resolved  -cont to monitor    #HFpEF  - echo from 9/2020 showed EF 55-60% and Grade I DD, continue home torsemide     #gout  - continue home allopurinol    #HTN  - continue home HCTZ, Torsemide    #CKD3  - baseline Cr 1.3 in 09/2020  - Creat improving 1.48  -monitor     #DVT ppx  - patient on Eliquis at home    Dispo:  DC planning spoke to clau Chapman at [phone], did not , left vm with call back number 89 year old M PMH afib on Eliquis, DVT, PVD, BPH, GERD, gout, hypothyroidism, HFpEF (echo 9/2020 EF 55-60%, Grade I DD) coming from [facility] facility for difficulty urinating admitted for hematuria.    #Hematuria - resolved  -Resolving. Patient here on 10/24 s/p fall and had straight cath performed, possible hematuria is from traumatic shah placement  - CT abd/pelvis:  no stones or hydro. indeterminant 1.9 cm left renal lesion increased in size from prior exams  - H/H remains stable  -Restarted Eliquis. Monitor for bleeding  -Appreciate Urology recs   - s/p cipro x1 in the ED, urine culture negative, no further abx    #Urinary retention  #BPH  - Long history of BPH and impaired bladder emptying. Pt never fully empties bladder as per Urology. Dr. KHAN   - PT failed TOV, shah re inserted  - patient cannot go back to [facility] with Shah as per family  - continue home meds tamsulosin and finasteride    #Weakness  -PT recs. Patient is 2 person assist.     #Chronic Afib  - continue eliquis  - rate controlled at this time off meds    #Hypokalemia  -resolved  -cont to monitor    #HFpEF  - echo from 9/2020 showed EF 55-60% and Grade I DD, continue home torsemide     #gout  - continue home allopurinol    #HTN  - continue home HCTZ, Torsemide    #CKD3  - baseline Cr 1.3 in 09/2020  - Creat improving 1.48  -monitor     #DVT ppx  - patient on Eliquis at home    Dispo:  DC planning spoke to son Nash Chapman at [phone], did not , left  with call back number 89 year old M PMH afib on Eliquis, DVT, PVD, BPH, GERD, gout, hypothyroidism, HFpEF (echo 9/2020 EF 55-60%, Grade I DD) coming from [facility] facility for difficulty urinating admitted for hematuria.    #Hematuria - resolved  -Resolving. Patient here on 10/24 s/p fall and had straight cath performed, possible hematuria is from traumatic shah placement  - CT abd/pelvis:  no stones or hydro. indeterminant 1.9 cm left renal lesion increased in size from prior exams  - H/H remains stable  -Restarted Eliquis. Monitor for bleeding  -Appreciate Urology recs   - s/p cipro x1 in the ED, urine culture negative, no further abx    #Urinary retention  #BPH  - Long history of BPH and impaired bladder emptying. Pt never fully empties bladder as per Urology. Dr. KHAN   - PT failed TOV, shah re inserted  - patient cannot go back to [facility] with Shah as per family  - continue home meds tamsulosin and finasteride    #Weakness  -PT recs. Patient is 2 person assist.     #Chronic Afib  - continue eliquis  - rate controlled at this time off meds    #Hypokalemia  -resolved  -cont to monitor    #HFpEF  - echo from 9/2020 showed EF 55-60% and Grade I DD, continue home torsemide     #gout  - continue home allopurinol    #HTN  - continue home HCTZ, Torsemide    #CKD3  - baseline Cr 1.3 in 09/2020  - Creat improving 1.48  -monitor     #DVT ppx  - patient on Eliquis at home    Dispo:  DC planning spoke to son Nash Chapman at [phone], did not , left  with call back number

## 2021-11-04 NOTE — DISCHARGE NOTE PROVIDER - NSDCCPCAREPLAN_GEN_ALL_CORE_FT
PRINCIPAL DISCHARGE DIAGNOSIS  Diagnosis: Clot hematuria  Assessment and Plan of Treatment: hematuria resolved, clearedf or rehab      SECONDARY DISCHARGE DIAGNOSES  Diagnosis: Acute urinary retention  Assessment and Plan of Treatment: maintain shah catheter     PRINCIPAL DISCHARGE DIAGNOSIS  Diagnosis: Clot hematuria  Assessment and Plan of Treatment: You had blood in your urine after straigh cath attempted. You now have a shah in place due to a failed void trial and inability to urinate completely. Once in rehab, they will attempt to remove your shah once you are stronger.      SECONDARY DISCHARGE DIAGNOSES  Diagnosis: Acute urinary retention  Assessment and Plan of Treatment: maintain shah catheter

## 2021-11-04 NOTE — DISCHARGE NOTE PROVIDER - NSDCMRMEDTOKEN_GEN_ALL_CORE_FT
allopurinol 300 mg oral tablet: 1 tab(s) orally once a day  apixaban 2.5 mg oral tablet: 1 tab(s) orally every 12 hours for DVT diagnosed in January 2020  ascorbic acid 500 mg oral tablet: 1 tab(s) orally once a day  bisacodyl 5 mg oral delayed release tablet: 1 tab(s) orally once a day (at bedtime)  ferrous sulfate 325 mg (65 mg elemental iron) oral tablet: 1 tab(s) orally 2 times a day  finasteride 5 mg oral tablet: 1 tab(s) orally once a day  fluticasone 50 mcg/inh nasal spray: 1 spray(s) nasal 2 times a day, As needed, post nasal drip  gabapentin 300 mg oral capsule: 1 cap(s) orally   hydroCHLOROthiazide 25 mg oral tablet: 1 tab(s) orally once a day  levothyroxine 100 mcg (0.1 mg) oral tablet: 1 tab(s) orally once a day  levothyroxine 88 mcg (0.088 mg) oral tablet: 1 tab(s) orally once a day  melatonin 3 mg oral tablet: 2 tab(s) orally once a day (at bedtime), As needed, Insomnia  Multiple Vitamins oral capsule: 1 cap(s) orally once a day  olopatadine 0.2% ophthalmic solution: 1 drop(s) to each affected eye once a day  polyethylene glycol 3350 oral powder for reconstitution: 17 gram(s) orally once a day, As needed, Constipation  senna oral tablet: 2 tab(s) orally once a day (at bedtime)  silver sulfADIAZINE 1% topical cream: 1 application topically once a day  Singulair 10 mg oral tablet: 1 tab(s) orally once a day (at bedtime)  tamsulosin 0.4 mg oral capsule: 2 cap(s) orally once a day  torsemide 20 mg oral tablet: 2 tab(s) orally once a day  Tylenol 500 mg oral tablet: 2 tab(s) orally every 8 hours  Vitamin D3 1250 mcg (50,000 intl units) oral capsule: 1 cap(s) orally once a week

## 2021-11-04 NOTE — DISCHARGE NOTE PROVIDER - CARE PROVIDER_API CALL
Olvin Hernandez (DO)  Internal Medicine  [address] [id number]  Phone: [phone]  Fax: [phone]  Follow Up Time:

## 2021-11-04 NOTE — DISCHARGE NOTE NURSING/CASE MANAGEMENT/SOCIAL WORK - PATIENT PORTAL LINK FT
You can access the FollowMyHealth Patient Portal offered by [facility] by registering at the following website: [email or web address]. By joining [facility]’s FollowMyHealth portal, you will also be able to view your health information using other applications (apps) compatible with our system.

## 2021-11-04 NOTE — DISCHARGE NOTE PROVIDER - HOSPITAL COURSE
Hospital Course  89 year old M PMH afib on Eliquis, DVT, PVD, BPH, GERD, gout, hypothyroidism, HFpEF (echo 9/2020 EF 55-60%, Grade I DD) coming from [facility] for hematuria. Patient was in ED on 10/24 after mechanical fall and straight cath was performed to obtain urine sample. Since then patient was feeling okay but about 2 days ago started having difficulty urinating and as per aide at bedside, had not urinated all day yesterday. Last night noted to have blood around penis and patient was brought in today for further evaluation. Patient also complains of R flank pain that started yesterday and lidocaine patch was applied which helped with pain. Other than that, patient denies any acute complaints. Had some abdominal pain/pressure yesterday however feels more comfortable now.     In the ED, T 97.5F, HR 81, /78, RR 16, SpO2 97% on RA. Labs showed H/H 12.2/38.5 (around baseline), BUN/Cr 44/1.57 (around baseline), UA positive for large blood, RBC and few bacteria, as well as protein. Received NS bolus x 500cc and cipro 500mg PO x1 in the ED.    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  Summary of Goals of Care Conversation:    Discharging Provider:  Thong Pena NP  Contact Info: Cell [phone] - Please call with any questions or concerns.    Outpatient Provider: Dr. Hernandez - notified

## 2021-11-04 NOTE — DISCHARGE NOTE NURSING/CASE MANAGEMENT/SOCIAL WORK - NSDCVIVACCINE_GEN_ALL_CORE_FT
influenza, high-dose, quadrivalent; 04-Nov-2021 15:25; Silvano Ramsay (RN); [employer]; [id number] (Exp. Date: 30-Jun-2022); IntraMuscular; Deltoid Right.; 0.7 milliLiter(s); VIS (VIS Published: 06-Aug-2021, VIS Presented: 04-Nov-2021);   Tdap; 17-Dec-2018 11:51; Kia Wilkerson (RN); [employer]; [id number] (Exp. Date: 03-Dec-2020); IntraMuscular; Deltoid Right.; 0.5 milliLiter(s); VIS (VIS Published: 09-May-2013, VIS Presented: 17-Dec-2018);   Tdap; 23-Apr-2019 15:48; Aisha Barr (RN); [employer]; [id number] (Exp. Date: 12-Mar-2020); IntraMuscular; Deltoid Right.; 0.5 milliLiter(s); VIS (VIS Published: 09-May-2013, VIS Presented: 23-Apr-2019);   Tdap; 01-May-2020 21:24; Sonja Julian (RN); [employer]; [id number] (Exp. Date: 15-Aug-2021); IntraMuscular; Deltoid Left.; 0.5 milliLiter(s); VIS (VIS Published: 09-May-2013, VIS Presented: 01-May-2020);

## 2021-11-04 NOTE — PROGRESS NOTE ADULT - ATTENDING COMMENTS
hematuria -possible hematuria is from traumatic shah placement  acute urinary retention  - hematuria resolved  - cont proscar and flomax  - urology following  - TOV tonight  - Will restart Eliquis within 24 hours if no continued hematuria    dvt ppx: eliquis on hold, chemical ppx on hold due to recent hematuria
Hematuria  - suspect traumatic shah insertion from ER a few days ago  - hematuria resolved now  - H/H stable  - cont to Eliquis for now  - cont bladder irrigation and follow up with urology    Hypokalemia   - repleted    Left renal lesion increased from before  - 1.9 cm left renal lesion found on CT   - spoke to pt's son, family does not want to pursue further diagnostic workup or treatment     BPH  - cont flomax and finasteride
Hematuria resolved. spoke to urology, restart Eliquis today.    Urinary retention  - failed TOV, pt with dribbles of urine only and cont to have high PVR, in 800cc. replace shah.     Increased size of L renal lesion   - informed pt's son regarding CT finding. Pt's son states he does not want to pursue further testing
89M with AF on Eliquis, DVT, PVD, BPH, GERD, gout, hypothyroidism, HFpEF, admitted from [facility] facility for difficulty urinating and subsequently admitted for hematuria after a traumatic straight cath. Patient with failed void trials, and will now require a shah at this time. Patient cannot return to [facility] with a shah, so will be going to [facility] in attempts to get stronger and then have further void trials as able. Stable for d/c to [facility]. Time spent on d/c 32 min including review of d/c paperwork and d/c med rec.
Hematuria  -hold Eliquis   -cont slow CBI  -follow up urology for TOV and timing to restart Eliquis    #Urinary retention  #BPH  - Flomax and Finasteride

## 2021-11-11 NOTE — ED ADULT NURSE NOTE - BREATH SOUNDS, MLM
Clear Complex Repair And Double Advancement Flap Text: The defect edges were debeveled with a #15 scalpel blade.  The primary defect was closed partially with a complex linear closure.  Given the location of the remaining defect, shape of the defect and the proximity to free margins a double advancement flap was deemed most appropriate for complete closure of the defect.  Using a sterile surgical marker, an appropriate advancement flap was drawn incorporating the defect and placing the expected incisions within the relaxed skin tension lines where possible.    The area thus outlined was incised deep to adipose tissue with a #15 scalpel blade.  The skin margins were undermined to an appropriate distance in all directions utilizing iris scissors.
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
## 2022-02-09 ENCOUNTER — EMERGENCY (EMERGENCY)
Facility: HOSPITAL | Age: 87
LOS: 1 days | Discharge: HOME | End: 2022-02-09
Attending: EMERGENCY MEDICINE | Admitting: EMERGENCY MEDICINE
Payer: OTHER MISCELLANEOUS

## 2022-02-09 VITALS
OXYGEN SATURATION: 98 % | SYSTOLIC BLOOD PRESSURE: 127 MMHG | TEMPERATURE: 98 F | DIASTOLIC BLOOD PRESSURE: 69 MMHG | RESPIRATION RATE: 19 BRPM | HEART RATE: 83 BPM

## 2022-02-09 VITALS
DIASTOLIC BLOOD PRESSURE: 84 MMHG | TEMPERATURE: 98 F | HEIGHT: 70 IN | OXYGEN SATURATION: 96 % | HEART RATE: 90 BPM | WEIGHT: 184.09 LBS | RESPIRATION RATE: 18 BRPM | SYSTOLIC BLOOD PRESSURE: 142 MMHG

## 2022-02-09 DIAGNOSIS — Z98.890 OTHER SPECIFIED POSTPROCEDURAL STATES: Chronic | ICD-10-CM

## 2022-02-09 LAB
ANION GAP SERPL CALC-SCNC: 6 MMOL/L — SIGNIFICANT CHANGE UP (ref 5–17)
BUN SERPL-MCNC: 14 MG/DL — SIGNIFICANT CHANGE UP (ref 7–23)
CALCIUM SERPL-MCNC: 8.9 MG/DL — SIGNIFICANT CHANGE UP (ref 8.4–10.5)
CHLORIDE SERPL-SCNC: 108 MMOL/L — SIGNIFICANT CHANGE UP (ref 96–108)
CO2 SERPL-SCNC: 30 MMOL/L — SIGNIFICANT CHANGE UP (ref 22–31)
CREAT SERPL-MCNC: 0.86 MG/DL — SIGNIFICANT CHANGE UP (ref 0.5–1.3)
GLUCOSE SERPL-MCNC: 105 MG/DL — HIGH (ref 70–99)
POTASSIUM SERPL-MCNC: 3.6 MMOL/L — SIGNIFICANT CHANGE UP (ref 3.5–5.3)
POTASSIUM SERPL-SCNC: 3.6 MMOL/L — SIGNIFICANT CHANGE UP (ref 3.5–5.3)
SODIUM SERPL-SCNC: 144 MMOL/L — SIGNIFICANT CHANGE UP (ref 135–145)

## 2022-02-09 PROCEDURE — 51702 INSERT TEMP BLADDER CATH: CPT

## 2022-02-09 PROCEDURE — 36415 COLL VENOUS BLD VENIPUNCTURE: CPT

## 2022-02-09 PROCEDURE — 99283 EMERGENCY DEPT VISIT LOW MDM: CPT | Mod: 25

## 2022-02-09 PROCEDURE — 80048 BASIC METABOLIC PNL TOTAL CA: CPT

## 2022-02-09 PROCEDURE — 99284 EMERGENCY DEPT VISIT MOD MDM: CPT

## 2022-02-09 RX ORDER — LIDOCAINE HCL 20 MG/ML
10 VIAL (ML) INJECTION ONCE
Refills: 0 | Status: COMPLETED | OUTPATIENT
Start: 2022-02-09 | End: 2022-02-09

## 2022-02-09 RX ADMIN — Medication 10 MILLILITER(S): at 02:52

## 2022-02-09 NOTE — ED PROVIDER NOTE - GASTROINTESTINAL NEGATIVE STATEMENT, MLM
I have personally performed a face to face diagnostic evaluation on this patient. I have reviewed the ACP note and agree with the history, exam and plan of care, except as noted.
no abdominal pain, no bloating, no constipation, no diarrhea, no nausea and no vomiting.

## 2022-02-09 NOTE — ED PROVIDER NOTE - CARE PROVIDER_API CALL
Logan Hernandez  UROLOGY  [address] [place] [address], [address]  [place], [place] [id number]  Phone: [phone]  Fax: [phone]  Follow Up Time: Routine

## 2022-02-09 NOTE — ED ADULT NURSE NOTE - NSIMPLEMENTINTERV_GEN_ALL_ED
Implemented All Fall Risk Interventions:  [place] to call system. Call bell, personal items and telephone within reach. Instruct patient to call for assistance. Room bathroom lighting operational. Non-slip footwear when patient is off stretcher. Physically safe environment: no spills, clutter or unnecessary equipment. Stretcher in lowest position, wheels locked, appropriate side rails in place. Provide visual cue, wrist band, yellow gown, etc. Monitor gait and stability. Monitor for mental status changes and reorient to person, place, and time. Review medications for side effects contributing to fall risk. Reinforce activity limits and safety measures with patient and family.

## 2022-02-09 NOTE — ED ADULT NURSE REASSESSMENT NOTE - CONDITION
Patient reports pain relief s/p replacement of shah catheter with coude tip 16fr. Patient urine output 800ml and continues to draining  yellow color urine./improved

## 2022-02-09 NOTE — ED PROVIDER NOTE - CLINICAL SUMMARY MEDICAL DECISION MAKING FREE TEXT BOX
pt c chronic shah, no output after shah change 2/7. distended tender bladder on exam. bladder scan >500cc per RN.  likely shah not in. will try to replace shah.  check labs r/o renal failure. pt c chronic shah, no output after shah change 2/7. distended tender bladder on exam. bladder scan >500cc per RN.  likely shah not in. will try to replace shah.  check labs r/o renal failure.    update: RN attempted to place new 16fr shah, had some resistance, tried 16fr coude, passed without difficult with return of ~700cc urine initially. total ~1L.  HDS. BMP normal. will dc.

## 2022-02-09 NOTE — ED ADULT TRIAGE NOTE - CHIEF COMPLAINT QUOTE
PT BIBA from [facility] for shah catheter replacement. PT had shah changed yesterday morning and it has not been draining.

## 2022-02-09 NOTE — ED ADULT NURSE NOTE - CHIEF COMPLAINT QUOTE
PT BIBA from [facility] for shah catheter replacement. PT had shah changed yesterday morning and it has not been draining.

## 2022-02-09 NOTE — ED PROVIDER NOTE - PATIENT PORTAL LINK FT
You can access the FollowMyHealth Patient Portal offered by [facility] by registering at the following website: [email or web address]. By joining [facility]’s FollowMyHealth portal, you will also be able to view your health information using other applications (apps) compatible with our system.

## 2022-02-09 NOTE — ED PROVIDER NOTE - NSFOLLOWUPINSTRUCTIONS_ED_ALL_ED_FT
you had your shah changed to a 16 french Coude catheter, with good urine output (~1 liter)      Follow Up as needed with your own [place] [address]  Phone: [phone]      Shah Catheter Placement and Care    WHAT YOU NEED TO KNOW:    A Shah catheter is a sterile tube that is inserted into your bladder to drain urine. It is also called an indwelling urinary catheter.     DISCHARGE INSTRUCTIONS:    Return to the emergency department if:   •Your catheter comes out.       •You suddenly have material that looks like sand in the tubing or drainage bag.      •No urine is draining into the bag and you have checked the system.      •You have pain in your hip, back, pelvis, or lower abdomen.      •You are confused or cannot think clearly.      Call your doctor or urologist if:   •You have a fever.      •You have bladder spasms for more than 1 day after the catheter is placed.      •You see blood in the tubing or drainage bag.      •You have a rash or itching where the catheter tube is secured to your skin.      •Urine leaks from or around the catheter, tubing, or drainage bag.      •The closed drainage system has accidently come open or apart.       •You see a layer of crystals inside the tubing.      •You have questions or concerns about your condition or care.      Care for your catheter and drainage bag: You can reduce your risk for infection and injury by caring for your catheter and drainage bag properly.  •Wash your hands often. Wash before and after you touch your catheter, tubing, or drainage bag. Use soap and water. Wear clean disposable gloves when you care for your catheter or disconnect the drainage bag. Wash your hands before you prepare or eat food.   Handwashing           •Clean your genital area 2 times every day. Clean your catheter area and anal opening after every bowel movement. ?For men: Use a soapy cloth to clean the tip of your penis. Start where the catheter enters. Wipe backward making sure to pull back the foreskin. Then use a cloth with clear water in the same direction to clean away the soap.       ?For women: Use a soapy cloth to clean the area that the catheter enters your body. Make sure to separate your labia and wipe toward the anus. Then use a cloth with clear water and wipe in the same direction.       •Secure the catheter tube so you do not pull or move the catheter. This helps prevent pain and bladder spasms. Healthcare providers will show you how to use medical tape or a strap to secure the catheter tube to your body.       •Keep a closed drainage system. Your catheter should always be attached to the drainage bag to form a closed system. Do not disconnect any part of the closed system unless you need to change the bag.      •Keep the drainage bag below the level of your waist. This helps stop urine from moving back up the tubing and into your bladder. Do not loop or kink the tubing. This can cause urine to back up and collect in your bladder. Do not let the drainage bag touch or lie on the floor.      •Empty the drainage bag when needed. The weight of a full drainage bag can be painful. Empty the drainage bag every 3 to 6 hours or when it is ? full.       •Clean and change the drainage bag as directed. Ask your healthcare provider how often you should change the drainage bag and what cleaning solution to use. Wear disposable gloves when you change the bag. Do not allow the end of the catheter or tubing to touch anything. Clean the ends with an alcohol pad before you reconnect them.      What to do if problems develop:   •No urine is draining into the bag: ?Check for kinks in the tubing and straighten them out.       ?Check the tape or strap used to secure the catheter tube to your skin. Make sure it is not blocking the tube.       ?Make sure you are not sitting or lying on the tubing.      ?Make sure the urine bag is hanging below the level of your waist.      •Urine leaks from or around the catheter, tubing, or drainage bag: Check if the closed drainage system has accidently come open or apart. Clean the catheter and tubing ends with a new alcohol pad and reconnect them.       Follow up with your doctor or urologist as directed: Write down your questions so you remember to ask them during your visits.

## 2022-02-09 NOTE — ED PROVIDER NOTE - OBJECTIVE STATEMENT
pt bibems from assisted living for shah malfunction, severe, no drainage for about 1 day. pt has chronic shah. had it changed 2/7 routinely. no drainage since. RN came back yesterday 2/8 and changed it twice, last 3pm, no urine output still. pt c/o discomfort in bladder, fullness. no nausae/vomiting. no fever/chills. eating and drinking ok.  on home hospice

## 2022-02-09 NOTE — ED ADULT NURSE NOTE - OBJECTIVE STATEMENT
Patient A&Ox1-2 BIBA for replacement Demarco catheter and urinary retention. Patient reports mild discomfort at this time. HHA at the bedside.  Patient had Demarco replaced 3 x since 2/7 from Hospice RN visit due to incomplete emptying.

## 2022-04-15 NOTE — ED ADULT NURSE NOTE - NSFALLRSKINDICATORS_ED_ALL_ED
Will order HVF 24-2 ROBERTO standard and OCT (RNFL) prior to next visit. Will see patient back in 6 months for IOP check. yes

## 2022-04-20 NOTE — ED ADULT NURSE NOTE - TEMPLATE LIST FOR HEAD TO TOE ASSESSMENT
"                          Physical Therapy Daily Treatment Note     Name: Lex Wesley  Clinic Number: [id number]    Therapy Diagnosis:   Encounter Diagnosis   Name Primary?    Acute pain of left shoulder Yes     Physician: ROCHELLE Ryan MD    Visit Date: 4/20/2022    Physician Orders: PT Eval and Treat   Medical Diagnosis: M79.622 (ICD-10-CM) - Pain of left upper arm  Date of Surgery: NA  Re-Assessment Date: 10/6/2021  Authorization Period Expiration: 6/1/22  Plan of Care Certification Period: 5/11/22  Visit # / Visits authorized: 10 / 20     Time In:900 am  Time Out: 950 am    Total Billable Time: 50 minutes     Precautions: Standard    Subjective      Pt reports: her competition went well and did not have shoulder pain, however did injury in 2-3rd toes on the right foot.    she was not compliant with home exercise program given last session.   Response to previous treatment:NA  Functional change: NA    Pain: 0/10  Location: left shoulder      Objective    Measured this visit: 4/20/22      Upper Extremity Strength    Shoulder Left Right   Shoulder flexion: 5/5 5/5   Shoulder Abduction: 5/5 5/5   Shoulder ER 5/5 5/5   Shoulder IR 5/5 5/5   Lower Trap 4+/5 5/5   Middle Trap 5/5 5/5       Lex received therapeutic exercises to develop strength, endurance and posture for 45 minutes including:  Elliptical x10 min    Robot arms against wall 3x 10-  [place] off 24" box 3x8--np  [place] with leg ext 24" box 2x4-np  Wheel barrel to wall for mechanics 3x10-np  Thoracic mobility on foam x3 min-  Prone Y (LT) taps 3x 30 with 2# dowel  Prone OH 2# dowel flexion 4x 20 sec-np  Prayer stretch 3x 1 min -np  Dance review x10 min      PT reassessment, education and letter for R foot and L shoulder x 15 min     Lex participated in neuromuscular re-education activities to improve: Balance, Coordination, Proprioception and Posture for 5 minutes. The following activities were included    Reformer supine 90/90 BLE shoulder flexion " "2 springs 3x10-np  Reformer supine 90/90 BLE cw and ccw 2x 10 ea 2 springs -np  Reverse bear crawl with sliders x1 lap with LT lift -np  Dead lift with bar 2x10-np  Pulses (mid) btb 3x 30 sec-np  Lifts (7#)/ chops (10#) 2x15 B-np  Sled pushes x4 laps-np  SA on foam roller gtb 2x10 with 5 sec hold    Home Exercises Provided and Patient Education Provided     Education provided:   - HEP to Go    Written Home Exercises Provided: Patient instructed to cont prior HEP.  Exercises were reviewed and Lex was able to demonstrate them prior to the end of the session.  Lex demonstrated good  understanding of the education provided.     See EMR under Patient Instructions for exercises provided {Marina single:[id number]::"4/20/2022","prior visit"    Assessment     Upon reassessment, pt has maintained most shoulder strength, except L lower trap. She was TTP at 2-3rd met heads and had pain with PF. Pt educated to avoid pointe shoes, releve, jumping and leaping in class for two weeks.    Lex is progressing well towards her goals.   Pt prognosis is Good.     Pt will continue to benefit from skilled outpatient physical therapy to address the deficits listed in the problem list box on initial evaluation, provide pt/family education and to maximize pt's level of independence in the home and community environment.     Pt's spiritual, cultural and educational needs considered and pt agreeable to plan of care and goals.    Anticipated barriers to physical therapy: none    Goals:   Short Term Goals: (4 weeks)   - Pt will increase strength to 4/5 lower traps and mid traps B, 5/5 L ER shoulder ( met)  - Decrease Pain to 2/10 as worst with all minimal lifting and OH activities unweighted (met)  - Pt to self correct posture with minimal cues (met)  - Pt independent with HEP with progressions.  (met)     Long Term Goals (8 Weeks):  - Pt will tolerate 1 full dance class painfree full out ( met)  - Pt will increase strength to 5/5 BUE in all " General planes of shoulder and scapula (met)  - Decrease Pain to 0/10 with lifting >5 lbs OH ( met)  - Pt to return to 90% PLOF (Progressing, not met)       Plan     Continue POC per patient tolerance progress shoulder strength and stability.     Lisa De Souza, PT

## 2022-04-27 NOTE — ED PROVIDER NOTE - QRS
DISPLAY PLAN FREE TEXT DISPLAY PLAN FREE TEXT DISPLAY PLAN FREE TEXT 124 DISPLAY PLAN FREE TEXT DISPLAY PLAN FREE TEXT DISPLAY PLAN FREE TEXT DISPLAY PLAN FREE TEXT

## 2022-06-09 NOTE — ED CLERICAL - BED REQUESTED
01-Nov-2018 21:31 Minocycline Counseling: Patient advised regarding possible photosensitivity and discoloration of the teeth, skin, lips, tongue and gums.  Patient instructed to avoid sunlight, if possible.  When exposed to sunlight, patients should wear protective clothing, sunglasses, and sunscreen.  The patient was instructed to call the office immediately if the following severe adverse effects occur:  hearing changes, easy bruising/bleeding, severe headache, or vision changes.  The patient verbalized understanding of the proper use and possible adverse effects of minocycline.  All of the patient's questions and concerns were addressed.

## 2022-06-15 NOTE — ED PROVIDER NOTE - RESPIRATORY, MLM
Rhofade Pregnancy And Lactation Text: This medication has not been assigned a Pregnancy Risk Category. It is unknown if the medication is excreted in breast milk. Breath sounds clear and equal bilaterally.

## 2022-10-31 NOTE — ED ADULT NURSE NOTE - IS THE PATIENT ABLE TO BE SCREENED?
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 Johnson showed Large rectal Dieulafoy lesion. Hemostasis achieved with over-the-scope clip  -  Foreign body noted in abdominal xray -  clip placed by GI   -  RUQ US without ascites, large fatty liver noted on 10/10  -hepatology dr. montilla following  - restarted lactulose No

## 2023-01-01 NOTE — DISCHARGE NOTE ADULT - DO YOU HAVE DIFFICULTY CLIMBING STAIRS
This note was copied from the mother's chart.  Discharge instructions given. Breastfeeding education sheet  called \"Breastfeeding Your Baby\" included in the discharge folder. Encouraged to watch the Discharge Video. Emphasized that mother should attempt to breastfeed at least every couple of hours with a minimum of 8 times with a goal of 10-12 feedings per 24 to establish and maintain adequate milk supply.  Infant nutrition and I&O discussed and mother encouraged to report her infant's I&O to the pediatrician.  If infant not latching, it's recommended she use a breastpump and pace feed any expressed breast milk, pasturized donor milk, or formula until infant can latch. Lactation number written on white board and encouraged to call if patient needs assistance or has any further questions. Mother given written instructions  on how to obtain an outpatient consultation and encouraged to participate in our Breastfeeding Support group.   Yes

## 2023-03-14 NOTE — PROGRESS NOTE ADULT - REASON FOR ADMISSION
leg pain, L>R and increased swelling, redness
Yes

## 2023-04-25 NOTE — ED ADULT TRIAGE NOTE - LOCATION:
Assessment & Plan     ICD-10-CM    1. Coronary artery disease involving native coronary artery of native heart without angina pectoris  I25.10 rosuvastatin (CRESTOR) 40 MG tablet     metoprolol succinate ER (TOPROL XL) 25 MG 24 hr tablet     TSH with free T4 reflex     CBC with platelets     Comprehensive metabolic panel     TSH with free T4 reflex     CBC with platelets     Comprehensive metabolic panel      2. History of non-ST elevation myocardial infarction (NSTEMI) - 12/26/2018 - Tx to [facility]  I25.2 rosuvastatin (CRESTOR) 40 MG tablet     metoprolol succinate ER (TOPROL XL) 25 MG 24 hr tablet      3. 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 Mixed hyperlipidemia  E78.2 rosuvastatin (CRESTOR) 40 MG tablet     Lipid Profile     Lipid Profile      5. Mixed conductive and sensorineural hearing loss, bilateral  H90.6       6. Encounter for screening for malignant neoplasm of prostate  Z12.5 PSA Screen GH     PSA Screen GH      7. Vaccine counseling  Z71.85       8. Annual physical exam  Z00.00       9. Nocturia approx 1-2x nightly  R35.1       10. Trigger finger, right ring finger  M65.341       11. Dupuytren's contracture of left hand  M72.0       12. Vitamin B12 deficiency  E53.8 Vitamin B12     vitamin B complex with vitamin C (VITAMIN  B COMPLEX) tablet     Vitamin B12      13. Vitamin D deficiency  E55.9 Vitamin D Total     vitamin D3 (CHOLECALCIFEROL) 50 mcg (2000 units) tablet     Vitamin D Total      Patient presents for annual physical examination as well as follow-up multiple issues.    Coronary disease, appears stable.  Denies exertional angina.  Doing well with Crestor, metoprolol.  Needs refills.  Check labs.  Has history of non-ST elevation MI in the past.  History of coronary bypass grafting in the past.  Currently asymptomatic.  Doing well  with current medications.    Mixed bilateral hearing loss.  Ongoing.  May need to consider updating his audiology testing.    Prostate lab cancer screening today.  Check labs.    Nocturia, reports 1-2 times nightly.  Check PSA levels today.  Consider urinalysis if symptoms change or worsen.  Seems to be related to hydration in the evening.    Trigger finger, right ring finger.  Also has Dupuytren's contracture of the left hand.  These are ongoing issues.  He plans to continue to work on a finger splint for his right hand and stretching of his left hand with massage of the palm.    Vitamin D and B12 deficiency.  Ongoing.  Check labs.  Start vitamin D and B12 supplements.  Prescription sent to pharmacy.    Mixed hyperlipidemia.  Ongoing. LDL is at goal: Yes. Triglycerides are at goal: Yes.  Medication side effects reported: None.   Continue current medications for now -Crestor 40 mg daily. Medication list reviewed/updated. Refills completed as needed.   Recent Labs   Lab Test 04/25/23  1003 12/10/20  0945   CHOL 119 131   HDL 42 40   LDL 57 72   TRIG 98 94     Vaccine counseling completed.  Encouraged annual vaccinations.    Encouraged weight loss and regular exercise.     Return in about 1 year (around 4/25/2024) for Welcome to Medicare Visit.    Vladislav Jerez MD  [facility]     SUBJECTIVE:   CC: Ellis is an 64 year old who presents for preventative health visit.       4/25/2023     9:02 AM   Additional Questions   Roomed by Adeflo ZHU / LINWOOD   Accompanied by n/a   Patient has been advised of split billing requirements and indicates understanding: Yes  Healthy Habits:     Getting at least 3 servings of Calcium per day:  Yes    Bi-annual eye exam:  Yes    Dental care twice a year:  NO    Sleep apnea or symptoms of sleep apnea:  None    Diet:  Regular (no restrictions)    Frequency of exercise:  6-7 days/week    Duration of exercise:  15-30 minutes    Taking medications regularly:  Yes    Barriers to  taking medications:  Not applicable    Medication side effects:  None    PHQ-2 Total Score: 0    Additional concerns today:  No    Today's PHQ-2 Score:       2023     8:45 AM   PHQ-2 (  [employer])   Q1: Little interest or pleasure in doing things 0   Q2: Feeling down, depressed or hopeless 0   PHQ-2 Score 0   Q1: Little interest or pleasure in doing things Not at all    Not at all   Q2: Feeling down, depressed or hopeless Not at all    Not at all   PHQ-2 Score 0    0       Have you ever done Advance Care Planning? (For example, a Health Directive, POLST, or a discussion with a medical provider or your loved ones about your wishes): Yes, advance care planning is on file.    Social History     Tobacco Use     Smoking status: Former     Types: Cigarettes, Cigars     Quit date: 2014     Years since quittin.6     Smokeless tobacco: Never     Tobacco comments:     Quit smoking: Only smokes during deer season and has 1-2 cigars per year   Vaping Use     Vaping status: Not on file   Substance Use Topics     Alcohol use: Yes     Alcohol/week: 2.0 standard drinks of alcohol     Types: 2 Cans of beer per week     Comment: maybe 5 a week at the very most         2023     8:45 AM   Alcohol Use   Prescreen: >3 drinks/day or >7 drinks/week? No     Last PSA:   Prostate Specific Antigen Screen   Date Value Ref Range Status   2023 0.77 0.00 - 4.50 ng/mL Final       Reviewed orders with patient. Reviewed health maintenance and updated orders accordingly - Yes    Reviewed and updated as needed this visit by clinical staff   Tobacco  Allergies  Meds  Problems  Med Hx  Surg Hx  Fam Hx  Soc   Hx        Reviewed and updated as needed this visit by Provider   Tobacco  Allergies  Meds  Problems  Med Hx  Surg Hx  Fam Hx           Review of Systems   Constitutional: Negative for chills and fever.   HENT: Negative for congestion, ear pain, hearing loss and sore throat.    Eyes: Negative for pain and  visual disturbance.   Respiratory: Negative for cough and shortness of breath.    Cardiovascular: Negative for chest pain, palpitations and peripheral edema.   Gastrointestinal: Negative for abdominal pain, constipation, diarrhea, heartburn, hematochezia and nausea.   Genitourinary: Negative for dysuria, frequency, genital sores, hematuria, impotence, penile discharge and urgency.        Nocturia 1-2x nightly   Musculoskeletal: Negative for arthralgias, joint swelling and myalgias.   Skin: Negative for rash.        trunkal eczema rash if showers too much, worse in winter   Allergic/Immunologic: Negative for immunocompromised state.   Neurological: Negative for dizziness, weakness, headaches and paresthesias.   Hematological: Does not bruise/bleed easily.   Psychiatric/Behavioral: Negative for mood changes. The patient is not nervous/anxious.        OBJECTIVE:   /68 (BP Location: Right arm, Patient Position: Sitting, Cuff Size: Adult Regular)   Pulse 70   Temp 98.1  F (36.7  C) (Temporal)   Resp 20   Ht 1.829 m (6')   Wt 96.5 kg (212 lb 12.8 oz)   SpO2 98%   BMI 28.86 kg/m      Physical Exam  Constitutional:       General: He is not in acute distress.     Appearance: He is well-developed. He is not diaphoretic.   HENT:      Head: Normocephalic and atraumatic.   Eyes:      General: No scleral icterus.     Conjunctiva/sclera: Conjunctivae normal.   Neck:      Vascular: No carotid bruit.   Cardiovascular:      Rate and Rhythm: Normal rate and regular rhythm.      Pulses: Normal pulses.      Comments: Varicose veins  Pulmonary:      Effort: Pulmonary effort is normal.      Breath sounds: Normal breath sounds.   Abdominal:      Palpations: Abdomen is soft.      Tenderness: There is no abdominal tenderness.   Musculoskeletal:         General: Deformity (dupentyre contracture on left 4th, 5th fingers) present.      Cervical back: Neck supple.      Right lower leg: No edema.      Left lower leg: No edema.    Lymphadenopathy:      Cervical: No cervical adenopathy.   Skin:     General: Skin is warm and dry.      Findings: Rash (trunkal eczema rash if showers too much ) present.   Neurological:      General: No focal deficit present.      Mental Status: He is alert. Mental status is at baseline.   Psychiatric:         Mood and Affect: Mood normal.         Behavior: Behavior normal.       Diagnostic Test Results:  Labs reviewed in Epic  Results for orders placed or performed in visit on 04/25/23   PSA Screen GH     Status: Normal   Result Value Ref Range    Prostate Specific Antigen Screen 0.77 0.00 - 4.50 ng/mL    Narrative    This result is obtained using the Roche Elecsys total PSA method on the don e601 immunoassay analyzer. Results obtained with different assay methods or kits cannot be used interchangeably.   TSH with free T4 reflex     Status: Normal   Result Value Ref Range    TSH 1.42 0.30 - 4.20 uIU/mL   CBC with platelets     Status: Normal   Result Value Ref Range    WBC Count 6.4 4.0 - 11.0 10e3/uL    RBC Count 4.76 4.40 - 5.90 10e6/uL    Hemoglobin 14.4 13.3 - 17.7 g/dL    Hematocrit 42.4 40.0 - 53.0 %    MCV 89 78 - 100 fL    MCH 30.3 26.5 - 33.0 pg    MCHC 34.0 31.5 - 36.5 g/dL    RDW 12.6 10.0 - 15.0 %    Platelet Count 231 150 - 450 10e3/uL   Lipid Profile     Status: Normal   Result Value Ref Range    Cholesterol 119 <200 mg/dL    Triglycerides 98 <150 mg/dL    Direct Measure HDL 42 >=40 mg/dL    LDL Cholesterol Calculated 57 <=100 mg/dL    Non HDL Cholesterol 77 <130 mg/dL    Narrative    Cholesterol  Desirable:  <200 mg/dL    Triglycerides  Normal:  Less than 150 mg/dL  Borderline High:  150-199 mg/dL  High:  200-499 mg/dL  Very High:  Greater than or equal to 500 mg/dL    Direct Measure HDL  Female:  Greater than or equal to 50 mg/dL   Male:  Greater than or equal to 40 mg/dL    LDL Cholesterol  Desirable:  <100mg/dL  Above Desirable:  100-129 mg/dL   Borderline High:  130-159 mg/dL   High:   Left arm; 160-189 mg/dL   Very High:  >= 190 mg/dL    Non HDL Cholesterol  Desirable:  130 mg/dL  Above Desirable:  130-159 mg/dL  Borderline High:  160-189 mg/dL  High:  190-219 mg/dL  Very High:  Greater than or equal to 220 mg/dL   Comprehensive metabolic panel     Status: Abnormal   Result Value Ref Range    Sodium 141 136 - 145 mmol/L    Potassium 4.8 3.4 - 5.3 mmol/L    Chloride 103 98 - 107 mmol/L    Carbon Dioxide (CO2) 26 22 - 29 mmol/L    Anion Gap 12 7 - 15 mmol/L    Urea Nitrogen 14.9 8.0 - 23.0 mg/dL    Creatinine 1.20 (H) 0.67 - 1.17 mg/dL    Calcium 9.0 8.8 - 10.2 mg/dL    Glucose 94 70 - 99 mg/dL    Alkaline Phosphatase 69 40 - 129 U/L    AST 22 10 - 50 U/L    ALT 30 10 - 50 U/L    Protein Total 6.8 6.4 - 8.3 g/dL    Albumin 4.0 3.5 - 5.2 g/dL    Bilirubin Total 0.6 <=1.2 mg/dL    GFR Estimate 68 >60 mL/min/1.73m2   Vitamin B12     Status: Normal   Result Value Ref Range    Vitamin B12 545 232 - 1,245 pg/mL      Vitamin B12 is relatively low.  Chemistry panel shows elevated creatinine.  Liver enzymes normal.  Cholesterol levels are all at goal.  CBC is normal.  TSH is normal.  PSA is normal.    Patient has been advised of split billing requirements and indicates understanding: Yes      COUNSELING:   Reviewed preventive health counseling, as reflected in patient instructions  Special attention given to:        Regular exercise       Healthy diet/nutrition       Vision screening       Hearing screening       Immunizations    BMI:   Estimated body mass index is 28.86 kg/m  as calculated from the following:    Height as of this encounter: 1.829 m (6').    Weight as of this encounter: 96.5 kg (212 lb 12.8 oz).         He reports that he quit smoking about 8 years ago. His smoking use included cigarettes and cigars. He has never used smokeless tobacco.            Vladislav Jerez MD  [facility]

## 2023-05-11 NOTE — PROGRESS NOTE ADULT - PROBLEM SELECTOR PLAN 8
P/t calling to r/s her appointment she had for 5/11 2023 @ 4:00 pm stated wasn't feeling well   Please call back to r/s Allopurinol 300mg qD

## 2023-05-16 NOTE — PROGRESS NOTE ADULT - PROBLEM SELECTOR PLAN 3
INR at goal. Medications and chart reviewed. No changes noted to necessitate adjustment of warfarin or follow-up plan. See calendar.     Continue medical management, monitor for regular urine output

## 2023-07-10 NOTE — PHYSICAL THERAPY INITIAL EVALUATION ADULT - GAIT TRAINING, PT EVAL
Last Visit Date: 3/20/2023   Next Visit Date: 9/21/2023 In 1-3 treats, patient will ambulate 150 feet with RW independently

## 2023-11-09 NOTE — PROGRESS NOTE ADULT - SUBJECTIVE AND OBJECTIVE BOX
Patient reports improvement in diarrhea since yesterday.  No abdominal pain.  Tolerating clear liquid diet without much difficulty.    MEDICATIONS  (STANDING):  allopurinol 300 milliGRAM(s) Oral daily  ciprofloxacin   IVPB 400 milliGRAM(s) IV Intermittent every 12 hours  finasteride 5 milliGRAM(s) Oral daily  heparin  Injectable 5000 Unit(s) SubCutaneous every 8 hours  levothyroxine 112 MICROGram(s) Oral daily  magnesium oxide 400 milliGRAM(s) Oral daily  metroNIDAZOLE  IVPB 500 milliGRAM(s) IV Intermittent every 8 hours  multivitamin 1 Tablet(s) Oral daily  pantoprazole    Tablet 40 milliGRAM(s) Oral before breakfast  tamsulosin 0.4 milliGRAM(s) Oral at bedtime    MEDICATIONS  (PRN):  acetaminophen   Tablet .. 650 milliGRAM(s) Oral every 6 hours PRN Temp greater or equal to 38C (100.4F), Mild Pain (1 - 3)  traMADol 50 milliGRAM(s) Oral three times a day PRN Severe Pain (7 - 10)      Allergies  penicillin (Unknown)      Vital Signs Last 24 Hrs  T(C): 36.8 (03 Nov 2018 17:10), Max: 36.8 (02 Nov 2018 23:02)  T(F): 98.2 (03 Nov 2018 17:10), Max: 98.3 (03 Nov 2018 05:46)  HR: 82 (03 Nov 2018 17:10) (60 - 82)  BP: 123/42 (03 Nov 2018 17:10) (123/42 - 133/45)  BP(mean): --  RR: 14 (03 Nov 2018 17:10) (14 - 14)  SpO2: 100% (03 Nov 2018 17:10) (99% - 100%)    PHYSICAL EXAM:    Constitutional: NAD, well-developed  Neck: No LAD, supple  Respiratory: clear to auscultation b/l no rales, rhonchi, wheezing  Cardiovascular: S1 and S2, RRR, no murmur  Gastrointestinal: +BS x4, soft, NT/ND, neg HSM,  Extremities: No peripheral edema, neg clubbing, cyanosis  Vascular: 2+ peripheral pulses  Neurological: A/O x 3, no focal deficits  Psychiatric: Normal mood, normal affect  Skin: No rashes      LABS:                        11.0   4.5   )-----------( 158      ( 03 Nov 2018 06:51 )             35.0     11-03    146<H>  |  113<H>  |  21  ----------------------------<  92  3.4<L>   |  26  |  1.17    Ca    8.5      03 Nov 2018 06:51  Phos  2.4     11-03  Mg     1.6     11-03    TPro  5.8<L>  /  Alb  1.8<L>  /  TBili  0.4  /  DBili  x   /  AST  20  /  ALT  19  /  AlkPhos  50  11-02      LIVER FUNCTIONS - ( 02 Nov 2018 07:22 )  Alb: 1.8 g/dL / Pro: 5.8 g/dL / ALK PHOS: 50 U/L / ALT: 19 U/L DA / AST: 20 U/L / GGT: x No

## 2023-12-11 NOTE — PHYSICAL THERAPY INITIAL EVALUATION ADULT - LEVEL OF INDEPENDENCE: SIT/STAND, REHAB EVAL
Patient presents for depo injection  IM injection given in right Deltoid  Patient tolerated well, patient supplied        [facility] [id number]  LOT [id number]  EXP 04/2025 maximum assist (25% patients effort)

## 2024-11-27 NOTE — CONSULT NOTE ADULT - PROVIDER SPECIALTY LIST ADULT
Rheumatology intact PAST SURGICAL HISTORY:  [place] teeth removed      PAST SURGICAL HISTORY:  [place] teeth removed

## 2024-12-11 NOTE — PATIENT PROFILE ADULT - SURGICAL SITE INCISION
Detail Level: Generalized
Sunscreen Recommendations: Mineral based
Detail Level: Detailed
Detail Level: Zone
Detail Level: Simple
no

## 2025-01-20 NOTE — ED PROVIDER NOTE - CPE EDP RESP NORM
normal... Rest and elevate the hand.  Change the dressing once daily (starting tomorrow).  Wash gently and apply bacitracin ointment with each dressing change.    Follow up with primary care provider/hand surgeon within 1 week.  Return to the Emergency Department if you have any new or worsening symptoms, or if you have any concerns.  ======================  Nonsutured Laceration Care  A laceration is a cut that may go through all layers of the skin and into the tissue that is right under the skin.    A laceration is usually stitched up (sutured) or closed with adhesive strips or skin glue shortly after the injury happens. However, if the wound is dirty or if several hours pass before medical treatment is provided, it is likely that bacteria will enter the wound. Closing a laceration after bacteria have entered it increases the risk for infection. In these cases, your health care provider may leave the laceration open (nonsutured) and cover it with a bandage (dressing). This type of treatment helps prevent infection and allows the wound to heal from the deepest layer of tissue damage up to the surface.    Nonsutured healing is also known as secondary wound healing. This is more common for wounds that involve loss of tissue, are irregular in shape and size, or are on surfaces of the body where movement makes sutures or other closure methods impossible.    How to care for your nonsutured laceration  Two open wounds. One is normal. The other is red with pus and infected.  Follow instructions from your health care provider about how to take care of your wound.  Keep the wound clean and dry.  Change any dressings as told by your health care provider. This includes changing the dressing when it starts to smell, or when it gets wet or dirty.  Clean the wound one time each day, or as often as told by your health care provider. To clean your wound:  Wash your hands with soap and water for at least 20 seconds before and after touching your wound or changing your dressing. If soap and water are not available, use hand .  Remove any dressing as told by your health care provider.  Clean the wound with water or irrigation solution as told by your health care provider.  Pat the wound dry with a clean towel. Do not rub the wound.  Apply a thin layer of antibiotic ointment or another topical ointment to the wound as told by your health care provider. This will prevent infection and keep the dressing from sticking to the wound.  Apply a new dressing as told by your health care provider.  Check your wound every day for signs of infection. Watch for:  More redness, swelling, or pain.  Fluid or blood.  Warmth.  Pus or a bad smell.  Do not take baths, swim, or do anything that puts your wound underwater until your health care provider approves.  Do not scratch or pick at the wound.  Do not usedisinfectants or antiseptics, such as rubbing alcohol, to clean your wound unless told by your health care provider.  Follow these instructions at home:  Medicines    Take over-the-counter and prescription medicines only as told by your health care provider.  If you were prescribed an antibiotic medicine, take or apply it as told by your health care provider. Do not stop using the antibiotic even if your condition improves.  Managing pain and swelling    If directed, put ice on the injured area. To do this:  Put ice in a plastic bag.  Place a towel between your skin and the bag.  Leave the ice on for 20 minutes, 2–3 times a day.  Remove the ice if your skin turns bright red. This is very important. If you cannot feel pain, heat, or cold, you have a greater risk of damage to the area.  Raise (elevate) the injured area above the level of your heart while you are sitting or lying down.  General instructions    Avoid any activity that could cause your laceration to reopen.  Keep all follow-up visits. This is important.  Contact a health care provider if:  You received a tetanus shot and you have swelling, severe pain, redness, or bleeding at the injection site.  Your pain is not controlled with medicine.  You have any of these signs of infection:  More redness, swelling, or pain around your wound.  Fluid or blood coming from your wound.  Warmth coming from your wound.  Pus or a bad smell coming from your wound.  A fever.  You notice something coming out of the wound, such as wood or glass.  You notice a change in the color of your skin near your wound.  You develop a new rash.  You need to change the dressing often.  You develop numbness around your wound.  Get help right away if:  Your pain suddenly increases and is severe.  You develop severe swelling around the wound.  The wound is on your hand or foot, and you cannot properly move a finger or toe.  The wound is on your hand or foot, and you notice that your fingers or toes look pale or bluish.  You have a red streak going away from your wound.  You develop painful lumps near the wound or on skin anywhere else on your body.  Summary  A laceration is a cut that may go through all layers of the skin and into the tissue that is right under the skin. It is usually closed with stitches, tape, or skin glue shortly after the injury happens.  If a wound is dirty or if several hours pass before medical treatment is provided, the laceration may be kept open (nonsutured) and covered with a bandage.  Nonsutured laceration helps prevent infection and allows the wound to heal from the deepest layer of tissue damage up to the surface.  Follow instructions from your health care provider about how to take care of your wound.  This information is not intended to replace advice given to you by your health care provider. Make sure you discuss any questions you have with your health care provider.

## 2025-03-25 NOTE — ED PROVIDER NOTE - INTERPRETATION
Spoke with patient informing him of lab results per  \"  Labs are fine ,  cholesterol high  lose weight and heathy diet   \" Patient verbalized understanding and will bring a form of completion to our office for  to fill out.   RBBB

## 2025-04-18 NOTE — PATIENT PROFILE ADULT - NSASFUNCLEVELADLTRANSFER_GEN_A_NUR
2 = assistive person
Responsibility to children, family, or others/Fear of death or the actual act of killing self

## 2025-05-02 NOTE — PROGRESS NOTE ADULT - SUBJECTIVE AND OBJECTIVE BOX
[FreeTextEntry1] : This is a 22 y/o Male presents today for initial evaluation of bilateral inguinal hernias.   The pt with a PMHx of anxiety, GERD and no PSHx.  Patient reports worsening of discomfort over the last 1 month in right groin exacerbated by coughing, straining, sneezing, lifting. Patient reports expansion of bulging/swelling that is reducible and associated with no pain. The pt also noticed similar symptoms on the left groin. He also reports urinary frequency but is unsure if it is related to the inguinal hernia. Relief with self-reduction/low activity/reclining. Denies nausea, vomiting, fever, chills, pain out of proportion, chest pain, HA, dizziness. Denies constipation, difficulty with bowel movements or urination. Denies shortness of breath, HENDRIX, or difficulty breathing, patient can walk / climb 2 flights without stopping. Denies hx of blood clots or bleeding problems.   Imaging: U/S bilateral groin (04/2025): direct right inguinal hernia. Negative left inguinal hernia.    Exam significant for: +small bilateral inguinal hernia, reducible. R>L.   The nature and risks of hernia were discussed as were signs and symptoms of incarceration, obstruction and strangulation as possible risks of observation. A thorough preoperative education has been performed about the role and the different surgical options have been discussed with patient. Risks, benefits and alternatives have been discussed and patient verbalizes understanding. Laparoscopic/Robotic/Open repair of inguinal hernia/abdominal hernia was discussed with the patient at length. The risks, benefits, and alternatives of the types of repair as well as to the use of mesh were discussed and all questions answered. Risks of hernia repair include, but are not limited to infection, bleeding, recurrence and injury to adjacent structures and nerves. Advised patient about possible risk of future complications if hernia worsens or goes untreated.  -The patient was offered the options of robotic-assisted bilateral inguinal hernias repair with mesh versus conservative monitoring for six months. He prefers to manage the right inguinal hernia conservatively at this time. Patient is a 89y old  Male who presents with a chief complaint of hematuria (03 Nov 2021 15:07)    Patient seen and examined at bedside.  no acute events overnight    ALLERGIES:  penicillin (Unknown)        Vital Signs Last 24 Hrs  T(F): 97.9 (04 Nov 2021 05:34), Max: 99.4 (03 Nov 2021 16:17)  HR: 61 (04 Nov 2021 05:34) (61 - 85)  BP: 123/64 (04 Nov 2021 05:34) (111/62 - 123/64)  RR: 16 (04 Nov 2021 05:34) (16 - 17)  SpO2: 97% (04 Nov 2021 05:34) (96% - 98%)  I&O's Summary    03 Nov 2021 07:01  -  04 Nov 2021 07:00  --------------------------------------------------------  IN: 1060 mL / OUT: 1500 mL / NET: -440 mL      MEDICATIONS:  acetaminophen     Tablet .. 650 milliGRAM(s) Oral every 8 hours  allopurinol 300 milliGRAM(s) Oral daily  apixaban 2.5 milliGRAM(s) Oral two times a day  ascorbic acid 500 milliGRAM(s) Oral daily  bisacodyl 5 milliGRAM(s) Oral at bedtime  ferrous    sulfate 325 milliGRAM(s) Oral two times a day  finasteride 5 milliGRAM(s) Oral daily  fluticasone propionate 50 MICROgram(s)/spray Nasal Spray 1 Spray(s) Both Nostrils two times a day PRN  gabapentin 300 milliGRAM(s) Oral <User Schedule>  hydrochlorothiazide 25 milliGRAM(s) Oral daily  influenza  Vaccine (HIGH DOSE) 0.7 milliLiter(s) IntraMuscular once  ketotifen 0.025% Ophthalmic Solution 1 Drop(s) Both EYES daily  levothyroxine 100 MICROGram(s) Oral daily  levothyroxine 88 MICROGram(s) Oral daily  melatonin 6 milliGRAM(s) Oral at bedtime PRN  montelukast 10 milliGRAM(s) Oral at bedtime  multivitamin 1 Tablet(s) Oral daily  polyethylene glycol 3350 17 Gram(s) Oral daily PRN  senna 2 Tablet(s) Oral at bedtime  silver sulfADIAZINE 1% Cream 1 Application(s) Topical daily  tamsulosin 0.8 milliGRAM(s) Oral at bedtime  torsemide 40 milliGRAM(s) Oral daily      PHYSICAL EXAM:  General: NAD, A/O x 3  ENT: MMM, no thrush  Neck: Supple, No JVD  Lungs: Clear to auscultation bilaterally, non labored  Cardio: S1S2 +murmur  Abdomen: Soft, Nontender  Extremities: No cyanosis, No edema    LABS:                        11.9   7.60  )-----------( 296      ( 02 Nov 2021 08:32 )             37.8     11-02    145  |  104  |  50  ----------------------------<  105  3.5   |  33  |  1.48    Ca    9.3      02 Nov 2021 08:32      eGFR if Non African American: 38 mL/min/1.73M2 (11-02-21 @ 08:32)  eGFR if : 44 mL/min/1.73M2 (11-02-21 @ 08:32)                                  Culture - Urine (collected 30 Oct 2021 09:45)  Source: Clean Catch Clean Catch (Midstream)  Final Report (31 Oct 2021 13:59):    No growth      COVID-19 PCR: NotDetec (10-31-21 @ 06:00)      RADIOLOGY & ADDITIONAL TESTS:    Care Discussed with Consultants/Other Providers:    Patient is a 89y old  Male who presents with a chief complaint of hematuria (03 Nov 2021 15:07)    Patient seen and examined at bedside.  no acute events overnight    ALLERGIES:  penicillin (Unknown)        Vital Signs Last 24 Hrs  T(F): 97.9 (04 Nov 2021 05:34), Max: 99.4 (03 Nov 2021 16:17)  HR: 61 (04 Nov 2021 05:34) (61 - 85)  BP: 123/64 (04 Nov 2021 05:34) (111/62 - 123/64)  RR: 16 (04 Nov 2021 05:34) (16 - 17)  SpO2: 97% (04 Nov 2021 05:34) (96% - 98%)  I&O's Summary    03 Nov 2021 07:01  -  04 Nov 2021 07:00  --------------------------------------------------------  IN: 1060 mL / OUT: 1500 mL / NET: -440 mL      MEDICATIONS:  acetaminophen     Tablet .. 650 milliGRAM(s) Oral every 8 hours  allopurinol 300 milliGRAM(s) Oral daily  apixaban 2.5 milliGRAM(s) Oral two times a day  ascorbic acid 500 milliGRAM(s) Oral daily  bisacodyl 5 milliGRAM(s) Oral at bedtime  ferrous    sulfate 325 milliGRAM(s) Oral two times a day  finasteride 5 milliGRAM(s) Oral daily  fluticasone propionate 50 MICROgram(s)/spray Nasal Spray 1 Spray(s) Both Nostrils two times a day PRN  gabapentin 300 milliGRAM(s) Oral <User Schedule>  hydrochlorothiazide 25 milliGRAM(s) Oral daily  influenza  Vaccine (HIGH DOSE) 0.7 milliLiter(s) IntraMuscular once  ketotifen 0.025% Ophthalmic Solution 1 Drop(s) Both EYES daily  levothyroxine 100 MICROGram(s) Oral daily  levothyroxine 88 MICROGram(s) Oral daily  melatonin 6 milliGRAM(s) Oral at bedtime PRN  montelukast 10 milliGRAM(s) Oral at bedtime  multivitamin 1 Tablet(s) Oral daily  polyethylene glycol 3350 17 Gram(s) Oral daily PRN  senna 2 Tablet(s) Oral at bedtime  silver sulfADIAZINE 1% Cream 1 Application(s) Topical daily  tamsulosin 0.8 milliGRAM(s) Oral at bedtime  torsemide 40 milliGRAM(s) Oral daily      PHYSICAL EXAM:  General: NAD, A/O x 3  ENT: MMM, no thrush  Neck: Supple, No JVD  Lungs: Clear to auscultation bilaterally, non labored  Cardio: S1S2 +murmur  Abdomen: Soft, Nontender  Extremities: No cyanosis, No edema    LABS:                        11.9   7.60  )-----------( 296      ( 02 Nov 2021 08:32 )             37.8     11-02    145  |  104  |  50  ----------------------------<  105  3.5   |  33  |  1.48    Ca    9.3      02 Nov 2021 08:32      eGFR if Non African American: 38 mL/min/1.73M2 (11-02-21 @ 08:32)  eGFR if : 44 mL/min/1.73M2 (11-02-21 @ 08:32)      Culture - Urine (collected 30 Oct 2021 09:45)  Source: Clean Catch Clean Catch (Midstream)  Final Report (31 Oct 2021 13:59):    No growth      COVID-19 PCR: [facility] (10-31-21 @ 06:00)

## 2025-07-22 NOTE — ED PROVIDER NOTE - SEVERITY
Endoscopy recovery  Patient returned to baseline, vital signs stable (see vital sign flowsheet). Patient offered liquids and tolerated well. Respiratory status within defined limits. Abdomen soft not tender. Skin with in defined limits. Responsible party driving patient home was given the opportunity to ask questions. Patient discharged with documented belongings.     MILD